# Patient Record
Sex: FEMALE | Race: WHITE | NOT HISPANIC OR LATINO | URBAN - NONMETROPOLITAN AREA
[De-identification: names, ages, dates, MRNs, and addresses within clinical notes are randomized per-mention and may not be internally consistent; named-entity substitution may affect disease eponyms.]

---

## 2023-02-14 ENCOUNTER — OFFICE VISIT (OUTPATIENT)
Dept: INTERNAL MEDICINE | Facility: CLINIC | Age: 43
End: 2023-02-14
Payer: COMMERCIAL

## 2023-02-14 VITALS
TEMPERATURE: 98 F | OXYGEN SATURATION: 97 % | DIASTOLIC BLOOD PRESSURE: 80 MMHG | HEART RATE: 80 BPM | SYSTOLIC BLOOD PRESSURE: 120 MMHG | HEIGHT: 67 IN | BODY MASS INDEX: 23.54 KG/M2 | WEIGHT: 150 LBS

## 2023-02-14 DIAGNOSIS — Z13.29 SCREENING FOR ENDOCRINE DISORDER: ICD-10-CM

## 2023-02-14 DIAGNOSIS — Z12.31 SCREENING MAMMOGRAM FOR BREAST CANCER: ICD-10-CM

## 2023-02-14 DIAGNOSIS — Z13.0 SCREENING FOR BLOOD DISEASE: ICD-10-CM

## 2023-02-14 DIAGNOSIS — Z13.220 SCREENING, LIPID: ICD-10-CM

## 2023-02-14 DIAGNOSIS — B35.4 RINGWORM OF BODY: ICD-10-CM

## 2023-02-14 DIAGNOSIS — M95.8 ACQUIRED DEFORMITY OF CLAVICLE: ICD-10-CM

## 2023-02-14 DIAGNOSIS — Z00.00 WELL ADULT EXAM: Primary | ICD-10-CM

## 2023-02-14 DIAGNOSIS — M25.541 ARTHRALGIA OF RIGHT HAND: ICD-10-CM

## 2023-02-14 PROCEDURE — 99386 PREV VISIT NEW AGE 40-64: CPT | Performed by: FAMILY MEDICINE

## 2023-02-14 NOTE — ASSESSMENT & PLAN NOTE
Unremarkable PE findings. Discussed with the patient routine health maintenance including:  Vaccines, dental/eye health, healthy diet and exercise. Mental health has been addressed today as well. Routine labs have been ordered. Mammogram has been ordered. She will schedule an appointment with gynecology on her own. Declines flu and COVID vaccines today.

## 2023-02-14 NOTE — PROGRESS NOTES
"Kimberley Win is a 43 y.o. female.    Chief Complaint   Patient presents with   • Annual Exam       HPI     Kimberley Win is a 43-year-old female who presents today for an annual physical exam and to establish care.    Patient reports that she has had a spot on her right ankle for 1 or 2 years that constantly itches. She states that the spot returns in different locations on her body, such as her arm, and it remains itchy for a few years before it eventually goes away. Patient notes that she has had a pet cat for years.    Patient notes that her clavicle experiences pain intermittently while she is driving. She describes that the spot on her clavicle feels \"pointy\", and her back experiences tenderness when it is pressed on.    She states that she feels \"pinching\" towards the back of her leg causing intermittent pain which eventually goes away. She notes that the pain occurred more often prior to her regular exercise.    Patient experiences painful knots and potential arthritis in her right hand. Her aunt was diagnosed with rheumatoid arthritis last year.    She last had a Pap smear and mammogram sometime before the COVID-19 lockdown. Patient states that she notices abnormal lumps or bumps on her breasts during her menses. She is up to date on her tetanus vaccine until 2027. She is due for the COVID-19 vaccine, however she declines to obtain it at this time. Patient notes that she is currently up to date on her dental exams and eye exams.She confirms that she is actively exercising and follow a healthy diet.  Denies any concern for anxiety or depression.     The following portions of the patient's history were reviewed and updated as appropriate: allergies, current medications, past family history, past medical history, past social history, past surgical history and problem list.     Past Medical History:   Diagnosis Date   • Arthritis        History reviewed. No pertinent surgical history.    Family History "   Problem Relation Age of Onset   • COPD Mother    • Diabetes Mother    • Early death Mother    • Heart disease Mother    • Stroke Mother    • Breast cancer Mother    • COPD Maternal Grandfather        Social History     Socioeconomic History   • Marital status:    Tobacco Use   • Smoking status: Former     Packs/day: 0.25     Years: 10.00     Pack years: 2.50     Types: Cigarettes     Start date: 1995     Quit date: 2005     Years since quittin.0     Passive exposure: Past   • Smokeless tobacco: Never   Vaping Use   • Vaping Use: Never used   Substance and Sexual Activity   • Alcohol use: Not Currently     Comment: rare   • Drug use: Never   • Sexual activity: Yes     Partners: Male     Birth control/protection: Condom       No Known Allergies    No current outpatient medications on file.    ROS    Review of Systems   Constitutional: Negative for chills, fatigue and fever.   HENT: Negative for congestion, postnasal drip and sore throat.    Eyes: Negative for blurred vision and visual disturbance.   Respiratory: Negative for cough, shortness of breath and wheezing.    Cardiovascular: Negative for chest pain and leg swelling.   Gastrointestinal: Negative for abdominal pain, constipation, diarrhea, nausea and vomiting.   Endocrine: Positive for cold intolerance. Negative for heat intolerance.   Genitourinary: Negative for difficulty urinating.   Musculoskeletal: Positive for arthralgias. Negative for back pain.   Skin: Positive for rash.   Allergic/Immunologic: Negative for environmental allergies.   Neurological: Negative for weakness, numbness and headache.   Hematological: Does not bruise/bleed easily.   Psychiatric/Behavioral: Negative for depressed mood. The patient is not nervous/anxious.        Vitals:    23 1048   BP: 120/80   BP Location: Left arm   Patient Position: Sitting   Cuff Size: Adult   Pulse: 80   Temp: 98 °F (36.7 °C)   SpO2: 97%   Weight: 68 kg (150 lb)   Height: 170.2  "cm (67\")   PainSc: 0-No pain     Body mass index is 23.49 kg/m².    Physical Exam     Physical Exam  Constitutional:       General: She is not in acute distress.     Appearance: Normal appearance. She is well-developed.   HENT:      Head: Normocephalic and atraumatic.      Right Ear: Tympanic membrane and external ear normal.      Left Ear: Tympanic membrane and external ear normal.   Eyes:      Extraocular Movements: Extraocular movements intact.      Conjunctiva/sclera: Conjunctivae normal.      Pupils: Pupils are equal, round, and reactive to light.   Cardiovascular:      Rate and Rhythm: Normal rate and regular rhythm.      Heart sounds: No murmur heard.  Pulmonary:      Effort: Pulmonary effort is normal. No respiratory distress.      Breath sounds: Normal breath sounds. No wheezing.   Abdominal:      General: Bowel sounds are normal. There is no distension.      Palpations: Abdomen is soft.      Tenderness: There is no abdominal tenderness.   Musculoskeletal:         General: Normal range of motion.      Cervical back: Normal range of motion and neck supple.      Comments: She has Keaton's nodes to her 3rd and 4th digit on her right hand. She exhibits prominence of the right clavicular head.   Lymphadenopathy:      Cervical: No cervical adenopathy.   Skin:     General: Skin is warm and dry.      Comments: Slightly erythematous, well demarcated, dry patch of skin to her left anterior lower leg.   Neurological:      Mental Status: She is alert and oriented to person, place, and time.      Cranial Nerves: No cranial nerve deficit.      Deep Tendon Reflexes: Reflexes normal.   Psychiatric:         Mood and Affect: Mood normal.         Behavior: Behavior normal.         Assessment/Plan    Diagnoses and all orders for this visit:    1. Well adult exam (Primary)  Assessment & Plan:  Unremarkable PE findings. Discussed with the patient routine health maintenance including:  Vaccines, dental/eye health, healthy diet " and exercise. Mental health has been addressed today as well. Routine labs have been ordered. Mammogram has been ordered. She will schedule an appointment with gynecology on her own. Declines flu and COVID vaccines today.      2. Acquired deformity of clavicle  Assessment & Plan:  Likely secondary to patient's new exercise regimen, encouraged stretches and exercises at home.       3. Ringworm of body  Assessment & Plan:  Localized to left anterior lower leg, encouraged to apply Lotrimin cream twice daily.      4. Arthralgia of right hand  Assessment & Plan:  Likely osteoarthritis, however, we will rule out rheumatoid arthritis as well.    Orders:  -     CARLOS With / DsDNA, RNP, Sjogrens A / B, Smith  -     Rheumatoid Factor  -     Cyclic Citrul Peptide Antibody, IgG / IgA    5. Screening mammogram for breast cancer  -     Mammo Screening Digital Tomosynthesis Bilateral With CAD    6. Screening, lipid  -     Lipid Panel    7. Screening for blood disease  -     CBC & Differential    8. Screening for endocrine disorder  -     Comprehensive Metabolic Panel      No orders of the defined types were placed in this encounter.      No orders of the defined types were placed in this encounter.      Return in about 1 year (around 2/14/2024) for Annual.      Dorinda Covington DO       Transcribed from ambient dictation for Dorinda Covington DO by Tifafni Lagos.  02/14/23   15:07 EST    Patient or patient representative verbalized consent to the visit recording.  I have personally performed the services described in this document as transcribed by the above individual, and it is both accurate and complete.  Dorinda Covington DO  2/15/2023  06:22 EST

## 2023-02-15 LAB
ALBUMIN SERPL-MCNC: 4.8 G/DL (ref 3.5–5.2)
ALBUMIN/GLOB SERPL: 1.8 G/DL
ALP SERPL-CCNC: 47 U/L (ref 39–117)
ALT SERPL-CCNC: 12 U/L (ref 1–33)
ANA SER QL: NEGATIVE
AST SERPL-CCNC: 15 U/L (ref 1–32)
BASOPHILS # BLD AUTO: 0.03 10*3/MM3 (ref 0–0.2)
BASOPHILS NFR BLD AUTO: 0.5 % (ref 0–1.5)
BILIRUB SERPL-MCNC: 0.5 MG/DL (ref 0–1.2)
BUN SERPL-MCNC: 11 MG/DL (ref 6–20)
BUN/CREAT SERPL: 15.7 (ref 7–25)
CALCIUM SERPL-MCNC: 9.4 MG/DL (ref 8.6–10.5)
CCP IGA+IGG SERPL IA-ACNC: 2 UNITS (ref 0–19)
CHLORIDE SERPL-SCNC: 105 MMOL/L (ref 98–107)
CHOLEST SERPL-MCNC: 214 MG/DL (ref 0–200)
CO2 SERPL-SCNC: 25.5 MMOL/L (ref 22–29)
CREAT SERPL-MCNC: 0.7 MG/DL (ref 0.57–1)
EGFRCR SERPLBLD CKD-EPI 2021: 109.5 ML/MIN/1.73
EOSINOPHIL # BLD AUTO: 0.07 10*3/MM3 (ref 0–0.4)
EOSINOPHIL NFR BLD AUTO: 1.2 % (ref 0.3–6.2)
ERYTHROCYTE [DISTWIDTH] IN BLOOD BY AUTOMATED COUNT: 12.3 % (ref 12.3–15.4)
GLOBULIN SER CALC-MCNC: 2.7 GM/DL
GLUCOSE SERPL-MCNC: 95 MG/DL (ref 65–99)
HCT VFR BLD AUTO: 37.1 % (ref 34–46.6)
HDLC SERPL-MCNC: 89 MG/DL (ref 40–60)
HGB BLD-MCNC: 12.5 G/DL (ref 12–15.9)
IMM GRANULOCYTES # BLD AUTO: 0.01 10*3/MM3 (ref 0–0.05)
IMM GRANULOCYTES NFR BLD AUTO: 0.2 % (ref 0–0.5)
LDLC SERPL CALC-MCNC: 110 MG/DL (ref 0–100)
LYMPHOCYTES # BLD AUTO: 1.77 10*3/MM3 (ref 0.7–3.1)
LYMPHOCYTES NFR BLD AUTO: 29.7 % (ref 19.6–45.3)
MCH RBC QN AUTO: 30.9 PG (ref 26.6–33)
MCHC RBC AUTO-ENTMCNC: 33.7 G/DL (ref 31.5–35.7)
MCV RBC AUTO: 91.8 FL (ref 79–97)
MONOCYTES # BLD AUTO: 0.39 10*3/MM3 (ref 0.1–0.9)
MONOCYTES NFR BLD AUTO: 6.6 % (ref 5–12)
NEUTROPHILS # BLD AUTO: 3.68 10*3/MM3 (ref 1.7–7)
NEUTROPHILS NFR BLD AUTO: 61.8 % (ref 42.7–76)
NRBC BLD AUTO-RTO: 0 /100 WBC (ref 0–0.2)
PLATELET # BLD AUTO: 274 10*3/MM3 (ref 140–450)
POTASSIUM SERPL-SCNC: 4.1 MMOL/L (ref 3.5–5.2)
PROT SERPL-MCNC: 7.5 G/DL (ref 6–8.5)
RBC # BLD AUTO: 4.04 10*6/MM3 (ref 3.77–5.28)
RHEUMATOID FACT SERPL-ACNC: 10.2 IU/ML
SODIUM SERPL-SCNC: 140 MMOL/L (ref 136–145)
TRIGL SERPL-MCNC: 86 MG/DL (ref 0–150)
VLDLC SERPL CALC-MCNC: 15 MG/DL (ref 5–40)
WBC # BLD AUTO: 5.95 10*3/MM3 (ref 3.4–10.8)

## 2023-03-28 ENCOUNTER — TELEPHONE (OUTPATIENT)
Dept: INTERNAL MEDICINE | Facility: CLINIC | Age: 43
End: 2023-03-28

## 2023-03-28 NOTE — TELEPHONE ENCOUNTER
Caller: Kimberley Win    Relationship to patient: Self    Best call back number: [phone]    PATIENT HAS AN APPOINTMENT WITH THE OBGYN THAT DR GARCIA REFERRED HER TO, BUT SHE WOULD LIKE TO GET IN SOONER THAN 4/19/23 OR IF SHE COULD RECOMMEND ANOTHER DOCTOR.  PLEASE ADVISE.

## 2023-04-19 ENCOUNTER — OFFICE VISIT (OUTPATIENT)
Dept: OBSTETRICS AND GYNECOLOGY | Facility: CLINIC | Age: 43
End: 2023-04-19
Payer: COMMERCIAL

## 2023-04-19 VITALS
DIASTOLIC BLOOD PRESSURE: 76 MMHG | BODY MASS INDEX: 23.23 KG/M2 | HEIGHT: 67 IN | SYSTOLIC BLOOD PRESSURE: 122 MMHG | WEIGHT: 148 LBS

## 2023-04-19 DIAGNOSIS — N63.24 MASS OF LOWER INNER QUADRANT OF LEFT BREAST: ICD-10-CM

## 2023-04-19 DIAGNOSIS — Z01.419 WELL WOMAN EXAM: Primary | ICD-10-CM

## 2023-04-19 NOTE — PROGRESS NOTES
Annual Well Woman Visit    Subjective   Chief Complaint   Patient presents with   • Annual Exam     Left breast lump.    Last PAP 3+ years ago- WNL  Last MMG 3+ years ago- WNL     Kimberley Win is a 43 y.o.  presenting to be seen for an annual well woman visit. She reports a left breast lump that has been present for at least 6 months - she had a cyst in the same location and had fluid aspirated about 7 years ago. She has had several deaths in the family and therefore was not able to keep close track of the lump over the past several months, but she recently realized it was persistent and hardened. Denies any tenderness. She does have occasional sharp pains in her left breast.    Menarche: age 10  Periods occur monthly and are regular, last 4 days with heavy flow initially and significant cramping.   Previously took Depo in her 20s.     She denies pelvic pain, sexual dysfunction or dyspareunia. She denies urinary complaints including incontinence.    OB Hx: G0  Contraception: condoms  Pap smear: Prior to COVID, updated today. Denies h/o abnormal  Mammogram: Prior to COVID, has mammogram scheduled 23. Reports one prior mammogram that was normal.  Colonoscopy: N/A  DEXA Scan: N/A    Past Medical History:   Diagnosis Date   • Arthritis    • Osteoarthritis    • Varicella When I was a child     History reviewed. No pertinent surgical history.     Family History   Problem Relation Age of Onset   • Breast cancer Mother 55        Stage 4   • COPD Mother    • Diabetes Mother    • Early death Mother    • Heart disease Mother    • Stroke Mother    • COPD Maternal Grandfather    • Rheum arthritis Maternal Aunt    • Breast cancer Other      Social History     Tobacco Use   • Smoking status: Former     Packs/day: 0.25     Years: 10.00     Pack years: 2.50     Types: Cigarettes     Start date: 1995     Quit date: 2005     Years since quittin.1     Passive exposure: Past   • Smokeless tobacco: Never  "  Vaping Use   • Vaping Use: Never used   Substance Use Topics   • Alcohol use: Yes     Comment: rare   • Drug use: Never     (Not in a hospital admission)    Patient has no known allergies.  No current outpatient medications on file prior to visit.     No current facility-administered medications on file prior to visit.     Social History    Tobacco Use      Smoking status: Former        Packs/day: 0.25        Years: 10.00        Pack years: 2.5        Types: Cigarettes        Start date: 1995        Quit date: 2005        Years since quittin.1        Passive exposure: Past      Smokeless tobacco: Never    Review of Systems  Pertinent items are noted in HPI, all other systems were reviewed and negative       Objective   /76   Ht 170.2 cm (67\")   Wt 67.1 kg (148 lb)   BMI 23.18 kg/m²     Physical Exam:  General Appearance: alert, interactive, and cooperative  Breasts:  Examined in supine position  L breast with solid mass measuring approximately 1-1.5 cm in lower inner quadrant (7:00), otherwise bilateral breasts symmetric without masses or skin dimpling  Nipples normal without inversion, lesions or discharge  There are no palpable axillary nodes  Abdomen: no masses, soft, non-tender, no guarding and no rebound tenderness  Pelvis:  Pelvic: Clinical staff was present for exam  External genitalia:  normal appearance of the external genitalia including Bartholin's and [place]'s glands.  :  urethral meatus normal;  Vagina:  normal pink mucosa without prolapse or lesions.  Cervix:  normal appearance.  Uterus:  normal size, shape and consistency.  Adnexa:  normal bimanual exam of the adnexa.  Rectal:  digital rectal exam not performed; anus visually normal appearing     Assessment & Plan    Annual well woman exam with age appropriate screening      Diagnosis Plan   1. Well woman exam  LIQUID-BASED PAP SMEAR WITH HPV GENOTYPING REGARDLESS OF INTERPRETATION (SHRUTHI,COR,MAD)      2. Mass of lower inner " quadrant of left breast  Mammo Diagnostic Left With CAD        Medications ordered: None, discussed IUD/ other hormonal contraceptive methods for menstrual control - Kimberley will reach out to schedule if she would like to pursue any of these options    Procedures performed: Pap    - Mammogram: Ordered diagnostic study of left breast  - Pap screening guidelines reviewed; ordered  - Yearly clinical breast and pelvic exams recommended regardless of pap recommendations  - Dexa scan: not indicated   - Colonoscopy: not indicated   - Healthy diet and exercise encouraged  - Calcium and Vitamin D requirements reviewed  - Contraception: condoms  - Screening: declines GC/CZ    Follow up for annual visit or sooner with any concerns.    Fabby Miranda MD  Obstetrics and Gynecology  [facility]

## 2023-04-21 ENCOUNTER — PATIENT ROUNDING (BHMG ONLY) (OUTPATIENT)
Dept: OBSTETRICS AND GYNECOLOGY | Facility: CLINIC | Age: 43
End: 2023-04-21
Payer: COMMERCIAL

## 2023-04-21 LAB — REF LAB TEST METHOD: NORMAL

## 2023-04-21 NOTE — PROGRESS NOTES
April 21, 2023    Hello, may I speak with Kimberley Win?    My name is Joanne Hercules    I am  with [facility]  [address], [address]  [place] [address] [phone].    Before we get started may I verify your date of birth? 1980    I am calling to officially welcome you to our practice and ask about your recent visit. Is this a good time to talk? Yes    Tell me about your visit with us. What things went well?  Dr. Miranda was great!  It was a good experience.     We're always looking for ways to make our patients' experiences even better. Do you have recommendations on ways we may improve? No    Overall were you satisfied with your first visit to our practice? Yes       I appreciate you taking the time to speak with me today. Is there anything else I can do for you? No    Thank you, and have a great day.

## 2023-05-11 ENCOUNTER — HOSPITAL ENCOUNTER (OUTPATIENT)
Dept: MAMMOGRAPHY | Facility: HOSPITAL | Age: 43
Discharge: HOME | End: 2023-05-11
Payer: COMMERCIAL

## 2023-05-11 ENCOUNTER — HOSPITAL ENCOUNTER (OUTPATIENT)
Dept: ULTRASOUND IMAGING | Facility: HOSPITAL | Age: 43
Discharge: HOME | End: 2023-05-11
Payer: COMMERCIAL

## 2023-05-11 DIAGNOSIS — N63.24 MASS OF LOWER INNER QUADRANT OF LEFT BREAST: ICD-10-CM

## 2023-05-11 PROCEDURE — 77066 DX MAMMO INCL CAD BI: CPT

## 2023-05-11 PROCEDURE — 76642 ULTRASOUND BREAST LIMITED: CPT

## 2023-05-11 PROCEDURE — G0279 TOMOSYNTHESIS, MAMMO: HCPCS

## 2023-06-01 ENCOUNTER — HOSPITAL ENCOUNTER (OUTPATIENT)
Dept: MAMMOGRAPHY | Facility: HOSPITAL | Age: 43
Discharge: HOME | End: 2023-06-01

## 2023-06-01 ENCOUNTER — HOSPITAL ENCOUNTER (OUTPATIENT)
Dept: ULTRASOUND IMAGING | Facility: HOSPITAL | Age: 43
Discharge: HOME | End: 2023-06-01

## 2023-06-01 DIAGNOSIS — N63.24 MASS OF LOWER INNER QUADRANT OF LEFT BREAST: ICD-10-CM

## 2023-06-01 PROCEDURE — 76942 ECHO GUIDE FOR BIOPSY: CPT

## 2023-06-05 ENCOUNTER — TELEPHONE (OUTPATIENT)
Dept: INTERNAL MEDICINE | Facility: CLINIC | Age: 43
End: 2023-06-05
Payer: COMMERCIAL

## 2023-06-05 DIAGNOSIS — C50.919 METASTATIC INFILTRATING DUCTAL CARCINOMA TO LYMPH NODE: ICD-10-CM

## 2023-06-05 DIAGNOSIS — C77.9 METASTATIC INFILTRATING DUCTAL CARCINOMA TO LYMPH NODE: ICD-10-CM

## 2023-06-05 DIAGNOSIS — C50.919 INFILTRATING DUCTAL CARCINOMA OF BREAST, UNSPECIFIED LATERALITY: Primary | ICD-10-CM

## 2023-06-05 LAB
REF LAB TEST METHOD: NORMAL
REF LAB TEST METHOD: NORMAL

## 2023-06-05 NOTE — TELEPHONE ENCOUNTER
Has radiology contacted the patient with the results?  The radiologist had ordered follow up imaging and the biopsy.  Dr. Miranda reordered the initial mammogram as diagnostic.  The screening mammogram that I had ordered was not completed.

## 2023-06-06 NOTE — TELEPHONE ENCOUNTER
Called patient and discussed the report with the patient.  Advised that an urgent referral will be placed to Dr. Daxa Anderson in [place] to discuss excision.  Patient understanding of this.

## 2023-06-06 NOTE — TELEPHONE ENCOUNTER
Patient called and asked if someone could call her back about her mammogram results.  Please advise.  Phone number verified.

## 2023-06-16 ENCOUNTER — TRANSCRIBE ORDERS (OUTPATIENT)
Dept: PHYSICAL THERAPY | Facility: HOSPITAL | Age: 43
End: 2023-06-16
Payer: COMMERCIAL

## 2023-06-16 DIAGNOSIS — C50.919 MALIGNANT NEOPLASM OF FEMALE BREAST, UNSPECIFIED ESTROGEN RECEPTOR STATUS, UNSPECIFIED LATERALITY, UNSPECIFIED SITE OF BREAST: Primary | ICD-10-CM

## 2023-06-20 PROBLEM — Z17.0 MALIGNANT NEOPLASM OF CENTRAL PORTION OF LEFT BREAST IN FEMALE, ESTROGEN RECEPTOR POSITIVE: Status: ACTIVE | Noted: 2023-06-20

## 2023-06-20 PROBLEM — C50.112 MALIGNANT NEOPLASM OF CENTRAL PORTION OF LEFT BREAST IN FEMALE, ESTROGEN RECEPTOR POSITIVE: Status: ACTIVE | Noted: 2023-06-20

## 2023-06-21 PROBLEM — C50.412 MALIGNANT NEOPLASM OF UPPER-OUTER QUADRANT OF LEFT BREAST IN FEMALE, ESTROGEN RECEPTOR POSITIVE: Status: ACTIVE | Noted: 2023-06-21

## 2023-06-21 PROBLEM — Z17.0 MALIGNANT NEOPLASM OF UPPER-OUTER QUADRANT OF LEFT BREAST IN FEMALE, ESTROGEN RECEPTOR POSITIVE: Status: ACTIVE | Noted: 2023-06-21

## 2023-07-18 ENCOUNTER — PATIENT OUTREACH (OUTPATIENT)
Dept: ONCOLOGY | Facility: HOSPITAL | Age: 43
End: 2023-07-18

## 2023-07-24 ENCOUNTER — OFFICE VISIT (OUTPATIENT)
Dept: ONCOLOGY | Facility: CLINIC | Age: 43
End: 2023-07-24
Payer: COMMERCIAL

## 2023-07-24 VITALS
HEART RATE: 81 BPM | BODY MASS INDEX: 24.48 KG/M2 | SYSTOLIC BLOOD PRESSURE: 116 MMHG | OXYGEN SATURATION: 99 % | TEMPERATURE: 97.7 F | HEIGHT: 67 IN | DIASTOLIC BLOOD PRESSURE: 57 MMHG | WEIGHT: 156 LBS | RESPIRATION RATE: 12 BRPM

## 2023-07-24 DIAGNOSIS — Z17.0 MALIGNANT NEOPLASM OF CENTRAL PORTION OF LEFT BREAST IN FEMALE, ESTROGEN RECEPTOR POSITIVE: Primary | ICD-10-CM

## 2023-07-24 DIAGNOSIS — C50.112 MALIGNANT NEOPLASM OF CENTRAL PORTION OF LEFT BREAST IN FEMALE, ESTROGEN RECEPTOR POSITIVE: Primary | ICD-10-CM

## 2023-07-24 DIAGNOSIS — T45.1X5A CHEMOTHERAPY-INDUCED NAUSEA: ICD-10-CM

## 2023-07-24 DIAGNOSIS — R11.0 CHEMOTHERAPY-INDUCED NAUSEA: ICD-10-CM

## 2023-07-24 PROCEDURE — 99214 OFFICE O/P EST MOD 30 MIN: CPT | Performed by: INTERNAL MEDICINE

## 2023-07-24 NOTE — PROGRESS NOTES
Hematology and Oncology [place]  Office number [phone]    Fax number [phone]     Follow-up     Date:      Patient Name: Kimberley Win  MRN: [id number]  : 1980    Referring Physician: Dr. Daxa Anderson MD    Chief Complaint: left breast cancer    Cancer Staging:  Cancer Staging   Stage IIA (cT2, cN1, cM0, G2, ER+, DC+, HER2-)    History of Present Illness: Kimberley Win is a pleasant 43 y.o. female who presented for evaluation of left breast cancer.     She notes a history of cystic breast disease for many years. She notes a new breast mass that became progressively harder and associated with pain prompting diagnostic workup.     Diagnostic mammogram 23 showed a dense, spiculated mass in the left breast which on ultrasound corresponded to a 3:00 3 cm hypoechoic mass with internal vascularity. In the 7:00 left breast there was a 7 mm mass corresponding on US to a cluster of cysts spanning 1.4 cm. In the left breast there was a 1.7 cm LN with multiple smaller LN.     Left breast biopsy showed grade 2 invasive ductal carcinoma (ER 90%, DC 10%, HER 2 negative 0+ by IHC). Lymph node FNA was postive for malignancy, metastatic ductal carcinoma.    Breast cancer risk profile:  Age of menarche:10  G0; infertility  Age of menopause: premenopausal   Family history of breast, ovarian, prostate or pancreatic cancer: mom stage IV breast cancer in her 50s. M Great Aunt, breast cancer.     Treatment history:  Dose dense AC followed by weekly taxol: C1, 2023    Interval history:  She is here for toxicity check after cycle 2. Doing much better in terms of nausea and wants to continue Sancuso with cycle 3-4. However, the copay was unaffordable.     Nausea was well controlled on Sancuso patch. Was unable to get insurance coverage for it. Had no breakthrough while patch was in place. Took patch off Saturday and switched to zofran TID which is currently controlling it. Started her  menstrual cycle yesterday, cramps. Flow is regular.  Had some tenderness and pain in left breast at onset of her period.   Bone pain was controlled on tylenol and ibuprofen.   GERD was severe, kept her from sleeping. This was better controlled after I called in scheduled omeprazole with evening meal   Mild diarrhea with menses. Not using Miralax.   Mild blood tinged epistaxis  Mild pain at port improving    Past Medical History:   Past Medical History:   Diagnosis Date    Arthritis     Breast cancer 2023    Left Breast    Malignant neoplasm of central portion of left breast in female, estrogen receptor positive 2023    Osteoarthritis     Varicella When I was a child   No personal history of myocardial infarction, cerebrovascular event, or venous thromboembolism.  Osteoarthritis in her hands. No personal history of autoimmune disease. Fhx of RA in her aunt    Past Surgical History:   Past Surgical History:   Procedure Laterality Date    US GUIDED LYMPH NODE BIOPSY  2023       Family History:   Family History   Problem Relation Age of Onset    Breast cancer Mother 55        Stage 4    COPD Mother     Diabetes Mother     Early death Mother     Heart disease Mother     Stroke Mother     Rheum arthritis Maternal Aunt     Breast cancer Paternal Aunt     COPD Maternal Grandfather     Breast cancer Other        Social History:   Social History     Socioeconomic History    Marital status:    Tobacco Use    Smoking status: Former     Packs/day: 0.25     Years: 10.00     Pack years: 2.50     Types: Cigarettes     Start date: 1995     Quit date: 2005     Years since quittin.4     Passive exposure: Past    Smokeless tobacco: Never   Vaping Use    Vaping Use: Never used   Substance and Sexual Activity    Alcohol use: Yes     Comment: rare    Drug use: Never    Sexual activity: Yes     Partners: Male     Birth control/protection: Condom   , lives in [place].  "    Medications:     Current Outpatient Medications:     diphenhydrAMINE HCl, Sleep, (ZzzQuil) 25 MG capsule, , Disp: , Rfl:     granisetron (SANCUSO) 3.1 MG/24HR, Place 1 patch on the skin as directed by provider 1 (One) Time., Disp: , Rfl:     ibuprofen (ADVIL,MOTRIN) 200 MG tablet, Take 1 tablet by mouth Every 6 (Six) Hours As Needed for Mild Pain., Disp: , Rfl:     lidocaine-prilocaine (EMLA) 2.5-2.5 % cream, Apply 1 application topically to the appropriate area as directed As Needed (45-60 minutes prior to port access.  Cover with saran/plastic wrap.)., Disp: 30 g, Rfl: 3    OLANZapine (zyPREXA) 5 MG tablet, Take 1 tablet by mouth Every Night. Take on days 2, 3 and 4 after chemotherapy., Disp: 3 tablet, Rfl: 3    omeprazole (priLOSEC) 40 MG capsule, Take 1 capsule by mouth Daily. 30 min prior to evening., Disp: 30 capsule, Rfl: 5    ondansetron (ZOFRAN) 8 MG tablet, Take 1 tablet by mouth 3 (Three) Times a Day As Needed for Nausea or Vomiting (DO NOT USE WHILE SANCUSO IN PLACE)., Disp: 30 tablet, Rfl: 5    prochlorperazine (COMPAZINE) 5 MG tablet, Take 1-2 tablets by mouth Every 6 (Six) Hours As Needed for Nausea (USE 2nd; ok to use on evening of chemo)., Disp: 30 tablet, Rfl: 1    Allergies:   Allergies   Allergen Reactions    Sunblock [Solbar Pf Spf15] Rash       Objective     Vital Signs:   Vitals:    07/24/23 0910   BP: 116/57   Pulse: 81   Resp: 12   Temp: 97.7 °F (36.5 °C)   TempSrc: Temporal   SpO2: 99%   Weight: 70.8 kg (156 lb)   Height: 170.2 cm (67\")   PainSc: 0-No pain    Body mass index is 24.43 kg/m².   Pain Score    07/24/23 0910   PainSc: 0-No pain       ECOG Performance Status: 0    Physical Exam:   General: No acute distress. Well appearing   HEENT: Normocephalic, atraumatic. Sclera anicteric.   Neck: supple, no adenopathy.   Cardiovascular: regular rate and rhythm. No murmurs.   Respiratory: Normal rate. Clear to auscultation bilaterally  Abdomen: Soft, nontender, non distended with " normoactive bowel sounds  Lymph: no cervical, supraclavicular or axillary adenopathy  Neuro: Alert and oriented x 3. No focal deficits.   Ext: Symmetric, no swelling.   Psych: Euthymic  Breast: 4 x 3.5 cm 3:00 left breast mass (now 3.5x 3 cm firm). Small deep mobile LN not palpable today  Skin: No rash    Laboratory/Imaging Reviewed:   Infusion on 07/18/2023   Component Date Value Ref Range Status    Glucose 07/18/2023 102 (H)  65 - 99 mg/dL Final    BUN 07/18/2023 13  6 - 20 mg/dL Final    Creatinine 07/18/2023 0.58  0.57 - 1.00 mg/dL Final    Sodium 07/18/2023 137  136 - 145 mmol/L Final    Potassium 07/18/2023 4.1  3.5 - 5.2 mmol/L Final    Chloride 07/18/2023 100  98 - 107 mmol/L Final    CO2 07/18/2023 23.6  22.0 - 29.0 mmol/L Final    Calcium 07/18/2023 9.1  8.6 - 10.5 mg/dL Final    Total Protein 07/18/2023 7.3  6.0 - 8.5 g/dL Final    Albumin 07/18/2023 4.4  3.5 - 5.2 g/dL Final    ALT (SGPT) 07/18/2023 23  1 - 33 U/L Final    AST (SGOT) 07/18/2023 18  1 - 32 U/L Final    Alkaline Phosphatase 07/18/2023 86  39 - 117 U/L Final    Total Bilirubin 07/18/2023 <0.2  0.0 - 1.2 mg/dL Final    Globulin 07/18/2023 2.9  gm/dL Final    A/G Ratio 07/18/2023 1.5  g/dL Final    BUN/Creatinine Ratio 07/18/2023 22.4  7.0 - 25.0 Final    Anion Gap 07/18/2023 13.4  5.0 - 15.0 mmol/L Final    eGFR 07/18/2023 114.6  >60.0 mL/min/1.73 Final    WBC 07/18/2023 10.77  3.40 - 10.80 10*3/mm3 Final    RBC 07/18/2023 3.97  3.77 - 5.28 10*6/mm3 Final    Hemoglobin 07/18/2023 11.8 (L)  12.0 - 15.9 g/dL Final    Hematocrit 07/18/2023 34.7  34.0 - 46.6 % Final    MCV 07/18/2023 87.4  79.0 - 97.0 fL Final    MCH 07/18/2023 29.7  26.6 - 33.0 pg Final    MCHC 07/18/2023 34.0  31.5 - 35.7 g/dL Final    RDW 07/18/2023 12.0 (L)  12.3 - 15.4 % Final    RDW-SD 07/18/2023 38.8  37.0 - 54.0 fl Final    MPV 07/18/2023 8.8  6.0 - 12.0 fL Final    Platelets 07/18/2023 249  140 - 450 10*3/mm3 Final    Neutrophil % 07/18/2023 63.0  42.7 - 76.0 % Final     Lymphocyte % 07/18/2023 21.2  19.6 - 45.3 % Final    Monocyte % 07/18/2023 9.8  5.0 - 12.0 % Final    Eosinophil % 07/18/2023 0.6  0.3 - 6.2 % Final    Basophil % 07/18/2023 0.6  0.0 - 1.5 % Final    Immature Grans % 07/18/2023 4.8 (H)  0.0 - 0.5 % Final    Neutrophils, Absolute 07/18/2023 6.78  1.70 - 7.00 10*3/mm3 Final    Lymphocytes, Absolute 07/18/2023 2.28  0.70 - 3.10 10*3/mm3 Final    Monocytes, Absolute 07/18/2023 1.06 (H)  0.10 - 0.90 10*3/mm3 Final    Eosinophils, Absolute 07/18/2023 0.06  0.00 - 0.40 10*3/mm3 Final    Basophils, Absolute 07/18/2023 0.07  0.00 - 0.20 10*3/mm3 Final    Immature Grans, Absolute 07/18/2023 0.52 (H)  0.00 - 0.05 10*3/mm3 Final    nRBC 07/18/2023 0.0  0.0 - 0.2 /100 WBC Final       Adult Transthoracic Echo Complete W/ Cont if Necessary Per Protocol    Result Date: 6/29/2023  Narrative: 1.  Normal left ventricular size and systolic function, LVEF 55-60%. 2.  Normal LV diastolic filling pattern. 3.  Normal right ventricular size and systolic function. 4.  Normal left and right atrial size. 5.  Trace MR, TR, and PI. 6.  Normal LV global longitudinal strain pattern.     NM Bone Scan Whole Body    Result Date: 6/26/2023  Narrative: NUCLEAR MEDICINE BONE SCAN WHOLE BODY  HISTORY: Breast cancer staging; C50.112-Malignant neoplasm of central portion of left female breast; Z17.0-Estrogen receptor positive status (ER+).  COMPARISON: Existing relevant imaging studies: No prior bone scan.  PROCEDURE: The patient was injected with 26.7 mCi of technetium 99m MDP.  Three hour delayed images were obtained.  FINDINGS:  Minimal increased activity is seen of the sternoclavicular joints, typically degenerative at this location. Otherwise no abnormal tracer activity is identified to suggest occult fracture or metastatic disease.      Impression: No findings to indicate metastatic bone disease .   This report was signed and finalized on 6/26/2023 5:18 PM by Trae De MD.    XR Chest 1  View    Result Date: 6/28/2023  Narrative: XR CHEST 1 VW Date of Exam: 6/28/2023 9:13 AM EDT Indication: C50.812 Comparison: CT chest June 23, 2023 Findings: A port catheter is noted with its tip in the superior vena cava. The heart is enlarged. The lungs seem clear. There are no pleural effusions.     Impression: Impression: 1.No definite pneumothorax. 2.Some suggested cardiomegaly. Electronically Signed: Kev Ortiz  6/28/2023 9:43 AM EDT  Workstation ID: [id number]    MRI Breast Bilateral Diagnostic With & Without Contrast    Result Date: 6/28/2023  Narrative: BILATERAL BREAST MRI   HISTORY: 43-year-old female with a new diagnosis of invasive ductal carcinoma, ER positive TX positive HER2/violeta negative, status post ultrasound-guided core biopsy of a palpable left breast mass at at . Marking clip was placed by the biopsying radiologist. The patient also underwent ultrasound-guided fine-needle aspiration of a prominent left axillary lymph node cytology consistent with metastatic disease. No notation made as to whether marking clip was placed. Breast MRI to evaluate extent of disease in the left breast and to screen the contralateral right breast.  TECHNIQUE:  MRI was performed on a 1.5 Philomena magnet utilizing an 8 channel Sentinelle breast coil.  Pre-contrast spin-echo T1 weighted and T2 weighted sequences were obtained in the axial plane.  Routine dynamic images were performed following the administration of 14 ml of Multihance contrast.  Four postcontrast runs were obtained. No contrast complications occurred.  Delayed high resolution post contrast T1 weighted sagittal images were also obtained.  A CAD system (BY[employer]!) was utilized for data analysis.   COMPARISON: Outside diagnostic mammogram and ultrasound images dated 5/11/2020 [id number]. No prior MRIs are available for comparison.   FINDINGS: There is normal vascular enhancement and mild background enhancement. The breast tissue is  heterogeneously dense.  On T2 weighted sequences multiple cysts are identified in both breasts.  LEFT BREAST: Corresponding to the index malignancy is a heterogeneously enhancing spiculated mass measuring 2.8 x 2 x 3 cm located at 3:00 in the left breast demonstrating washout kinetics on kinetic analysis. Signal void from marking clip placed at the time of the biopsy is identified along the anterior aspect of this mass. There is no evidence of chest wall or skin invasion. There are no other areas of abnormal morphology or enhancement in the left breast indicate multifocal or multicentric disease.  RIGHT BREAST: There are no areas of abnormal morphology or enhancement in the right breast to indicate breast malignancy.  Evaluation of axillary lymph nodes demonstrate several enlarged left axillary lymph nodes suspicious for metastatic disease. The largest lymph node which has lost its fatty hilum measures 1.6 x 1 x 1.8 cm.      Impression: BI-RADS 6 known biopsy-proven malignancy left breast with FNA evidence of metastatic disease to the left axilla. There is no evidence of multifocal multicentric or contralateral disease  RECOMMENDATIONS: Surgical and oncology follow-up.  BI-RADS CATEGORY: BI-RADS 6 known biopsy-proven malignancy left breast       This report was finalized on 6/28/2023 10:03 AM by Dr. Elif Pacheco MD.       Procedures    Assessment / Plan      Assessment/Plan:     1.  Malignant neoplasm of central portion of left breast in female, estrogen receptor positive   2.  Chemotherapy monitoring  -We reviewed the potential role for neoadjuvant treatment.  The advantages include potential for downstaging the tumor to allow for breast conservation, and the added prognostic value of assessing pathologic response to chemotherapy.  There is no clear survival advantage to neoadjuvant over adjuvant administration, but outcomes appear equivalent when followed by standard surgical treatment.  She desires lumpectomy  and is currently not a candidate for breast conservation at current time. Therefore she was referred for consideration of neoadjuvant chemotherapy.   We discussed standard treatment options of 1) dose dense Adriamycin and Cytoxan followed by weekly Taxol or 2) Taxotere and Cytoxan. We discussed differences in schedule and toxicities. We specifically discussed an increased risk for cardiotoxicity and late bone marrow disorders with the addition of adriamycin. Based on young age and LN positive malignancy recommended dose dense AC followed by weekly taxol.  -We reviewed her baseline echocardiogram which shows an ejection fraction of 55 to 60% with normal strain and is adequate to proceed with treatment.  -We reviewed her metastatic work-up which shows multiple axillary lymph nodes, but no evidence of metastatic disease in the chest abdomen or pelvis.  Bone scan negative.  -MRI reviewed and shows no evidence of multicentric or contralateral disease  We discussed the goals of chemotherapy being cure.  We reviewed the chemotherapy schedule and its side effects including but not limited to alopecia, myelosuppression, infection, fevers, nausea, vomiting, diarrhea, mucositis, dehydration, fatigue, heart disease, neuropathy, and late cancers.    -Metastatic workup negative  -She is here for tox check after cycle 2. Side effects better controlled. Tolerating therapy well. Vitals stable. Anticipate cycle 2 in 1 week pending repeat labs.      3.  Chemotherapy-induced nausea  -Better with sancuso. Place sancuso 24 hours prior to chemo and wear for 7 days. 2 samples dispensed today.   -Added compazine afternoon of chemo.   -Schedule zofran TID after patch removed until symptoms resolve.   -Continue aggressive supportive care.     4. GERD  -Now on scheduled PPI. Severe breakthrough symptoms improved with addition of higher strength PPI.    5. Neulasta bone pain  -OK to use tylenol as needed. Call if poorly controlled. Continues  claritin premedication.  -Continue  6.  Mild epistaxis/Rhinorrhea  -Trial of saline mist spray and cool mist humidifier at night. Continue  -Discussed urgent evaluation for any prolonged or severe episodes of epistaxis that do not resolve with pressure      7. Access   Port     Follow Up:   2 weeks     Martha Olguin MD  Hematology and Oncology

## 2023-07-31 ENCOUNTER — INFUSION (OUTPATIENT)
Dept: ONCOLOGY | Facility: HOSPITAL | Age: 43
End: 2023-07-31
Payer: COMMERCIAL

## 2023-07-31 ENCOUNTER — OFFICE VISIT (OUTPATIENT)
Dept: ONCOLOGY | Facility: CLINIC | Age: 43
End: 2023-07-31
Payer: COMMERCIAL

## 2023-07-31 VITALS
WEIGHT: 156 LBS | DIASTOLIC BLOOD PRESSURE: 70 MMHG | OXYGEN SATURATION: 99 % | SYSTOLIC BLOOD PRESSURE: 127 MMHG | HEIGHT: 67 IN | RESPIRATION RATE: 12 BRPM | HEART RATE: 80 BPM | BODY MASS INDEX: 24.48 KG/M2 | TEMPERATURE: 96.9 F

## 2023-07-31 DIAGNOSIS — T45.1X5A CHEMOTHERAPY-INDUCED NAUSEA: ICD-10-CM

## 2023-07-31 DIAGNOSIS — Z17.0 MALIGNANT NEOPLASM OF CENTRAL PORTION OF LEFT BREAST IN FEMALE, ESTROGEN RECEPTOR POSITIVE: Primary | ICD-10-CM

## 2023-07-31 DIAGNOSIS — R11.0 CHEMOTHERAPY-INDUCED NAUSEA: ICD-10-CM

## 2023-07-31 DIAGNOSIS — C50.112 MALIGNANT NEOPLASM OF CENTRAL PORTION OF LEFT BREAST IN FEMALE, ESTROGEN RECEPTOR POSITIVE: Primary | ICD-10-CM

## 2023-07-31 LAB
ALBUMIN SERPL-MCNC: 4.4 G/DL (ref 3.5–5.2)
ALBUMIN/GLOB SERPL: 1.4 G/DL
ALP SERPL-CCNC: 100 U/L (ref 39–117)
ALT SERPL W P-5'-P-CCNC: 23 U/L (ref 1–33)
ANION GAP SERPL CALCULATED.3IONS-SCNC: 12.5 MMOL/L (ref 5–15)
AST SERPL-CCNC: 20 U/L (ref 1–32)
BILIRUB SERPL-MCNC: <0.2 MG/DL (ref 0–1.2)
BUN SERPL-MCNC: 19 MG/DL (ref 6–20)
BUN/CREAT SERPL: 30.6 (ref 7–25)
CALCIUM SPEC-SCNC: 9.3 MG/DL (ref 8.6–10.5)
CHLORIDE SERPL-SCNC: 102 MMOL/L (ref 98–107)
CO2 SERPL-SCNC: 25.5 MMOL/L (ref 22–29)
CREAT SERPL-MCNC: 0.62 MG/DL (ref 0.57–1)
DEPRECATED RDW RBC AUTO: 39.1 FL (ref 37–54)
EGFRCR SERPLBLD CKD-EPI 2021: 112.8 ML/MIN/1.73
EOSINOPHIL # BLD MANUAL: 0.14 10*3/MM3 (ref 0–0.4)
EOSINOPHIL NFR BLD MANUAL: 1 % (ref 0.3–6.2)
ERYTHROCYTE [DISTWIDTH] IN BLOOD BY AUTOMATED COUNT: 12.5 % (ref 12.3–15.4)
GLOBULIN UR ELPH-MCNC: 3.1 GM/DL
GLUCOSE SERPL-MCNC: 91 MG/DL (ref 65–99)
HCT VFR BLD AUTO: 33.3 % (ref 34–46.6)
HGB BLD-MCNC: 11.2 G/DL (ref 12–15.9)
LYMPHOCYTES # BLD MANUAL: 1.66 10*3/MM3 (ref 0.7–3.1)
LYMPHOCYTES NFR BLD MANUAL: 2 % (ref 5–12)
MCH RBC QN AUTO: 29.3 PG (ref 26.6–33)
MCHC RBC AUTO-ENTMCNC: 33.6 G/DL (ref 31.5–35.7)
MCV RBC AUTO: 87.2 FL (ref 79–97)
METAMYELOCYTES NFR BLD MANUAL: 2 % (ref 0–0)
MONOCYTES # BLD: 0.28 10*3/MM3 (ref 0.1–0.9)
MYELOCYTES NFR BLD MANUAL: 4 % (ref 0–0)
NEUTROPHILS # BLD AUTO: 10.91 10*3/MM3 (ref 1.7–7)
NEUTROPHILS NFR BLD MANUAL: 72 % (ref 42.7–76)
NEUTS BAND NFR BLD MANUAL: 7 % (ref 0–5)
NRBC SPEC MANUAL: 1 /100 WBC (ref 0–0.2)
PLAT MORPH BLD: NORMAL
PLATELET # BLD AUTO: 223 10*3/MM3 (ref 140–450)
PMV BLD AUTO: 9 FL (ref 6–12)
POTASSIUM SERPL-SCNC: 4.4 MMOL/L (ref 3.5–5.2)
PROT SERPL-MCNC: 7.5 G/DL (ref 6–8.5)
RBC # BLD AUTO: 3.82 10*6/MM3 (ref 3.77–5.28)
RBC MORPH BLD: NORMAL
SCAN SLIDE: NORMAL
SODIUM SERPL-SCNC: 140 MMOL/L (ref 136–145)
VARIANT LYMPHS NFR BLD MANUAL: 12 % (ref 19.6–45.3)
WBC MORPH BLD: NORMAL
WBC NRBC COR # BLD: 13.81 10*3/MM3 (ref 3.4–10.8)

## 2023-07-31 PROCEDURE — 96366 THER/PROPH/DIAG IV INF ADDON: CPT

## 2023-07-31 PROCEDURE — 96411 CHEMO IV PUSH ADDL DRUG: CPT

## 2023-07-31 PROCEDURE — 99215 OFFICE O/P EST HI 40 MIN: CPT | Performed by: INTERNAL MEDICINE

## 2023-07-31 PROCEDURE — 25010000002 DEXAMETHASONE SODIUM PHOSPHATE 20 MG/5ML SOLUTION: Performed by: INTERNAL MEDICINE

## 2023-07-31 PROCEDURE — 25010000002 FOSAPREPITANT PER 1 MG: Performed by: INTERNAL MEDICINE

## 2023-07-31 PROCEDURE — 96409 CHEMO IV PUSH SNGL DRUG: CPT

## 2023-07-31 PROCEDURE — 96375 TX/PRO/DX INJ NEW DRUG ADDON: CPT

## 2023-07-31 PROCEDURE — 25010000002 CYCLOPHOSPHAMIDE PER 100 MG: Performed by: INTERNAL MEDICINE

## 2023-07-31 PROCEDURE — 85007 BL SMEAR W/DIFF WBC COUNT: CPT

## 2023-07-31 PROCEDURE — 96413 CHEMO IV INFUSION 1 HR: CPT

## 2023-07-31 PROCEDURE — 25010000002 DOXORUBICIN PER 10 MG: Performed by: INTERNAL MEDICINE

## 2023-07-31 PROCEDURE — 80053 COMPREHEN METABOLIC PANEL: CPT

## 2023-07-31 PROCEDURE — 25010000002 HEPARIN LOCK FLUSH PER 10 UNITS: Performed by: INTERNAL MEDICINE

## 2023-07-31 PROCEDURE — 85025 COMPLETE CBC W/AUTO DIFF WBC: CPT

## 2023-07-31 PROCEDURE — 96367 TX/PROPH/DG ADDL SEQ IV INF: CPT

## 2023-07-31 RX ORDER — SODIUM CHLORIDE 0.9 % (FLUSH) 0.9 %
20 SYRINGE (ML) INJECTION AS NEEDED
Status: DISCONTINUED | OUTPATIENT
Start: 2023-07-31 | End: 2023-07-31 | Stop reason: HOSPADM

## 2023-07-31 RX ORDER — DOXORUBICIN HYDROCHLORIDE 2 MG/ML
60 INJECTION, SOLUTION INTRAVENOUS ONCE
Status: COMPLETED | OUTPATIENT
Start: 2023-07-31 | End: 2023-07-31

## 2023-07-31 RX ORDER — SODIUM CHLORIDE 0.9 % (FLUSH) 0.9 %
20 SYRINGE (ML) INJECTION AS NEEDED
OUTPATIENT
Start: 2023-07-31

## 2023-07-31 RX ORDER — OLANZAPINE 5 MG/1
5 TABLET ORAL ONCE
Status: CANCELLED | OUTPATIENT
Start: 2023-08-02 | End: 2023-08-02

## 2023-07-31 RX ORDER — DOXORUBICIN HYDROCHLORIDE 2 MG/ML
60 INJECTION, SOLUTION INTRAVENOUS ONCE
Status: CANCELLED | OUTPATIENT
Start: 2023-08-02

## 2023-07-31 RX ORDER — SODIUM CHLORIDE 9 MG/ML
250 INJECTION, SOLUTION INTRAVENOUS ONCE
Status: CANCELLED | OUTPATIENT
Start: 2023-08-02

## 2023-07-31 RX ORDER — HEPARIN SODIUM (PORCINE) LOCK FLUSH IV SOLN 100 UNIT/ML 100 UNIT/ML
300 SOLUTION INTRAVENOUS ONCE
Status: COMPLETED | OUTPATIENT
Start: 2023-07-31 | End: 2023-07-31

## 2023-07-31 RX ORDER — HEPARIN SODIUM (PORCINE) LOCK FLUSH IV SOLN 100 UNIT/ML 100 UNIT/ML
300 SOLUTION INTRAVENOUS ONCE
OUTPATIENT
Start: 2023-07-31

## 2023-07-31 RX ORDER — OLANZAPINE 5 MG/1
5 TABLET ORAL ONCE
Status: COMPLETED | OUTPATIENT
Start: 2023-07-31 | End: 2023-07-31

## 2023-07-31 RX ORDER — SODIUM CHLORIDE 9 MG/ML
250 INJECTION, SOLUTION INTRAVENOUS ONCE
Status: DISCONTINUED | OUTPATIENT
Start: 2023-07-31 | End: 2023-07-31 | Stop reason: HOSPADM

## 2023-07-31 RX ADMIN — DEXAMETHASONE SODIUM PHOSPHATE 12 MG: 4 INJECTION, SOLUTION INTRA-ARTICULAR; INTRALESIONAL; INTRAMUSCULAR; INTRAVENOUS; SOFT TISSUE at 12:30

## 2023-07-31 RX ADMIN — OLANZAPINE 5 MG: 5 TABLET, FILM COATED ORAL at 12:27

## 2023-07-31 RX ADMIN — Medication 10 ML: at 14:02

## 2023-07-31 RX ADMIN — FOSAPREPITANT 150 MG: 150 INJECTION, POWDER, LYOPHILIZED, FOR SOLUTION INTRAVENOUS at 12:28

## 2023-07-31 RX ADMIN — HEPARIN 500 UNITS: 100 SYRINGE at 14:01

## 2023-07-31 RX ADMIN — CYCLOPHOSPHAMIDE 1090 MG: 500 INJECTION, POWDER, FOR SOLUTION INTRAVENOUS; ORAL at 13:27

## 2023-07-31 RX ADMIN — DOXORUBICIN HYDROCHLORIDE 108 MG: 2 INJECTION, SOLUTION INTRAVENOUS at 13:10

## 2023-08-01 ENCOUNTER — INFUSION (OUTPATIENT)
Dept: ONCOLOGY | Facility: HOSPITAL | Age: 43
End: 2023-08-01
Payer: COMMERCIAL

## 2023-08-01 ENCOUNTER — TELEPHONE (OUTPATIENT)
Dept: ONCOLOGY | Facility: CLINIC | Age: 43
End: 2023-08-01
Payer: COMMERCIAL

## 2023-08-01 VITALS
SYSTOLIC BLOOD PRESSURE: 117 MMHG | OXYGEN SATURATION: 98 % | DIASTOLIC BLOOD PRESSURE: 62 MMHG | TEMPERATURE: 98.2 F | RESPIRATION RATE: 18 BRPM | HEART RATE: 82 BPM

## 2023-08-01 DIAGNOSIS — C50.112 MALIGNANT NEOPLASM OF CENTRAL PORTION OF LEFT BREAST IN FEMALE, ESTROGEN RECEPTOR POSITIVE: Primary | ICD-10-CM

## 2023-08-01 DIAGNOSIS — Z17.0 MALIGNANT NEOPLASM OF CENTRAL PORTION OF LEFT BREAST IN FEMALE, ESTROGEN RECEPTOR POSITIVE: Primary | ICD-10-CM

## 2023-08-01 PROCEDURE — 25010000002 PEGFILGRASTIM-CBQV 6 MG/0.6ML SOLUTION PREFILLED SYRINGE: Performed by: INTERNAL MEDICINE

## 2023-08-01 PROCEDURE — 96372 THER/PROPH/DIAG INJ SC/IM: CPT

## 2023-08-01 RX ADMIN — PEGFILGRASTIM-CBQV 6 MG: 6 INJECTION, SOLUTION SUBCUTANEOUS at 14:20

## 2023-08-14 ENCOUNTER — OFFICE VISIT (OUTPATIENT)
Dept: ONCOLOGY | Facility: CLINIC | Age: 43
End: 2023-08-14
Payer: COMMERCIAL

## 2023-08-14 ENCOUNTER — INFUSION (OUTPATIENT)
Dept: ONCOLOGY | Facility: HOSPITAL | Age: 43
End: 2023-08-14
Payer: COMMERCIAL

## 2023-08-14 VITALS
TEMPERATURE: 97.1 F | HEIGHT: 67 IN | DIASTOLIC BLOOD PRESSURE: 62 MMHG | SYSTOLIC BLOOD PRESSURE: 134 MMHG | WEIGHT: 157 LBS | BODY MASS INDEX: 24.64 KG/M2 | OXYGEN SATURATION: 98 % | RESPIRATION RATE: 12 BRPM | HEART RATE: 77 BPM

## 2023-08-14 DIAGNOSIS — C50.112 MALIGNANT NEOPLASM OF CENTRAL PORTION OF LEFT BREAST IN FEMALE, ESTROGEN RECEPTOR POSITIVE: Primary | ICD-10-CM

## 2023-08-14 DIAGNOSIS — Z17.0 MALIGNANT NEOPLASM OF CENTRAL PORTION OF LEFT BREAST IN FEMALE, ESTROGEN RECEPTOR POSITIVE: Primary | ICD-10-CM

## 2023-08-14 LAB
ALBUMIN SERPL-MCNC: 4.3 G/DL (ref 3.5–5.2)
ALBUMIN/GLOB SERPL: 1.4 G/DL
ALP SERPL-CCNC: 94 U/L (ref 39–117)
ALT SERPL W P-5'-P-CCNC: 15 U/L (ref 1–33)
ANION GAP SERPL CALCULATED.3IONS-SCNC: 9.3 MMOL/L (ref 5–15)
AST SERPL-CCNC: 18 U/L (ref 1–32)
BILIRUB SERPL-MCNC: 0.2 MG/DL (ref 0–1.2)
BUN SERPL-MCNC: 12 MG/DL (ref 6–20)
BUN/CREAT SERPL: 18.8 (ref 7–25)
CALCIUM SPEC-SCNC: 9.3 MG/DL (ref 8.6–10.5)
CHLORIDE SERPL-SCNC: 102 MMOL/L (ref 98–107)
CO2 SERPL-SCNC: 27.7 MMOL/L (ref 22–29)
CREAT SERPL-MCNC: 0.64 MG/DL (ref 0.57–1)
DEPRECATED RDW RBC AUTO: 41.1 FL (ref 37–54)
EGFRCR SERPLBLD CKD-EPI 2021: 111.9 ML/MIN/1.73
ERYTHROCYTE [DISTWIDTH] IN BLOOD BY AUTOMATED COUNT: 13.7 % (ref 12.3–15.4)
GLOBULIN UR ELPH-MCNC: 3 GM/DL
GLUCOSE SERPL-MCNC: 107 MG/DL (ref 65–99)
HCT VFR BLD AUTO: 30.3 % (ref 34–46.6)
HGB BLD-MCNC: 10.2 G/DL (ref 12–15.9)
LYMPHOCYTES # BLD MANUAL: 1.45 10*3/MM3 (ref 0.7–3.1)
LYMPHOCYTES NFR BLD MANUAL: 9 % (ref 5–12)
MCH RBC QN AUTO: 29.8 PG (ref 26.6–33)
MCHC RBC AUTO-ENTMCNC: 33.7 G/DL (ref 31.5–35.7)
MCV RBC AUTO: 88.6 FL (ref 79–97)
METAMYELOCYTES NFR BLD MANUAL: 2 % (ref 0–0)
MONOCYTES # BLD: 1.45 10*3/MM3 (ref 0.1–0.9)
NEUTROPHILS # BLD AUTO: 12.88 10*3/MM3 (ref 1.7–7)
NEUTROPHILS NFR BLD MANUAL: 72 % (ref 42.7–76)
NEUTS BAND NFR BLD MANUAL: 8 % (ref 0–5)
NRBC SPEC MANUAL: 2 /100 WBC (ref 0–0.2)
PLATELET # BLD AUTO: 229 10*3/MM3 (ref 140–450)
PMV BLD AUTO: 8.8 FL (ref 6–12)
POTASSIUM SERPL-SCNC: 4 MMOL/L (ref 3.5–5.2)
PROT SERPL-MCNC: 7.3 G/DL (ref 6–8.5)
RBC # BLD AUTO: 3.42 10*6/MM3 (ref 3.77–5.28)
RBC MORPH BLD: NORMAL
SCAN SLIDE: NORMAL
SMALL PLATELETS BLD QL SMEAR: ADEQUATE
SODIUM SERPL-SCNC: 139 MMOL/L (ref 136–145)
VARIANT LYMPHS NFR BLD MANUAL: 9 % (ref 19.6–45.3)
WBC MORPH BLD: NORMAL
WBC NRBC COR # BLD: 16.1 10*3/MM3 (ref 3.4–10.8)

## 2023-08-14 PROCEDURE — 96409 CHEMO IV PUSH SNGL DRUG: CPT

## 2023-08-14 PROCEDURE — 25010000002 DOXORUBICIN PER 10 MG: Performed by: INTERNAL MEDICINE

## 2023-08-14 PROCEDURE — 25010000002 DEXAMETHASONE SODIUM PHOSPHATE 20 MG/5ML SOLUTION: Performed by: INTERNAL MEDICINE

## 2023-08-14 PROCEDURE — 96367 TX/PROPH/DG ADDL SEQ IV INF: CPT

## 2023-08-14 PROCEDURE — 25010000002 CYCLOPHOSPHAMIDE 1 GM/5ML SOLUTION 5 ML VIAL: Performed by: INTERNAL MEDICINE

## 2023-08-14 PROCEDURE — 96375 TX/PRO/DX INJ NEW DRUG ADDON: CPT

## 2023-08-14 PROCEDURE — 85025 COMPLETE CBC W/AUTO DIFF WBC: CPT

## 2023-08-14 PROCEDURE — 25010000002 HEPARIN LOCK FLUSH PER 10 UNITS: Performed by: INTERNAL MEDICINE

## 2023-08-14 PROCEDURE — 85007 BL SMEAR W/DIFF WBC COUNT: CPT

## 2023-08-14 PROCEDURE — 25010000002 FOSAPREPITANT PER 1 MG: Performed by: INTERNAL MEDICINE

## 2023-08-14 PROCEDURE — 96411 CHEMO IV PUSH ADDL DRUG: CPT

## 2023-08-14 PROCEDURE — 80053 COMPREHEN METABOLIC PANEL: CPT

## 2023-08-14 PROCEDURE — 25010000002 CYCLOPHOSPHAMIDE 500 MG/2.5ML SOLUTION 2.5 ML VIAL: Performed by: INTERNAL MEDICINE

## 2023-08-14 PROCEDURE — 96365 THER/PROPH/DIAG IV INF INIT: CPT

## 2023-08-14 PROCEDURE — 96413 CHEMO IV INFUSION 1 HR: CPT

## 2023-08-14 RX ORDER — DOXORUBICIN HYDROCHLORIDE 2 MG/ML
60 INJECTION, SOLUTION INTRAVENOUS ONCE
Status: CANCELLED | OUTPATIENT
Start: 2023-08-16

## 2023-08-14 RX ORDER — SODIUM CHLORIDE 9 MG/ML
250 INJECTION, SOLUTION INTRAVENOUS ONCE
Status: COMPLETED | OUTPATIENT
Start: 2023-08-14 | End: 2023-08-14

## 2023-08-14 RX ORDER — HEPARIN SODIUM (PORCINE) LOCK FLUSH IV SOLN 100 UNIT/ML 100 UNIT/ML
300 SOLUTION INTRAVENOUS ONCE
OUTPATIENT
Start: 2023-08-14

## 2023-08-14 RX ORDER — SODIUM CHLORIDE 9 MG/ML
250 INJECTION, SOLUTION INTRAVENOUS ONCE
Status: CANCELLED | OUTPATIENT
Start: 2023-08-16

## 2023-08-14 RX ORDER — SODIUM CHLORIDE 0.9 % (FLUSH) 0.9 %
20 SYRINGE (ML) INJECTION AS NEEDED
Status: DISCONTINUED | OUTPATIENT
Start: 2023-08-14 | End: 2023-08-14 | Stop reason: HOSPADM

## 2023-08-14 RX ORDER — SODIUM CHLORIDE 0.9 % (FLUSH) 0.9 %
20 SYRINGE (ML) INJECTION AS NEEDED
OUTPATIENT
Start: 2023-08-14

## 2023-08-14 RX ORDER — DOXORUBICIN HYDROCHLORIDE 2 MG/ML
60 INJECTION, SOLUTION INTRAVENOUS ONCE
Status: COMPLETED | OUTPATIENT
Start: 2023-08-14 | End: 2023-08-14

## 2023-08-14 RX ORDER — HEPARIN SODIUM (PORCINE) LOCK FLUSH IV SOLN 100 UNIT/ML 100 UNIT/ML
300 SOLUTION INTRAVENOUS ONCE
Status: COMPLETED | OUTPATIENT
Start: 2023-08-14 | End: 2023-08-14

## 2023-08-14 RX ADMIN — HEPARIN 300 UNITS: 100 SYRINGE at 13:48

## 2023-08-14 RX ADMIN — CYCLOPHOSPHAMIDE 1090 MG: 200 INJECTION, SOLUTION INTRAVENOUS at 13:16

## 2023-08-14 RX ADMIN — DEXAMETHASONE SODIUM PHOSPHATE 12 MG: 4 INJECTION, SOLUTION INTRA-ARTICULAR; INTRALESIONAL; INTRAMUSCULAR; INTRAVENOUS; SOFT TISSUE at 12:44

## 2023-08-14 RX ADMIN — DOXORUBICIN HYDROCHLORIDE 108 MG: 2 INJECTION, SOLUTION INTRAVENOUS at 13:05

## 2023-08-14 RX ADMIN — SODIUM CHLORIDE 150 MG: 9 INJECTION, SOLUTION INTRAVENOUS at 12:18

## 2023-08-14 RX ADMIN — Medication 20 ML: at 13:48

## 2023-08-14 RX ADMIN — SODIUM CHLORIDE 250 ML: 9 INJECTION, SOLUTION INTRAVENOUS at 13:05

## 2023-08-14 NOTE — PROGRESS NOTES
[facility]ology [facility] [place]  Office number [phone]    Fax number [phone]     Follow-up     Date: 23      Patient Name: Kimberley Win  MRN: [id number]  : 1980    Referring Physician: Dr. Daxa Anderson MD    Chief Complaint: left breast cancer    Cancer Staging:  Cancer Staging   Stage IIA (cT2, cN1, cM0, G2, ER+, WY+, HER2-)    History of Present Illness: Kimberley Win is a pleasant 43 y.o. female who presented for evaluation of left breast cancer.     She notes a history of cystic breast disease for many years. She notes a new breast mass that became progressively harder and associated with pain prompting diagnostic workup.     Diagnostic mammogram 23 showed a dense, spiculated mass in the left breast which on ultrasound corresponded to a 3:00 3 cm hypoechoic mass with internal vascularity. In the 7:00 left breast there was a 7 mm mass corresponding on US to a cluster of cysts spanning 1.4 cm. In the left breast there was a 1.7 cm LN with multiple smaller LN.     Left breast biopsy showed grade 2 invasive ductal carcinoma (ER 90%, WY 10%, HER 2 negative 0+ by IHC). Lymph node FNA was postive for malignancy, metastatic ductal carcinoma.    Breast cancer risk profile:  Age of menarche:10  G0; infertility  Age of menopause: premenopausal   Family history of breast, ovarian, prostate or pancreatic cancer: mom stage IV breast cancer in her 50s. M Great Aunt, breast cancer.   Genetic testing: negative 36 gene CancerNext panel     Treatment history:  Dose dense AC followed by weekly taxol: C1, 2023    Interval history:  She is here for follow up and consideration of cycle #4. She endorses a tough first week with more fatigue. Was sleeping a lot for 6 days, was drinking a lot of fluids.    Felt weak and tired, leg heaviness. No focal weakness.  Altered taste, using biotene. Has not tried baking soda rinse but plans to. No mouth sores.   Persistent dyspepsia x 1  week, constant, taking PPI once daily. Not using breakthrough pepcid. Forgot to take the Zyprexa except day 1. Had palpitations x 24 hours after Zyprexa, intermittent, that then resolved. Kept Sancuso in place and used rare compazine and thinks nausea was well controlled overall. Did have some gagging with pills during her nausea week, no choking, none current, no anticipatory nausea today.  Constipation better with miralax + colace.   Seeing Dr. Anderson 23. Desires BL mastectomy with reconstruction.  Contralateral right breast pain/tenderness x 4-5 days    Past Medical History:   Past Medical History:   Diagnosis Date    Arthritis     Breast cancer 2023    Left Breast    Malignant neoplasm of central portion of left breast in female, estrogen receptor positive 2023    Osteoarthritis     Varicella When I was a child   No personal history of myocardial infarction, cerebrovascular event, or venous thromboembolism.  Osteoarthritis in her hands. No personal history of autoimmune disease. Fhx of RA in her aunt    Past Surgical History:   Past Surgical History:   Procedure Laterality Date    US GUIDED LYMPH NODE BIOPSY  2023       Family History:   Family History   Problem Relation Age of Onset    Breast cancer Mother 55        Stage 4    COPD Mother     Diabetes Mother     Early death Mother     Heart disease Mother     Stroke Mother     Rheum arthritis Maternal Aunt     Breast cancer Paternal Aunt     COPD Maternal Grandfather     Breast cancer Other        Social History:   Social History     Socioeconomic History    Marital status:    Tobacco Use    Smoking status: Former     Packs/day: 0.25     Years: 10.00     Pack years: 2.50     Types: Cigarettes     Start date: 1995     Quit date: 2005     Years since quittin.5     Passive exposure: Past    Smokeless tobacco: Never   Vaping Use    Vaping Use: Never used   Substance and Sexual Activity    Alcohol use: Yes     Comment:  "rare    Drug use: Never    Sexual activity: Yes     Partners: Male     Birth control/protection: Condom   , lives in [place].     Medications:     Current Outpatient Medications:     diphenhydrAMINE HCl, Sleep, (ZzzQuil) 25 MG capsule, , Disp: , Rfl:     granisetron (SANCUSO) 3.1 MG/24HR, Place 1 patch on the skin as directed by provider 1 (One) Time., Disp: , Rfl:     ibuprofen (ADVIL,MOTRIN) 200 MG tablet, Take 1 tablet by mouth Every 6 (Six) Hours As Needed for Mild Pain., Disp: , Rfl:     lidocaine-prilocaine (EMLA) 2.5-2.5 % cream, Apply 1 application topically to the appropriate area as directed As Needed (45-60 minutes prior to port access.  Cover with saran/plastic wrap.)., Disp: 30 g, Rfl: 3    OLANZapine (zyPREXA) 5 MG tablet, Take 1 tablet by mouth Every Night. Take on days 2, 3 and 4 after chemotherapy., Disp: 3 tablet, Rfl: 3    omeprazole (priLOSEC) 40 MG capsule, Take 1 capsule by mouth Daily. 30 min prior to evening., Disp: 30 capsule, Rfl: 5    ondansetron (ZOFRAN) 8 MG tablet, Take 1 tablet by mouth 3 (Three) Times a Day As Needed for Nausea or Vomiting (DO NOT USE WHILE SANCUSO IN PLACE)., Disp: 30 tablet, Rfl: 5    prochlorperazine (COMPAZINE) 5 MG tablet, Take 1-2 tablets by mouth Every 6 (Six) Hours As Needed for Nausea (USE 2nd; ok to use on evening of chemo)., Disp: 30 tablet, Rfl: 1    Allergies:   Allergies   Allergen Reactions    Sunblock [Solbar Pf Spf15] Rash       Objective     Vital Signs:   Vitals:    08/14/23 0921   BP: 134/62   Pulse: 77   Resp: 12   Temp: 97.1 øF (36.2 øC)   TempSrc: Temporal   SpO2: 98%   Weight: 71.2 kg (157 lb)   Height: 170.2 cm (67\")   PainSc: 0-No pain    Body mass index is 24.59 kg/mý.   Pain Score    08/14/23 0921   PainSc: 0-No pain       ECOG Performance Status: 0    Physical Exam:   General: No acute distress. Well appearing.  HEENT: Normocephalic, atraumatic. Sclera anicteric.   Neck: supple, no adenopathy.   Cardiovascular: regular " rate and rhythm. No murmurs.   Respiratory: Normal rate. Clear to auscultation bilaterally  Abdomen: Soft, nontender, non distended with normoactive bowel sounds  Lymph: no cervical, supraclavicular or axillary adenopathy  Neuro: Alert and oriented x 3. No focal deficits.   Ext: Symmetric, no swelling.   Psych: Euthymic  Breast: 4 x 3.5 cm 3:00 left breast mass (now 3 x 2.5 cm firm stable). Small deep mobile LN not palpable. No palpable right breast mass.  Skin: No rash    Laboratory/Imaging Reviewed:   Infusion on 08/14/2023   Component Date Value Ref Range Status    Glucose 08/14/2023 107 (H)  65 - 99 mg/dL Final    BUN 08/14/2023 12  6 - 20 mg/dL Final    Creatinine 08/14/2023 0.64  0.57 - 1.00 mg/dL Final    Sodium 08/14/2023 139  136 - 145 mmol/L Final    Potassium 08/14/2023 4.0  3.5 - 5.2 mmol/L Final    Chloride 08/14/2023 102  98 - 107 mmol/L Final    CO2 08/14/2023 27.7  22.0 - 29.0 mmol/L Final    Calcium 08/14/2023 9.3  8.6 - 10.5 mg/dL Final    Total Protein 08/14/2023 7.3  6.0 - 8.5 g/dL Final    Albumin 08/14/2023 4.3  3.5 - 5.2 g/dL Final    ALT (SGPT) 08/14/2023 15  1 - 33 U/L Final    AST (SGOT) 08/14/2023 18  1 - 32 U/L Final    Alkaline Phosphatase 08/14/2023 94  39 - 117 U/L Final    Total Bilirubin 08/14/2023 0.2  0.0 - 1.2 mg/dL Final    Globulin 08/14/2023 3.0  gm/dL Final    A/G Ratio 08/14/2023 1.4  g/dL Final    BUN/Creatinine Ratio 08/14/2023 18.8  7.0 - 25.0 Final    Anion Gap 08/14/2023 9.3  5.0 - 15.0 mmol/L Final    eGFR 08/14/2023 111.9  >60.0 mL/min/1.73 Final    WBC 08/14/2023 16.10 (H)  3.40 - 10.80 10*3/mm3 Final    RBC 08/14/2023 3.42 (L)  3.77 - 5.28 10*6/mm3 Final    Hemoglobin 08/14/2023 10.2 (L)  12.0 - 15.9 g/dL Final    Hematocrit 08/14/2023 30.3 (L)  34.0 - 46.6 % Final    MCV 08/14/2023 88.6  79.0 - 97.0 fL Final    MCH 08/14/2023 29.8  26.6 - 33.0 pg Final    MCHC 08/14/2023 33.7  31.5 - 35.7 g/dL Final    RDW 08/14/2023 13.7  12.3 - 15.4 % Final    RDW-SD 08/14/2023  41.1  37.0 - 54.0 fl Final    MPV 08/14/2023 8.8  6.0 - 12.0 fL Final    Platelets 08/14/2023 229  140 - 450 10*3/mm3 Final    Scan Slide 08/14/2023    Final    See Manual Differential Results    Neutrophil % 08/14/2023 72.0  42.7 - 76.0 % Final    Lymphocyte % 08/14/2023 9.0 (L)  19.6 - 45.3 % Final    Monocyte % 08/14/2023 9.0  5.0 - 12.0 % Final    Bands %  08/14/2023 8.0 (H)  0.0 - 5.0 % Final    Metamyelocyte % 08/14/2023 2.0 (H)  0.0 - 0.0 % Final    Neutrophils Absolute 08/14/2023 12.88 (H)  1.70 - 7.00 10*3/mm3 Final    Lymphocytes Absolute 08/14/2023 1.45  0.70 - 3.10 10*3/mm3 Final    Monocytes Absolute 08/14/2023 1.45 (H)  0.10 - 0.90 10*3/mm3 Final    nRBC 08/14/2023 2.0 (H)  0.0 - 0.2 /100 WBC Final    RBC Morphology 08/14/2023 Normal  Normal Final    WBC Morphology 08/14/2023 Normal  Normal Final    Platelet Estimate 08/14/2023 Adequate  Normal Final       No results found.    Procedures    Assessment / Plan      Assessment/Plan:     1.  Malignant neoplasm of central portion of left breast in female, estrogen receptor positive   2.  Chemotherapy monitoring  -We reviewed the potential role for neoadjuvant treatment.  The advantages include potential for downstaging the tumor to allow for breast conservation, and the added prognostic value of assessing pathologic response to chemotherapy.  There is no clear survival advantage to neoadjuvant over adjuvant administration, but outcomes appear equivalent when followed by standard surgical treatment.  She desires lumpectomy and is currently not a candidate for breast conservation at current time. Therefore she was referred for consideration of neoadjuvant chemotherapy.   Based on young age and LN positive malignancy recommended dose dense AC followed by weekly taxol.  -Baseline echocardiogram shows an ejection fraction of 55 to 60%  -CBC/CMP reviewed and adequate for treatment today.  She has a leukocytosis that is likely secondary to recent Neulasta  administration.  No current infectious symptoms.     3.  Chemotherapy-induced nausea  -Continue Sancuso and as needed Compazine  -Stop olanzapine for sedation and palpitations.  -Continue aggressive supportive care.     4. GERD/dyspepsia  -I asked her to increase the PPI to twice daily and add Pepcid twice daily as needed    5. Neulasta bone pain  -Well-controlled on Claritin    6. Constipation  -Better on Colace plus MiraLAX.    7.  Contralateral breast pain  -No palpable mass on exam.  Discussed with her breast surgeon.  She will follow-up with her in the next 1 to 2 weeks for surgical planning and will determine need for any further contralateral imaging at that time    8. Access   Port     9.  Altered taste/dry mouth  -Reviewed strategies to combat this.    10.  Palpitations  -Suspect secondary to Sancuso.   -I ordered an EKG for her to have done if her symptoms recur    Follow Up:   2 weeks     Martha Olguin MD  Hematology and Oncology     I have spent a total of 42 min on reviewing test results/preparing to see patient, counseling patient, performing medically appropriate exam, placing orders, coordinating care, discussion with surgeon and documenting clinical information in the electronic or other health record

## 2023-08-14 NOTE — PATIENT INSTRUCTIONS
Keep Prilosec with your evening meal and schedule Pepcid AM before breakfast and at bedtime.    Metaquil oral rinse

## 2023-08-15 ENCOUNTER — HOSPITAL ENCOUNTER (OUTPATIENT)
Dept: CARDIOLOGY | Facility: HOSPITAL | Age: 43
Discharge: HOME | End: 2023-08-15
Payer: COMMERCIAL

## 2023-08-15 ENCOUNTER — INFUSION (OUTPATIENT)
Dept: ONCOLOGY | Facility: HOSPITAL | Age: 43
End: 2023-08-15
Payer: COMMERCIAL

## 2023-08-15 VITALS
WEIGHT: 156.6 LBS | HEART RATE: 76 BPM | RESPIRATION RATE: 18 BRPM | OXYGEN SATURATION: 100 % | DIASTOLIC BLOOD PRESSURE: 71 MMHG | TEMPERATURE: 99.1 F | BODY MASS INDEX: 24.53 KG/M2 | SYSTOLIC BLOOD PRESSURE: 121 MMHG

## 2023-08-15 DIAGNOSIS — Z17.0 MALIGNANT NEOPLASM OF CENTRAL PORTION OF LEFT BREAST IN FEMALE, ESTROGEN RECEPTOR POSITIVE: ICD-10-CM

## 2023-08-15 DIAGNOSIS — C50.112 MALIGNANT NEOPLASM OF CENTRAL PORTION OF LEFT BREAST IN FEMALE, ESTROGEN RECEPTOR POSITIVE: ICD-10-CM

## 2023-08-15 DIAGNOSIS — C50.112 MALIGNANT NEOPLASM OF CENTRAL PORTION OF LEFT BREAST IN FEMALE, ESTROGEN RECEPTOR POSITIVE: Primary | ICD-10-CM

## 2023-08-15 DIAGNOSIS — Z17.0 MALIGNANT NEOPLASM OF CENTRAL PORTION OF LEFT BREAST IN FEMALE, ESTROGEN RECEPTOR POSITIVE: Primary | ICD-10-CM

## 2023-08-15 PROCEDURE — 96372 THER/PROPH/DIAG INJ SC/IM: CPT

## 2023-08-15 PROCEDURE — 93005 ELECTROCARDIOGRAM TRACING: CPT | Performed by: INTERNAL MEDICINE

## 2023-08-15 PROCEDURE — 96401 CHEMO ANTI-NEOPL SQ/IM: CPT

## 2023-08-15 PROCEDURE — 25010000002 PEGFILGRASTIM-CBQV 6 MG/0.6ML SOLUTION PREFILLED SYRINGE: Performed by: INTERNAL MEDICINE

## 2023-08-15 RX ADMIN — PEGFILGRASTIM-CBQV 6 MG: 6 INJECTION, SOLUTION SUBCUTANEOUS at 13:54

## 2023-08-16 LAB
QT INTERVAL: 420 MS
QTC INTERVAL: 469 MS

## 2023-08-17 ENCOUNTER — PATIENT MESSAGE (OUTPATIENT)
Dept: ONCOLOGY | Facility: CLINIC | Age: 43
End: 2023-08-17
Payer: COMMERCIAL

## 2023-08-18 DIAGNOSIS — R00.2 PALPITATIONS: Primary | ICD-10-CM

## 2023-08-18 NOTE — TELEPHONE ENCOUNTER
"Contacted patient via telephone. Patient stated she is having palpitations again.  Patient stated they are not worse.  Patient stated she didn't have any yesterday.  Today is the last day for Sancuso.  Patient stated the palpitations are all over her chest and it varies on location with each one.  Patient stated it is intermittent.  Patient stated that at times it has felt like \"it is beating in her throat and it will cause her to cough.\"  She denies SOA, chest pain/pressure, dizziness, lightheadedness.  Patient stated this morning when she got up her BP was 106/65 heart rate 76 bpm and it was not happening at that time.  Later when it was happening, her BP was 126/71 heart rate 89 bpm.  Patient wasn't sure if she should be worried about it or do something.  Patient already completed the EKG on 8/16/23 and it wasn't happening at that time. Notified Dr. Olguin of the above.  Order received for a 72 hour holter monitor.  Referral coordinator in [place] notified due to [place] needing a cardiology referral to complete.  Patient stated she is okay with coming to [place] to get set up with.  Advised her per MD if she wishes not to complete monitor now, she can see MD in [place] on 8/21/23.  Patient stated to go on and set up monitor instead.  Advised her that if she later wishes to see MD on 8/21/23.  Also, patient advised on signs and symptoms that she needs to be evaluated by the nearest ER such as dizziness, lightheadedness, heart rate > 100 bpm at rest or chest pain or pressure.  Patient verbalized understanding.  Holter monitor set up and patient coming to [facility] today for placement. Patient notified and where to go to get it and time she needs to be there by. Patient verbalized understanding.  "

## 2023-08-23 ENCOUNTER — TELEPHONE (OUTPATIENT)
Dept: ONCOLOGY | Facility: CLINIC | Age: 43
End: 2023-08-23
Payer: COMMERCIAL

## 2023-08-23 ENCOUNTER — APPOINTMENT (OUTPATIENT)
Dept: GENERAL RADIOLOGY | Facility: HOSPITAL | Age: 43
End: 2023-08-23
Payer: COMMERCIAL

## 2023-08-23 ENCOUNTER — HOSPITAL ENCOUNTER (EMERGENCY)
Facility: HOSPITAL | Age: 43
Discharge: HOME | End: 2023-08-23
Attending: STUDENT IN AN ORGANIZED HEALTH CARE EDUCATION/TRAINING PROGRAM
Payer: COMMERCIAL

## 2023-08-23 VITALS
RESPIRATION RATE: 20 BRPM | SYSTOLIC BLOOD PRESSURE: 106 MMHG | DIASTOLIC BLOOD PRESSURE: 66 MMHG | OXYGEN SATURATION: 99 % | BODY MASS INDEX: 24.36 KG/M2 | HEART RATE: 94 BPM | TEMPERATURE: 98.3 F | WEIGHT: 155.2 LBS | HEIGHT: 67 IN

## 2023-08-23 DIAGNOSIS — I49.3 PREMATURE VENTRICULAR CONTRACTIONS: Primary | ICD-10-CM

## 2023-08-23 LAB
ALBUMIN SERPL-MCNC: 4.2 G/DL (ref 3.5–5.2)
ALBUMIN/GLOB SERPL: 1.4 G/DL
ALP SERPL-CCNC: 95 U/L (ref 39–117)
ALT SERPL W P-5'-P-CCNC: 15 U/L (ref 1–33)
ANION GAP SERPL CALCULATED.3IONS-SCNC: 7.9 MMOL/L (ref 5–15)
AST SERPL-CCNC: 14 U/L (ref 1–32)
BILIRUB SERPL-MCNC: 0.2 MG/DL (ref 0–1.2)
BUN SERPL-MCNC: 13 MG/DL (ref 6–20)
BUN/CREAT SERPL: 20.6 (ref 7–25)
CALCIUM SPEC-SCNC: 9.5 MG/DL (ref 8.6–10.5)
CHLORIDE SERPL-SCNC: 100 MMOL/L (ref 98–107)
CO2 SERPL-SCNC: 29.1 MMOL/L (ref 22–29)
CREAT SERPL-MCNC: 0.63 MG/DL (ref 0.57–1)
DEPRECATED RDW RBC AUTO: 41.1 FL (ref 37–54)
EGFRCR SERPLBLD CKD-EPI 2021: 112.3 ML/MIN/1.73
ERYTHROCYTE [DISTWIDTH] IN BLOOD BY AUTOMATED COUNT: 13.2 % (ref 12.3–15.4)
GLOBULIN UR ELPH-MCNC: 3 GM/DL
GLUCOSE SERPL-MCNC: 119 MG/DL (ref 65–99)
HCT VFR BLD AUTO: 28.7 % (ref 34–46.6)
HGB BLD-MCNC: 10 G/DL (ref 12–15.9)
HOLD SPECIMEN: NORMAL
HOLD SPECIMEN: NORMAL
LYMPHOCYTES # BLD MANUAL: 0.69 10*3/MM3 (ref 0.7–3.1)
LYMPHOCYTES NFR BLD MANUAL: 22 % (ref 5–12)
MAGNESIUM SERPL-MCNC: 1.8 MG/DL (ref 1.6–2.6)
MCH RBC QN AUTO: 30.2 PG (ref 26.6–33)
MCHC RBC AUTO-ENTMCNC: 34.8 G/DL (ref 31.5–35.7)
MCV RBC AUTO: 86.7 FL (ref 79–97)
MONOCYTES # BLD: 0.45 10*3/MM3 (ref 0.1–0.9)
NEUTROPHILS # BLD AUTO: 0.9 10*3/MM3 (ref 1.7–7)
NEUTROPHILS NFR BLD MANUAL: 44 % (ref 42.7–76)
NT-PROBNP SERPL-MCNC: 349.7 PG/ML (ref 0–450)
PLAT MORPH BLD: NORMAL
PLATELET # BLD AUTO: 245 10*3/MM3 (ref 140–450)
PMV BLD AUTO: 9.1 FL (ref 6–12)
POTASSIUM SERPL-SCNC: 3.5 MMOL/L (ref 3.5–5.2)
PROT SERPL-MCNC: 7.2 G/DL (ref 6–8.5)
RBC # BLD AUTO: 3.31 10*6/MM3 (ref 3.77–5.28)
RBC MORPH BLD: NORMAL
SODIUM SERPL-SCNC: 137 MMOL/L (ref 136–145)
TROPONIN T SERPL HS-MCNC: 9 NG/L
TSH SERPL DL<=0.05 MIU/L-ACNC: 2.11 UIU/ML (ref 0.27–4.2)
URATE SERPL-MCNC: 3.4 MG/DL (ref 2.4–5.7)
VARIANT LYMPHS NFR BLD MANUAL: 34 % (ref 19.6–45.3)
WBC MORPH BLD: NORMAL
WBC NRBC COR # BLD: 2.04 10*3/MM3 (ref 3.4–10.8)
WHOLE BLOOD HOLD COAG: NORMAL
WHOLE BLOOD HOLD SPECIMEN: NORMAL

## 2023-08-23 PROCEDURE — 99284 EMERGENCY DEPT VISIT MOD MDM: CPT

## 2023-08-23 PROCEDURE — 93005 ELECTROCARDIOGRAM TRACING: CPT | Performed by: STUDENT IN AN ORGANIZED HEALTH CARE EDUCATION/TRAINING PROGRAM

## 2023-08-23 PROCEDURE — 85007 BL SMEAR W/DIFF WBC COUNT: CPT | Performed by: STUDENT IN AN ORGANIZED HEALTH CARE EDUCATION/TRAINING PROGRAM

## 2023-08-23 PROCEDURE — 83735 ASSAY OF MAGNESIUM: CPT | Performed by: STUDENT IN AN ORGANIZED HEALTH CARE EDUCATION/TRAINING PROGRAM

## 2023-08-23 PROCEDURE — 83880 ASSAY OF NATRIURETIC PEPTIDE: CPT | Performed by: PHYSICIAN ASSISTANT

## 2023-08-23 PROCEDURE — 71045 X-RAY EXAM CHEST 1 VIEW: CPT

## 2023-08-23 PROCEDURE — 84484 ASSAY OF TROPONIN QUANT: CPT | Performed by: STUDENT IN AN ORGANIZED HEALTH CARE EDUCATION/TRAINING PROGRAM

## 2023-08-23 PROCEDURE — 84550 ASSAY OF BLOOD/URIC ACID: CPT | Performed by: PHYSICIAN ASSISTANT

## 2023-08-23 PROCEDURE — 80050 GENERAL HEALTH PANEL: CPT | Performed by: STUDENT IN AN ORGANIZED HEALTH CARE EDUCATION/TRAINING PROGRAM

## 2023-08-23 RX ORDER — SODIUM CHLORIDE 0.9 % (FLUSH) 0.9 %
10 SYRINGE (ML) INJECTION AS NEEDED
Status: DISCONTINUED | OUTPATIENT
Start: 2023-08-23 | End: 2023-08-23 | Stop reason: HOSPADM

## 2023-08-23 NOTE — TELEPHONE ENCOUNTER
"Patient stated she removed her holter monitor and mailed it back on Monday, 8/28/23. She stated since then, her heart palpitations have become constant, even with rest.  Patient stated palpitations are worse with movement, stated she is not doing much.  Patient denied chest pressure/pain but that her heart is racing.  Patient stated \"when it hits in my throat, I cough and it takes my breath away, even with sitting.\"  Patient stated it \"beats all over my chest.\"  Dr. Olguin notified and per MD, patient advised that since it is worsening and she has c/o SOA, she should go to the nearest emergency room for evaluation or call 911.  Patient verbalized understanding and stated she will.  " 72

## 2023-08-23 NOTE — TELEPHONE ENCOUNTER
Notified patient that the holter isn't expected to be received back until 8/24/23 and will then take 2-3 days to be read by a cardiologist.  Dr. Olguin notified and patient advised that since it will take so long to get results she should to to the nearest emergency room.  Patient stated she is up and moving to go to the ER now and her heart rate is 136 bpm.  Dr. Olguin notified and patient advised that this is more reason to be evaluated now by an ER provider.  Patient verbalized understanding.

## 2023-08-28 ENCOUNTER — OFFICE VISIT (OUTPATIENT)
Dept: ONCOLOGY | Facility: CLINIC | Age: 43
End: 2023-08-28
Payer: COMMERCIAL

## 2023-08-28 ENCOUNTER — INFUSION (OUTPATIENT)
Dept: ONCOLOGY | Facility: HOSPITAL | Age: 43
End: 2023-08-28
Payer: COMMERCIAL

## 2023-08-28 VITALS
HEIGHT: 67 IN | DIASTOLIC BLOOD PRESSURE: 83 MMHG | TEMPERATURE: 97.8 F | RESPIRATION RATE: 12 BRPM | HEART RATE: 82 BPM | OXYGEN SATURATION: 99 % | BODY MASS INDEX: 24.33 KG/M2 | SYSTOLIC BLOOD PRESSURE: 134 MMHG | WEIGHT: 155 LBS

## 2023-08-28 VITALS — HEART RATE: 83 BPM | SYSTOLIC BLOOD PRESSURE: 108 MMHG | DIASTOLIC BLOOD PRESSURE: 58 MMHG

## 2023-08-28 DIAGNOSIS — C50.112 MALIGNANT NEOPLASM OF CENTRAL PORTION OF LEFT BREAST IN FEMALE, ESTROGEN RECEPTOR POSITIVE: Primary | ICD-10-CM

## 2023-08-28 DIAGNOSIS — Z17.0 MALIGNANT NEOPLASM OF CENTRAL PORTION OF LEFT BREAST IN FEMALE, ESTROGEN RECEPTOR POSITIVE: Primary | ICD-10-CM

## 2023-08-28 LAB
ALBUMIN SERPL-MCNC: 4.2 G/DL (ref 3.5–5.2)
ALBUMIN/GLOB SERPL: 1.4 G/DL
ALP SERPL-CCNC: 77 U/L (ref 39–117)
ALT SERPL W P-5'-P-CCNC: 17 U/L (ref 1–33)
ANION GAP SERPL CALCULATED.3IONS-SCNC: 10.2 MMOL/L (ref 5–15)
AST SERPL-CCNC: 17 U/L (ref 1–32)
B-HCG UR QL: NEGATIVE
BASOPHILS # BLD AUTO: 0.08 10*3/MM3 (ref 0–0.2)
BASOPHILS NFR BLD AUTO: 0.8 % (ref 0–1.5)
BILIRUB SERPL-MCNC: <0.2 MG/DL (ref 0–1.2)
BUN SERPL-MCNC: 10 MG/DL (ref 6–20)
BUN/CREAT SERPL: 17.5 (ref 7–25)
CALCIUM SPEC-SCNC: 9.2 MG/DL (ref 8.6–10.5)
CHLORIDE SERPL-SCNC: 104 MMOL/L (ref 98–107)
CO2 SERPL-SCNC: 25.8 MMOL/L (ref 22–29)
CREAT SERPL-MCNC: 0.57 MG/DL (ref 0.57–1)
DEPRECATED RDW RBC AUTO: 45 FL (ref 37–54)
EGFRCR SERPLBLD CKD-EPI 2021: 115.1 ML/MIN/1.73
EOSINOPHIL # BLD AUTO: 0.05 10*3/MM3 (ref 0–0.4)
EOSINOPHIL NFR BLD AUTO: 0.5 % (ref 0.3–6.2)
ERYTHROCYTE [DISTWIDTH] IN BLOOD BY AUTOMATED COUNT: 14.9 % (ref 12.3–15.4)
GLOBULIN UR ELPH-MCNC: 3 GM/DL
GLUCOSE SERPL-MCNC: 93 MG/DL (ref 65–99)
HCT VFR BLD AUTO: 28 % (ref 34–46.6)
HGB BLD-MCNC: 9.8 G/DL (ref 12–15.9)
IMM GRANULOCYTES # BLD AUTO: 0.18 10*3/MM3 (ref 0–0.05)
IMM GRANULOCYTES NFR BLD AUTO: 1.8 % (ref 0–0.5)
LYMPHOCYTES # BLD AUTO: 1.26 10*3/MM3 (ref 0.7–3.1)
LYMPHOCYTES NFR BLD AUTO: 12.7 % (ref 19.6–45.3)
MCH RBC QN AUTO: 31 PG (ref 26.6–33)
MCHC RBC AUTO-ENTMCNC: 35 G/DL (ref 31.5–35.7)
MCV RBC AUTO: 88.6 FL (ref 79–97)
MONOCYTES # BLD AUTO: 1.38 10*3/MM3 (ref 0.1–0.9)
MONOCYTES NFR BLD AUTO: 13.9 % (ref 5–12)
NEUTROPHILS NFR BLD AUTO: 6.99 10*3/MM3 (ref 1.7–7)
NEUTROPHILS NFR BLD AUTO: 70.3 % (ref 42.7–76)
NRBC BLD AUTO-RTO: 0.2 /100 WBC (ref 0–0.2)
PLATELET # BLD AUTO: 190 10*3/MM3 (ref 140–450)
PMV BLD AUTO: 8.9 FL (ref 6–12)
POTASSIUM SERPL-SCNC: 4.2 MMOL/L (ref 3.5–5.2)
PROT SERPL-MCNC: 7.2 G/DL (ref 6–8.5)
RBC # BLD AUTO: 3.16 10*6/MM3 (ref 3.77–5.28)
SODIUM SERPL-SCNC: 140 MMOL/L (ref 136–145)
WBC NRBC COR # BLD: 9.94 10*3/MM3 (ref 3.4–10.8)

## 2023-08-28 PROCEDURE — 25010000002 PACLITAXEL PER 1 MG: Performed by: INTERNAL MEDICINE

## 2023-08-28 PROCEDURE — 81025 URINE PREGNANCY TEST: CPT

## 2023-08-28 PROCEDURE — 96417 CHEMO IV INFUS EACH ADDL SEQ: CPT

## 2023-08-28 PROCEDURE — 25010000002 DEXAMETHASONE SODIUM PHOSPHATE 20 MG/5ML SOLUTION: Performed by: INTERNAL MEDICINE

## 2023-08-28 PROCEDURE — 25010000002 HEPARIN LOCK FLUSH PER 10 UNITS: Performed by: INTERNAL MEDICINE

## 2023-08-28 PROCEDURE — 96375 TX/PRO/DX INJ NEW DRUG ADDON: CPT

## 2023-08-28 PROCEDURE — 25010000002 DIPHENHYDRAMINE PER 50 MG: Performed by: INTERNAL MEDICINE

## 2023-08-28 PROCEDURE — 85025 COMPLETE CBC W/AUTO DIFF WBC: CPT

## 2023-08-28 PROCEDURE — 96413 CHEMO IV INFUSION 1 HR: CPT

## 2023-08-28 PROCEDURE — 80053 COMPREHEN METABOLIC PANEL: CPT

## 2023-08-28 RX ORDER — SODIUM CHLORIDE 0.9 % (FLUSH) 0.9 %
20 SYRINGE (ML) INJECTION AS NEEDED
OUTPATIENT
Start: 2023-08-28

## 2023-08-28 RX ORDER — DIPHENHYDRAMINE HYDROCHLORIDE 50 MG/ML
50 INJECTION INTRAMUSCULAR; INTRAVENOUS AS NEEDED
Status: CANCELLED | OUTPATIENT
Start: 2023-08-31

## 2023-08-28 RX ORDER — SODIUM CHLORIDE 9 MG/ML
250 INJECTION, SOLUTION INTRAVENOUS ONCE
Status: CANCELLED | OUTPATIENT
Start: 2023-08-31

## 2023-08-28 RX ORDER — HEPARIN SODIUM (PORCINE) LOCK FLUSH IV SOLN 100 UNIT/ML 100 UNIT/ML
300 SOLUTION INTRAVENOUS ONCE
OUTPATIENT
Start: 2023-08-28

## 2023-08-28 RX ORDER — HEPARIN SODIUM (PORCINE) LOCK FLUSH IV SOLN 100 UNIT/ML 100 UNIT/ML
300 SOLUTION INTRAVENOUS ONCE
Status: COMPLETED | OUTPATIENT
Start: 2023-08-28 | End: 2023-08-28

## 2023-08-28 RX ORDER — FAMOTIDINE 10 MG/ML
20 INJECTION, SOLUTION INTRAVENOUS ONCE
Status: CANCELLED | OUTPATIENT
Start: 2023-08-31

## 2023-08-28 RX ORDER — FAMOTIDINE 10 MG/ML
20 INJECTION, SOLUTION INTRAVENOUS AS NEEDED
Status: DISCONTINUED | OUTPATIENT
Start: 2023-08-28 | End: 2023-08-28 | Stop reason: HOSPADM

## 2023-08-28 RX ORDER — DIPHENHYDRAMINE HYDROCHLORIDE 50 MG/ML
50 INJECTION INTRAMUSCULAR; INTRAVENOUS AS NEEDED
Status: DISCONTINUED | OUTPATIENT
Start: 2023-08-28 | End: 2023-08-28 | Stop reason: HOSPADM

## 2023-08-28 RX ORDER — SODIUM CHLORIDE 0.9 % (FLUSH) 0.9 %
20 SYRINGE (ML) INJECTION AS NEEDED
Status: DISCONTINUED | OUTPATIENT
Start: 2023-08-28 | End: 2023-08-28 | Stop reason: HOSPADM

## 2023-08-28 RX ORDER — FAMOTIDINE 10 MG/ML
20 INJECTION, SOLUTION INTRAVENOUS AS NEEDED
Status: CANCELLED | OUTPATIENT
Start: 2023-08-31

## 2023-08-28 RX ORDER — FAMOTIDINE 10 MG/ML
20 INJECTION, SOLUTION INTRAVENOUS ONCE
Status: COMPLETED | OUTPATIENT
Start: 2023-08-28 | End: 2023-08-28

## 2023-08-28 RX ADMIN — HEPARIN 300 UNITS: 100 SYRINGE at 15:18

## 2023-08-28 RX ADMIN — DIPHENHYDRAMINE HYDROCHLORIDE 50 MG: 50 INJECTION INTRAMUSCULAR; INTRAVENOUS at 13:24

## 2023-08-28 RX ADMIN — FAMOTIDINE 20 MG: 10 INJECTION INTRAVENOUS at 13:54

## 2023-08-28 RX ADMIN — DEXAMETHASONE SODIUM PHOSPHATE 12 MG: 4 INJECTION, SOLUTION INTRA-ARTICULAR; INTRALESIONAL; INTRAMUSCULAR; INTRAVENOUS; SOFT TISSUE at 13:54

## 2023-08-28 RX ADMIN — SODIUM CHLORIDE 145 MG: 9 INJECTION, SOLUTION INTRAVENOUS at 14:17

## 2023-08-28 RX ADMIN — Medication 20 ML: at 15:18

## 2023-08-28 NOTE — PROGRESS NOTES
Hematology and Oncology [place]  Office number [phone]    Fax number [phone]     Follow-up     Date: 23      Patient Name: Kimberley Win  MRN: [id number]  : 1980    Referring Physician: Dr. Daxa Anderson MD    Chief Complaint: left breast cancer    Cancer Staging:  Cancer Staging   Stage IIA (cT2, cN1, cM0, G2, ER+, MO+, HER2-)    History of Present Illness: Kimberley Win is a pleasant 43 y.o. female who presented for evaluation of left breast cancer.     She notes a history of cystic breast disease for many years. She notes a new breast mass that became progressively harder and associated with pain prompting diagnostic workup.     Diagnostic mammogram 23 showed a dense, spiculated mass in the left breast which on ultrasound corresponded to a 3:00 3 cm hypoechoic mass with internal vascularity. In the 7:00 left breast there was a 7 mm mass corresponding on US to a cluster of cysts spanning 1.4 cm. In the left breast there was a 1.7 cm LN with multiple smaller LN.     Left breast biopsy showed grade 2 invasive ductal carcinoma (ER 90%, MO 10%, HER 2 negative 0+ by IHC). Lymph node FNA was postive for malignancy, metastatic ductal carcinoma.    Breast cancer risk profile:  Age of menarche:10  G0; infertility  Age of menopause: premenopausal   Family history of breast, ovarian, prostate or pancreatic cancer: mom stage IV breast cancer in her 50s. M Great Aunt, breast cancer.   Genetic testing: negative 36 gene CancerNext panel     Treatment history:  Dose dense AC followed by weekly taxol: C1, 2023    Interval history:  She is here for follow up and consideration of cycle #5 of treatment, cycle 1 of taxol.  Had palpitations associated with SOA. Underwent holter which is pending, but given worsening symptoms and new onset SOA was seen in ED where EKG showed new onset frequent PVCs, not seen on the EKG she had performed at symptom onset. She did remove Sancuso  without noticeable improvement in palpitations and also had stopped olanzapine. Feeling better now. Nausea and constipation were longer lasting as was fatigue. Altered taste. No infectious symptoms.      has h/o PVCs on metoprolol    Past Medical History:   Past Medical History:   Diagnosis Date    Arthritis     Breast cancer 2023    Left Breast    Malignant neoplasm of central portion of left breast in female, estrogen receptor positive 2023    Osteoarthritis     Varicella When I was a child   No personal history of myocardial infarction, cerebrovascular event, or venous thromboembolism.  Osteoarthritis in her hands. No personal history of autoimmune disease. Fhx of RA in her aunt    Past Surgical History:   Past Surgical History:   Procedure Laterality Date    US GUIDED LYMPH NODE BIOPSY  2023       Family History:   Family History   Problem Relation Age of Onset    Breast cancer Mother 55        Stage 4    COPD Mother     Diabetes Mother     Early death Mother     Heart disease Mother     Stroke Mother     Rheum arthritis Maternal Aunt     Breast cancer Paternal Aunt     COPD Maternal Grandfather     Breast cancer Other        Social History:   Social History     Socioeconomic History    Marital status:    Tobacco Use    Smoking status: Former     Packs/day: 0.25     Years: 10.00     Pack years: 2.50     Types: Cigarettes     Start date: 1995     Quit date: 2005     Years since quittin.5     Passive exposure: Past    Smokeless tobacco: Never   Vaping Use    Vaping Use: Never used   Substance and Sexual Activity    Alcohol use: Yes     Comment: rare    Drug use: Never    Sexual activity: Yes     Partners: Male     Birth control/protection: Condom   , lives in [place].     Medications:     Current Outpatient Medications:     diphenhydrAMINE HCl, Sleep, (ZzzQuil) 25 MG capsule, , Disp: , Rfl:     granisetron (SANCUSO) 3.1 MG/24HR, Place 1 patch on the  "skin as directed by provider 1 (One) Time. (Patient not taking: Reported on 8/28/2023), Disp: , Rfl:     ibuprofen (ADVIL,MOTRIN) 200 MG tablet, Take 1 tablet by mouth Every 6 (Six) Hours As Needed for Mild Pain., Disp: , Rfl:     lidocaine-prilocaine (EMLA) 2.5-2.5 % cream, Apply 1 application topically to the appropriate area as directed As Needed (45-60 minutes prior to port access.  Cover with saran/plastic wrap.)., Disp: 30 g, Rfl: 3    omeprazole (priLOSEC) 40 MG capsule, Take 1 capsule by mouth Daily. 30 min prior to evening., Disp: 30 capsule, Rfl: 5    ondansetron (ZOFRAN) 8 MG tablet, Take 1 tablet by mouth 3 (Three) Times a Day As Needed for Nausea or Vomiting (DO NOT USE WHILE SANCUSO IN PLACE)., Disp: 30 tablet, Rfl: 5    prochlorperazine (COMPAZINE) 5 MG tablet, Take 1-2 tablets by mouth Every 6 (Six) Hours As Needed for Nausea (USE 2nd; ok to use on evening of chemo)., Disp: 30 tablet, Rfl: 1    Allergies:   Allergies   Allergen Reactions    Sunblock [Solbar Pf Spf15] Rash       Objective     Vital Signs:   Vitals:    08/28/23 1054   BP: 134/83   Pulse: 82   Resp: 12   Temp: 97.8 °F (36.6 °C)   TempSrc: Temporal   SpO2: 99%   Weight: 70.3 kg (155 lb)   Height: 170.2 cm (67\")   PainSc: 0-No pain    Body mass index is 24.28 kg/m².   Pain Score    08/28/23 1054   PainSc: 0-No pain       ECOG Performance Status: 0    Physical Exam:   General: No acute distress. Well appearing.  HEENT: Normocephalic, atraumatic. Sclera anicteric.   Neck: supple, no adenopathy.   Cardiovascular: regular rate and rhythm. No murmurs.   Respiratory: Normal rate. Clear to auscultation bilaterally  Abdomen: Soft, nontender, non distended with normoactive bowel sounds  Lymph: no cervical, supraclavicular or axillary adenopathy  Neuro: Alert and oriented x 3. No focal deficits.   Ext: Symmetric, no swelling.   Psych: Euthymic  Breast: 4 x 3.5 cm 3:00 left breast mass (now 3 x 2 cm firm stable). Small deep mobile LN not palpable. " No palpable right breast mass.  Skin: No rash    Laboratory/Imaging Reviewed:   Infusion on 08/28/2023   Component Date Value Ref Range Status    Glucose 08/28/2023 93  65 - 99 mg/dL Final    BUN 08/28/2023 10  6 - 20 mg/dL Final    Creatinine 08/28/2023 0.57  0.57 - 1.00 mg/dL Final    Sodium 08/28/2023 140  136 - 145 mmol/L Final    Potassium 08/28/2023 4.2  3.5 - 5.2 mmol/L Final    Chloride 08/28/2023 104  98 - 107 mmol/L Final    CO2 08/28/2023 25.8  22.0 - 29.0 mmol/L Final    Calcium 08/28/2023 9.2  8.6 - 10.5 mg/dL Final    Total Protein 08/28/2023 7.2  6.0 - 8.5 g/dL Final    Albumin 08/28/2023 4.2  3.5 - 5.2 g/dL Final    ALT (SGPT) 08/28/2023 17  1 - 33 U/L Final    AST (SGOT) 08/28/2023 17  1 - 32 U/L Final    Alkaline Phosphatase 08/28/2023 77  39 - 117 U/L Final    Total Bilirubin 08/28/2023 <0.2  0.0 - 1.2 mg/dL Final    Globulin 08/28/2023 3.0  gm/dL Final    A/G Ratio 08/28/2023 1.4  g/dL Final    BUN/Creatinine Ratio 08/28/2023 17.5  7.0 - 25.0 Final    Anion Gap 08/28/2023 10.2  5.0 - 15.0 mmol/L Final    eGFR 08/28/2023 115.1  >60.0 mL/min/1.73 Final    HCG, Urine QL 08/28/2023 Negative  Negative Final    WBC 08/28/2023 9.94  3.40 - 10.80 10*3/mm3 Final    RBC 08/28/2023 3.16 (L)  3.77 - 5.28 10*6/mm3 Final    Hemoglobin 08/28/2023 9.8 (L)  12.0 - 15.9 g/dL Final    Hematocrit 08/28/2023 28.0 (L)  34.0 - 46.6 % Final    MCV 08/28/2023 88.6  79.0 - 97.0 fL Final    MCH 08/28/2023 31.0  26.6 - 33.0 pg Final    MCHC 08/28/2023 35.0  31.5 - 35.7 g/dL Final    RDW 08/28/2023 14.9  12.3 - 15.4 % Final    RDW-SD 08/28/2023 45.0  37.0 - 54.0 fl Final    MPV 08/28/2023 8.9  6.0 - 12.0 fL Final    Platelets 08/28/2023 190  140 - 450 10*3/mm3 Final    Neutrophil % 08/28/2023 70.3  42.7 - 76.0 % Final    Lymphocyte % 08/28/2023 12.7 (L)  19.6 - 45.3 % Final    Monocyte % 08/28/2023 13.9 (H)  5.0 - 12.0 % Final    Eosinophil % 08/28/2023 0.5  0.3 - 6.2 % Final    Basophil % 08/28/2023 0.8  0.0 - 1.5 % Final     Immature Grans % 08/28/2023 1.8 (H)  0.0 - 0.5 % Final    Neutrophils, Absolute 08/28/2023 6.99  1.70 - 7.00 10*3/mm3 Final    Lymphocytes, Absolute 08/28/2023 1.26  0.70 - 3.10 10*3/mm3 Final    Monocytes, Absolute 08/28/2023 1.38 (H)  0.10 - 0.90 10*3/mm3 Final    Eosinophils, Absolute 08/28/2023 0.05  0.00 - 0.40 10*3/mm3 Final    Basophils, Absolute 08/28/2023 0.08  0.00 - 0.20 10*3/mm3 Final    Immature Grans, Absolute 08/28/2023 0.18 (H)  0.00 - 0.05 10*3/mm3 Final    nRBC 08/28/2023 0.2  0.0 - 0.2 /100 WBC Final   Admission on 08/23/2023, Discharged on 08/23/2023   Component Date Value Ref Range Status    Glucose 08/23/2023 119 (H)  65 - 99 mg/dL Final    BUN 08/23/2023 13  6 - 20 mg/dL Final    Creatinine 08/23/2023 0.63  0.57 - 1.00 mg/dL Final    Sodium 08/23/2023 137  136 - 145 mmol/L Final    Potassium 08/23/2023 3.5  3.5 - 5.2 mmol/L Final    Chloride 08/23/2023 100  98 - 107 mmol/L Final    CO2 08/23/2023 29.1 (H)  22.0 - 29.0 mmol/L Final    Calcium 08/23/2023 9.5  8.6 - 10.5 mg/dL Final    Total Protein 08/23/2023 7.2  6.0 - 8.5 g/dL Final    Albumin 08/23/2023 4.2  3.5 - 5.2 g/dL Final    ALT (SGPT) 08/23/2023 15  1 - 33 U/L Final    AST (SGOT) 08/23/2023 14  1 - 32 U/L Final    Alkaline Phosphatase 08/23/2023 95  39 - 117 U/L Final    Total Bilirubin 08/23/2023 0.2  0.0 - 1.2 mg/dL Final    Globulin 08/23/2023 3.0  gm/dL Final    A/G Ratio 08/23/2023 1.4  g/dL Final    BUN/Creatinine Ratio 08/23/2023 20.6  7.0 - 25.0 Final    Anion Gap 08/23/2023 7.9  5.0 - 15.0 mmol/L Final    eGFR 08/23/2023 112.3  >60.0 mL/min/1.73 Final    Magnesium 08/23/2023 1.8  1.6 - 2.6 mg/dL Final    HS Troponin T 08/23/2023 9  <10 ng/L Final    TSH 08/23/2023 2.110  0.270 - 4.200 uIU/mL Final    Extra Tube 08/23/2023 Hold for add-ons.   Final    Auto resulted.    Extra Tube 08/23/2023 hold for add-on   Final    Auto resulted    Extra Tube 08/23/2023 Hold for add-ons.   Final    Auto resulted.    Extra Tube 08/23/2023  Hold for add-ons.   Final    Auto resulted    WBC 08/23/2023 2.04 (L)  3.40 - 10.80 10*3/mm3 Final    RBC 08/23/2023 3.31 (L)  3.77 - 5.28 10*6/mm3 Final    Hemoglobin 08/23/2023 10.0 (L)  12.0 - 15.9 g/dL Final    Hematocrit 08/23/2023 28.7 (L)  34.0 - 46.6 % Final    MCV 08/23/2023 86.7  79.0 - 97.0 fL Final    MCH 08/23/2023 30.2  26.6 - 33.0 pg Final    MCHC 08/23/2023 34.8  31.5 - 35.7 g/dL Final    RDW 08/23/2023 13.2  12.3 - 15.4 % Final    RDW-SD 08/23/2023 41.1  37.0 - 54.0 fl Final    MPV 08/23/2023 9.1  6.0 - 12.0 fL Final    Platelets 08/23/2023 245  140 - 450 10*3/mm3 Final    Neutrophil % 08/23/2023 44.0  42.7 - 76.0 % Final    Lymphocyte % 08/23/2023 34.0  19.6 - 45.3 % Final    Monocyte % 08/23/2023 22.0 (H)  5.0 - 12.0 % Final    Neutrophils Absolute 08/23/2023 0.90 (L)  1.70 - 7.00 10*3/mm3 Final    Lymphocytes Absolute 08/23/2023 0.69 (L)  0.70 - 3.10 10*3/mm3 Final    Monocytes Absolute 08/23/2023 0.45  0.10 - 0.90 10*3/mm3 Final    RBC Morphology 08/23/2023 Normal  Normal Final    WBC Morphology 08/23/2023 Normal  Normal Final    Platelet Morphology 08/23/2023 Normal  Normal Final    Uric Acid 08/23/2023 3.4  2.4 - 5.7 mg/dL Final    proBNP 08/23/2023 349.7  0.0 - 450.0 pg/mL Final   Hospital Outpatient Visit on 08/15/2023   Component Date Value Ref Range Status    QT Interval 08/15/2023 420  ms Final    QTC Interval 08/15/2023 469  ms Final       XR Chest 1 View    Result Date: 8/23/2023  Narrative: PROCEDURE: XR CHEST 1 VW-  HISTORY: Dysrhythmia Triage Protocol  COMPARISON: 6/20/2023  FINDINGS:  Portable view of the chest demonstrates the lungs to be grossly clear. There is no evidence of effusion, pneumothorax or other significant pleural disease. The mediastinum is unremarkable.  The heart size is normal. Right subclavian Port-A-Cath is noted with tip in the innominate vein.      Impression: Port-A-Cath in position as above without pneumothorax    This report was signed and finalized on  8/23/2023 1:18 PM by Trae De MD.       Procedures    Assessment / Plan      Assessment/Plan:     1.  Malignant neoplasm of central portion of left breast in female, estrogen receptor positive   2.  Chemotherapy monitoring  -We reviewed the potential role for neoadjuvant treatment.  The advantages include potential for downstaging the tumor to allow for breast conservation, and the added prognostic value of assessing pathologic response to chemotherapy.  There is no clear survival advantage to neoadjuvant over adjuvant administration, but outcomes appear equivalent when followed by standard surgical treatment.  She desires lumpectomy and is currently not a candidate for breast conservation at current time. Therefore she was referred for consideration of neoadjuvant chemotherapy.   Based on young age and LN positive malignancy recommended dose dense AC followed by weekly taxol.  -Baseline echocardiogram shows an ejection fraction of 55 to 60%  -CBC/CMP reviewed and adequate for treatment today.  -Follow up in one week with labs for consideration of week #2 of taxol     3.  Chemotherapy-induced nausea  -Stopped both olanzapine and Sancuso for palpitations but neither seemed to be the trigger.   -Anticipate this will improve with therapy change from AC to taxol. Has PRN zofran/compazine available.     4. GERD/dyspepsia  - PPI BID and PRN pepcid    5. Palpitations  -EKG x 2 and ED records reviewed  -Holter pending results but EKG with frequent PVCs.   -Electrolytes reviewed.   -Discussed cardiology/metoprolol if persistent. Since improving and chemotherapy related, she wants to continue to monitor for now unless they recur on taxol.     6. Constipation  -Better on Colace plus MiraLAX.    7. Access   Port     Follow Up:   1 weeks     Martha Olguin MD  Hematology and Oncology     I have spent a total of 40 min on the day of service including time before, during, and after the office visit on reviewing test  results/records/preparing to see patient, counseling patient, performing medically appropriate exam, placing orders, coordinating care and documenting clinical information in the electronic or other health record

## 2023-09-05 ENCOUNTER — OFFICE VISIT (OUTPATIENT)
Dept: ONCOLOGY | Facility: CLINIC | Age: 43
End: 2023-09-05
Payer: COMMERCIAL

## 2023-09-05 ENCOUNTER — INFUSION (OUTPATIENT)
Dept: ONCOLOGY | Facility: HOSPITAL | Age: 43
End: 2023-09-05
Payer: COMMERCIAL

## 2023-09-05 VITALS
DIASTOLIC BLOOD PRESSURE: 66 MMHG | BODY MASS INDEX: 23.86 KG/M2 | SYSTOLIC BLOOD PRESSURE: 117 MMHG | HEIGHT: 67 IN | OXYGEN SATURATION: 99 % | RESPIRATION RATE: 16 BRPM | HEART RATE: 109 BPM | WEIGHT: 152 LBS | TEMPERATURE: 97.3 F

## 2023-09-05 VITALS — SYSTOLIC BLOOD PRESSURE: 108 MMHG | DIASTOLIC BLOOD PRESSURE: 67 MMHG | TEMPERATURE: 97.7 F | HEART RATE: 100 BPM

## 2023-09-05 DIAGNOSIS — C50.112 MALIGNANT NEOPLASM OF CENTRAL PORTION OF LEFT BREAST IN FEMALE, ESTROGEN RECEPTOR POSITIVE: Primary | ICD-10-CM

## 2023-09-05 DIAGNOSIS — Z17.0 MALIGNANT NEOPLASM OF CENTRAL PORTION OF LEFT BREAST IN FEMALE, ESTROGEN RECEPTOR POSITIVE: Primary | ICD-10-CM

## 2023-09-05 LAB
ALBUMIN SERPL-MCNC: 4.3 G/DL (ref 3.5–5.2)
ALBUMIN/GLOB SERPL: 1.4 G/DL
ALP SERPL-CCNC: 65 U/L (ref 39–117)
ALT SERPL W P-5'-P-CCNC: 48 U/L (ref 1–33)
ANION GAP SERPL CALCULATED.3IONS-SCNC: 10.1 MMOL/L (ref 5–15)
AST SERPL-CCNC: 34 U/L (ref 1–32)
BASOPHILS # BLD AUTO: 0.07 10*3/MM3 (ref 0–0.2)
BASOPHILS NFR BLD AUTO: 1 % (ref 0–1.5)
BILIRUB SERPL-MCNC: 0.2 MG/DL (ref 0–1.2)
BUN SERPL-MCNC: 16 MG/DL (ref 6–20)
BUN/CREAT SERPL: 26.2 (ref 7–25)
CALCIUM SPEC-SCNC: 9.7 MG/DL (ref 8.6–10.5)
CHLORIDE SERPL-SCNC: 103 MMOL/L (ref 98–107)
CO2 SERPL-SCNC: 25.9 MMOL/L (ref 22–29)
CREAT SERPL-MCNC: 0.61 MG/DL (ref 0.57–1)
DEPRECATED RDW RBC AUTO: 48.7 FL (ref 37–54)
EGFRCR SERPLBLD CKD-EPI 2021: 113.2 ML/MIN/1.73
EOSINOPHIL # BLD AUTO: 0.01 10*3/MM3 (ref 0–0.4)
EOSINOPHIL NFR BLD AUTO: 0.1 % (ref 0.3–6.2)
ERYTHROCYTE [DISTWIDTH] IN BLOOD BY AUTOMATED COUNT: 15.3 % (ref 12.3–15.4)
GLOBULIN UR ELPH-MCNC: 3.1 GM/DL
GLUCOSE SERPL-MCNC: 104 MG/DL (ref 65–99)
HCT VFR BLD AUTO: 29 % (ref 34–46.6)
HGB BLD-MCNC: 9.9 G/DL (ref 12–15.9)
IMM GRANULOCYTES # BLD AUTO: 0.03 10*3/MM3 (ref 0–0.05)
IMM GRANULOCYTES NFR BLD AUTO: 0.4 % (ref 0–0.5)
LYMPHOCYTES # BLD AUTO: 0.71 10*3/MM3 (ref 0.7–3.1)
LYMPHOCYTES NFR BLD AUTO: 10.5 % (ref 19.6–45.3)
MCH RBC QN AUTO: 30.5 PG (ref 26.6–33)
MCHC RBC AUTO-ENTMCNC: 34.1 G/DL (ref 31.5–35.7)
MCV RBC AUTO: 89.2 FL (ref 79–97)
MONOCYTES # BLD AUTO: 0.81 10*3/MM3 (ref 0.1–0.9)
MONOCYTES NFR BLD AUTO: 12 % (ref 5–12)
NEUTROPHILS NFR BLD AUTO: 5.12 10*3/MM3 (ref 1.7–7)
NEUTROPHILS NFR BLD AUTO: 76 % (ref 42.7–76)
NRBC BLD AUTO-RTO: 0 /100 WBC (ref 0–0.2)
PLATELET # BLD AUTO: 352 10*3/MM3 (ref 140–450)
PMV BLD AUTO: 8.6 FL (ref 6–12)
POTASSIUM SERPL-SCNC: 3.9 MMOL/L (ref 3.5–5.2)
PROT SERPL-MCNC: 7.4 G/DL (ref 6–8.5)
RBC # BLD AUTO: 3.25 10*6/MM3 (ref 3.77–5.28)
SODIUM SERPL-SCNC: 139 MMOL/L (ref 136–145)
WBC NRBC COR # BLD: 6.75 10*3/MM3 (ref 3.4–10.8)

## 2023-09-05 PROCEDURE — 25010000002 HEPARIN LOCK FLUSH PER 10 UNITS: Performed by: INTERNAL MEDICINE

## 2023-09-05 PROCEDURE — 85025 COMPLETE CBC W/AUTO DIFF WBC: CPT

## 2023-09-05 PROCEDURE — 96375 TX/PRO/DX INJ NEW DRUG ADDON: CPT

## 2023-09-05 PROCEDURE — 25010000002 DIPHENHYDRAMINE PER 50 MG: Performed by: NURSE PRACTITIONER

## 2023-09-05 PROCEDURE — 25010000002 DEXAMETHASONE SODIUM PHOSPHATE 20 MG/5ML SOLUTION: Performed by: NURSE PRACTITIONER

## 2023-09-05 PROCEDURE — 80053 COMPREHEN METABOLIC PANEL: CPT

## 2023-09-05 PROCEDURE — 25010000002 PACLITAXEL PER 1 MG: Performed by: NURSE PRACTITIONER

## 2023-09-05 PROCEDURE — 96413 CHEMO IV INFUSION 1 HR: CPT

## 2023-09-05 RX ORDER — FAMOTIDINE 10 MG/ML
20 INJECTION, SOLUTION INTRAVENOUS AS NEEDED
Status: CANCELLED | OUTPATIENT
Start: 2023-09-07

## 2023-09-05 RX ORDER — LORATADINE 10 MG/1
10 TABLET ORAL DAILY
COMMUNITY

## 2023-09-05 RX ORDER — FAMOTIDINE 10 MG/ML
20 INJECTION, SOLUTION INTRAVENOUS ONCE
Status: CANCELLED | OUTPATIENT
Start: 2023-09-07

## 2023-09-05 RX ORDER — DIPHENHYDRAMINE HYDROCHLORIDE 50 MG/ML
50 INJECTION INTRAMUSCULAR; INTRAVENOUS AS NEEDED
Status: DISCONTINUED | OUTPATIENT
Start: 2023-09-05 | End: 2023-09-05 | Stop reason: HOSPADM

## 2023-09-05 RX ORDER — FAMOTIDINE 10 MG/ML
20 INJECTION, SOLUTION INTRAVENOUS AS NEEDED
Status: DISCONTINUED | OUTPATIENT
Start: 2023-09-05 | End: 2023-09-05 | Stop reason: HOSPADM

## 2023-09-05 RX ORDER — DIPHENHYDRAMINE HYDROCHLORIDE 50 MG/ML
50 INJECTION INTRAMUSCULAR; INTRAVENOUS AS NEEDED
Status: CANCELLED | OUTPATIENT
Start: 2023-09-07

## 2023-09-05 RX ORDER — HEPARIN SODIUM (PORCINE) LOCK FLUSH IV SOLN 100 UNIT/ML 100 UNIT/ML
300 SOLUTION INTRAVENOUS ONCE
Status: CANCELLED | OUTPATIENT
Start: 2023-09-05

## 2023-09-05 RX ORDER — SODIUM CHLORIDE 0.9 % (FLUSH) 0.9 %
20 SYRINGE (ML) INJECTION AS NEEDED
Status: CANCELLED | OUTPATIENT
Start: 2023-09-05

## 2023-09-05 RX ORDER — FAMOTIDINE 10 MG/ML
20 INJECTION, SOLUTION INTRAVENOUS ONCE
Status: COMPLETED | OUTPATIENT
Start: 2023-09-05 | End: 2023-09-05

## 2023-09-05 RX ORDER — HEPARIN SODIUM (PORCINE) LOCK FLUSH IV SOLN 100 UNIT/ML 100 UNIT/ML
300 SOLUTION INTRAVENOUS ONCE
Status: COMPLETED | OUTPATIENT
Start: 2023-09-05 | End: 2023-09-05

## 2023-09-05 RX ORDER — SODIUM CHLORIDE 9 MG/ML
250 INJECTION, SOLUTION INTRAVENOUS ONCE
Status: DISCONTINUED | OUTPATIENT
Start: 2023-09-05 | End: 2023-09-05 | Stop reason: HOSPADM

## 2023-09-05 RX ORDER — SODIUM CHLORIDE 9 MG/ML
250 INJECTION, SOLUTION INTRAVENOUS ONCE
Status: CANCELLED | OUTPATIENT
Start: 2023-09-07

## 2023-09-05 RX ADMIN — SODIUM CHLORIDE 145 MG: 9 INJECTION, SOLUTION INTRAVENOUS at 11:14

## 2023-09-05 RX ADMIN — DIPHENHYDRAMINE HYDROCHLORIDE 25 MG: 50 INJECTION INTRAMUSCULAR; INTRAVENOUS at 10:52

## 2023-09-05 RX ADMIN — FAMOTIDINE 20 MG: 10 INJECTION, SOLUTION INTRAVENOUS at 10:33

## 2023-09-05 RX ADMIN — DEXAMETHASONE SODIUM PHOSPHATE 12 MG: 4 INJECTION, SOLUTION INTRA-ARTICULAR; INTRALESIONAL; INTRAMUSCULAR; INTRAVENOUS; SOFT TISSUE at 10:33

## 2023-09-05 RX ADMIN — SODIUM CHLORIDE, PRESERVATIVE FREE 300 UNITS: 5 INJECTION INTRAVENOUS at 12:30

## 2023-09-05 NOTE — PROGRESS NOTES
Hematology and Oncology [place]  Office number [phone]    Fax number [phone]     Follow-up     Date: 23      Patient Name: Kimberley Win  MRN: [id number]  : 1980    Referring Physician: Dr. Daxa Anderson MD    Chief Complaint: left breast cancer    Cancer Staging:  Cancer Staging   Stage IIA (cT2, cN1, cM0, G2, ER+, NE+, HER2-)    History of Present Illness: Kimberley Win is a pleasant 43 y.o. female who presented for evaluation of left breast cancer.     She notes a history of cystic breast disease for many years. She notes a new breast mass that became progressively harder and associated with pain prompting diagnostic workup.     Diagnostic mammogram 23 showed a dense, spiculated mass in the left breast which on ultrasound corresponded to a 3:00 3 cm hypoechoic mass with internal vascularity. In the 7:00 left breast there was a 7 mm mass corresponding on US to a cluster of cysts spanning 1.4 cm. In the left breast there was a 1.7 cm LN with multiple smaller LN.     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 Lymph node FNA was postive for malignancy, metastatic ductal carcinoma.    Breast cancer risk profile:  Age of menarche:10  G0; infertility  Age of menopause: premenopausal   Family history of breast, ovarian, prostate or pancreatic cancer: mom stage IV breast cancer in her 50s. M Great Aunt, breast cancer.   Genetic testing: negative 36 gene CancerNext panel     Treatment history:  Dose dense AC followed by weekly taxol: C1, 2023    Interval history:  She is here for follow up and consideration of cycle #6 of treatment, cycle 2 of taxol.  She continues to have palpitations associated with shortness of air.  EKG showed new onset of frequent PVCs not seen on the EKG she performed at onset.  She reports that she has no nausea and has no need to restart the Sancuso patch or the olanzapine.  She  did have some nausea this morning but feels its more anticipatory because she has to be here today.  She continues to have palpitations as well as tachycardia.  Continues to have altered taste. No infectious symptoms.      has h/o PVCs on metoprolol    Past Medical History:   Past Medical History:   Diagnosis Date    Arthritis     Breast cancer 2023    Left Breast    Malignant neoplasm of central portion of left breast in female, estrogen receptor positive 2023    Osteoarthritis     Varicella When I was a child   No personal history of myocardial infarction, cerebrovascular event, or venous thromboembolism.  Osteoarthritis in her hands. No personal history of autoimmune disease. Fhx of RA in her aunt    Past Surgical History:   Past Surgical History:   Procedure Laterality Date    US GUIDED LYMPH NODE BIOPSY  2023       Family History:   Family History   Problem Relation Age of Onset    Breast cancer Mother 55        Stage 4    COPD Mother     Diabetes Mother     Early death Mother     Heart disease Mother     Stroke Mother     Rheum arthritis Maternal Aunt     Breast cancer Paternal Aunt     COPD Maternal Grandfather     Breast cancer Other        Social History:   Social History     Socioeconomic History    Marital status:    Tobacco Use    Smoking status: Former     Packs/day: 0.25     Years: 10.00     Pack years: 2.50     Types: Cigarettes     Start date: 1995     Quit date: 2005     Years since quittin.5     Passive exposure: Past    Smokeless tobacco: Never   Vaping Use    Vaping Use: Never used   Substance and Sexual Activity    Alcohol use: Yes     Comment: rare    Drug use: Never    Sexual activity: Yes     Partners: Male     Birth control/protection: Condom   , lives in [place].     Medications:     Current Outpatient Medications:     ibuprofen (ADVIL,MOTRIN) 200 MG tablet, Take 1 tablet by mouth Every 6 (Six) Hours As Needed for Mild Pain.,  "Disp: , Rfl:     lidocaine-prilocaine (EMLA) 2.5-2.5 % cream, Apply 1 application topically to the appropriate area as directed As Needed (45-60 minutes prior to port access.  Cover with saran/plastic wrap.)., Disp: 30 g, Rfl: 3    loratadine (Claritin) 10 MG tablet, Take 1 tablet by mouth Daily., Disp: , Rfl:     omeprazole (priLOSEC) 40 MG capsule, Take 1 capsule by mouth Daily. 30 min prior to evening., Disp: 30 capsule, Rfl: 5    ondansetron (ZOFRAN) 8 MG tablet, Take 1 tablet by mouth 3 (Three) Times a Day As Needed for Nausea or Vomiting (DO NOT USE WHILE SANCUSO IN PLACE)., Disp: 30 tablet, Rfl: 5    prochlorperazine (COMPAZINE) 5 MG tablet, Take 1-2 tablets by mouth Every 6 (Six) Hours As Needed for Nausea (USE 2nd; ok to use on evening of chemo)., Disp: 30 tablet, Rfl: 1    Allergies:   Allergies   Allergen Reactions    Sunblock [Solbar Pf Spf15] Rash       Objective     Vital Signs:   Vitals:    09/05/23 0853   BP: 117/66   Pulse: 109   Resp: 16   Temp: 97.3 °F (36.3 °C)   TempSrc: Temporal   SpO2: 99%   Weight: 68.9 kg (152 lb)   Height: 170.2 cm (67\")   PainSc: 0-No pain    Body mass index is 23.81 kg/m².   Pain Score    09/05/23 0853   PainSc: 0-No pain       ECOG Performance Status: 0    Physical Exam:   General: No acute distress. Well appearing.  HEENT: Normocephalic, atraumatic. Sclera anicteric.   Neck: supple, no adenopathy.   Cardiovascular: regular rate and rhythm. No murmurs.   Respiratory: Normal rate. Clear to auscultation bilaterally  Abdomen: Soft, nontender, non distended with normoactive bowel sounds  Lymph: no cervical, supraclavicular or axillary adenopathy  Neuro: Alert and oriented x 3. No focal deficits.   Ext: Symmetric, no swelling.   Psych: Euthymic  Breast: 4 x 3.5 cm 3:00 left breast mass (now 3 x 2 cm firm stable). Small deep mobile LN not palpable. No palpable right breast mass.  Skin: No rash    Laboratory/Imaging Reviewed:   Infusion on 08/28/2023   Component Date Value Ref " Range Status    Glucose 08/28/2023 93  65 - 99 mg/dL Final    BUN 08/28/2023 10  6 - 20 mg/dL Final    Creatinine 08/28/2023 0.57  0.57 - 1.00 mg/dL Final    Sodium 08/28/2023 140  136 - 145 mmol/L Final    Potassium 08/28/2023 4.2  3.5 - 5.2 mmol/L Final    Chloride 08/28/2023 104  98 - 107 mmol/L Final    CO2 08/28/2023 25.8  22.0 - 29.0 mmol/L Final    Calcium 08/28/2023 9.2  8.6 - 10.5 mg/dL Final    Total Protein 08/28/2023 7.2  6.0 - 8.5 g/dL Final    Albumin 08/28/2023 4.2  3.5 - 5.2 g/dL Final    ALT (SGPT) 08/28/2023 17  1 - 33 U/L Final    AST (SGOT) 08/28/2023 17  1 - 32 U/L Final    Alkaline Phosphatase 08/28/2023 77  39 - 117 U/L Final    Total Bilirubin 08/28/2023 <0.2  0.0 - 1.2 mg/dL Final    Globulin 08/28/2023 3.0  gm/dL Final    A/G Ratio 08/28/2023 1.4  g/dL Final    BUN/Creatinine Ratio 08/28/2023 17.5  7.0 - 25.0 Final    Anion Gap 08/28/2023 10.2  5.0 - 15.0 mmol/L Final    eGFR 08/28/2023 115.1  >60.0 mL/min/1.73 Final    HCG, Urine QL 08/28/2023 Negative  Negative Final    WBC 08/28/2023 9.94  3.40 - 10.80 10*3/mm3 Final    RBC 08/28/2023 3.16 (L)  3.77 - 5.28 10*6/mm3 Final    Hemoglobin 08/28/2023 9.8 (L)  12.0 - 15.9 g/dL Final    Hematocrit 08/28/2023 28.0 (L)  34.0 - 46.6 % Final    MCV 08/28/2023 88.6  79.0 - 97.0 fL Final    MCH 08/28/2023 31.0  26.6 - 33.0 pg Final    MCHC 08/28/2023 35.0  31.5 - 35.7 g/dL Final    RDW 08/28/2023 14.9  12.3 - 15.4 % Final    RDW-SD 08/28/2023 45.0  37.0 - 54.0 fl Final    MPV 08/28/2023 8.9  6.0 - 12.0 fL Final    Platelets 08/28/2023 190  140 - 450 10*3/mm3 Final    Neutrophil % 08/28/2023 70.3  42.7 - 76.0 % Final    Lymphocyte % 08/28/2023 12.7 (L)  19.6 - 45.3 % Final    Monocyte % 08/28/2023 13.9 (H)  5.0 - 12.0 % Final    Eosinophil % 08/28/2023 0.5  0.3 - 6.2 % Final    Basophil % 08/28/2023 0.8  0.0 - 1.5 % Final    Immature Grans % 08/28/2023 1.8 (H)  0.0 - 0.5 % Final    Neutrophils, Absolute 08/28/2023 6.99  1.70 - 7.00 10*3/mm3 Final     Lymphocytes, Absolute 08/28/2023 1.26  0.70 - 3.10 10*3/mm3 Final    Monocytes, Absolute 08/28/2023 1.38 (H)  0.10 - 0.90 10*3/mm3 Final    Eosinophils, Absolute 08/28/2023 0.05  0.00 - 0.40 10*3/mm3 Final    Basophils, Absolute 08/28/2023 0.08  0.00 - 0.20 10*3/mm3 Final    Immature Grans, Absolute 08/28/2023 0.18 (H)  0.00 - 0.05 10*3/mm3 Final    nRBC 08/28/2023 0.2  0.0 - 0.2 /100 WBC Final   Admission on 08/23/2023, Discharged on 08/23/2023   Component Date Value Ref Range Status    Glucose 08/23/2023 119 (H)  65 - 99 mg/dL Final    BUN 08/23/2023 13  6 - 20 mg/dL Final    Creatinine 08/23/2023 0.63  0.57 - 1.00 mg/dL Final    Sodium 08/23/2023 137  136 - 145 mmol/L Final    Potassium 08/23/2023 3.5  3.5 - 5.2 mmol/L Final    Chloride 08/23/2023 100  98 - 107 mmol/L Final    CO2 08/23/2023 29.1 (H)  22.0 - 29.0 mmol/L Final    Calcium 08/23/2023 9.5  8.6 - 10.5 mg/dL Final    Total Protein 08/23/2023 7.2  6.0 - 8.5 g/dL Final    Albumin 08/23/2023 4.2  3.5 - 5.2 g/dL Final    ALT (SGPT) 08/23/2023 15  1 - 33 U/L Final    AST (SGOT) 08/23/2023 14  1 - 32 U/L Final    Alkaline Phosphatase 08/23/2023 95  39 - 117 U/L Final    Total Bilirubin 08/23/2023 0.2  0.0 - 1.2 mg/dL Final    Globulin 08/23/2023 3.0  gm/dL Final    A/G Ratio 08/23/2023 1.4  g/dL Final    BUN/Creatinine Ratio 08/23/2023 20.6  7.0 - 25.0 Final    Anion Gap 08/23/2023 7.9  5.0 - 15.0 mmol/L Final    eGFR 08/23/2023 112.3  >60.0 mL/min/1.73 Final    Magnesium 08/23/2023 1.8  1.6 - 2.6 mg/dL Final    HS Troponin T 08/23/2023 9  <10 ng/L Final    TSH 08/23/2023 2.110  0.270 - 4.200 uIU/mL Final    Extra Tube 08/23/2023 Hold for add-ons.   Final    Auto resulted.    Extra Tube 08/23/2023 hold for add-on   Final    Auto resulted    Extra Tube 08/23/2023 Hold for add-ons.   Final    Auto resulted.    Extra Tube 08/23/2023 Hold for add-ons.   Final    Auto resulted    WBC 08/23/2023 2.04 (L)  3.40 - 10.80 10*3/mm3 Final    RBC 08/23/2023 3.31 (L)   3.77 - 5.28 10*6/mm3 Final    Hemoglobin 08/23/2023 10.0 (L)  12.0 - 15.9 g/dL Final    Hematocrit 08/23/2023 28.7 (L)  34.0 - 46.6 % Final    MCV 08/23/2023 86.7  79.0 - 97.0 fL Final    MCH 08/23/2023 30.2  26.6 - 33.0 pg Final    MCHC 08/23/2023 34.8  31.5 - 35.7 g/dL Final    RDW 08/23/2023 13.2  12.3 - 15.4 % Final    RDW-SD 08/23/2023 41.1  37.0 - 54.0 fl Final    MPV 08/23/2023 9.1  6.0 - 12.0 fL Final    Platelets 08/23/2023 245  140 - 450 10*3/mm3 Final    Neutrophil % 08/23/2023 44.0  42.7 - 76.0 % Final    Lymphocyte % 08/23/2023 34.0  19.6 - 45.3 % Final    Monocyte % 08/23/2023 22.0 (H)  5.0 - 12.0 % Final    Neutrophils Absolute 08/23/2023 0.90 (L)  1.70 - 7.00 10*3/mm3 Final    Lymphocytes Absolute 08/23/2023 0.69 (L)  0.70 - 3.10 10*3/mm3 Final    Monocytes Absolute 08/23/2023 0.45  0.10 - 0.90 10*3/mm3 Final    RBC Morphology 08/23/2023 Normal  Normal Final    WBC Morphology 08/23/2023 Normal  Normal Final    Platelet Morphology 08/23/2023 Normal  Normal Final    Uric Acid 08/23/2023 3.4  2.4 - 5.7 mg/dL Final    proBNP 08/23/2023 349.7  0.0 - 450.0 pg/mL Final       XR Chest 1 View    Result Date: 8/23/2023  Narrative: PROCEDURE: XR CHEST 1 VW-  HISTORY: Dysrhythmia Triage Protocol  COMPARISON: 6/20/2023  FINDINGS:  Portable view of the chest demonstrates the lungs to be grossly clear. There is no evidence of effusion, pneumothorax or other significant pleural disease. The mediastinum is unremarkable.  The heart size is normal. Right subclavian Port-A-Cath is noted with tip in the innominate vein.      Impression: Port-A-Cath in position as above without pneumothorax    This report was signed and finalized on 8/23/2023 1:18 PM by Trae eD MD.       Procedures    Assessment / Plan      Assessment/Plan:     1.  Malignant neoplasm of central portion of left breast in female, estrogen receptor positive   2.  Chemotherapy monitoring  -We reviewed the potential role for neoadjuvant treatment.  The  advantages include potential for downstaging the tumor to allow for breast conservation, and the added prognostic value of assessing pathologic response to chemotherapy.  There is no clear survival advantage to neoadjuvant over adjuvant administration, but outcomes appear equivalent when followed by standard surgical treatment.  She desires lumpectomy and is currently not a candidate for breast conservation at current time. Therefore she was referred for consideration of neoadjuvant chemotherapy.   Based on young age and LN positive malignancy recommended dose dense AC followed by weekly taxol.  -Baseline echocardiogram shows an ejection fraction of 55 to 60%  -CBC/CMP pending for treatment today.  -Follow up in one week with labs for consideration of week #3 of taxol     3.  Chemotherapy-induced nausea  -Stopped both olanzapine and Sancuso for palpitations but neither seemed to be the trigger.   -Anticipate this will improve with therapy change from AC to taxol.   -Has PRN zofran/compazine available.     4. GERD/dyspepsia  - PPI BID and PRN pepcid    5. Palpitations  -EKG x 2 and ED records reviewed  -Holter pending results but EKG with frequent PVCs.   -Electrolytes reviewed.   -This is not improved.  I recommended referral to cardiology and starting metoprolol since this is persistent.  Patient would like to hold off on cardiology referral as well as metoprolol for now.  We will plan to discuss at next visit.  We discussed heart rate today was 109 at rest.  I encouraged patient to continue to monitor at home.  - For now we will continue to monitor.  I reviewed with the patient that if this persist or if she is having chest pain to go to ED and notify our office.    6. Constipation  -Better on Colace plus MiraLAX.    7. Access   Port     Follow Up:   1 week     NELLA Horan    Hematology and Oncology     Total time of patient care including time prior to, face to face with patient, and following visit spent  in reviewing records, lab results, imaging studies, discussion with patient, and documentation/charting was > 45 minutes

## 2023-09-06 ENCOUNTER — TELEPHONE (OUTPATIENT)
Dept: ONCOLOGY | Facility: CLINIC | Age: 43
End: 2023-09-06
Payer: COMMERCIAL

## 2023-09-06 DIAGNOSIS — R00.2 PALPITATIONS: Primary | ICD-10-CM

## 2023-09-06 DIAGNOSIS — I49.3 FREQUENT PVCS: ICD-10-CM

## 2023-09-06 NOTE — TELEPHONE ENCOUNTER
Notified patient per Dr. Olguin that her holter monitor results came back and it showed frequent PVCs.  Educated patient on PVCs.  Advised patient per Dr. Olguin that she recommends metoprolol and a referral to cardiologist.  Patient verbalized understanding and agreed with plan.  Asked patient to call this office with any questions or concerns or if she does not hear from cardiology with an appointment in the next few days.  Patient verbalized understanding.

## 2023-09-08 ENCOUNTER — APPOINTMENT (OUTPATIENT)
Dept: GENERAL RADIOLOGY | Facility: HOSPITAL | Age: 43
End: 2023-09-08
Payer: COMMERCIAL

## 2023-09-08 ENCOUNTER — HOSPITAL ENCOUNTER (EMERGENCY)
Facility: HOSPITAL | Age: 43
Discharge: HOME | End: 2023-09-08
Attending: EMERGENCY MEDICINE
Payer: COMMERCIAL

## 2023-09-08 VITALS
DIASTOLIC BLOOD PRESSURE: 63 MMHG | SYSTOLIC BLOOD PRESSURE: 108 MMHG | BODY MASS INDEX: 24.44 KG/M2 | TEMPERATURE: 98.5 F | RESPIRATION RATE: 18 BRPM | HEIGHT: 67 IN | OXYGEN SATURATION: 97 % | HEART RATE: 81 BPM | WEIGHT: 155.7 LBS

## 2023-09-08 DIAGNOSIS — R50.9 FEVER, UNSPECIFIED: Primary | ICD-10-CM

## 2023-09-08 LAB
ALBUMIN SERPL-MCNC: 4.3 G/DL (ref 3.5–5.2)
ALBUMIN/GLOB SERPL: 1.5 G/DL
ALP SERPL-CCNC: 62 U/L (ref 39–117)
ALT SERPL W P-5'-P-CCNC: 72 U/L (ref 1–33)
ANION GAP SERPL CALCULATED.3IONS-SCNC: 11.2 MMOL/L (ref 5–15)
AST SERPL-CCNC: 47 U/L (ref 1–32)
B PARAPERT DNA SPEC QL NAA+PROBE: NOT DETECTED
B PERT DNA SPEC QL NAA+PROBE: NOT DETECTED
B-HCG UR QL: NEGATIVE
BASOPHILS # BLD AUTO: 0.02 10*3/MM3 (ref 0–0.2)
BASOPHILS NFR BLD AUTO: 0.5 % (ref 0–1.5)
BILIRUB SERPL-MCNC: 0.4 MG/DL (ref 0–1.2)
BILIRUB UR QL STRIP: NEGATIVE
BUN SERPL-MCNC: 13 MG/DL (ref 6–20)
BUN/CREAT SERPL: 21 (ref 7–25)
C PNEUM DNA NPH QL NAA+NON-PROBE: NOT DETECTED
CALCIUM SPEC-SCNC: 9.2 MG/DL (ref 8.6–10.5)
CHLORIDE SERPL-SCNC: 99 MMOL/L (ref 98–107)
CLARITY UR: CLEAR
CO2 SERPL-SCNC: 25.8 MMOL/L (ref 22–29)
COLOR UR: YELLOW
CREAT SERPL-MCNC: 0.62 MG/DL (ref 0.57–1)
D-LACTATE SERPL-SCNC: 0.8 MMOL/L (ref 0.5–2)
DEPRECATED RDW RBC AUTO: 47.8 FL (ref 37–54)
EGFRCR SERPLBLD CKD-EPI 2021: 112.8 ML/MIN/1.73
EOSINOPHIL # BLD AUTO: 0.04 10*3/MM3 (ref 0–0.4)
EOSINOPHIL NFR BLD AUTO: 0.9 % (ref 0.3–6.2)
ERYTHROCYTE [DISTWIDTH] IN BLOOD BY AUTOMATED COUNT: 14.7 % (ref 12.3–15.4)
FLUAV SUBTYP SPEC NAA+PROBE: NOT DETECTED
FLUBV RNA ISLT QL NAA+PROBE: NOT DETECTED
GLOBULIN UR ELPH-MCNC: 2.9 GM/DL
GLUCOSE SERPL-MCNC: 102 MG/DL (ref 65–99)
GLUCOSE UR STRIP-MCNC: NEGATIVE MG/DL
HADV DNA SPEC NAA+PROBE: NOT DETECTED
HCOV 229E RNA SPEC QL NAA+PROBE: NOT DETECTED
HCOV HKU1 RNA SPEC QL NAA+PROBE: NOT DETECTED
HCOV NL63 RNA SPEC QL NAA+PROBE: NOT DETECTED
HCOV OC43 RNA SPEC QL NAA+PROBE: NOT DETECTED
HCT VFR BLD AUTO: 28.1 % (ref 34–46.6)
HGB BLD-MCNC: 9.6 G/DL (ref 12–15.9)
HGB UR QL STRIP.AUTO: NEGATIVE
HMPV RNA NPH QL NAA+NON-PROBE: NOT DETECTED
HPIV1 RNA ISLT QL NAA+PROBE: NOT DETECTED
HPIV2 RNA SPEC QL NAA+PROBE: NOT DETECTED
HPIV3 RNA NPH QL NAA+PROBE: NOT DETECTED
HPIV4 P GENE NPH QL NAA+PROBE: NOT DETECTED
IMM GRANULOCYTES # BLD AUTO: 0.01 10*3/MM3 (ref 0–0.05)
IMM GRANULOCYTES NFR BLD AUTO: 0.2 % (ref 0–0.5)
KETONES UR QL STRIP: NEGATIVE
LEUKOCYTE ESTERASE UR QL STRIP.AUTO: NEGATIVE
LYMPHOCYTES # BLD AUTO: 0.41 10*3/MM3 (ref 0.7–3.1)
LYMPHOCYTES NFR BLD AUTO: 9.7 % (ref 19.6–45.3)
M PNEUMO IGG SER IA-ACNC: NOT DETECTED
MCH RBC QN AUTO: 30.4 PG (ref 26.6–33)
MCHC RBC AUTO-ENTMCNC: 34.2 G/DL (ref 31.5–35.7)
MCV RBC AUTO: 88.9 FL (ref 79–97)
MONOCYTES # BLD AUTO: 0.2 10*3/MM3 (ref 0.1–0.9)
MONOCYTES NFR BLD AUTO: 4.7 % (ref 5–12)
NEUTROPHILS NFR BLD AUTO: 3.55 10*3/MM3 (ref 1.7–7)
NEUTROPHILS NFR BLD AUTO: 84 % (ref 42.7–76)
NITRITE UR QL STRIP: NEGATIVE
NRBC BLD AUTO-RTO: 0 /100 WBC (ref 0–0.2)
PH UR STRIP.AUTO: 7.5 [PH] (ref 5–8)
PLATELET # BLD AUTO: 301 10*3/MM3 (ref 140–450)
PMV BLD AUTO: 8.8 FL (ref 6–12)
POTASSIUM SERPL-SCNC: 4.1 MMOL/L (ref 3.5–5.2)
PROCALCITONIN SERPL-MCNC: 0.09 NG/ML (ref 0–0.25)
PROT SERPL-MCNC: 7.2 G/DL (ref 6–8.5)
PROT UR QL STRIP: NEGATIVE
RBC # BLD AUTO: 3.16 10*6/MM3 (ref 3.77–5.28)
RHINOVIRUS RNA SPEC NAA+PROBE: NOT DETECTED
RSV RNA NPH QL NAA+NON-PROBE: NOT DETECTED
SARS-COV-2 RNA NPH QL NAA+NON-PROBE: NOT DETECTED
SODIUM SERPL-SCNC: 136 MMOL/L (ref 136–145)
SP GR UR STRIP: 1.01 (ref 1–1.03)
UROBILINOGEN UR QL STRIP: NORMAL
WBC NRBC COR # BLD: 4.23 10*3/MM3 (ref 3.4–10.8)

## 2023-09-08 PROCEDURE — 85025 COMPLETE CBC W/AUTO DIFF WBC: CPT

## 2023-09-08 PROCEDURE — 96360 HYDRATION IV INFUSION INIT: CPT

## 2023-09-08 PROCEDURE — 83605 ASSAY OF LACTIC ACID: CPT

## 2023-09-08 PROCEDURE — 87040 BLOOD CULTURE FOR BACTERIA: CPT

## 2023-09-08 PROCEDURE — 93005 ELECTROCARDIOGRAM TRACING: CPT

## 2023-09-08 PROCEDURE — 84145 PROCALCITONIN (PCT): CPT

## 2023-09-08 PROCEDURE — 99284 EMERGENCY DEPT VISIT MOD MDM: CPT

## 2023-09-08 PROCEDURE — 80053 COMPREHEN METABOLIC PANEL: CPT

## 2023-09-08 PROCEDURE — 81003 URINALYSIS AUTO W/O SCOPE: CPT

## 2023-09-08 PROCEDURE — 0202U NFCT DS 22 TRGT SARS-COV-2: CPT

## 2023-09-08 PROCEDURE — 81025 URINE PREGNANCY TEST: CPT

## 2023-09-08 PROCEDURE — 36415 COLL VENOUS BLD VENIPUNCTURE: CPT

## 2023-09-08 PROCEDURE — 71045 X-RAY EXAM CHEST 1 VIEW: CPT

## 2023-09-08 RX ORDER — ACETAMINOPHEN 500 MG
500 TABLET ORAL ONCE
Status: COMPLETED | OUTPATIENT
Start: 2023-09-08 | End: 2023-09-08

## 2023-09-08 RX ORDER — LEVOFLOXACIN 500 MG/1
500 TABLET, FILM COATED ORAL DAILY
Qty: 7 TABLET | Refills: 0 | Status: SHIPPED | OUTPATIENT
Start: 2023-09-08 | End: 2023-09-15

## 2023-09-08 RX ORDER — LEVOFLOXACIN 750 MG/1
750 TABLET ORAL ONCE
Status: COMPLETED | OUTPATIENT
Start: 2023-09-08 | End: 2023-09-08

## 2023-09-08 RX ORDER — ACETAMINOPHEN 500 MG
1000 TABLET ORAL ONCE
Status: COMPLETED | OUTPATIENT
Start: 2023-09-08 | End: 2023-09-08

## 2023-09-08 RX ADMIN — SODIUM CHLORIDE 1000 ML: 9 INJECTION, SOLUTION INTRAVENOUS at 18:11

## 2023-09-08 RX ADMIN — ACETAMINOPHEN 500 MG: 500 TABLET, FILM COATED ORAL at 17:52

## 2023-09-08 RX ADMIN — ACETAMINOPHEN 500 MG: 500 TABLET, FILM COATED ORAL at 17:45

## 2023-09-08 RX ADMIN — LEVOFLOXACIN 750 MG: 750 TABLET, FILM COATED ORAL at 21:04

## 2023-09-08 NOTE — ED PROVIDER NOTES
Subjective  History of Present Illness:    This is a 43-year-old female, recently started treatment for breast cancer back in July of the left breast, follows with Dr. Olguin of oncology, status postchemotherapy last treatment/infusion Tuesday of this week, presents 3 days later for sudden onset fevers chills myalgias around 2 PM today.  She denies any chest pain or shortness of air.  No nausea vomiting or diarrhea.  No sore throat.  Had previously been taking doxorubicin and another chemotherapy agent that caused heart issues, began Taxol, had her second infusion of Taxol this past Tuesday.  Tmax of 101.  No diarrhea.  No known sick contacts.  No congestion or runny nose.  Denies any dysuria hematuria frequency or urgency.  Cough is nonproductive.      Nurses Notes reviewed and agree, including vitals, allergies, social history and prior medical history.     REVIEW OF SYSTEMS: All systems reviewed and not pertinent unless noted.  Review of Systems   Constitutional:  Positive for chills and fever.   HENT:  Negative for congestion, rhinorrhea and sore throat.    Respiratory:  Positive for cough. Negative for shortness of breath.    Cardiovascular:  Negative for chest pain.   Gastrointestinal:  Negative for abdominal pain, diarrhea, nausea and vomiting.   Genitourinary:  Negative for dysuria, frequency, hematuria and urgency.   Musculoskeletal:  Positive for myalgias.   All other systems reviewed and are negative.    Past Medical History:   Diagnosis Date    Arthritis     Breast cancer 06/01/2023    Left Breast    Malignant neoplasm of central portion of left breast in female, estrogen receptor positive 6/20/2023    Osteoarthritis 2021    Varicella When I was a child       Allergies:    Sunblock [solbar pf [id number]]      Past Surgical History:   Procedure Laterality Date    US GUIDED LYMPH NODE BIOPSY  6/1/2023         Social History     Socioeconomic History    Marital status:    Tobacco Use    Smoking status:  "Former     Packs/day: 0.25     Years: 10.00     Pack years: 2.50     Types: Cigarettes     Start date: 1995     Quit date: 2005     Years since quittin.5     Passive exposure: Past    Smokeless tobacco: Never   Vaping Use    Vaping Use: Never used   Substance and Sexual Activity    Alcohol use: Yes     Comment: rare    Drug use: Never    Sexual activity: Yes     Partners: Male     Birth control/protection: Condom         Family History   Problem Relation Age of Onset    Breast cancer Mother 55        Stage 4    COPD Mother     Diabetes Mother     Early death Mother     Heart disease Mother     Stroke Mother     Rheum arthritis Maternal Aunt     Breast cancer Paternal Aunt     COPD Maternal Grandfather     Breast cancer Other        Objective  Physical Exam:  /56   Pulse 74   Temp (!) 100.7 °F (38.2 °C)   Resp 20   Ht 170.2 cm (67\")   Wt 70.6 kg (155 lb 11.2 oz)   SpO2 96%   BMI 24.39 kg/m²      Physical Exam  Vitals and nursing note reviewed.   Constitutional:       Appearance: She is normal weight. She is ill-appearing. She is not toxic-appearing or diaphoretic.   HENT:      Head: Normocephalic and atraumatic.      Nose: Nose normal. No congestion or rhinorrhea.      Mouth/Throat:      Mouth: Mucous membranes are moist.      Pharynx: Oropharynx is clear. No oropharyngeal exudate or posterior oropharyngeal erythema.   Eyes:      Extraocular Movements: Extraocular movements intact.      Pupils: Pupils are equal, round, and reactive to light.   Cardiovascular:      Rate and Rhythm: Normal rate and regular rhythm.      Pulses: Normal pulses.      Heart sounds: Normal heart sounds.   Pulmonary:      Effort: Pulmonary effort is normal. No respiratory distress.      Breath sounds: Normal breath sounds. No stridor. No wheezing, rhonchi or rales.   Abdominal:      General: There is no distension.      Palpations: Abdomen is soft.      Tenderness: There is no abdominal tenderness. There is no " guarding.   Musculoskeletal:         General: Normal range of motion.      Cervical back: Normal range of motion and neck supple. No rigidity or tenderness.   Skin:     General: Skin is warm and dry.      Capillary Refill: Capillary refill takes less than 2 seconds.   Neurological:      General: No focal deficit present.      Mental Status: She is alert and oriented to person, place, and time.   Psychiatric:         Mood and Affect: Mood normal.         Behavior: Behavior normal.         Thought Content: Thought content normal.         Judgment: Judgment normal.           Procedures    ED Course:    ED Course as of 09/08/23 2058   Fri Sep 08, 2023   1846 EKG interpreted by me.  Sinus rhythm.  Rate of 93.  Low voltage in chest leads.  No ST segment or T wave changes.  Normal EKG [CG]   1859 Had nursing staff contact on-call oncologist for advice. [JR]      ED Course User Index  [CG] Justin Newton,   [JR] Ralph Moser PA-C       Lab Results (last 24 hours)       Procedure Component Value Units Date/Time    Respiratory Panel PCR w/COVID-19(SARS-CoV-2) HIRAM/DONN/RAFI/PAD/COR/MAD/SHRUTHI In-House, NP Swab in UTM/VTM, 3-4 HR TAT - Swab, Nasopharynx [[id number]]  (Normal) Collected: 09/08/23 1748    Specimen: Swab from Nasopharynx Updated: 09/08/23 1843     ADENOVIRUS, PCR Not Detected     Coronavirus 229E Not Detected     Coronavirus HKU1 Not Detected     Coronavirus NL63 Not Detected     Coronavirus OC43 Not Detected     COVID19 Not Detected     Human Metapneumovirus Not Detected     Human Rhinovirus/Enterovirus Not Detected     Influenza A PCR Not Detected     Influenza B PCR Not Detected     Parainfluenza Virus 1 Not Detected     Parainfluenza Virus 2 Not Detected     Parainfluenza Virus 3 Not Detected     Parainfluenza Virus 4 Not Detected     RSV, PCR Not Detected     Bordetella pertussis pcr Not Detected     Bordetella parapertussis PCR Not Detected     Chlamydophila pneumoniae PCR Not Detected     Mycoplasma  pneumo by PCR Not Detected    Narrative:      In the setting of a positive respiratory panel with a viral infection PLUS a negative procalcitonin without other underlying concern for bacterial infection, consider observing off antibiotics or discontinuation of antibiotics and continue supportive care. If the respiratory panel is positive for atypical bacterial infection (Bordetella pertussis, Chlamydophila pneumoniae, or Mycoplasma pneumoniae), consider antibiotic de-escalation to target atypical bacterial infection.    CBC Auto Differential [[id number]]  (Abnormal) Collected: 09/08/23 1813    Specimen: Blood Updated: 09/08/23 1820     WBC 4.23 10*3/mm3      RBC 3.16 10*6/mm3      Hemoglobin 9.6 g/dL      Hematocrit 28.1 %      MCV 88.9 fL      MCH 30.4 pg      MCHC 34.2 g/dL      RDW 14.7 %      RDW-SD 47.8 fl      MPV 8.8 fL      Platelets 301 10*3/mm3      Neutrophil % 84.0 %      Lymphocyte % 9.7 %      Monocyte % 4.7 %      Eosinophil % 0.9 %      Basophil % 0.5 %      Immature Grans % 0.2 %      Neutrophils, Absolute 3.55 10*3/mm3      Lymphocytes, Absolute 0.41 10*3/mm3      Monocytes, Absolute 0.20 10*3/mm3      Eosinophils, Absolute 0.04 10*3/mm3      Basophils, Absolute 0.02 10*3/mm3      Immature Grans, Absolute 0.01 10*3/mm3      nRBC 0.0 /100 WBC     Comprehensive Metabolic Panel [[id number]]  (Abnormal) Collected: 09/08/23 1813    Specimen: Blood Updated: 09/08/23 1840     Glucose 102 mg/dL      BUN 13 mg/dL      Creatinine 0.62 mg/dL      Sodium 136 mmol/L      Potassium 4.1 mmol/L      Chloride 99 mmol/L      CO2 25.8 mmol/L      Calcium 9.2 mg/dL      Total Protein 7.2 g/dL      Albumin 4.3 g/dL      ALT (SGPT) 72 U/L      AST (SGOT) 47 U/L      Alkaline Phosphatase 62 U/L      Total Bilirubin 0.4 mg/dL      Globulin 2.9 gm/dL      A/G Ratio 1.5 g/dL      BUN/Creatinine Ratio 21.0     Anion Gap 11.2 mmol/L      eGFR 112.8 mL/min/1.73     Narrative:      GFR Normal >60  Chronic Kidney Disease  "<60  Kidney Failure <15      Procalcitonin [[id number]]  (Normal) Collected: 09/08/23 1813    Specimen: Blood Updated: 09/08/23 1848     Procalcitonin 0.09 ng/mL     Narrative:      As a Marker for Sepsis (Non-Neonates):    1. <0.5 ng/mL represents a low risk of severe sepsis and/or septic shock.  2. >2 ng/mL represents a high risk of severe sepsis and/or septic shock.    As a Marker for Lower Respiratory Tract Infections that require antibiotic therapy:    PCT on Admission    Antibiotic Therapy       6-12 Hrs later    >0.5                Strongly Recommended  >0.25 - <0.5        Recommended   0.1 - 0.25          Discouraged              Remeasure/reassess PCT  <0.1                Strongly Discouraged     Remeasure/reassess PCT    As 28 day mortality risk marker: \"Change in Procalcitonin Result\" (>80% or <=80%) if Day 0 (or Day 1) and Day 4 values are available. Refer to http://www.[facility]pct-calculator.com    Change in PCT <=80%  A decrease of PCT levels below or equal to 80% defines a positive change in PCT test result representing a higher risk for 28-day all-cause mortality of patients diagnosed with severe sepsis for septic shock.    Change in PCT >80%  A decrease of PCT levels of more than 80% defines a negative change in PCT result representing a lower risk for 28-day all-cause mortality of patients diagnosed with severe sepsis or septic shock.       Lactic Acid, Plasma [[id number]]  (Normal) Collected: 09/08/23 1813    Specimen: Blood Updated: 09/08/23 1835     Lactate 0.8 mmol/L     Urinalysis With Culture If Indicated - Urine, Clean Catch [[id number]]  (Normal) Collected: 09/08/23 1842    Specimen: Urine, Clean Catch Updated: 09/08/23 1850     Color, UA Yellow     Appearance, UA Clear     pH, UA 7.5     Specific Gravity, UA 1.008     Glucose, UA Negative     Ketones, UA Negative     Bilirubin, UA Negative     Blood, UA Negative     Protein, UA Negative     Leuk Esterase, UA Negative     Nitrite, UA Negative "     Urobilinogen, UA 0.2 E.U./dL    Narrative:      In absence of clinical symptoms, the presence of pyuria, bacteria, and/or nitrites on the urinalysis result does not correlate with infection.  Urine microscopic not indicated.    Pregnancy, Urine - Urine, Clean Catch [[id number]]  (Normal) Collected: 09/08/23 1842    Specimen: Urine, Clean Catch Updated: 09/08/23 1858     HCG, Urine QL Negative    Blood Culture - Blood, Hand, Right [[id number]] Collected: 09/08/23 2023    Specimen: Blood from Hand, Right Updated: 09/08/23 2029    Blood Culture - Blood, Arm, Left [[id number]] Collected: 09/08/23 2025    Specimen: Blood from Arm, Left Updated: 09/08/23 2029             No radiology results from the last 24 hrs       MDM     Amount and/or Complexity of Data Reviewed  Decide to obtain previous medical records or to obtain history from someone other than the patient: yes  Independent visualization of images, tracings, or specimens: yes        Initial impression of presenting illness: This is a 43-year-old female currently undergoing chemotherapy infusions for left-sided breast cancer presents emergency room today for evaluation of fever and flu symptoms.    DDX: includes but is not limited to: Viral upper respiratory tract infection, pneumonia, sepsis, neutropenic fever, urinary tract infection, electrolyte abnormality, other    Patient arrives hemodynamically stable, febrile to 100.7 degrees, tachycardic at a rate of 101, nonhypoxic and nontachypneic with vitals interpreted by myself.     Pertinent features from physical exam: This is a 43-year-old female, appears that she feels unwell, abdomen soft nontender.  Cardiac auscultation regular rate and rhythm, lungs were clear bilaterally.  Mucous membranes moist, oropharynx clear without erythema or exudate on the tonsils.  2+ radial pulses bilaterally.  Moves all extremities without difficulty.  Capillary refill and skin turgor are appropriate..  Port in the right upper  Sukumar chest appears without erythema or obvious external infection.    Initial diagnostic plan: CBC, CMP, procalcitonin, lactic acid, respiratory panel, urinalysis, urine pregnancy, chest x-ray, EKG ,blood cultures    Results from initial plan were reviewed and interpreted by me revealing urine hCG negative.  Procalcitonin normal.  Lactic acid normal.  Urinalysis without infection.  CMP with mild liver enzyme elevation otherwise negative.  CBC with a hemoglobin of 9.6 and hematocrit of 28.1 which appear baseline for patient.  EKG revealed sinus rhythm rate of 93.  Total respiratory panel was negative.  Chest x-ray per my interpretation with no acute cardiopulmonary process.  Blood cultures pending.  Overall lab work-ups appear essentially reassuring.    Diagnostic information from other sources: Chart reviewed.    Interventions / Re-evaluation: 1000 mg of Tylenol, 1 L of normal saline.  Given first dose of Levaquin.    Results/clinical rationale were discussed with patient at bedside.  She is agreeable with this plan.    Consultations/Discussion of results with other physicians: Spoke with Dr. BRUNO [facility] oncology, recommended treating as a febrile illness with discharge home on broad-spectrum antibiotics such as Levaquin and follow-up in office and called to cancel her chemotherapy next week.    Disposition plan: Discharge.  Close follow-up with her oncologist.  Strict return precautions were given to the patient.  Recommended continuing to monitor blood pressure and return to the ER for symptoms of low blood pressure.  Recommended continuing to mask and remain away from large public places.  -----    Final diagnoses:   Fever, unspecified          Ralph Moser PA-C  09/08/23 2100

## 2023-09-08 NOTE — ED NOTES
JANELLE Rosas requested to speak with oncology at [facility]. Awaiting call back at this time.

## 2023-09-09 NOTE — DISCHARGE INSTRUCTIONS
Please return to the emergency room for any worsening symptoms.  Recommend very close follow-up with your oncologist.  If your blood pressure becomes extremely low, you should return to the ER for reevaluation.  Dr. Pan of [facility] and oncology recommended that you do not receive your chemo on Monday, however recommend keeping this appointment to follow-up with your oncologist.  I have sent an antibiotic to your pharmacy, make sure to pick this up and take the entire course.

## 2023-09-11 ENCOUNTER — INFUSION (OUTPATIENT)
Dept: ONCOLOGY | Facility: HOSPITAL | Age: 43
End: 2023-09-11
Payer: COMMERCIAL

## 2023-09-11 ENCOUNTER — OFFICE VISIT (OUTPATIENT)
Dept: ONCOLOGY | Facility: CLINIC | Age: 43
End: 2023-09-11
Payer: COMMERCIAL

## 2023-09-11 VITALS
TEMPERATURE: 97.4 F | DIASTOLIC BLOOD PRESSURE: 57 MMHG | OXYGEN SATURATION: 100 % | WEIGHT: 152 LBS | HEART RATE: 76 BPM | BODY MASS INDEX: 23.86 KG/M2 | SYSTOLIC BLOOD PRESSURE: 101 MMHG | HEIGHT: 67 IN

## 2023-09-11 DIAGNOSIS — Z17.0 MALIGNANT NEOPLASM OF CENTRAL PORTION OF LEFT BREAST IN FEMALE, ESTROGEN RECEPTOR POSITIVE: Primary | ICD-10-CM

## 2023-09-11 DIAGNOSIS — C50.112 MALIGNANT NEOPLASM OF CENTRAL PORTION OF LEFT BREAST IN FEMALE, ESTROGEN RECEPTOR POSITIVE: Primary | ICD-10-CM

## 2023-09-11 LAB
ALBUMIN SERPL-MCNC: 4 G/DL (ref 3.5–5.2)
ALBUMIN/GLOB SERPL: 1.4 G/DL
ALP SERPL-CCNC: 62 U/L (ref 39–117)
ALT SERPL W P-5'-P-CCNC: 71 U/L (ref 1–33)
ANION GAP SERPL CALCULATED.3IONS-SCNC: 8.2 MMOL/L (ref 5–15)
AST SERPL-CCNC: 42 U/L (ref 1–32)
BASOPHILS # BLD AUTO: 0.05 10*3/MM3 (ref 0–0.2)
BASOPHILS NFR BLD AUTO: 1.3 % (ref 0–1.5)
BILIRUB SERPL-MCNC: 0.2 MG/DL (ref 0–1.2)
BUN SERPL-MCNC: 12 MG/DL (ref 6–20)
BUN/CREAT SERPL: 22.2 (ref 7–25)
CALCIUM SPEC-SCNC: 9.2 MG/DL (ref 8.6–10.5)
CHLORIDE SERPL-SCNC: 103 MMOL/L (ref 98–107)
CO2 SERPL-SCNC: 25.8 MMOL/L (ref 22–29)
CREAT SERPL-MCNC: 0.54 MG/DL (ref 0.57–1)
DEPRECATED RDW RBC AUTO: 49 FL (ref 37–54)
EGFRCR SERPLBLD CKD-EPI 2021: 116.6 ML/MIN/1.73
EOSINOPHIL # BLD AUTO: 0.2 10*3/MM3 (ref 0–0.4)
EOSINOPHIL NFR BLD AUTO: 5.4 % (ref 0.3–6.2)
ERYTHROCYTE [DISTWIDTH] IN BLOOD BY AUTOMATED COUNT: 15 % (ref 12.3–15.4)
GLOBULIN UR ELPH-MCNC: 2.9 GM/DL
GLUCOSE SERPL-MCNC: 99 MG/DL (ref 65–99)
HCT VFR BLD AUTO: 28.2 % (ref 34–46.6)
HGB BLD-MCNC: 9.6 G/DL (ref 12–15.9)
IMM GRANULOCYTES # BLD AUTO: 0.01 10*3/MM3 (ref 0–0.05)
IMM GRANULOCYTES NFR BLD AUTO: 0.3 % (ref 0–0.5)
LYMPHOCYTES # BLD AUTO: 0.51 10*3/MM3 (ref 0.7–3.1)
LYMPHOCYTES NFR BLD AUTO: 13.7 % (ref 19.6–45.3)
MAGNESIUM SERPL-MCNC: 1.9 MG/DL (ref 1.6–2.6)
MCH RBC QN AUTO: 30.7 PG (ref 26.6–33)
MCHC RBC AUTO-ENTMCNC: 34 G/DL (ref 31.5–35.7)
MCV RBC AUTO: 90.1 FL (ref 79–97)
MONOCYTES # BLD AUTO: 0.42 10*3/MM3 (ref 0.1–0.9)
MONOCYTES NFR BLD AUTO: 11.3 % (ref 5–12)
NEUTROPHILS NFR BLD AUTO: 2.53 10*3/MM3 (ref 1.7–7)
NEUTROPHILS NFR BLD AUTO: 68 % (ref 42.7–76)
NRBC BLD AUTO-RTO: 0 /100 WBC (ref 0–0.2)
PHOSPHATE SERPL-MCNC: 3.6 MG/DL (ref 2.5–4.5)
PLATELET # BLD AUTO: 267 10*3/MM3 (ref 140–450)
PMV BLD AUTO: 8.9 FL (ref 6–12)
POTASSIUM SERPL-SCNC: 4.1 MMOL/L (ref 3.5–5.2)
PROT SERPL-MCNC: 6.9 G/DL (ref 6–8.5)
RBC # BLD AUTO: 3.13 10*6/MM3 (ref 3.77–5.28)
SODIUM SERPL-SCNC: 137 MMOL/L (ref 136–145)
WBC NRBC COR # BLD: 3.72 10*3/MM3 (ref 3.4–10.8)

## 2023-09-11 PROCEDURE — 80053 COMPREHEN METABOLIC PANEL: CPT

## 2023-09-11 PROCEDURE — 36591 DRAW BLOOD OFF VENOUS DEVICE: CPT

## 2023-09-11 PROCEDURE — 83735 ASSAY OF MAGNESIUM: CPT

## 2023-09-11 PROCEDURE — 25010000002 HEPARIN LOCK FLUSH PER 10 UNITS: Performed by: INTERNAL MEDICINE

## 2023-09-11 PROCEDURE — 84100 ASSAY OF PHOSPHORUS: CPT

## 2023-09-11 PROCEDURE — 96523 IRRIG DRUG DELIVERY DEVICE: CPT

## 2023-09-11 PROCEDURE — 85025 COMPLETE CBC W/AUTO DIFF WBC: CPT

## 2023-09-11 RX ORDER — SODIUM CHLORIDE 0.9 % (FLUSH) 0.9 %
20 SYRINGE (ML) INJECTION AS NEEDED
Status: DISCONTINUED | OUTPATIENT
Start: 2023-09-11 | End: 2023-09-11 | Stop reason: HOSPADM

## 2023-09-11 RX ORDER — HEPARIN SODIUM (PORCINE) LOCK FLUSH IV SOLN 100 UNIT/ML 100 UNIT/ML
SOLUTION INTRAVENOUS
Status: DISCONTINUED
Start: 2023-09-11 | End: 2023-09-11 | Stop reason: HOSPADM

## 2023-09-11 RX ORDER — HEPARIN SODIUM (PORCINE) LOCK FLUSH IV SOLN 100 UNIT/ML 100 UNIT/ML
300 SOLUTION INTRAVENOUS ONCE
Status: CANCELLED | OUTPATIENT
Start: 2023-09-11

## 2023-09-11 RX ORDER — HEPARIN SODIUM (PORCINE) LOCK FLUSH IV SOLN 100 UNIT/ML 100 UNIT/ML
300 SOLUTION INTRAVENOUS ONCE
Status: COMPLETED | OUTPATIENT
Start: 2023-09-11 | End: 2023-09-11

## 2023-09-11 RX ORDER — SODIUM CHLORIDE 0.9 % (FLUSH) 0.9 %
20 SYRINGE (ML) INJECTION AS NEEDED
Status: CANCELLED | OUTPATIENT
Start: 2023-09-11

## 2023-09-11 RX ADMIN — HEPARIN 300 UNITS: 100 SYRINGE at 13:43

## 2023-09-11 RX ADMIN — Medication 20 ML: at 13:44

## 2023-09-11 NOTE — PROGRESS NOTES
[facility]ology [facility] [place]  Office number [phone]    Fax number [phone]     Follow-up     Date: 23      Patient Name: Kimberley Win  MRN: [id number]  : 1980    Referring Physician: Dr. Daxa Anderson MD    Chief Complaint: left breast cancer    Cancer Staging:  Cancer Staging   Stage IIA (cT2, cN1, cM0, G2, ER+, AR+, HER2-)    History of Present Illness: Kimberley Win is a pleasant 43 y.o. female who presented for evaluation of left breast cancer.     She notes a history of cystic breast disease for many years. She notes a new breast mass that became progressively harder and associated with pain prompting diagnostic workup.     Diagnostic mammogram 23 showed a dense, spiculated mass in the left breast which on ultrasound corresponded to a 3:00 3 cm hypoechoic mass with internal vascularity. In the 7:00 left breast there was a 7 mm mass corresponding on US to a cluster of cysts spanning 1.4 cm. In the left breast there was a 1.7 cm LN with multiple smaller LN.     Left breast biopsy showed grade 2 invasive ductal carcinoma (ER 90%, AR 10%, HER 2 negative 0+ by IHC). Lymph node FNA was postive for malignancy, metastatic ductal carcinoma.    Breast cancer risk profile:  Age of menarche:10  G0; infertility  Age of menopause: premenopausal   Family history of breast, ovarian, prostate or pancreatic cancer: mom stage IV breast cancer in her 50s. M Great Aunt, breast cancer.   Genetic testing: negative 36 gene CancerNext panel     Treatment history:  Dose dense AC followed by weekly taxol: C1, 2023    Interval history:  Was seen in ED 23 for temp to 100.7 associated with chills and body aches. Started on Levaquin. WBC wnl. CXR and infectious workup negative. Taking tylenol and ibuprofen twice daily with persistent fever. Alternating advil and tylenol which takes temp down but it recurs. Has been taking the metoprolol with some improvement in palpitations,  but increased on Saturday. Still was happening this AM. Feels fatigued, myalgias. +tickle in back of throat  Hot flashes last 30 min.     Past Medical History:   Past Medical History:   Diagnosis Date    Arthritis     Breast cancer 2023    Left Breast    Malignant neoplasm of central portion of left breast in female, estrogen receptor positive 2023    Osteoarthritis     Varicella When I was a child   No personal history of myocardial infarction, cerebrovascular event, or venous thromboembolism.  Osteoarthritis in her hands. No personal history of autoimmune disease. Fhx of RA in her aunt    Past Surgical History:   Past Surgical History:   Procedure Laterality Date    US GUIDED LYMPH NODE BIOPSY  2023       Family History:   Family History   Problem Relation Age of Onset    Breast cancer Mother 55        Stage 4    COPD Mother     Diabetes Mother     Early death Mother     Heart disease Mother     Stroke Mother     Rheum arthritis Maternal Aunt     Breast cancer Paternal Aunt     COPD Maternal Grandfather     Breast cancer Other        Social History:   Social History     Socioeconomic History    Marital status:    Tobacco Use    Smoking status: Former     Packs/day: 0.25     Years: 10.00     Pack years: 2.50     Types: Cigarettes     Start date: 1995     Quit date: 2005     Years since quittin.5     Passive exposure: Past    Smokeless tobacco: Never   Vaping Use    Vaping Use: Never used   Substance and Sexual Activity    Alcohol use: Yes     Comment: rare    Drug use: Never    Sexual activity: Yes     Partners: Male     Birth control/protection: Condom   , lives in [place].     Medications:     Current Outpatient Medications:     ibuprofen (ADVIL,MOTRIN) 200 MG tablet, Take 1 tablet by mouth Every 6 (Six) Hours As Needed for Mild Pain., Disp: , Rfl:     levoFLOXacin (LEVAQUIN) 500 MG tablet, Take 1 tablet by mouth Daily for 7 days., Disp: 7 tablet, Rfl:  "0    lidocaine-prilocaine (EMLA) 2.5-2.5 % cream, Apply 1 application topically to the appropriate area as directed As Needed (45-60 minutes prior to port access.  Cover with saran/plastic wrap.)., Disp: 30 g, Rfl: 3    loratadine (CLARITIN) 10 MG tablet, Take 1 tablet by mouth Daily., Disp: , Rfl:     metoprolol tartrate (LOPRESSOR) 25 MG tablet, TAKE ONE-HALF TABLET BY MOUTH EVERY MORNING AND ONE-HALF TABLET EVERY EVENING, Disp: 90 tablet, Rfl: 0    omeprazole (priLOSEC) 40 MG capsule, Take 1 capsule by mouth Daily. 30 min prior to evening., Disp: 30 capsule, Rfl: 5    ondansetron (ZOFRAN) 8 MG tablet, Take 1 tablet by mouth 3 (Three) Times a Day As Needed for Nausea or Vomiting (DO NOT USE WHILE SANCUSO IN PLACE)., Disp: 30 tablet, Rfl: 5    prochlorperazine (COMPAZINE) 5 MG tablet, Take 1-2 tablets by mouth Every 6 (Six) Hours As Needed for Nausea (USE 2nd; ok to use on evening of chemo)., Disp: 30 tablet, Rfl: 1    Allergies:   Allergies   Allergen Reactions    Sunblock [Solbar Pf Spf15] Rash       Objective     Vital Signs:   Vitals:    09/11/23 1256   BP: 101/57   Pulse: 76   Temp: 97.4 °F (36.3 °C)   SpO2: 100%   Weight: 68.9 kg (152 lb)   Height: 170.2 cm (67\")    Body mass index is 23.81 kg/m².   There were no vitals filed for this visit.      ECOG Performance Status: 0    Physical Exam:   General: No acute distress. Well appearing.  HEENT: Normocephalic, atraumatic. Sclera anicteric.   Neck: supple, no adenopathy.   Cardiovascular: regular rate and rhythm. No murmurs.   Respiratory: Normal rate. Clear to auscultation bilaterally  Abdomen: Soft, nontender, non distended with normoactive bowel sounds.  Lymph: no cervical, supraclavicular or axillary adenopathy  Neuro: Alert and oriented x 3. No focal deficits.   Ext: Symmetric, no swelling.   Psych: Euthymic  Breast: 4 x 3.5 cm 3:00 left breast mass (now 3 x 2 cm firm stable). Small deep mobile LN not palpable. No palpable right breast mass.  Skin: No " rash    Laboratory/Imaging Reviewed:   Admission on 09/08/2023, Discharged on 09/08/2023   Component Date Value Ref Range Status    ADENOVIRUS, PCR 09/08/2023 Not Detected  Not Detected Final    Coronavirus 229E 09/08/2023 Not Detected  Not Detected Final    Coronavirus HKU1 09/08/2023 Not Detected  Not Detected Final    Coronavirus NL63 09/08/2023 Not Detected  Not Detected Final    Coronavirus OC43 09/08/2023 Not Detected  Not Detected Final    COVID19 09/08/2023 Not Detected  Not Detected - Ref. Range Final    Human Metapneumovirus 09/08/2023 Not Detected  Not Detected Final    Human Rhinovirus/Enterovirus 09/08/2023 Not Detected  Not Detected Final    Influenza A PCR 09/08/2023 Not Detected  Not Detected Final    Influenza B PCR 09/08/2023 Not Detected  Not Detected Final    Parainfluenza Virus 1 09/08/2023 Not Detected  Not Detected Final    Parainfluenza Virus 2 09/08/2023 Not Detected  Not Detected Final    Parainfluenza Virus 3 09/08/2023 Not Detected  Not Detected Final    Parainfluenza Virus 4 09/08/2023 Not Detected  Not Detected Final    RSV, PCR 09/08/2023 Not Detected  Not Detected Final    Bordetella pertussis pcr 09/08/2023 Not Detected  Not Detected Final    Bordetella parapertussis PCR 09/08/2023 Not Detected  Not Detected Final    Chlamydophila pneumoniae PCR 09/08/2023 Not Detected  Not Detected Final    Mycoplasma pneumo by PCR 09/08/2023 Not Detected  Not Detected Final    WBC 09/08/2023 4.23  3.40 - 10.80 10*3/mm3 Final    RBC 09/08/2023 3.16 (L)  3.77 - 5.28 10*6/mm3 Final    Hemoglobin 09/08/2023 9.6 (L)  12.0 - 15.9 g/dL Final    Hematocrit 09/08/2023 28.1 (L)  34.0 - 46.6 % Final    MCV 09/08/2023 88.9  79.0 - 97.0 fL Final    MCH 09/08/2023 30.4  26.6 - 33.0 pg Final    MCHC 09/08/2023 34.2  31.5 - 35.7 g/dL Final    RDW 09/08/2023 14.7  12.3 - 15.4 % Final    RDW-SD 09/08/2023 47.8  37.0 - 54.0 fl Final    MPV 09/08/2023 8.8  6.0 - 12.0 fL Final    Platelets 09/08/2023 301  140 - 450  10*3/mm3 Final    Neutrophil % 09/08/2023 84.0 (H)  42.7 - 76.0 % Final    Lymphocyte % 09/08/2023 9.7 (L)  19.6 - 45.3 % Final    Monocyte % 09/08/2023 4.7 (L)  5.0 - 12.0 % Final    Eosinophil % 09/08/2023 0.9  0.3 - 6.2 % Final    Basophil % 09/08/2023 0.5  0.0 - 1.5 % Final    Immature Grans % 09/08/2023 0.2  0.0 - 0.5 % Final    Neutrophils, Absolute 09/08/2023 3.55  1.70 - 7.00 10*3/mm3 Final    Lymphocytes, Absolute 09/08/2023 0.41 (L)  0.70 - 3.10 10*3/mm3 Final    Monocytes, Absolute 09/08/2023 0.20  0.10 - 0.90 10*3/mm3 Final    Eosinophils, Absolute 09/08/2023 0.04  0.00 - 0.40 10*3/mm3 Final    Basophils, Absolute 09/08/2023 0.02  0.00 - 0.20 10*3/mm3 Final    Immature Grans, Absolute 09/08/2023 0.01  0.00 - 0.05 10*3/mm3 Final    nRBC 09/08/2023 0.0  0.0 - 0.2 /100 WBC Final    Glucose 09/08/2023 102 (H)  65 - 99 mg/dL Final    BUN 09/08/2023 13  6 - 20 mg/dL Final    Creatinine 09/08/2023 0.62  0.57 - 1.00 mg/dL Final    Sodium 09/08/2023 136  136 - 145 mmol/L Final    Potassium 09/08/2023 4.1  3.5 - 5.2 mmol/L Final    Chloride 09/08/2023 99  98 - 107 mmol/L Final    CO2 09/08/2023 25.8  22.0 - 29.0 mmol/L Final    Calcium 09/08/2023 9.2  8.6 - 10.5 mg/dL Final    Total Protein 09/08/2023 7.2  6.0 - 8.5 g/dL Final    Albumin 09/08/2023 4.3  3.5 - 5.2 g/dL Final    ALT (SGPT) 09/08/2023 72 (H)  1 - 33 U/L Final    AST (SGOT) 09/08/2023 47 (H)  1 - 32 U/L Final    Alkaline Phosphatase 09/08/2023 62  39 - 117 U/L Final    Total Bilirubin 09/08/2023 0.4  0.0 - 1.2 mg/dL Final    Globulin 09/08/2023 2.9  gm/dL Final    A/G Ratio 09/08/2023 1.5  g/dL Final    BUN/Creatinine Ratio 09/08/2023 21.0  7.0 - 25.0 Final    Anion Gap 09/08/2023 11.2  5.0 - 15.0 mmol/L Final    eGFR 09/08/2023 112.8  >60.0 mL/min/1.73 Final    Procalcitonin 09/08/2023 0.09  0.00 - 0.25 ng/mL Final    Lactate 09/08/2023 0.8  0.5 - 2.0 mmol/L Final    Color, UA 09/08/2023 Yellow  Yellow, Straw Final    Appearance, UA 09/08/2023 Clear   Clear Final    pH, UA 09/08/2023 7.5  5.0 - 8.0 Final    Specific Gravity, UA 09/08/2023 1.008  1.005 - 1.030 Final    Glucose, UA 09/08/2023 Negative  Negative Final    Ketones, UA 09/08/2023 Negative  Negative Final    Bilirubin, UA 09/08/2023 Negative  Negative Final    Blood, UA 09/08/2023 Negative  Negative Final    Protein, UA 09/08/2023 Negative  Negative Final    Leuk Esterase, UA 09/08/2023 Negative  Negative Final    Nitrite, UA 09/08/2023 Negative  Negative Final    Urobilinogen, UA 09/08/2023 0.2 E.U./dL  0.2 - 1.0 E.U./dL Final    HCG, Urine QL 09/08/2023 Negative  Negative Final    Blood Culture 09/08/2023 No growth at 2 days   Preliminary    Blood Culture 09/08/2023 No growth at 2 days   Preliminary   Infusion on 09/05/2023   Component Date Value Ref Range Status    Glucose 09/05/2023 104 (H)  65 - 99 mg/dL Final    BUN 09/05/2023 16  6 - 20 mg/dL Final    Creatinine 09/05/2023 0.61  0.57 - 1.00 mg/dL Final    Sodium 09/05/2023 139  136 - 145 mmol/L Final    Potassium 09/05/2023 3.9  3.5 - 5.2 mmol/L Final    Chloride 09/05/2023 103  98 - 107 mmol/L Final    CO2 09/05/2023 25.9  22.0 - 29.0 mmol/L Final    Calcium 09/05/2023 9.7  8.6 - 10.5 mg/dL Final    Total Protein 09/05/2023 7.4  6.0 - 8.5 g/dL Final    Albumin 09/05/2023 4.3  3.5 - 5.2 g/dL Final    ALT (SGPT) 09/05/2023 48 (H)  1 - 33 U/L Final    AST (SGOT) 09/05/2023 34 (H)  1 - 32 U/L Final    Alkaline Phosphatase 09/05/2023 65  39 - 117 U/L Final    Total Bilirubin 09/05/2023 0.2  0.0 - 1.2 mg/dL Final    Globulin 09/05/2023 3.1  gm/dL Final    A/G Ratio 09/05/2023 1.4  g/dL Final    BUN/Creatinine Ratio 09/05/2023 26.2 (H)  7.0 - 25.0 Final    Anion Gap 09/05/2023 10.1  5.0 - 15.0 mmol/L Final    eGFR 09/05/2023 113.2  >60.0 mL/min/1.73 Final    WBC 09/05/2023 6.75  3.40 - 10.80 10*3/mm3 Final    RBC 09/05/2023 3.25 (L)  3.77 - 5.28 10*6/mm3 Final    Hemoglobin 09/05/2023 9.9 (L)  12.0 - 15.9 g/dL Final    Hematocrit 09/05/2023 29.0 (L)   34.0 - 46.6 % Final    MCV 09/05/2023 89.2  79.0 - 97.0 fL Final    MCH 09/05/2023 30.5  26.6 - 33.0 pg Final    MCHC 09/05/2023 34.1  31.5 - 35.7 g/dL Final    RDW 09/05/2023 15.3  12.3 - 15.4 % Final    RDW-SD 09/05/2023 48.7  37.0 - 54.0 fl Final    MPV 09/05/2023 8.6  6.0 - 12.0 fL Final    Platelets 09/05/2023 352  140 - 450 10*3/mm3 Final    Neutrophil % 09/05/2023 76.0  42.7 - 76.0 % Final    Lymphocyte % 09/05/2023 10.5 (L)  19.6 - 45.3 % Final    Monocyte % 09/05/2023 12.0  5.0 - 12.0 % Final    Eosinophil % 09/05/2023 0.1 (L)  0.3 - 6.2 % Final    Basophil % 09/05/2023 1.0  0.0 - 1.5 % Final    Immature Grans % 09/05/2023 0.4  0.0 - 0.5 % Final    Neutrophils, Absolute 09/05/2023 5.12  1.70 - 7.00 10*3/mm3 Final    Lymphocytes, Absolute 09/05/2023 0.71  0.70 - 3.10 10*3/mm3 Final    Monocytes, Absolute 09/05/2023 0.81  0.10 - 0.90 10*3/mm3 Final    Eosinophils, Absolute 09/05/2023 0.01  0.00 - 0.40 10*3/mm3 Final    Basophils, Absolute 09/05/2023 0.07  0.00 - 0.20 10*3/mm3 Final    Immature Grans, Absolute 09/05/2023 0.03  0.00 - 0.05 10*3/mm3 Final    nRBC 09/05/2023 0.0  0.0 - 0.2 /100 WBC Final       XR Chest 1 View    Result Date: 9/9/2023  Narrative: PROCEDURE: XR CHEST 1 VW-  INDICATION:  cough, fever, on chemotherapy  FINDINGS:  A portable view of the chest was obtained.  Comparison is made to a prior exam dated 8/23/2023.   There is no change in a right Port-A-Cath. The cardiac and mediastinal silhouettes are within normal limits.  The lungs are clear.  There is no pleural effusion or pneumothorax.      Impression: No acute process on this portable exam.   This report was signed and finalized on 9/9/2023 8:25 AM by Tanya Hernandez MD.      XR Chest 1 View    Result Date: 8/23/2023  Narrative: PROCEDURE: XR CHEST 1 VW-  HISTORY: Dysrhythmia Triage Protocol  COMPARISON: 6/20/2023  FINDINGS:  Portable view of the chest demonstrates the lungs to be grossly clear. There is no evidence of effusion,  pneumothorax or other significant pleural disease. The mediastinum is unremarkable.  The heart size is normal. Right subclavian Port-A-Cath is noted with tip in the innominate vein.      Impression: Port-A-Cath in position as above without pneumothorax    This report was signed and finalized on 8/23/2023 1:18 PM by Trae De MD.      Holter Monitor - 72 Hour Up To 15 Days    Result Date: 9/5/2023  Narrative:   1.8% PVCs.  Associated with symptoms.          Component  Ref Range & Units 3 d ago   Color, UA  Yellow, Straw Yellow   Appearance, UA  Clear Clear   pH, UA  5.0 - 8.0 7.5   Specific Gravity, UA  1.005 - 1.030 1.008   Glucose, UA  Negative Negative   Ketones, UA  Negative Negative   Bilirubin, UA  Negative Negative   Blood, UA  Negative Negative   Protein, UA  Negative Negative   Leuk Esterase, UA  Negative Negative   Nitrite, UA  Negative Negative   Urobilinogen, UA  0.2 - 1.0 E.U./dL 0.2 E.U./dL   Resulting Agency [facility]          Procedures    Assessment / Plan      Assessment/Plan:     1.  Malignant neoplasm of central portion of left breast in female, estrogen receptor positive   2.  Chemotherapy monitoring  -We reviewed the potential role for neoadjuvant treatment.  The advantages include potential for downstaging the tumor to allow for breast conservation, and the added prognostic value of assessing pathologic response to chemotherapy.  There is no clear survival advantage to neoadjuvant over adjuvant administration, but outcomes appear equivalent when followed by standard surgical treatment.  She desires lumpectomy and is currently not a candidate for breast conservation at current time. Therefore she was referred for consideration of neoadjuvant chemotherapy.   Based on young age and LN positive malignancy recommended dose dense AC followed by weekly taxol.  -Baseline echocardiogram shows an ejection fraction of 55 to 60%  -Hold treatment today for acute side effects of therapy. RTC 1 week for  treatment resumption.  Repeat labs today to reevaluate her white blood cell count     3.  Fever  -I reviewed her ER work-up which is notable for normal chest x-ray, cultures which are no growth to date, normal white blood cell count with no neutropenia.  She continues on Levaquin.  Although her viral respiratory panel was negative, I suspect viral etiology.  We will follow-up in 1 week to ensure resolution of treatment side effects prior to treatment resumption    4. GERD/dyspepsia  - PPI BID and PRN pepcid    5. Palpitations  -Attributed to frequent PVCs.  Now on metoprolol.  Repeat labs including electrolytes    6. Constipation  -Better on Colace plus MiraLAX.    7. Access   Port     Follow Up:   1 weeks    Orders Placed This Encounter   Procedures    Comprehensive Metabolic Panel    Magnesium    Phosphorus    CBC & Differential       Martha Olguin MD  Hematology and Oncology     I have spent a total of 40 min on reviewing test results/preparing to see patient, counseling patient, performing medically appropriate exam placing orders, discussion with nursing, coordinating care, and documenting clinical information in the electronic or other health record

## 2023-09-12 NOTE — PROGRESS NOTES
"     [facility] Cardiology Office Consult Note    Kimberley Win  [id number]  2023    Referred By: Martha Olguin MD    Chief Complaint: Palpitations, PVCs    History of Present Illness:   Mrs. Kimberley Win is a 43 y.o. female who presents to the Cardiology Clinic for evaluation of palpitations and PVC's.  The patient is currently under the care of oncology for cancer of left breast for which she is receiving chemotherapy. She has a previous history of tobacco use with complete smoking cessation in .  Her mother  of breast cancer at age 55.  She has no known cardiac history.  Echocardiography in  demonstrated normal EF with no significant valvular disease.  A recent 72-hour cardiac monitor study showed a 1.8% burden of symptomatic PVC's.  She presents today for follow-up.  The patient reports experiencing palpitations and the sensation of her heart beating \"all over [her] chest\".  She states this occurred after her first chemotherapy treatment in July.  It eventually dissipated but returned after her second chemotherapy treatment.  She notes that things \"do not taste right\", so it is difficult for her to stay hydrated but she is trying to do so.  She notes that she has been ill/febrile for approximately 2 weeks with a temp max of 101 °F.  A review of records reflects that she was given 1 L of IVF last week at her oncology office.  She is unsure if she will be able to proceed with additional chemotherapy treatments at this time.  She denies chest pain, but reports shortness of breath associated with recent URI/pneumonitis for which she will be seeing Dr. Cornejo this Friday.  She denies chest pain, dizziness, and lower extremity edema.  She offers no other complaints or concerns at this time.      Past Cardiac Testin. Last Coronary Angio: None  2. Prior Stress Testing: None  3. Last Echo: 2023   1.  Normal left ventricular size and systolic function, LVEF " 55-60%.  2.  Normal LV diastolic filling pattern.  3.  Normal right ventricular size and systolic function.  4.  Normal left and right atrial size.  5.  Trace MR, TR, and PI.  6.  Normal LV global longitudinal strain pattern.  4. Prior Holter Monitor: 2023    1.8% PVCs. Associated with symptoms.     Review of Systems:   Review of Systems   As Noted in HPI.   I have reviewed and confirmed the accuracy of the ROS as documented by the MA/ABDIFATAHN/RN NELLA Palacios      Past Medical History:   Past Medical History:   Diagnosis Date    Arthritis     Breast cancer 2023    Left Breast    Malignant neoplasm of central portion of left breast in female, estrogen receptor positive 2023    Osteoarthritis     Varicella When I was a child       Past Surgical History:   Past Surgical History:   Procedure Laterality Date    US GUIDED LYMPH NODE BIOPSY  2023       Family History:   Family History   Problem Relation Age of Onset    Breast cancer Mother 55        Stage 4    COPD Mother     Diabetes Mother     Early death Mother     Heart disease Mother     Stroke Mother     Rheum arthritis Maternal Aunt     Breast cancer Paternal Aunt     COPD Maternal Grandfather     Breast cancer Other        Social History:   Social History     Socioeconomic History    Marital status:    Tobacco Use    Smoking status: Former     Packs/day: 0.25     Years: 10.00     Pack years: 2.50     Types: Cigarettes     Start date: 1995     Quit date: 2005     Years since quittin.6     Passive exposure: Past    Smokeless tobacco: Never   Vaping Use    Vaping Use: Never used   Substance and Sexual Activity    Alcohol use: Yes     Comment: rare    Drug use: Never    Sexual activity: Yes     Partners: Male     Birth control/protection: Condom       Medications:     Current Outpatient Medications:     ibuprofen (ADVIL,MOTRIN) 200 MG tablet, Take 1 tablet by mouth Every 6 (Six) Hours As Needed for Mild Pain., Disp:  ", Rfl:     lidocaine-prilocaine (EMLA) 2.5-2.5 % cream, Apply 1 application topically to the appropriate area as directed As Needed (45-60 minutes prior to port access.  Cover with saran/plastic wrap.)., Disp: 30 g, Rfl: 3    loratadine (CLARITIN) 10 MG tablet, Take 1 tablet by mouth Daily., Disp: , Rfl:     metoprolol tartrate (LOPRESSOR) 25 MG tablet, TAKE ONE-HALF TABLET BY MOUTH EVERY MORNING AND ONE-HALF TABLET EVERY EVENING, Disp: 90 tablet, Rfl: 0    omeprazole (priLOSEC) 40 MG capsule, Take 1 capsule by mouth Daily. 30 min prior to evening., Disp: 30 capsule, Rfl: 5    ondansetron (ZOFRAN) 8 MG tablet, Take 1 tablet by mouth 3 (Three) Times a Day As Needed for Nausea or Vomiting (DO NOT USE WHILE SANCUSO IN PLACE)., Disp: 30 tablet, Rfl: 5    predniSONE (DELTASONE) 20 MG tablet, Take 3 tablets by mouth Daily for 5 days, THEN 2 tablets Daily for 5 days, THEN 1 tablet Daily for 5 days. Continue until seen, Disp: 30 tablet, Rfl: 0    prochlorperazine (COMPAZINE) 5 MG tablet, Take 1-2 tablets by mouth Every 6 (Six) Hours As Needed for Nausea (USE 2nd; ok to use on evening of chemo)., Disp: 30 tablet, Rfl: 1    sulfamethoxazole-trimethoprim (BACTRIM DS,SEPTRA DS) 800-160 MG per tablet, Take 1 tablet by mouth 2 (Two) Times a Day., Disp: 28 tablet, Rfl: 0    Allergies:   Allergies   Allergen Reactions    Sunblock [Solbar Pf Spf15] Rash       Physical Exam:  Vital Signs:   Vitals:    09/20/23 1324   BP: 110/66   BP Location: Right arm   Patient Position: Sitting   Pulse: 74   Resp: 20   SpO2: 99%   Weight: 68.9 kg (152 lb)   Height: 170.2 cm (67\")     Body mass index is 23.81 kg/m².    Physical Exam  Vitals and nursing note reviewed.   Constitutional:       General: She is not in acute distress.     Comments: Ill-appearing.  Intermittent weak cough preserved   HENT:      Head: Normocephalic and atraumatic.   Neck:      Trachea: Trachea normal.   Cardiovascular:      Rate and Rhythm: Normal rate and regular rhythm. "      Pulses: Normal pulses.      Heart sounds: Normal heart sounds. No murmur heard.    No friction rub. No gallop.   Pulmonary:      Effort: Pulmonary effort is normal.      Breath sounds: Normal breath sounds.   Musculoskeletal:      Cervical back: Neck supple.      Right lower leg: No edema.      Left lower leg: No edema.   Skin:     General: Skin is warm and dry.   Neurological:      Mental Status: She is alert and oriented to person, place, and time.   Psychiatric:         Mood and Affect: Mood normal.         Behavior: Behavior normal. Behavior is cooperative.         Thought Content: Thought content does not include suicidal ideation.     .  Results Review:   I reviewed the patient's new clinical results.    Procedures    Assessment / Plan:   Diagnoses and all orders for this visit:    1. Palpitations (Primary)  Febrile illness, dehydration likely contributing  Cardiac monitor study showed < 2% burden  She can continue metoprolol, but is discouraged from taking extra doses as this may lead to hypotension  This may be related to chemotherapy treatment  Follow-up with primary cardiologist in 3 months to review symptoms    2. Shortness of breath  6/23, Baseline echo showed isauro LVEF    3. Malignant neoplasm of upper-outer quadrant of left breast in female, estrogen receptor positive  Ongoing chemotherapy  Followed closely by oncology      Preventative Cardiology:   Tobacco Cessation: N/A   Advance Care Planning: ACP discussion was declined by the patient. Patient has an advance directive in EMR which is still valid.        Follow Up:   Return in about 3 months (around 12/20/2023) for Follow-up with Dr. Costello, Follow-up with Dr. Shin. TO RE-EVALUATE SYMPTOMS      Thank you for allowing me to participate in the care of your patient. Please do not hesitate to contact me with additional questions or concerns.     NELLA Haley

## 2023-09-13 LAB
BACTERIA SPEC AEROBE CULT: NORMAL
BACTERIA SPEC AEROBE CULT: NORMAL

## 2023-09-14 ENCOUNTER — TELEPHONE (OUTPATIENT)
Dept: ONCOLOGY | Facility: CLINIC | Age: 43
End: 2023-09-14

## 2023-09-14 ENCOUNTER — HOSPITAL ENCOUNTER (OUTPATIENT)
Dept: ONCOLOGY | Facility: HOSPITAL | Age: 43
Discharge: HOME | End: 2023-09-14
Admitting: INTERNAL MEDICINE
Payer: COMMERCIAL

## 2023-09-14 ENCOUNTER — OFFICE VISIT (OUTPATIENT)
Dept: ONCOLOGY | Facility: CLINIC | Age: 43
End: 2023-09-14
Payer: COMMERCIAL

## 2023-09-14 ENCOUNTER — PATIENT MESSAGE (OUTPATIENT)
Dept: ONCOLOGY | Facility: CLINIC | Age: 43
End: 2023-09-14

## 2023-09-14 VITALS
HEART RATE: 95 BPM | TEMPERATURE: 96.9 F | BODY MASS INDEX: 24.01 KG/M2 | RESPIRATION RATE: 16 BRPM | WEIGHT: 153 LBS | DIASTOLIC BLOOD PRESSURE: 71 MMHG | SYSTOLIC BLOOD PRESSURE: 124 MMHG | HEIGHT: 67 IN

## 2023-09-14 DIAGNOSIS — R50.9 FEVER, UNSPECIFIED FEVER CAUSE: Primary | ICD-10-CM

## 2023-09-14 DIAGNOSIS — Z17.0 MALIGNANT NEOPLASM OF UPPER-OUTER QUADRANT OF LEFT BREAST IN FEMALE, ESTROGEN RECEPTOR POSITIVE: Primary | ICD-10-CM

## 2023-09-14 DIAGNOSIS — C50.112 MALIGNANT NEOPLASM OF CENTRAL PORTION OF LEFT BREAST IN FEMALE, ESTROGEN RECEPTOR POSITIVE: Primary | ICD-10-CM

## 2023-09-14 DIAGNOSIS — Z17.0 MALIGNANT NEOPLASM OF CENTRAL PORTION OF LEFT BREAST IN FEMALE, ESTROGEN RECEPTOR POSITIVE: ICD-10-CM

## 2023-09-14 DIAGNOSIS — C50.412 MALIGNANT NEOPLASM OF UPPER-OUTER QUADRANT OF LEFT BREAST IN FEMALE, ESTROGEN RECEPTOR POSITIVE: Primary | ICD-10-CM

## 2023-09-14 DIAGNOSIS — C50.412 MALIGNANT NEOPLASM OF UPPER-OUTER QUADRANT OF LEFT BREAST IN FEMALE, ESTROGEN RECEPTOR POSITIVE: ICD-10-CM

## 2023-09-14 DIAGNOSIS — C50.112 MALIGNANT NEOPLASM OF CENTRAL PORTION OF LEFT BREAST IN FEMALE, ESTROGEN RECEPTOR POSITIVE: ICD-10-CM

## 2023-09-14 DIAGNOSIS — R05.9 COUGH, UNSPECIFIED TYPE: ICD-10-CM

## 2023-09-14 DIAGNOSIS — Z17.0 MALIGNANT NEOPLASM OF UPPER-OUTER QUADRANT OF LEFT BREAST IN FEMALE, ESTROGEN RECEPTOR POSITIVE: ICD-10-CM

## 2023-09-14 DIAGNOSIS — Z17.0 MALIGNANT NEOPLASM OF CENTRAL PORTION OF LEFT BREAST IN FEMALE, ESTROGEN RECEPTOR POSITIVE: Primary | ICD-10-CM

## 2023-09-14 LAB
ALBUMIN SERPL-MCNC: 4 G/DL (ref 3.5–5.2)
ALBUMIN/GLOB SERPL: 1.1 G/DL
ALP SERPL-CCNC: 72 U/L (ref 39–117)
ALT SERPL W P-5'-P-CCNC: 64 U/L (ref 1–33)
ANION GAP SERPL CALCULATED.3IONS-SCNC: 11 MMOL/L (ref 5–15)
AST SERPL-CCNC: 45 U/L (ref 1–32)
BACTERIA UR QL AUTO: ABNORMAL /HPF
BASOPHILS # BLD AUTO: 0.04 10*3/MM3 (ref 0–0.2)
BASOPHILS NFR BLD AUTO: 0.7 % (ref 0–1.5)
BILIRUB SERPL-MCNC: 0.3 MG/DL (ref 0–1.2)
BILIRUB UR QL STRIP: NEGATIVE
BUN SERPL-MCNC: 15 MG/DL (ref 6–20)
BUN/CREAT SERPL: 26.8 (ref 7–25)
CALCIUM SPEC-SCNC: 9.2 MG/DL (ref 8.6–10.5)
CHLORIDE SERPL-SCNC: 103 MMOL/L (ref 98–107)
CLARITY UR: CLEAR
CO2 SERPL-SCNC: 25 MMOL/L (ref 22–29)
COLOR UR: YELLOW
CREAT SERPL-MCNC: 0.56 MG/DL (ref 0.57–1)
DEPRECATED RDW RBC AUTO: 51 FL (ref 37–54)
EGFRCR SERPLBLD CKD-EPI 2021: 115.6 ML/MIN/1.73
EOSINOPHIL # BLD AUTO: 0.31 10*3/MM3 (ref 0–0.4)
EOSINOPHIL NFR BLD AUTO: 5.2 % (ref 0.3–6.2)
ERYTHROCYTE [DISTWIDTH] IN BLOOD BY AUTOMATED COUNT: 14.8 % (ref 12.3–15.4)
GLOBULIN UR ELPH-MCNC: 3.6 GM/DL
GLUCOSE SERPL-MCNC: 109 MG/DL (ref 65–99)
GLUCOSE UR STRIP-MCNC: NEGATIVE MG/DL
HCT VFR BLD AUTO: 29.1 % (ref 34–46.6)
HGB BLD-MCNC: 9.5 G/DL (ref 12–15.9)
HGB UR QL STRIP.AUTO: NEGATIVE
HYALINE CASTS UR QL AUTO: ABNORMAL /LPF
IMM GRANULOCYTES # BLD AUTO: 0.01 10*3/MM3 (ref 0–0.05)
IMM GRANULOCYTES NFR BLD AUTO: 0.2 % (ref 0–0.5)
KETONES UR QL STRIP: NEGATIVE
LEUKOCYTE ESTERASE UR QL STRIP.AUTO: ABNORMAL
LYMPHOCYTES # BLD AUTO: 0.98 10*3/MM3 (ref 0.7–3.1)
LYMPHOCYTES NFR BLD AUTO: 16.5 % (ref 19.6–45.3)
MCH RBC QN AUTO: 30.4 PG (ref 26.6–33)
MCHC RBC AUTO-ENTMCNC: 32.6 G/DL (ref 31.5–35.7)
MCV RBC AUTO: 93.3 FL (ref 79–97)
MONOCYTES # BLD AUTO: 0.87 10*3/MM3 (ref 0.1–0.9)
MONOCYTES NFR BLD AUTO: 14.6 % (ref 5–12)
NEUTROPHILS NFR BLD AUTO: 3.74 10*3/MM3 (ref 1.7–7)
NEUTROPHILS NFR BLD AUTO: 62.8 % (ref 42.7–76)
NITRITE UR QL STRIP: NEGATIVE
PH UR STRIP.AUTO: 6 [PH] (ref 5–8)
PLATELET # BLD AUTO: 250 10*3/MM3 (ref 140–450)
PMV BLD AUTO: 8.5 FL (ref 6–12)
POTASSIUM SERPL-SCNC: 4.1 MMOL/L (ref 3.5–5.2)
PROT SERPL-MCNC: 7.6 G/DL (ref 6–8.5)
PROT UR QL STRIP: NEGATIVE
RBC # BLD AUTO: 3.12 10*6/MM3 (ref 3.77–5.28)
RBC # UR STRIP: ABNORMAL /HPF
REF LAB TEST METHOD: ABNORMAL
SODIUM SERPL-SCNC: 139 MMOL/L (ref 136–145)
SP GR UR STRIP: 1.02 (ref 1–1.03)
SQUAMOUS #/AREA URNS HPF: ABNORMAL /HPF
UROBILINOGEN UR QL STRIP: ABNORMAL
WBC # UR STRIP: ABNORMAL /HPF
WBC NRBC COR # BLD: 5.95 10*3/MM3 (ref 3.4–10.8)

## 2023-09-14 PROCEDURE — 80053 COMPREHEN METABOLIC PANEL: CPT | Performed by: INTERNAL MEDICINE

## 2023-09-14 PROCEDURE — 85025 COMPLETE CBC W/AUTO DIFF WBC: CPT | Performed by: INTERNAL MEDICINE

## 2023-09-14 PROCEDURE — 87086 URINE CULTURE/COLONY COUNT: CPT | Performed by: INTERNAL MEDICINE

## 2023-09-14 PROCEDURE — 25010000002 HEPARIN LOCK FLUSH PER 10 UNITS: Performed by: INTERNAL MEDICINE

## 2023-09-14 PROCEDURE — 87040 BLOOD CULTURE FOR BACTERIA: CPT | Performed by: INTERNAL MEDICINE

## 2023-09-14 PROCEDURE — 81001 URINALYSIS AUTO W/SCOPE: CPT | Performed by: INTERNAL MEDICINE

## 2023-09-14 PROCEDURE — 36415 COLL VENOUS BLD VENIPUNCTURE: CPT

## 2023-09-14 PROCEDURE — 96360 HYDRATION IV INFUSION INIT: CPT

## 2023-09-14 RX ORDER — HEPARIN SODIUM (PORCINE) LOCK FLUSH IV SOLN 100 UNIT/ML 100 UNIT/ML
500 SOLUTION INTRAVENOUS AS NEEDED
Status: DISCONTINUED | OUTPATIENT
Start: 2023-09-14 | End: 2023-09-15 | Stop reason: HOSPADM

## 2023-09-14 RX ORDER — HEPARIN SODIUM (PORCINE) LOCK FLUSH IV SOLN 100 UNIT/ML 100 UNIT/ML
500 SOLUTION INTRAVENOUS AS NEEDED
OUTPATIENT
Start: 2023-09-14

## 2023-09-14 RX ORDER — HEPARIN SODIUM (PORCINE) LOCK FLUSH IV SOLN 100 UNIT/ML 100 UNIT/ML
300 SOLUTION INTRAVENOUS ONCE
Status: CANCELLED | OUTPATIENT
Start: 2023-09-14

## 2023-09-14 RX ORDER — HEPARIN SODIUM (PORCINE) LOCK FLUSH IV SOLN 100 UNIT/ML 100 UNIT/ML
300 SOLUTION INTRAVENOUS ONCE
Status: DISCONTINUED | OUTPATIENT
Start: 2023-09-14 | End: 2023-09-15 | Stop reason: HOSPADM

## 2023-09-14 RX ORDER — SODIUM CHLORIDE 0.9 % (FLUSH) 0.9 %
20 SYRINGE (ML) INJECTION AS NEEDED
Status: CANCELLED | OUTPATIENT
Start: 2023-09-14

## 2023-09-14 RX ADMIN — SODIUM CHLORIDE 1000 ML: 9 INJECTION, SOLUTION INTRAVENOUS at 15:10

## 2023-09-14 RX ADMIN — HEPARIN 500 UNITS: 100 SYRINGE at 16:15

## 2023-09-14 NOTE — TELEPHONE ENCOUNTER
"Called patient.  Patient stated she has body chills, fever.  Denies N/V.  Stated she is drinking about 2 (16 oz.) bottles of water and 1/2 bottle of gatorade per day.  Patient stated she had an episode on Tuesday this week that her \"stomach acted up and I had diarrhea.\"  Patient denies diarrhea since.  Patient stated she is eating fine.  Patient stated she has 2 tablets of Levaquin left.  Patient stated her temperature is 101.0 now and she took Tylenol at 07:45.  Patient stated her heart rate has been around 100 bpm.  Patient was asked to home COVID test.  Patient stated she would.  Dr. Olguin notified of the above and per MD, patient advised that if her COVID test is negative, she can come in today to be seen.  Lab appointment obtained before MD appointment (only spot available).  Orders received.  Infusion notified of orders.  Patient advised to call this office back with COVID test results.  Patient advised to wear a mask when coming in to clinic.  Patient verbalized understanding.  Orders placed.   "

## 2023-09-14 NOTE — TELEPHONE ENCOUNTER
Notified patient per Dr. Olguin that she will receive a phone call with scan appointments and follow up tomorrow.  Patient verbalized understanding.  Message sent to scheduling to get patient scheduled.

## 2023-09-14 NOTE — TELEPHONE ENCOUNTER
Patient stated she was tested at [facility] at  for COVID today and she is negative.  Patient asked about using port today and she said she will put Emla cream on it. SWAPNA Moise in infusion notified of negative test.

## 2023-09-14 NOTE — TELEPHONE ENCOUNTER
Caller: Kimberley Win    Relationship: Self    Best call back number: [phone]    What is the best time to reach you: ANY    Who are you requesting to speak with (clinical staff, provider,  specific staff member): NURSE WHITE        What was the call regarding: PATIENT CALLED TO TALK TO NURSE WHITE BEFORE HER APPT TODAY ABOUT COVID TEST AND PORT.     Is it okay if the provider responds through MyChart: NO

## 2023-09-14 NOTE — PROGRESS NOTES
Hematology and Oncology [place]  Office number [phone]    Fax number [phone]     Follow-up     Date: 23      Patient Name: Kimberley Win  MRN: [id number]  : 1980    Referring Physician: Dr. Daxa Anderson MD    Chief Complaint: left breast cancer    Cancer Staging:  Cancer Staging   Stage IIA (cT2, cN1, cM0, G2, ER+, NE+, HER2-)    History of Present Illness: Kimberley Win is a pleasant 43 y.o. female who presented for evaluation of left breast cancer.     She notes a history of cystic breast disease for many years. She notes a new breast mass that became progressively harder and associated with pain prompting diagnostic workup.     Diagnostic mammogram 23 showed a dense, spiculated mass in the left breast which on ultrasound corresponded to a 3:00 3 cm hypoechoic mass with internal vascularity. In the 7:00 left breast there was a 7 mm mass corresponding on US to a cluster of cysts spanning 1.4 cm. In the left breast there was a 1.7 cm LN with multiple smaller LN.     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 Lymph node FNA was postive for malignancy, metastatic ductal carcinoma.    Breast cancer risk profile:  Age of menarche:10  G0; infertility  Age of menopause: premenopausal   Family history of breast, ovarian, prostate or pancreatic cancer: mom stage IV breast cancer in her 50s. M Great Aunt, breast cancer.   Genetic testing: negative 36 gene CancerNext panel     Treatment history:  Dose dense AC followed by weekly taxol: C1, 2023    Interval history:  She presents for short interval follow-up.  She continues to experience fevers associated body aches with a Tmax of 101 this morning.  She has a dry cough for the last 3 weeks.  She continues with Levaquin.  She is taking Tylenol as needed.  She denies any other localizing symptoms.  Specifically she denies any shortness of breath, sore throat,  mucositis, abdominal pain, dysuria, flank pain.  Duration of her symptoms is 7 days.      Past Medical History:   Past Medical History:   Diagnosis Date    Arthritis     Breast cancer 2023    Left Breast    Malignant neoplasm of central portion of left breast in female, estrogen receptor positive 2023    Osteoarthritis     Varicella When I was a child   No personal history of myocardial infarction, cerebrovascular event, or venous thromboembolism.  Osteoarthritis in her hands. No personal history of autoimmune disease. Fhx of RA in her aunt    Past Surgical History:   Past Surgical History:   Procedure Laterality Date    US GUIDED LYMPH NODE BIOPSY  2023       Family History:   Family History   Problem Relation Age of Onset    Breast cancer Mother 55        Stage 4    COPD Mother     Diabetes Mother     Early death Mother     Heart disease Mother     Stroke Mother     Rheum arthritis Maternal Aunt     Breast cancer Paternal Aunt     COPD Maternal Grandfather     Breast cancer Other        Social History:   Social History     Socioeconomic History    Marital status:    Tobacco Use    Smoking status: Former     Packs/day: 0.25     Years: 10.00     Pack years: 2.50     Types: Cigarettes     Start date: 1995     Quit date: 2005     Years since quittin.5     Passive exposure: Past    Smokeless tobacco: Never   Vaping Use    Vaping Use: Never used   Substance and Sexual Activity    Alcohol use: Yes     Comment: rare    Drug use: Never    Sexual activity: Yes     Partners: Male     Birth control/protection: Condom   , lives in [place].     Medications:     Current Outpatient Medications:     ibuprofen (ADVIL,MOTRIN) 200 MG tablet, Take 1 tablet by mouth Every 6 (Six) Hours As Needed for Mild Pain., Disp: , Rfl:     levoFLOXacin (LEVAQUIN) 500 MG tablet, Take 1 tablet by mouth Daily for 7 days., Disp: 7 tablet, Rfl: 0    lidocaine-prilocaine (EMLA) 2.5-2.5 % cream,  Apply 1 application topically to the appropriate area as directed As Needed (45-60 minutes prior to port access.  Cover with saran/plastic wrap.)., Disp: 30 g, Rfl: 3    loratadine (CLARITIN) 10 MG tablet, Take 1 tablet by mouth Daily., Disp: , Rfl:     metoprolol tartrate (LOPRESSOR) 25 MG tablet, TAKE ONE-HALF TABLET BY MOUTH EVERY MORNING AND ONE-HALF TABLET EVERY EVENING, Disp: 90 tablet, Rfl: 0    omeprazole (priLOSEC) 40 MG capsule, Take 1 capsule by mouth Daily. 30 min prior to evening., Disp: 30 capsule, Rfl: 5    ondansetron (ZOFRAN) 8 MG tablet, Take 1 tablet by mouth 3 (Three) Times a Day As Needed for Nausea or Vomiting (DO NOT USE WHILE SANCUSO IN PLACE)., Disp: 30 tablet, Rfl: 5    prochlorperazine (COMPAZINE) 5 MG tablet, Take 1-2 tablets by mouth Every 6 (Six) Hours As Needed for Nausea (USE 2nd; ok to use on evening of chemo)., Disp: 30 tablet, Rfl: 1  No current facility-administered medications for this visit.    Facility-Administered Medications Ordered in Other Visits:     heparin injection 300 Units, 300 Units, Intracatheter, Once, Martha Olguin MD    heparin injection 500 Units, 500 Units, Intravenous, PRN, Martha Olguin MD, 500 Units at 09/14/23 1615    Allergies:   Allergies   Allergen Reactions    Sunblock [Solbar Pf Spf15] Rash       Objective     Vital Signs:   There were no vitals filed for this visit.   There is no height or weight on file to calculate BMI.   There were no vitals filed for this visit.      ECOG Performance Status: 0    Physical Exam:   General: No acute distress.  Frequent dry cough  HEENT: Normocephalic, atraumatic. Sclera anicteric.  Mucous membranes moist.  No erythema  Neck: supple, no adenopathy.   Cardiovascular: regular rate and rhythm. No murmurs.   Respiratory: Normal rate. Clear to auscultation bilaterally  Abdomen: Soft, nontender, non distended with normoactive bowel sounds.  Lymph: no cervical, supraclavicular or axillary adenopathy  Neuro: Alert and  oriented x 3. No focal deficits.   Ext: Symmetric, no swelling.   Psych: Euthymic  Skin around her port site with no erythema    Laboratory/Imaging Reviewed:   Hospital Outpatient Visit on 09/14/2023   Component Date Value Ref Range Status    Color, UA 09/14/2023 Yellow  Yellow, Straw Final    Appearance, UA 09/14/2023 Clear  Clear Final    pH, UA 09/14/2023 6.0  5.0 - 8.0 Final    Specific [place], UA 09/14/2023 1.021  1.001 - 1.030 Final    Glucose, UA 09/14/2023 Negative  Negative Final    Ketones, UA 09/14/2023 Negative  Negative Final    Bilirubin, UA 09/14/2023 Negative  Negative Final    Blood, UA 09/14/2023 Negative  Negative Final    Protein, UA 09/14/2023 Negative  Negative Final    Leuk Esterase, UA 09/14/2023 Trace (A)  Negative Final    Nitrite, UA 09/14/2023 Negative  Negative Final    Urobilinogen, UA 09/14/2023 0.2 E.U./dL  0.2 - 1.0 E.U./dL Final    Glucose 09/14/2023 109 (H)  65 - 99 mg/dL Final    BUN 09/14/2023 15  6 - 20 mg/dL Final    Creatinine 09/14/2023 0.56 (L)  0.57 - 1.00 mg/dL Final    Sodium 09/14/2023 139  136 - 145 mmol/L Final    Potassium 09/14/2023 4.1  3.5 - 5.2 mmol/L Final    Slight hemolysis detected by analyzer. Results may be affected.    Chloride 09/14/2023 103  98 - 107 mmol/L Final    CO2 09/14/2023 25.0  22.0 - 29.0 mmol/L Final    Calcium 09/14/2023 9.2  8.6 - 10.5 mg/dL Final    Total Protein 09/14/2023 7.6  6.0 - 8.5 g/dL Final    Albumin 09/14/2023 4.0  3.5 - 5.2 g/dL Final    ALT (SGPT) 09/14/2023 64 (H)  1 - 33 U/L Final    AST (SGOT) 09/14/2023 45 (H)  1 - 32 U/L Final    Alkaline Phosphatase 09/14/2023 72  39 - 117 U/L Final    Total Bilirubin 09/14/2023 0.3  0.0 - 1.2 mg/dL Final    Globulin 09/14/2023 3.6  gm/dL Final    Calculated Result    A/G Ratio 09/14/2023 1.1  g/dL Final    BUN/Creatinine Ratio 09/14/2023 26.8 (H)  7.0 - 25.0 Final    Anion Gap 09/14/2023 11.0  5.0 - 15.0 mmol/L Final    eGFR 09/14/2023 115.6  >60.0 mL/min/1.73 Final    WBC 09/14/2023  5.95  3.40 - 10.80 10*3/mm3 Final    RBC 09/14/2023 3.12 (L)  3.77 - 5.28 10*6/mm3 Final    Hemoglobin 09/14/2023 9.5 (L)  12.0 - 15.9 g/dL Final    Hematocrit 09/14/2023 29.1 (L)  34.0 - 46.6 % Final    MCV 09/14/2023 93.3  79.0 - 97.0 fL Final    MCH 09/14/2023 30.4  26.6 - 33.0 pg Final    MCHC 09/14/2023 32.6  31.5 - 35.7 g/dL Final    RDW 09/14/2023 14.8  12.3 - 15.4 % Final    RDW-SD 09/14/2023 51.0  37.0 - 54.0 fl Final    MPV 09/14/2023 8.5  6.0 - 12.0 fL Final    Platelets 09/14/2023 250  140 - 450 10*3/mm3 Final    Neutrophil % 09/14/2023 62.8  42.7 - 76.0 % Final    Lymphocyte % 09/14/2023 16.5 (L)  19.6 - 45.3 % Final    Monocyte % 09/14/2023 14.6 (H)  5.0 - 12.0 % Final    Eosinophil % 09/14/2023 5.2  0.3 - 6.2 % Final    Basophil % 09/14/2023 0.7  0.0 - 1.5 % Final    Immature Grans % 09/14/2023 0.2  0.0 - 0.5 % Final    Neutrophils, Absolute 09/14/2023 3.74  1.70 - 7.00 10*3/mm3 Final    Lymphocytes, Absolute 09/14/2023 0.98  0.70 - 3.10 10*3/mm3 Final    Monocytes, Absolute 09/14/2023 0.87  0.10 - 0.90 10*3/mm3 Final    Eosinophils, Absolute 09/14/2023 0.31  0.00 - 0.40 10*3/mm3 Final    Basophils, Absolute 09/14/2023 0.04  0.00 - 0.20 10*3/mm3 Final    Immature Grans, Absolute 09/14/2023 0.01  0.00 - 0.05 10*3/mm3 Final    RBC, UA 09/14/2023 0-2  None Seen, 0-2 /HPF Final    WBC, UA 09/14/2023 6-12 (A)  None Seen, 0-2 /HPF Final    Bacteria, UA 09/14/2023 None Seen  None Seen, Trace /HPF Final    Squamous Epithelial Cells, UA 09/14/2023 3-6 (A)  None Seen, 0-2 /HPF Final    Hyaline Casts, UA 09/14/2023 0-6  0 - 6 /LPF Final    Methodology 09/14/2023 Automated Microscopy   Final   Admission on 09/14/2023, Discharged on 09/14/2023   Component Date Value Ref Range Status    SARS Antigen 09/14/2023 Not Detected  Not Detected, Presumptive Negative Final    Internal Control 09/14/2023 Passed  Passed Final    Lot Number 09/14/2023 [id number]   Final    Expiration Date 09/14/2023 04-02-24   Final   Infusion  on 09/11/2023   Component Date Value Ref Range Status    Glucose 09/11/2023 99  65 - 99 mg/dL Final    BUN 09/11/2023 12  6 - 20 mg/dL Final    Creatinine 09/11/2023 0.54 (L)  0.57 - 1.00 mg/dL Final    Sodium 09/11/2023 137  136 - 145 mmol/L Final    Potassium 09/11/2023 4.1  3.5 - 5.2 mmol/L Final    Chloride 09/11/2023 103  98 - 107 mmol/L Final    CO2 09/11/2023 25.8  22.0 - 29.0 mmol/L Final    Calcium 09/11/2023 9.2  8.6 - 10.5 mg/dL Final    Total Protein 09/11/2023 6.9  6.0 - 8.5 g/dL Final    Albumin 09/11/2023 4.0  3.5 - 5.2 g/dL Final    ALT (SGPT) 09/11/2023 71 (H)  1 - 33 U/L Final    AST (SGOT) 09/11/2023 42 (H)  1 - 32 U/L Final    Alkaline Phosphatase 09/11/2023 62  39 - 117 U/L Final    Total Bilirubin 09/11/2023 0.2  0.0 - 1.2 mg/dL Final    Globulin 09/11/2023 2.9  gm/dL Final    A/G Ratio 09/11/2023 1.4  g/dL Final    BUN/Creatinine Ratio 09/11/2023 22.2  7.0 - 25.0 Final    Anion Gap 09/11/2023 8.2  5.0 - 15.0 mmol/L Final    eGFR 09/11/2023 116.6  >60.0 mL/min/1.73 Final    Magnesium 09/11/2023 1.9  1.6 - 2.6 mg/dL Final    Phosphorus 09/11/2023 3.6  2.5 - 4.5 mg/dL Final    WBC 09/11/2023 3.72  3.40 - 10.80 10*3/mm3 Final    RBC 09/11/2023 3.13 (L)  3.77 - 5.28 10*6/mm3 Final    Hemoglobin 09/11/2023 9.6 (L)  12.0 - 15.9 g/dL Final    Hematocrit 09/11/2023 28.2 (L)  34.0 - 46.6 % Final    MCV 09/11/2023 90.1  79.0 - 97.0 fL Final    MCH 09/11/2023 30.7  26.6 - 33.0 pg Final    MCHC 09/11/2023 34.0  31.5 - 35.7 g/dL Final    RDW 09/11/2023 15.0  12.3 - 15.4 % Final    RDW-SD 09/11/2023 49.0  37.0 - 54.0 fl Final    MPV 09/11/2023 8.9  6.0 - 12.0 fL Final    Platelets 09/11/2023 267  140 - 450 10*3/mm3 Final    Neutrophil % 09/11/2023 68.0  42.7 - 76.0 % Final    Lymphocyte % 09/11/2023 13.7 (L)  19.6 - 45.3 % Final    Monocyte % 09/11/2023 11.3  5.0 - 12.0 % Final    Eosinophil % 09/11/2023 5.4  0.3 - 6.2 % Final    Basophil % 09/11/2023 1.3  0.0 - 1.5 % Final    Immature Grans % 09/11/2023 0.3   0.0 - 0.5 % Final    Neutrophils, Absolute 09/11/2023 2.53  1.70 - 7.00 10*3/mm3 Final    Lymphocytes, Absolute 09/11/2023 0.51 (L)  0.70 - 3.10 10*3/mm3 Final    Monocytes, Absolute 09/11/2023 0.42  0.10 - 0.90 10*3/mm3 Final    Eosinophils, Absolute 09/11/2023 0.20  0.00 - 0.40 10*3/mm3 Final    Basophils, Absolute 09/11/2023 0.05  0.00 - 0.20 10*3/mm3 Final    Immature Grans, Absolute 09/11/2023 0.01  0.00 - 0.05 10*3/mm3 Final    nRBC 09/11/2023 0.0  0.0 - 0.2 /100 WBC Final   Admission on 09/08/2023, Discharged on 09/08/2023   Component Date Value Ref Range Status    ADENOVIRUS, PCR 09/08/2023 Not Detected  Not Detected Final    Coronavirus 229E 09/08/2023 Not Detected  Not Detected Final    Coronavirus HKU1 09/08/2023 Not Detected  Not Detected Final    Coronavirus NL63 09/08/2023 Not Detected  Not Detected Final    Coronavirus OC43 09/08/2023 Not Detected  Not Detected Final    COVID19 09/08/2023 Not Detected  Not Detected - Ref. Range Final    Human Metapneumovirus 09/08/2023 Not Detected  Not Detected Final    Human Rhinovirus/Enterovirus 09/08/2023 Not Detected  Not Detected Final    Influenza A PCR 09/08/2023 Not Detected  Not Detected Final    Influenza B PCR 09/08/2023 Not Detected  Not Detected Final    Parainfluenza Virus 1 09/08/2023 Not Detected  Not Detected Final    Parainfluenza Virus 2 09/08/2023 Not Detected  Not Detected Final    Parainfluenza Virus 3 09/08/2023 Not Detected  Not Detected Final    Parainfluenza Virus 4 09/08/2023 Not Detected  Not Detected Final    RSV, PCR 09/08/2023 Not Detected  Not Detected Final    Bordetella pertussis pcr 09/08/2023 Not Detected  Not Detected Final    Bordetella parapertussis PCR 09/08/2023 Not Detected  Not Detected Final    Chlamydophila pneumoniae PCR 09/08/2023 Not Detected  Not Detected Final    Mycoplasma pneumo by PCR 09/08/2023 Not Detected  Not Detected Final    WBC 09/08/2023 4.23  3.40 - 10.80 10*3/mm3 Final    RBC 09/08/2023 3.16 (L)  3.77 -  5.28 10*6/mm3 Final    Hemoglobin 09/08/2023 9.6 (L)  12.0 - 15.9 g/dL Final    Hematocrit 09/08/2023 28.1 (L)  34.0 - 46.6 % Final    MCV 09/08/2023 88.9  79.0 - 97.0 fL Final    MCH 09/08/2023 30.4  26.6 - 33.0 pg Final    MCHC 09/08/2023 34.2  31.5 - 35.7 g/dL Final    RDW 09/08/2023 14.7  12.3 - 15.4 % Final    RDW-SD 09/08/2023 47.8  37.0 - 54.0 fl Final    MPV 09/08/2023 8.8  6.0 - 12.0 fL Final    Platelets 09/08/2023 301  140 - 450 10*3/mm3 Final    Neutrophil % 09/08/2023 84.0 (H)  42.7 - 76.0 % Final    Lymphocyte % 09/08/2023 9.7 (L)  19.6 - 45.3 % Final    Monocyte % 09/08/2023 4.7 (L)  5.0 - 12.0 % Final    Eosinophil % 09/08/2023 0.9  0.3 - 6.2 % Final    Basophil % 09/08/2023 0.5  0.0 - 1.5 % Final    Immature Grans % 09/08/2023 0.2  0.0 - 0.5 % Final    Neutrophils, Absolute 09/08/2023 3.55  1.70 - 7.00 10*3/mm3 Final    Lymphocytes, Absolute 09/08/2023 0.41 (L)  0.70 - 3.10 10*3/mm3 Final    Monocytes, Absolute 09/08/2023 0.20  0.10 - 0.90 10*3/mm3 Final    Eosinophils, Absolute 09/08/2023 0.04  0.00 - 0.40 10*3/mm3 Final    Basophils, Absolute 09/08/2023 0.02  0.00 - 0.20 10*3/mm3 Final    Immature Grans, Absolute 09/08/2023 0.01  0.00 - 0.05 10*3/mm3 Final    nRBC 09/08/2023 0.0  0.0 - 0.2 /100 WBC Final    Glucose 09/08/2023 102 (H)  65 - 99 mg/dL Final    BUN 09/08/2023 13  6 - 20 mg/dL Final    Creatinine 09/08/2023 0.62  0.57 - 1.00 mg/dL Final    Sodium 09/08/2023 136  136 - 145 mmol/L Final    Potassium 09/08/2023 4.1  3.5 - 5.2 mmol/L Final    Chloride 09/08/2023 99  98 - 107 mmol/L Final    CO2 09/08/2023 25.8  22.0 - 29.0 mmol/L Final    Calcium 09/08/2023 9.2  8.6 - 10.5 mg/dL Final    Total Protein 09/08/2023 7.2  6.0 - 8.5 g/dL Final    Albumin 09/08/2023 4.3  3.5 - 5.2 g/dL Final    ALT (SGPT) 09/08/2023 72 (H)  1 - 33 U/L Final    AST (SGOT) 09/08/2023 47 (H)  1 - 32 U/L Final    Alkaline Phosphatase 09/08/2023 62  39 - 117 U/L Final    Total Bilirubin 09/08/2023 0.4  0.0 - 1.2 mg/dL  Final    Globulin 09/08/2023 2.9  gm/dL Final    A/G Ratio 09/08/2023 1.5  g/dL Final    BUN/Creatinine Ratio 09/08/2023 21.0  7.0 - 25.0 Final    Anion Gap 09/08/2023 11.2  5.0 - 15.0 mmol/L Final    eGFR 09/08/2023 112.8  >60.0 mL/min/1.73 Final    Procalcitonin 09/08/2023 0.09  0.00 - 0.25 ng/mL Final    Lactate 09/08/2023 0.8  0.5 - 2.0 mmol/L Final    Color, UA 09/08/2023 Yellow  Yellow, Straw Final    Appearance, UA 09/08/2023 Clear  Clear Final    pH, UA 09/08/2023 7.5  5.0 - 8.0 Final    Specific Gravity, UA 09/08/2023 1.008  1.005 - 1.030 Final    Glucose, UA 09/08/2023 Negative  Negative Final    Ketones, UA 09/08/2023 Negative  Negative Final    Bilirubin, UA 09/08/2023 Negative  Negative Final    Blood, UA 09/08/2023 Negative  Negative Final    Protein, UA 09/08/2023 Negative  Negative Final    Leuk Esterase, UA 09/08/2023 Negative  Negative Final    Nitrite, UA 09/08/2023 Negative  Negative Final    Urobilinogen, UA 09/08/2023 0.2 E.U./dL  0.2 - 1.0 E.U./dL Final    HCG, Urine QL 09/08/2023 Negative  Negative Final    Blood Culture 09/08/2023 No growth at 5 days   Final    Blood Culture 09/08/2023 No growth at 5 days   Final   Infusion on 09/05/2023   Component Date Value Ref Range Status    Glucose 09/05/2023 104 (H)  65 - 99 mg/dL Final    BUN 09/05/2023 16  6 - 20 mg/dL Final    Creatinine 09/05/2023 0.61  0.57 - 1.00 mg/dL Final    Sodium 09/05/2023 139  136 - 145 mmol/L Final    Potassium 09/05/2023 3.9  3.5 - 5.2 mmol/L Final    Chloride 09/05/2023 103  98 - 107 mmol/L Final    CO2 09/05/2023 25.9  22.0 - 29.0 mmol/L Final    Calcium 09/05/2023 9.7  8.6 - 10.5 mg/dL Final    Total Protein 09/05/2023 7.4  6.0 - 8.5 g/dL Final    Albumin 09/05/2023 4.3  3.5 - 5.2 g/dL Final    ALT (SGPT) 09/05/2023 48 (H)  1 - 33 U/L Final    AST (SGOT) 09/05/2023 34 (H)  1 - 32 U/L Final    Alkaline Phosphatase 09/05/2023 65  39 - 117 U/L Final    Total Bilirubin 09/05/2023 0.2  0.0 - 1.2 mg/dL Final    Globulin  09/05/2023 3.1  gm/dL Final    A/G Ratio 09/05/2023 1.4  g/dL Final    BUN/Creatinine Ratio 09/05/2023 26.2 (H)  7.0 - 25.0 Final    Anion Gap 09/05/2023 10.1  5.0 - 15.0 mmol/L Final    eGFR 09/05/2023 113.2  >60.0 mL/min/1.73 Final    WBC 09/05/2023 6.75  3.40 - 10.80 10*3/mm3 Final    RBC 09/05/2023 3.25 (L)  3.77 - 5.28 10*6/mm3 Final    Hemoglobin 09/05/2023 9.9 (L)  12.0 - 15.9 g/dL Final    Hematocrit 09/05/2023 29.0 (L)  34.0 - 46.6 % Final    MCV 09/05/2023 89.2  79.0 - 97.0 fL Final    MCH 09/05/2023 30.5  26.6 - 33.0 pg Final    MCHC 09/05/2023 34.1  31.5 - 35.7 g/dL Final    RDW 09/05/2023 15.3  12.3 - 15.4 % Final    RDW-SD 09/05/2023 48.7  37.0 - 54.0 fl Final    MPV 09/05/2023 8.6  6.0 - 12.0 fL Final    Platelets 09/05/2023 352  140 - 450 10*3/mm3 Final    Neutrophil % 09/05/2023 76.0  42.7 - 76.0 % Final    Lymphocyte % 09/05/2023 10.5 (L)  19.6 - 45.3 % Final    Monocyte % 09/05/2023 12.0  5.0 - 12.0 % Final    Eosinophil % 09/05/2023 0.1 (L)  0.3 - 6.2 % Final    Basophil % 09/05/2023 1.0  0.0 - 1.5 % Final    Immature Grans % 09/05/2023 0.4  0.0 - 0.5 % Final    Neutrophils, Absolute 09/05/2023 5.12  1.70 - 7.00 10*3/mm3 Final    Lymphocytes, Absolute 09/05/2023 0.71  0.70 - 3.10 10*3/mm3 Final    Monocytes, Absolute 09/05/2023 0.81  0.10 - 0.90 10*3/mm3 Final    Eosinophils, Absolute 09/05/2023 0.01  0.00 - 0.40 10*3/mm3 Final    Basophils, Absolute 09/05/2023 0.07  0.00 - 0.20 10*3/mm3 Final    Immature Grans, Absolute 09/05/2023 0.03  0.00 - 0.05 10*3/mm3 Final    nRBC 09/05/2023 0.0  0.0 - 0.2 /100 WBC Final       XR Chest 1 View    Result Date: 9/9/2023  Narrative: PROCEDURE: XR CHEST 1 VW-  INDICATION:  cough, fever, on chemotherapy  FINDINGS:  A portable view of the chest was obtained.  Comparison is made to a prior exam dated 8/23/2023.   There is no change in a right Port-A-Cath. The cardiac and mediastinal silhouettes are within normal limits.  The lungs are clear.  There is no pleural  effusion or pneumothorax.      Impression: No acute process on this portable exam.   This report was signed and finalized on 9/9/2023 8:25 AM by Tanya Hernandez MD.      XR Chest 1 View    Result Date: 8/23/2023  Narrative: PROCEDURE: XR CHEST 1 VW-  HISTORY: Dysrhythmia Triage Protocol  COMPARISON: 6/20/2023  FINDINGS:  Portable view of the chest demonstrates the lungs to be grossly clear. There is no evidence of effusion, pneumothorax or other significant pleural disease. The mediastinum is unremarkable.  The heart size is normal. Right subclavian Port-A-Cath is noted with tip in the innominate vein.      Impression: Port-A-Cath in position as above without pneumothorax    This report was signed and finalized on 8/23/2023 1:18 PM by Trae De MD.      Holter Monitor - 72 Hour Up To 15 Days    Result Date: 9/5/2023  Narrative:   1.8% PVCs.  Associated with symptoms.          Component  Ref Range & Units 3 d ago   Color, UA  Yellow, Straw Yellow   Appearance, UA  Clear Clear   pH, UA  5.0 - 8.0 7.5   Specific Gravity, UA  1.005 - 1.030 1.008   Glucose, UA  Negative Negative   Ketones, UA  Negative Negative   Bilirubin, UA  Negative Negative   Blood, UA  Negative Negative   Protein, UA  Negative Negative   Leuk Esterase, UA  Negative Negative   Nitrite, UA  Negative Negative   Urobilinogen, UA  0.2 - 1.0 E.U./dL 0.2 E.U./dL   Resulting Agency [facility]          Procedures    Assessment / Plan      Assessment/Plan:     1.  Malignant neoplasm of central portion of left breast in female, estrogen receptor positive   2.  Chemotherapy monitoring  -Treatment held this week for febrile illness.  Anticipating treatment resumption 9/18/2023 pending clinical course.    3.  Fever  4.  Persistent dry cough  I reviewed her work-up to date which is notable for negative viral respiratory panel including COVID and flu.  She had a repeat COVID test today which was also negative.  Chest x-ray was negative.  Blood cultures and  urine were negative.  She was not neutropenic.  She is completing a course of Levaquin.  She remains symptomatic with Tmax of 101 today.  She is having a dry cough.  Because of her immunosuppression and persistent symptoms we will obtain a chest CT to rule out pneumonitis which can rarely present with fever and a dry cough or any parenchymal lung changes that could be suggestive of an atypical/fungal pneumonia that may not be well covered by her current antibiotic regimen.  She will continue Levaquin in the interim.  She received IV fluids today.    Follow Up:   On Monday unless clinical worsening.    Martha Olguin MD  Hematology and Oncology

## 2023-09-15 ENCOUNTER — HOSPITAL ENCOUNTER (OUTPATIENT)
Dept: CT IMAGING | Facility: HOSPITAL | Age: 43
Discharge: HOME | End: 2023-09-15
Admitting: INTERNAL MEDICINE
Payer: COMMERCIAL

## 2023-09-15 ENCOUNTER — TELEPHONE (OUTPATIENT)
Dept: ONCOLOGY | Facility: CLINIC | Age: 43
End: 2023-09-15
Payer: COMMERCIAL

## 2023-09-15 DIAGNOSIS — R05.9 COUGH, UNSPECIFIED TYPE: ICD-10-CM

## 2023-09-15 DIAGNOSIS — R50.9 FEVER, UNSPECIFIED FEVER CAUSE: ICD-10-CM

## 2023-09-15 PROCEDURE — 25510000001 IOPAMIDOL 61 % SOLUTION: Performed by: INTERNAL MEDICINE

## 2023-09-15 PROCEDURE — 71260 CT THORAX DX C+: CPT

## 2023-09-15 RX ADMIN — IOPAMIDOL 80 ML: 612 INJECTION, SOLUTION INTRAVENOUS at 15:20

## 2023-09-15 NOTE — TELEPHONE ENCOUNTER
Patient stated she was asleep when this office called.  Patient stated she will come to [place] to complete imaging if needed.  Spoke with scheduling and she is trying to get patient scheduled today in [place].  Advised patient this office will contact her back.  She verbalized understanding.

## 2023-09-15 NOTE — TELEPHONE ENCOUNTER
Called patient about STAT imaging scheduled this morning. Advised patient that  if she is unable to make it she should call us back so we can reschedule it elsewhere. Requested a return call.  Also called patient's spouse with no answer received.

## 2023-09-15 NOTE — TELEPHONE ENCOUNTER
Caller: Kimberley Win    Relationship: Self    Best call back number: [phone]    What is the best time to reach you: ANYTIME    Who are you requesting to speak with (clinical staff, provider, specific staff member): CLINICAL    What was the call regarding: PATIENT RETURNING CALL FOR CINDY - PLEASE CALL WHEN AVAILABLE.

## 2023-09-16 LAB — BACTERIA SPEC AEROBE CULT: NO GROWTH

## 2023-09-18 ENCOUNTER — LAB (OUTPATIENT)
Dept: LAB | Facility: HOSPITAL | Age: 43
End: 2023-09-18
Payer: COMMERCIAL

## 2023-09-18 ENCOUNTER — OFFICE VISIT (OUTPATIENT)
Dept: ONCOLOGY | Facility: CLINIC | Age: 43
End: 2023-09-18
Payer: COMMERCIAL

## 2023-09-18 VITALS
TEMPERATURE: 99.7 F | OXYGEN SATURATION: 93 % | DIASTOLIC BLOOD PRESSURE: 74 MMHG | HEART RATE: 117 BPM | BODY MASS INDEX: 23.86 KG/M2 | SYSTOLIC BLOOD PRESSURE: 120 MMHG | WEIGHT: 152 LBS | HEIGHT: 67 IN | RESPIRATION RATE: 12 BRPM

## 2023-09-18 DIAGNOSIS — C50.112 MALIGNANT NEOPLASM OF CENTRAL PORTION OF LEFT BREAST IN FEMALE, ESTROGEN RECEPTOR POSITIVE: ICD-10-CM

## 2023-09-18 DIAGNOSIS — Z17.0 MALIGNANT NEOPLASM OF CENTRAL PORTION OF LEFT BREAST IN FEMALE, ESTROGEN RECEPTOR POSITIVE: ICD-10-CM

## 2023-09-18 DIAGNOSIS — C50.112 MALIGNANT NEOPLASM OF CENTRAL PORTION OF LEFT BREAST IN FEMALE, ESTROGEN RECEPTOR POSITIVE: Primary | ICD-10-CM

## 2023-09-18 DIAGNOSIS — Z17.0 MALIGNANT NEOPLASM OF CENTRAL PORTION OF LEFT BREAST IN FEMALE, ESTROGEN RECEPTOR POSITIVE: Primary | ICD-10-CM

## 2023-09-18 PROCEDURE — 36415 COLL VENOUS BLD VENIPUNCTURE: CPT

## 2023-09-18 PROCEDURE — 87385 HISTOPLASMA CAPSUL AG IA: CPT

## 2023-09-18 RX ORDER — PREDNISONE 20 MG/1
TABLET ORAL
Qty: 30 TABLET | Refills: 0 | Status: SHIPPED | OUTPATIENT
Start: 2023-09-18 | End: 2023-10-03

## 2023-09-18 RX ORDER — SULFAMETHOXAZOLE AND TRIMETHOPRIM 800; 160 MG/1; MG/1
1 TABLET ORAL 2 TIMES DAILY
Qty: 28 TABLET | Refills: 0 | Status: SHIPPED | OUTPATIENT
Start: 2023-09-18

## 2023-09-18 NOTE — PROGRESS NOTES
[facility]  Office number [phone]    Fax number [phone]     Follow-up     Date: 23      Patient Name: Kimberley Win  MRN: [id number]  : 1980    Referring Physician: Dr. Daxa Anderson MD    Chief Complaint: left breast cancer    Cancer Staging:  Cancer Staging   Stage IIA (cT2, cN1, cM0, G2, ER+, NE+, HER2-)    History of Present Illness: Kimberley Win is a pleasant 43 y.o. female who presented for evaluation of left breast cancer.     She notes a history of cystic breast disease for many years. She notes a new breast mass that became progressively harder and associated with pain prompting diagnostic workup.     Diagnostic mammogram 23 showed a dense, spiculated mass in the left breast which on ultrasound corresponded to a 3:00 3 cm hypoechoic mass with internal vascularity. In the 7:00 left breast there was a 7 mm mass corresponding on US to a cluster of cysts spanning 1.4 cm. In the left breast there was a 1.7 cm LN with multiple smaller LN.     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 Lymph node FNA was postive for malignancy, metastatic ductal carcinoma.    Breast cancer risk profile:  Age of menarche:10  G0; infertility  Age of menopause: premenopausal   Family history of breast, ovarian, prostate or pancreatic cancer: mom stage IV breast cancer in her 50s. M Great Aunt, breast cancer.   Genetic testing: negative 36 gene CancerNext panel     Treatment history:  Dose dense AC followed by weekly taxol: C1, 2023  Taxol C1 ; C2 : on hold    Interval history:  She presents for short interval follow-up.  She continues to experience fevers associated body aches with a Tmax of 101 this morning.  She has a dry cough for the last 3 weeks, in retrospect this started the first week after taxol.  She continues with Levaquin.  She is taking Tylenol as needed.  She denies any  other localizing symptoms.  Specifically she denies any shortness of breath, sore throat, mucositis, abdominal pain, dysuria, flank pain.  Duration of her symptoms is 7 days.      Past Medical History:   Past Medical History:   Diagnosis Date    Arthritis     Breast cancer 2023    Left Breast    Malignant neoplasm of central portion of left breast in female, estrogen receptor positive 2023    Osteoarthritis     Varicella When I was a child   No personal history of myocardial infarction, cerebrovascular event, or venous thromboembolism.  Osteoarthritis in her hands. No personal history of autoimmune disease. Fhx of RA in her aunt    Past Surgical History:   Past Surgical History:   Procedure Laterality Date    US GUIDED LYMPH NODE BIOPSY  2023       Family History:   Family History   Problem Relation Age of Onset    Breast cancer Mother 55        Stage 4    COPD Mother     Diabetes Mother     Early death Mother     Heart disease Mother     Stroke Mother     Rheum arthritis Maternal Aunt     Breast cancer Paternal Aunt     COPD Maternal Grandfather     Breast cancer Other        Social History:   Social History     Socioeconomic History    Marital status:    Tobacco Use    Smoking status: Former     Packs/day: 0.25     Years: 10.00     Pack years: 2.50     Types: Cigarettes     Start date: 1995     Quit date: 2005     Years since quittin.6     Passive exposure: Past    Smokeless tobacco: Never   Vaping Use    Vaping Use: Never used   Substance and Sexual Activity    Alcohol use: Yes     Comment: rare    Drug use: Never    Sexual activity: Yes     Partners: Male     Birth control/protection: Condom   , lives in [place].     Medications:     Current Outpatient Medications:     ibuprofen (ADVIL,MOTRIN) 200 MG tablet, Take 1 tablet by mouth Every 6 (Six) Hours As Needed for Mild Pain., Disp: , Rfl:     lidocaine-prilocaine (EMLA) 2.5-2.5 % cream, Apply 1  "application topically to the appropriate area as directed As Needed (45-60 minutes prior to port access.  Cover with saran/plastic wrap.)., Disp: 30 g, Rfl: 3    loratadine (CLARITIN) 10 MG tablet, Take 1 tablet by mouth Daily., Disp: , Rfl:     metoprolol tartrate (LOPRESSOR) 25 MG tablet, TAKE ONE-HALF TABLET BY MOUTH EVERY MORNING AND ONE-HALF TABLET EVERY EVENING, Disp: 90 tablet, Rfl: 0    omeprazole (priLOSEC) 40 MG capsule, Take 1 capsule by mouth Daily. 30 min prior to evening., Disp: 30 capsule, Rfl: 5    ondansetron (ZOFRAN) 8 MG tablet, Take 1 tablet by mouth 3 (Three) Times a Day As Needed for Nausea or Vomiting (DO NOT USE WHILE SANCUSO IN PLACE)., Disp: 30 tablet, Rfl: 5    prochlorperazine (COMPAZINE) 5 MG tablet, Take 1-2 tablets by mouth Every 6 (Six) Hours As Needed for Nausea (USE 2nd; ok to use on evening of chemo)., Disp: 30 tablet, Rfl: 1    Allergies:   Allergies   Allergen Reactions    Sunblock [Solbar Pf Spf15] Rash       Objective     Vital Signs:   Vitals:    09/18/23 1027   BP: 120/74   Pulse: 117   Resp: 12   Temp: 99.7 °F (37.6 °C)   TempSrc: Temporal   SpO2: 93%   Weight: 68.9 kg (152 lb)   Height: 170.2 cm (67\")   PainSc:   3   PainLoc: Head      Body mass index is 23.81 kg/m².   Pain Score    09/18/23 1027   PainSc:   3   PainLoc: Head         ECOG Performance Status: 0    Physical Exam:   General: No acute distress.  Frequent dry cough  HEENT: Normocephalic, atraumatic. Sclera anicteric.  Mucous membranes moist.  No erythema  Neck: supple, no adenopathy.   Cardiovascular: regular rate and rhythm. No murmurs.   Respiratory: Normal rate. Clear to auscultation bilaterally  Abdomen: Soft, nontender, non distended with normoactive bowel sounds.  Lymph: no cervical, supraclavicular or axillary adenopathy  Neuro: Alert and oriented x 3. No focal deficits.   Ext: Symmetric, no swelling.   Psych: Euthymic  Skin around her port site with no erythema    Laboratory/Imaging Reviewed:   Hospital " Outpatient Visit on 09/14/2023   Component Date Value Ref Range Status    Blood Culture 09/14/2023 No growth at 3 days   Preliminary    Blood Culture 09/14/2023 No growth at 3 days   Preliminary    Color, UA 09/14/2023 Yellow  Yellow, Straw Final    Appearance, UA 09/14/2023 Clear  Clear Final    pH, UA 09/14/2023 6.0  5.0 - 8.0 Final    Specific [place], UA 09/14/2023 1.021  1.001 - 1.030 Final    Glucose, UA 09/14/2023 Negative  Negative Final    Ketones, UA 09/14/2023 Negative  Negative Final    Bilirubin, UA 09/14/2023 Negative  Negative Final    Blood, UA 09/14/2023 Negative  Negative Final    Protein, UA 09/14/2023 Negative  Negative Final    Leuk Esterase, UA 09/14/2023 Trace (A)  Negative Final    Nitrite, UA 09/14/2023 Negative  Negative Final    Urobilinogen, UA 09/14/2023 0.2 E.U./dL  0.2 - 1.0 E.U./dL Final    Glucose 09/14/2023 109 (H)  65 - 99 mg/dL Final    BUN 09/14/2023 15  6 - 20 mg/dL Final    Creatinine 09/14/2023 0.56 (L)  0.57 - 1.00 mg/dL Final    Sodium 09/14/2023 139  136 - 145 mmol/L Final    Potassium 09/14/2023 4.1  3.5 - 5.2 mmol/L Final    Slight hemolysis detected by analyzer. Results may be affected.    Chloride 09/14/2023 103  98 - 107 mmol/L Final    CO2 09/14/2023 25.0  22.0 - 29.0 mmol/L Final    Calcium 09/14/2023 9.2  8.6 - 10.5 mg/dL Final    Total Protein 09/14/2023 7.6  6.0 - 8.5 g/dL Final    Albumin 09/14/2023 4.0  3.5 - 5.2 g/dL Final    ALT (SGPT) 09/14/2023 64 (H)  1 - 33 U/L Final    AST (SGOT) 09/14/2023 45 (H)  1 - 32 U/L Final    Alkaline Phosphatase 09/14/2023 72  39 - 117 U/L Final    Total Bilirubin 09/14/2023 0.3  0.0 - 1.2 mg/dL Final    Globulin 09/14/2023 3.6  gm/dL Final    Calculated Result    A/G Ratio 09/14/2023 1.1  g/dL Final    BUN/Creatinine Ratio 09/14/2023 26.8 (H)  7.0 - 25.0 Final    Anion Gap 09/14/2023 11.0  5.0 - 15.0 mmol/L Final    eGFR 09/14/2023 115.6  >60.0 mL/min/1.73 Final    WBC 09/14/2023 5.95  3.40 - 10.80 10*3/mm3 Final    RBC  09/14/2023 3.12 (L)  3.77 - 5.28 10*6/mm3 Final    Hemoglobin 09/14/2023 9.5 (L)  12.0 - 15.9 g/dL Final    Hematocrit 09/14/2023 29.1 (L)  34.0 - 46.6 % Final    MCV 09/14/2023 93.3  79.0 - 97.0 fL Final    MCH 09/14/2023 30.4  26.6 - 33.0 pg Final    MCHC 09/14/2023 32.6  31.5 - 35.7 g/dL Final    RDW 09/14/2023 14.8  12.3 - 15.4 % Final    RDW-SD 09/14/2023 51.0  37.0 - 54.0 fl Final    MPV 09/14/2023 8.5  6.0 - 12.0 fL Final    Platelets 09/14/2023 250  140 - 450 10*3/mm3 Final    Neutrophil % 09/14/2023 62.8  42.7 - 76.0 % Final    Lymphocyte % 09/14/2023 16.5 (L)  19.6 - 45.3 % Final    Monocyte % 09/14/2023 14.6 (H)  5.0 - 12.0 % Final    Eosinophil % 09/14/2023 5.2  0.3 - 6.2 % Final    Basophil % 09/14/2023 0.7  0.0 - 1.5 % Final    Immature Grans % 09/14/2023 0.2  0.0 - 0.5 % Final    Neutrophils, Absolute 09/14/2023 3.74  1.70 - 7.00 10*3/mm3 Final    Lymphocytes, Absolute 09/14/2023 0.98  0.70 - 3.10 10*3/mm3 Final    Monocytes, Absolute 09/14/2023 0.87  0.10 - 0.90 10*3/mm3 Final    Eosinophils, Absolute 09/14/2023 0.31  0.00 - 0.40 10*3/mm3 Final    Basophils, Absolute 09/14/2023 0.04  0.00 - 0.20 10*3/mm3 Final    Immature Grans, Absolute 09/14/2023 0.01  0.00 - 0.05 10*3/mm3 Final    RBC, UA 09/14/2023 0-2  None Seen, 0-2 /HPF Final    WBC, UA 09/14/2023 6-12 (A)  None Seen, 0-2 /HPF Final    Bacteria, UA 09/14/2023 None Seen  None Seen, Trace /HPF Final    Squamous Epithelial Cells, UA 09/14/2023 3-6 (A)  None Seen, 0-2 /HPF Final    Hyaline Casts, UA 09/14/2023 0-6  0 - 6 /LPF Final    Methodology 09/14/2023 Automated Microscopy   Final    Urine Culture 09/14/2023 No growth   Final   Admission on 09/14/2023, Discharged on 09/14/2023   Component Date Value Ref Range Status    SARS Antigen 09/14/2023 Not Detected  Not Detected, Presumptive Negative Final    Internal Control 09/14/2023 Passed  Passed Final    Lot Number 09/14/2023 [id number]   Final    Expiration Date 09/14/2023 04-02-24   Final    Infusion on 09/11/2023   Component Date Value Ref Range Status    Glucose 09/11/2023 99  65 - 99 mg/dL Final    BUN 09/11/2023 12  6 - 20 mg/dL Final    Creatinine 09/11/2023 0.54 (L)  0.57 - 1.00 mg/dL Final    Sodium 09/11/2023 137  136 - 145 mmol/L Final    Potassium 09/11/2023 4.1  3.5 - 5.2 mmol/L Final    Chloride 09/11/2023 103  98 - 107 mmol/L Final    CO2 09/11/2023 25.8  22.0 - 29.0 mmol/L Final    Calcium 09/11/2023 9.2  8.6 - 10.5 mg/dL Final    Total Protein 09/11/2023 6.9  6.0 - 8.5 g/dL Final    Albumin 09/11/2023 4.0  3.5 - 5.2 g/dL Final    ALT (SGPT) 09/11/2023 71 (H)  1 - 33 U/L Final    AST (SGOT) 09/11/2023 42 (H)  1 - 32 U/L Final    Alkaline Phosphatase 09/11/2023 62  39 - 117 U/L Final    Total Bilirubin 09/11/2023 0.2  0.0 - 1.2 mg/dL Final    Globulin 09/11/2023 2.9  gm/dL Final    A/G Ratio 09/11/2023 1.4  g/dL Final    BUN/Creatinine Ratio 09/11/2023 22.2  7.0 - 25.0 Final    Anion Gap 09/11/2023 8.2  5.0 - 15.0 mmol/L Final    eGFR 09/11/2023 116.6  >60.0 mL/min/1.73 Final    Magnesium 09/11/2023 1.9  1.6 - 2.6 mg/dL Final    Phosphorus 09/11/2023 3.6  2.5 - 4.5 mg/dL Final    WBC 09/11/2023 3.72  3.40 - 10.80 10*3/mm3 Final    RBC 09/11/2023 3.13 (L)  3.77 - 5.28 10*6/mm3 Final    Hemoglobin 09/11/2023 9.6 (L)  12.0 - 15.9 g/dL Final    Hematocrit 09/11/2023 28.2 (L)  34.0 - 46.6 % Final    MCV 09/11/2023 90.1  79.0 - 97.0 fL Final    MCH 09/11/2023 30.7  26.6 - 33.0 pg Final    MCHC 09/11/2023 34.0  31.5 - 35.7 g/dL Final    RDW 09/11/2023 15.0  12.3 - 15.4 % Final    RDW-SD 09/11/2023 49.0  37.0 - 54.0 fl Final    MPV 09/11/2023 8.9  6.0 - 12.0 fL Final    Platelets 09/11/2023 267  140 - 450 10*3/mm3 Final    Neutrophil % 09/11/2023 68.0  42.7 - 76.0 % Final    Lymphocyte % 09/11/2023 13.7 (L)  19.6 - 45.3 % Final    Monocyte % 09/11/2023 11.3  5.0 - 12.0 % Final    Eosinophil % 09/11/2023 5.4  0.3 - 6.2 % Final    Basophil % 09/11/2023 1.3  0.0 - 1.5 % Final    Immature Grans %  09/11/2023 0.3  0.0 - 0.5 % Final    Neutrophils, Absolute 09/11/2023 2.53  1.70 - 7.00 10*3/mm3 Final    Lymphocytes, Absolute 09/11/2023 0.51 (L)  0.70 - 3.10 10*3/mm3 Final    Monocytes, Absolute 09/11/2023 0.42  0.10 - 0.90 10*3/mm3 Final    Eosinophils, Absolute 09/11/2023 0.20  0.00 - 0.40 10*3/mm3 Final    Basophils, Absolute 09/11/2023 0.05  0.00 - 0.20 10*3/mm3 Final    Immature Grans, Absolute 09/11/2023 0.01  0.00 - 0.05 10*3/mm3 Final    nRBC 09/11/2023 0.0  0.0 - 0.2 /100 WBC Final   Admission on 09/08/2023, Discharged on 09/08/2023   Component Date Value Ref Range Status    ADENOVIRUS, PCR 09/08/2023 Not Detected  Not Detected Final    Coronavirus 229E 09/08/2023 Not Detected  Not Detected Final    Coronavirus HKU1 09/08/2023 Not Detected  Not Detected Final    Coronavirus NL63 09/08/2023 Not Detected  Not Detected Final    Coronavirus OC43 09/08/2023 Not Detected  Not Detected Final    COVID19 09/08/2023 Not Detected  Not Detected - Ref. Range Final    Human Metapneumovirus 09/08/2023 Not Detected  Not Detected Final    Human Rhinovirus/Enterovirus 09/08/2023 Not Detected  Not Detected Final    Influenza A PCR 09/08/2023 Not Detected  Not Detected Final    Influenza B PCR 09/08/2023 Not Detected  Not Detected Final    Parainfluenza Virus 1 09/08/2023 Not Detected  Not Detected Final    Parainfluenza Virus 2 09/08/2023 Not Detected  Not Detected Final    Parainfluenza Virus 3 09/08/2023 Not Detected  Not Detected Final    Parainfluenza Virus 4 09/08/2023 Not Detected  Not Detected Final    RSV, PCR 09/08/2023 Not Detected  Not Detected Final    Bordetella pertussis pcr 09/08/2023 Not Detected  Not Detected Final    Bordetella parapertussis PCR 09/08/2023 Not Detected  Not Detected Final    Chlamydophila pneumoniae PCR 09/08/2023 Not Detected  Not Detected Final    Mycoplasma pneumo by PCR 09/08/2023 Not Detected  Not Detected Final    WBC 09/08/2023 4.23  3.40 - 10.80 10*3/mm3 Final    RBC 09/08/2023  3.16 (L)  3.77 - 5.28 10*6/mm3 Final    Hemoglobin 09/08/2023 9.6 (L)  12.0 - 15.9 g/dL Final    Hematocrit 09/08/2023 28.1 (L)  34.0 - 46.6 % Final    MCV 09/08/2023 88.9  79.0 - 97.0 fL Final    MCH 09/08/2023 30.4  26.6 - 33.0 pg Final    MCHC 09/08/2023 34.2  31.5 - 35.7 g/dL Final    RDW 09/08/2023 14.7  12.3 - 15.4 % Final    RDW-SD 09/08/2023 47.8  37.0 - 54.0 fl Final    MPV 09/08/2023 8.8  6.0 - 12.0 fL Final    Platelets 09/08/2023 301  140 - 450 10*3/mm3 Final    Neutrophil % 09/08/2023 84.0 (H)  42.7 - 76.0 % Final    Lymphocyte % 09/08/2023 9.7 (L)  19.6 - 45.3 % Final    Monocyte % 09/08/2023 4.7 (L)  5.0 - 12.0 % Final    Eosinophil % 09/08/2023 0.9  0.3 - 6.2 % Final    Basophil % 09/08/2023 0.5  0.0 - 1.5 % Final    Immature Grans % 09/08/2023 0.2  0.0 - 0.5 % Final    Neutrophils, Absolute 09/08/2023 3.55  1.70 - 7.00 10*3/mm3 Final    Lymphocytes, Absolute 09/08/2023 0.41 (L)  0.70 - 3.10 10*3/mm3 Final    Monocytes, Absolute 09/08/2023 0.20  0.10 - 0.90 10*3/mm3 Final    Eosinophils, Absolute 09/08/2023 0.04  0.00 - 0.40 10*3/mm3 Final    Basophils, Absolute 09/08/2023 0.02  0.00 - 0.20 10*3/mm3 Final    Immature Grans, Absolute 09/08/2023 0.01  0.00 - 0.05 10*3/mm3 Final    nRBC 09/08/2023 0.0  0.0 - 0.2 /100 WBC Final    Glucose 09/08/2023 102 (H)  65 - 99 mg/dL Final    BUN 09/08/2023 13  6 - 20 mg/dL Final    Creatinine 09/08/2023 0.62  0.57 - 1.00 mg/dL Final    Sodium 09/08/2023 136  136 - 145 mmol/L Final    Potassium 09/08/2023 4.1  3.5 - 5.2 mmol/L Final    Chloride 09/08/2023 99  98 - 107 mmol/L Final    CO2 09/08/2023 25.8  22.0 - 29.0 mmol/L Final    Calcium 09/08/2023 9.2  8.6 - 10.5 mg/dL Final    Total Protein 09/08/2023 7.2  6.0 - 8.5 g/dL Final    Albumin 09/08/2023 4.3  3.5 - 5.2 g/dL Final    ALT (SGPT) 09/08/2023 72 (H)  1 - 33 U/L Final    AST (SGOT) 09/08/2023 47 (H)  1 - 32 U/L Final    Alkaline Phosphatase 09/08/2023 62  39 - 117 U/L Final    Total Bilirubin 09/08/2023 0.4   0.0 - 1.2 mg/dL Final    Globulin 09/08/2023 2.9  gm/dL Final    A/G Ratio 09/08/2023 1.5  g/dL Final    BUN/Creatinine Ratio 09/08/2023 21.0  7.0 - 25.0 Final    Anion Gap 09/08/2023 11.2  5.0 - 15.0 mmol/L Final    eGFR 09/08/2023 112.8  >60.0 mL/min/1.73 Final    Procalcitonin 09/08/2023 0.09  0.00 - 0.25 ng/mL Final    Lactate 09/08/2023 0.8  0.5 - 2.0 mmol/L Final    Color, UA 09/08/2023 Yellow  Yellow, Straw Final    Appearance, UA 09/08/2023 Clear  Clear Final    pH, UA 09/08/2023 7.5  5.0 - 8.0 Final    Specific Gravity, UA 09/08/2023 1.008  1.005 - 1.030 Final    Glucose, UA 09/08/2023 Negative  Negative Final    Ketones, UA 09/08/2023 Negative  Negative Final    Bilirubin, UA 09/08/2023 Negative  Negative Final    Blood, UA 09/08/2023 Negative  Negative Final    Protein, UA 09/08/2023 Negative  Negative Final    Leuk Esterase, UA 09/08/2023 Negative  Negative Final    Nitrite, UA 09/08/2023 Negative  Negative Final    Urobilinogen, UA 09/08/2023 0.2 E.U./dL  0.2 - 1.0 E.U./dL Final    HCG, Urine QL 09/08/2023 Negative  Negative Final    Blood Culture 09/08/2023 No growth at 5 days   Final    Blood Culture 09/08/2023 No growth at 5 days   Final   Infusion on 09/05/2023   Component Date Value Ref Range Status    Glucose 09/05/2023 104 (H)  65 - 99 mg/dL Final    BUN 09/05/2023 16  6 - 20 mg/dL Final    Creatinine 09/05/2023 0.61  0.57 - 1.00 mg/dL Final    Sodium 09/05/2023 139  136 - 145 mmol/L Final    Potassium 09/05/2023 3.9  3.5 - 5.2 mmol/L Final    Chloride 09/05/2023 103  98 - 107 mmol/L Final    CO2 09/05/2023 25.9  22.0 - 29.0 mmol/L Final    Calcium 09/05/2023 9.7  8.6 - 10.5 mg/dL Final    Total Protein 09/05/2023 7.4  6.0 - 8.5 g/dL Final    Albumin 09/05/2023 4.3  3.5 - 5.2 g/dL Final    ALT (SGPT) 09/05/2023 48 (H)  1 - 33 U/L Final    AST (SGOT) 09/05/2023 34 (H)  1 - 32 U/L Final    Alkaline Phosphatase 09/05/2023 65  39 - 117 U/L Final    Total Bilirubin 09/05/2023 0.2  0.0 - 1.2 mg/dL  Final    Globulin 09/05/2023 3.1  gm/dL Final    A/G Ratio 09/05/2023 1.4  g/dL Final    BUN/Creatinine Ratio 09/05/2023 26.2 (H)  7.0 - 25.0 Final    Anion Gap 09/05/2023 10.1  5.0 - 15.0 mmol/L Final    eGFR 09/05/2023 113.2  >60.0 mL/min/1.73 Final    WBC 09/05/2023 6.75  3.40 - 10.80 10*3/mm3 Final    RBC 09/05/2023 3.25 (L)  3.77 - 5.28 10*6/mm3 Final    Hemoglobin 09/05/2023 9.9 (L)  12.0 - 15.9 g/dL Final    Hematocrit 09/05/2023 29.0 (L)  34.0 - 46.6 % Final    MCV 09/05/2023 89.2  79.0 - 97.0 fL Final    MCH 09/05/2023 30.5  26.6 - 33.0 pg Final    MCHC 09/05/2023 34.1  31.5 - 35.7 g/dL Final    RDW 09/05/2023 15.3  12.3 - 15.4 % Final    RDW-SD 09/05/2023 48.7  37.0 - 54.0 fl Final    MPV 09/05/2023 8.6  6.0 - 12.0 fL Final    Platelets 09/05/2023 352  140 - 450 10*3/mm3 Final    Neutrophil % 09/05/2023 76.0  42.7 - 76.0 % Final    Lymphocyte % 09/05/2023 10.5 (L)  19.6 - 45.3 % Final    Monocyte % 09/05/2023 12.0  5.0 - 12.0 % Final    Eosinophil % 09/05/2023 0.1 (L)  0.3 - 6.2 % Final    Basophil % 09/05/2023 1.0  0.0 - 1.5 % Final    Immature Grans % 09/05/2023 0.4  0.0 - 0.5 % Final    Neutrophils, Absolute 09/05/2023 5.12  1.70 - 7.00 10*3/mm3 Final    Lymphocytes, Absolute 09/05/2023 0.71  0.70 - 3.10 10*3/mm3 Final    Monocytes, Absolute 09/05/2023 0.81  0.10 - 0.90 10*3/mm3 Final    Eosinophils, Absolute 09/05/2023 0.01  0.00 - 0.40 10*3/mm3 Final    Basophils, Absolute 09/05/2023 0.07  0.00 - 0.20 10*3/mm3 Final    Immature Grans, Absolute 09/05/2023 0.03  0.00 - 0.05 10*3/mm3 Final    nRBC 09/05/2023 0.0  0.0 - 0.2 /100 WBC Final       CT Chest With Contrast Diagnostic    Result Date: 9/15/2023  Narrative: CT CHEST W CONTRAST DIAGNOSTIC Date of Exam: 9/15/2023 1:46 PM CDT Indication: Breast cancer, invasive, stage I/II/III, initial workup COUGH, fever, immunosuppression. Comparison: 6/23/2023 Technique: Axial CT images were obtained of the chest after the uneventful intravenous administration of  80 cc Isovue-300.  Reconstructed coronal and sagittal images were also obtained. Automated exposure control and iterative construction methods were used. Findings: MEDIASTINUM: There is a right subclavian cell venous port with catheter tip in the proximal SVC.. Aortic and heart size are normal. No mass nor pericardial effusion. CORONARY ARTERIES: Nocalcified atherosclerotic disease. LUNGS: There are vague faint areas of groundglass attenuation throughout the lungs potentially infectious or inflammatory. No dense consolidation. No nodule. PLEURAL SPACE: No effusion, mass, nor pneumothorax. LYMPH NODES: There are no pathologically enlarged lymph nodes. UPPER ABDOMEN: Unremarkable OSSEOUS STRUCTURES: Appropriate for age with no acute process identified. Persistent 2.3 x 1.5 cm left breast nodule. Correlate with history.     Impression: Impression: 1.Very faint scattered groundglass opacities in the lungs suggesting infectious/inflammatory process. No lobar pneumonia. 2.Unchanged left breast nodule Electronically Signed: Eladio Quick MD  9/15/2023 2:28 PM CDT  Workstation ID: [id number]    XR Chest 1 View    Result Date: 9/9/2023  Narrative: PROCEDURE: XR CHEST 1 VW-  INDICATION:  cough, fever, on chemotherapy  FINDINGS:  A portable view of the chest was obtained.  Comparison is made to a prior exam dated 8/23/2023.   There is no change in a right Port-A-Cath. The cardiac and mediastinal silhouettes are within normal limits.  The lungs are clear.  There is no pleural effusion or pneumothorax.      Impression: No acute process on this portable exam.   This report was signed and finalized on 9/9/2023 8:25 AM by Tanya Hernandez MD.      XR Chest 1 View    Result Date: 8/23/2023  Narrative: PROCEDURE: XR CHEST 1 VW-  HISTORY: Dysrhythmia Triage Protocol  COMPARISON: 6/20/2023  FINDINGS:  Portable view of the chest demonstrates the lungs to be grossly clear. There is no evidence of effusion, pneumothorax or other significant  pleural disease. The mediastinum is unremarkable.  The heart size is normal. Right subclavian Port-A-Cath is noted with tip in the innominate vein.      Impression: Port-A-Cath in position as above without pneumothorax    This report was signed and finalized on 8/23/2023 1:18 PM by Trae De MD.      Holter Monitor - 72 Hour Up To 15 Days    Result Date: 9/5/2023  Narrative:   1.8% PVCs.  Associated with symptoms.          Component  Ref Range & Units 3 d ago   Color, UA  Yellow, Straw Yellow   Appearance, UA  Clear Clear   pH, UA  5.0 - 8.0 7.5   Specific Gravity, UA  1.005 - 1.030 1.008   Glucose, UA  Negative Negative   Ketones, UA  Negative Negative   Bilirubin, UA  Negative Negative   Blood, UA  Negative Negative   Protein, UA  Negative Negative   Leuk Esterase, UA  Negative Negative   Nitrite, UA  Negative Negative   Urobilinogen, UA  0.2 - 1.0 E.U./dL 0.2 E.U./dL   Resulting Agency [facility]          Procedures    Assessment / Plan      Assessment/Plan:     1.  Malignant neoplasm of central portion of left breast in female, estrogen receptor positive   2.  Chemotherapy monitoring  3.  Fever  4.  Persistent dry cough  -Treatment held last week week for febrile illness. She has persistent fever and a worsening dry cough. CT obtained over the weekend reviewed by me and shows mild pulmonary infiltrates. DDX includes taxane pneumonitis, early, or atypical infection. Repeat blood and urine cultures from Friday were all normal. I am going to hold her treatment again today.   -Will start high dose prednisone for presumed taxane pneumonitis.  -Collect urine histo  -Cover for PCP with bactrim (will also act as prophylaxis against PCP given plan for high dose steroids) Follow up in 38-72 hours to ensure she is improving. If fevers persist, will have her see [email or web address]  Orders Placed This Encounter   Procedures    Histoplasma Ag Ur - Urine, Urine, Clean Catch        Follow Up:   48-72 hours    Martha Olguin,  MD  Hematology and Oncology     I have spent a total of 40 min on reviewing test results/preparing to see patient, counseling patient, performing medically appropriate exam, placing orders, coordinating care and documenting clinical information in the electronic or other health record

## 2023-09-19 LAB
BACTERIA SPEC AEROBE CULT: NORMAL
BACTERIA SPEC AEROBE CULT: NORMAL

## 2023-09-20 ENCOUNTER — OFFICE VISIT (OUTPATIENT)
Dept: CARDIOLOGY | Facility: CLINIC | Age: 43
End: 2023-09-20
Payer: COMMERCIAL

## 2023-09-20 ENCOUNTER — TELEMEDICINE (OUTPATIENT)
Dept: ONCOLOGY | Facility: CLINIC | Age: 43
End: 2023-09-20
Payer: COMMERCIAL

## 2023-09-20 VITALS
RESPIRATION RATE: 20 BRPM | WEIGHT: 152 LBS | DIASTOLIC BLOOD PRESSURE: 66 MMHG | OXYGEN SATURATION: 99 % | HEART RATE: 74 BPM | SYSTOLIC BLOOD PRESSURE: 110 MMHG | HEIGHT: 67 IN | BODY MASS INDEX: 23.86 KG/M2

## 2023-09-20 DIAGNOSIS — Z17.0 MALIGNANT NEOPLASM OF UPPER-OUTER QUADRANT OF LEFT BREAST IN FEMALE, ESTROGEN RECEPTOR POSITIVE: ICD-10-CM

## 2023-09-20 DIAGNOSIS — D84.9 IMMUNOSUPPRESSED STATUS: ICD-10-CM

## 2023-09-20 DIAGNOSIS — C50.112 MALIGNANT NEOPLASM OF CENTRAL PORTION OF LEFT BREAST IN FEMALE, ESTROGEN RECEPTOR POSITIVE: Primary | ICD-10-CM

## 2023-09-20 DIAGNOSIS — Z17.0 MALIGNANT NEOPLASM OF CENTRAL PORTION OF LEFT BREAST IN FEMALE, ESTROGEN RECEPTOR POSITIVE: Primary | ICD-10-CM

## 2023-09-20 DIAGNOSIS — J18.9 PNEUMONITIS: ICD-10-CM

## 2023-09-20 DIAGNOSIS — R50.81 FEVER IN OTHER DISEASES: ICD-10-CM

## 2023-09-20 DIAGNOSIS — R06.02 SHORTNESS OF BREATH: ICD-10-CM

## 2023-09-20 DIAGNOSIS — R00.2 PALPITATIONS: Primary | ICD-10-CM

## 2023-09-20 DIAGNOSIS — C50.412 MALIGNANT NEOPLASM OF UPPER-OUTER QUADRANT OF LEFT BREAST IN FEMALE, ESTROGEN RECEPTOR POSITIVE: ICD-10-CM

## 2023-09-20 NOTE — PROGRESS NOTES
[facility]  Office number [phone]    Fax number [phone]     Follow-up     Date: 23      Patient Name: Kimberley Win  MRN: [id number]  : 1980    Referring Physician: Dr. Daxa Anderson MD    Chief Complaint: short inverval follow up taxol side effect. Pt provides verbal consent to receive care via telehealth    Cancer Staging:  Cancer Staging   Stage IIA (cT2, cN1, cM0, G2, ER+, DE+, HER2-)    History of Present Illness: Kimberley Win is a pleasant 43 y.o. female who presented for evaluation of left breast cancer.     She notes a history of cystic breast disease for many years. She notes a new breast mass that became progressively harder and associated with pain prompting diagnostic workup.     Diagnostic mammogram 23 showed a dense, spiculated mass in the left breast which on ultrasound corresponded to a 3:00 3 cm hypoechoic mass with internal vascularity. In the 7:00 left breast there was a 7 mm mass corresponding on US to a cluster of cysts spanning 1.4 cm. In the left breast there was a 1.7 cm LN with multiple smaller LN.     Left breast biopsy showed grade 2 invasive ductal carcinoma (ER 90%, DE 10%, HER 2 negative 0+ by IHC). Lymph node FNA was postive for malignancy, metastatic ductal carcinoma.    Breast cancer risk profile:  Age of menarche:10  G0; infertility  Age of menopause: premenopausal   Family history of breast, ovarian, prostate or pancreatic cancer: mom stage IV breast cancer in her 50s. M Great Aunt, breast cancer.   Genetic testing: negative 36 gene CancerNext panel     Treatment history:  Dose dense AC followed by weekly taxol: C1, 2023    Interval history:  Kimberley is here for short interval follow up for side effect of therapy.  Started Bactrim Monday night and prednisone yesterday morning.   Last fever yesterday at 9:00 .3 just prior to prednisone dose. No fevers since she started the prednisone.   Cough and  RO are about the same to mildly improved.  Hot flashes are back. Overall she feels she is turning a corner.     Past Medical History:   Past Medical History:   Diagnosis Date    Arthritis     Breast cancer 2023    Left Breast    Malignant neoplasm of central portion of left breast in female, estrogen receptor positive 2023    Osteoarthritis     Varicella When I was a child   No personal history of myocardial infarction, cerebrovascular event, or venous thromboembolism.  Osteoarthritis in her hands. No personal history of autoimmune disease. Fhx of RA in her aunt    Past Surgical History:   Past Surgical History:   Procedure Laterality Date    US GUIDED LYMPH NODE BIOPSY  2023       Family History:   Family History   Problem Relation Age of Onset    Breast cancer Mother 55        Stage 4    COPD Mother     Diabetes Mother     Early death Mother     Heart disease Mother     Stroke Mother     Rheum arthritis Maternal Aunt     Breast cancer Paternal Aunt     COPD Maternal Grandfather     Breast cancer Other        Social History:   Social History     Socioeconomic History    Marital status:    Tobacco Use    Smoking status: Former     Packs/day: 0.25     Years: 10.00     Pack years: 2.50     Types: Cigarettes     Start date: 1995     Quit date: 2005     Years since quittin.6     Passive exposure: Past    Smokeless tobacco: Never   Vaping Use    Vaping Use: Never used   Substance and Sexual Activity    Alcohol use: Yes     Comment: rare    Drug use: Never    Sexual activity: Yes     Partners: Male     Birth control/protection: Condom   , lives in [place].     Medications:     Current Outpatient Medications:     ibuprofen (ADVIL,MOTRIN) 200 MG tablet, Take 1 tablet by mouth Every 6 (Six) Hours As Needed for Mild Pain., Disp: , Rfl:     lidocaine-prilocaine (EMLA) 2.5-2.5 % cream, Apply 1 application topically to the appropriate area as directed As Needed (45-60  minutes prior to port access.  Cover with saran/plastic wrap.)., Disp: 30 g, Rfl: 3    loratadine (CLARITIN) 10 MG tablet, Take 1 tablet by mouth Daily., Disp: , Rfl:     metoprolol tartrate (LOPRESSOR) 25 MG tablet, TAKE ONE-HALF TABLET BY MOUTH EVERY MORNING AND ONE-HALF TABLET EVERY EVENING, Disp: 90 tablet, Rfl: 0    omeprazole (priLOSEC) 40 MG capsule, Take 1 capsule by mouth Daily. 30 min prior to evening., Disp: 30 capsule, Rfl: 5    ondansetron (ZOFRAN) 8 MG tablet, Take 1 tablet by mouth 3 (Three) Times a Day As Needed for Nausea or Vomiting (DO NOT USE WHILE SANCUSO IN PLACE)., Disp: 30 tablet, Rfl: 5    predniSONE (DELTASONE) 20 MG tablet, Take 3 tablets by mouth Daily for 5 days, THEN 2 tablets Daily for 5 days, THEN 1 tablet Daily for 5 days. Continue until seen, Disp: 30 tablet, Rfl: 0    prochlorperazine (COMPAZINE) 5 MG tablet, Take 1-2 tablets by mouth Every 6 (Six) Hours As Needed for Nausea (USE 2nd; ok to use on evening of chemo)., Disp: 30 tablet, Rfl: 1    sulfamethoxazole-trimethoprim (BACTRIM DS,SEPTRA DS) 800-160 MG per tablet, Take 1 tablet by mouth 2 (Two) Times a Day., Disp: 28 tablet, Rfl: 0    Allergies:   Allergies   Allergen Reactions    Sunblock [Solbar Pf Spf15] Rash       Objective     Vital Signs:   There were no vitals filed for this visit.     There is no height or weight on file to calculate BMI.   There were no vitals filed for this visit.        ECOG Performance Status: 0    Physical Exam:   General: No acute distress.  HEENT: Normocephalic, atraumatic. Sclera anicteric.    Neck: no visible adenopathy  Respiratory: Normal rate and effort  Dry cough  Neuro: Alert and oriented x 3. No focal deficits.       Laboratory/Imaging Reviewed:   Assessment / Plan      Assessment/Plan:     1.  Malignant neoplasm of central portion of left breast in female, estrogen receptor positive   2.  Chemotherapy monitoring  3.  Bilateral lung infiltrates concerning for early taxane pneumonitis vs  atypical infection.  4.  Persistent fever  -Treatment remains on hold pending resolution of acute symptoms.  Rapid improvement in her symptoms with prednisone together with bilateral lung infiltrates is worrisome for early taxane pneumonitis.  Also on the differential is atypical infection.  I am covering her for potential PCP pneumonia which is a rare complication of breast cancer chemotherapy.  Urine histo antigen is pending.  I am going to refer her to pulmonary for evaluation and treatment recommendations.  If they agree this is taxane pneumonitis we will plan steroid taper as tolerated and avoid further taxane.  Would be interested in whether they feel she should proceed with a bronchoscopy to rule out atypical infection.I personally reviewed her CT and compared to prior.  Breast mass looks stable to slightly improved.   -Will update her surgeon.  Her last chemotherapy was 9/5/2023, so we will potentially need to look for a surgery date after her high-dose steroids complete. Can consider tamoxifen as a bridge if there will be an unacceptable treatment delay, but she is planning bilateral mastectomy with reconstruction, so we will need to hold this for a few weeks prior to surgery.    Follow Up:   On Monday unless clinical worsening.  Orders Placed This Encounter   Procedures    Ambulatory Referral to Pulmonology     Martha Olguin MD  [facility]

## 2023-09-22 ENCOUNTER — OFFICE VISIT (OUTPATIENT)
Dept: PULMONOLOGY | Facility: CLINIC | Age: 43
End: 2023-09-22
Payer: COMMERCIAL

## 2023-09-22 ENCOUNTER — PATIENT ROUNDING (BHMG ONLY) (OUTPATIENT)
Dept: CARDIOLOGY | Facility: CLINIC | Age: 43
End: 2023-09-22
Payer: COMMERCIAL

## 2023-09-22 ENCOUNTER — LAB (OUTPATIENT)
Dept: LAB | Facility: HOSPITAL | Age: 43
End: 2023-09-22
Payer: COMMERCIAL

## 2023-09-22 VITALS
RESPIRATION RATE: 18 BRPM | DIASTOLIC BLOOD PRESSURE: 68 MMHG | WEIGHT: 152.6 LBS | HEART RATE: 74 BPM | HEIGHT: 67 IN | SYSTOLIC BLOOD PRESSURE: 122 MMHG | OXYGEN SATURATION: 99 % | BODY MASS INDEX: 23.95 KG/M2

## 2023-09-22 DIAGNOSIS — T50.905A DRUG-INDUCED PNEUMONITIS: ICD-10-CM

## 2023-09-22 DIAGNOSIS — R93.89 ABNORMAL CT OF THE CHEST: Primary | ICD-10-CM

## 2023-09-22 DIAGNOSIS — R93.89 ABNORMAL CT OF THE CHEST: ICD-10-CM

## 2023-09-22 DIAGNOSIS — J98.4 DRUG-INDUCED PNEUMONITIS: ICD-10-CM

## 2023-09-22 DIAGNOSIS — J18.9 DRUG-INDUCED PNEUMONITIS: ICD-10-CM

## 2023-09-22 LAB
ALBUMIN SERPL-MCNC: 4.4 G/DL (ref 3.5–5.2)
ALBUMIN/GLOB SERPL: 1.5 G/DL
ALP SERPL-CCNC: 84 U/L (ref 39–117)
ALT SERPL W P-5'-P-CCNC: 133 U/L (ref 1–33)
ANION GAP SERPL CALCULATED.3IONS-SCNC: 11 MMOL/L (ref 5–15)
AST SERPL-CCNC: 62 U/L (ref 1–32)
BASOPHILS # BLD AUTO: 0.08 10*3/MM3 (ref 0–0.2)
BASOPHILS NFR BLD AUTO: 0.8 % (ref 0–1.5)
BILIRUB SERPL-MCNC: 0.2 MG/DL (ref 0–1.2)
BUN SERPL-MCNC: 19 MG/DL (ref 6–20)
BUN/CREAT SERPL: 24.4 (ref 7–25)
CALCIUM SPEC-SCNC: 9.7 MG/DL (ref 8.6–10.5)
CHLORIDE SERPL-SCNC: 103 MMOL/L (ref 98–107)
CO2 SERPL-SCNC: 25 MMOL/L (ref 22–29)
CREAT SERPL-MCNC: 0.78 MG/DL (ref 0.57–1)
CRP SERPL-MCNC: 0.79 MG/DL (ref 0–0.5)
DEPRECATED RDW RBC AUTO: 49.7 FL (ref 37–54)
EGFRCR SERPLBLD CKD-EPI 2021: 96.2 ML/MIN/1.73
EOSINOPHIL # BLD AUTO: 0.03 10*3/MM3 (ref 0–0.4)
EOSINOPHIL NFR BLD AUTO: 0.3 % (ref 0.3–6.2)
ERYTHROCYTE [DISTWIDTH] IN BLOOD BY AUTOMATED COUNT: 15 % (ref 12.3–15.4)
ERYTHROCYTE [SEDIMENTATION RATE] IN BLOOD: 17 MM/HR (ref 0–20)
GLOBULIN UR ELPH-MCNC: 2.9 GM/DL
GLUCOSE SERPL-MCNC: 99 MG/DL (ref 65–99)
HCT VFR BLD AUTO: 31.1 % (ref 34–46.6)
HGB BLD-MCNC: 10.1 G/DL (ref 12–15.9)
IMM GRANULOCYTES # BLD AUTO: 0.04 10*3/MM3 (ref 0–0.05)
IMM GRANULOCYTES NFR BLD AUTO: 0.4 % (ref 0–0.5)
LYMPHOCYTES # BLD AUTO: 1.61 10*3/MM3 (ref 0.7–3.1)
LYMPHOCYTES NFR BLD AUTO: 16.8 % (ref 19.6–45.3)
MCH RBC QN AUTO: 29.6 PG (ref 26.6–33)
MCHC RBC AUTO-ENTMCNC: 32.5 G/DL (ref 31.5–35.7)
MCV RBC AUTO: 91.2 FL (ref 79–97)
MONOCYTES # BLD AUTO: 1.06 10*3/MM3 (ref 0.1–0.9)
MONOCYTES NFR BLD AUTO: 11.1 % (ref 5–12)
NEUTROPHILS NFR BLD AUTO: 6.75 10*3/MM3 (ref 1.7–7)
NEUTROPHILS NFR BLD AUTO: 70.6 % (ref 42.7–76)
NRBC BLD AUTO-RTO: 0 /100 WBC (ref 0–0.2)
PLATELET # BLD AUTO: 359 10*3/MM3 (ref 140–450)
PMV BLD AUTO: 8.8 FL (ref 6–12)
POTASSIUM SERPL-SCNC: 3.9 MMOL/L (ref 3.5–5.2)
PROT SERPL-MCNC: 7.3 G/DL (ref 6–8.5)
RBC # BLD AUTO: 3.41 10*6/MM3 (ref 3.77–5.28)
SODIUM SERPL-SCNC: 139 MMOL/L (ref 136–145)
WBC NRBC COR # BLD: 9.57 10*3/MM3 (ref 3.4–10.8)

## 2023-09-22 PROCEDURE — 85652 RBC SED RATE AUTOMATED: CPT

## 2023-09-22 PROCEDURE — 85025 COMPLETE CBC W/AUTO DIFF WBC: CPT

## 2023-09-22 PROCEDURE — 36415 COLL VENOUS BLD VENIPUNCTURE: CPT

## 2023-09-22 PROCEDURE — 95012 NITRIC OXIDE EXP GAS DETER: CPT | Performed by: INTERNAL MEDICINE

## 2023-09-22 PROCEDURE — 80053 COMPREHEN METABOLIC PANEL: CPT

## 2023-09-22 PROCEDURE — 86140 C-REACTIVE PROTEIN: CPT

## 2023-09-22 NOTE — PROGRESS NOTES
CONSULT NOTE    Requested by:   Dorinda Covington DO      Chief Complaint   Patient presents with    Breathing Problem    Consult       Subjective:  Kimberley Win is a 43 y.o. female.   Patient comes in today for consultation because of abnormal CT of the chest and cough.    The patient was diagnosed with breast cancer and has been undergoing chemotherapy.  Her chemotherapy was recently switched and she was started on Taxol and she received 1 cycle of Taxol on 8/28/2023 and started having some cough but did not pay attention to it.  She also has had some issues with previous chemotherapy with regards to palpitations and felt that she could not differentiate the sensation between the 2.    However, when she had the second round of Taxol on 9/5/2023, she started having increasing cough, body aches and chills.  She went to the ER and later on was seen by her oncologist.    She was started on antibiotics and also prednisone.    She actually notices improvement in her overall symptoms with decreased cough and shortness of breath.  She also feels that the palpitations are much better as well.    Although she did have a history of smoking, she quit smoking almost 20 years ago.      The following portions of the patient's history were reviewed and updated as appropriate: allergies, current medications, past family history, past medical history, past social history, and past surgical history.    Review of Systems   HENT:  Negative for sinus pressure, sneezing and sore throat.    Respiratory:  Positive for cough and shortness of breath (some). Negative for chest tightness and wheezing.    Cardiovascular:  Positive for palpitations. Negative for leg swelling.   Psychiatric/Behavioral:  Positive for sleep disturbance.    All other systems reviewed and are negative.    Past Medical History:   Diagnosis Date    Arthritis     Breast cancer 06/01/2023    Left Breast    Malignant neoplasm of central portion of left  "breast in female, estrogen receptor positive 2023    Osteoarthritis     Varicella When I was a child       Social History     Tobacco Use    Smoking status: Former     Packs/day: 0.25     Years: 10.00     Pack years: 2.50     Types: Cigarettes     Start date: 1995     Quit date: 2005     Years since quittin.6     Passive exposure: Past    Smokeless tobacco: Never   Substance Use Topics    Alcohol use: Yes     Comment: rare         Objective:  Visit Vitals  /68   Pulse 74   Resp 18   Ht 170.2 cm (67\") Comment: pt reported   Wt 69.2 kg (152 lb 9.6 oz)   SpO2 99%   BMI 23.90 kg/m²       Physical Exam  Vitals reviewed.   Constitutional:       Appearance: She is well-developed.   HENT:      Head:      Comments: No acute lesions noted     Mouth/Throat:      Mouth: Mucous membranes are moist.   Neck:      Thyroid: No thyromegaly.      Vascular: No JVD.   Cardiovascular:      Rate and Rhythm: Normal rate and regular rhythm.      Heart sounds: No murmur heard.  Pulmonary:      Effort: Pulmonary effort is normal. No respiratory distress.      Breath sounds: No wheezing or rales.   Musculoskeletal:      Cervical back: Neck supple.      Comments: Gait was normal.   Skin:     General: Skin is warm and dry.   Neurological:      Mental Status: She is alert and oriented to person, place, and time.   Psychiatric:         Behavior: Behavior normal.         Assessment/Plan:  Diagnoses and all orders for this visit:    1. Abnormal CT of the chest (Primary)  -     Nitric Oxide  -     C-reactive Protein; Future  -     Sedimentation Rate; Future    2. Drug-induced pneumonitis  -     Nitric Oxide  -     C-reactive Protein; Future  -     Sedimentation Rate; Future        Return if symptoms worsen or fail to improve.    DISCUSSION(if any):  I reviewed the patient's ER summary, dated 2023, that mentioned fever with a prescription for levofloxacin.    I have personally reviewed images of the last chest x-ray " and available CT.  Last chest x-ray was reviewed personally and the results were shared with the patient.  Images reviewed personally.   Results for orders placed during the hospital encounter of 09/08/23    XR Chest 1 View    Narrative  PROCEDURE: XR CHEST 1 VW-    INDICATION:  cough, fever, on chemotherapy    FINDINGS:  A portable view of the chest was obtained.  Comparison is  made to a prior exam dated 8/23/2023.   There is no change in a right  Port-A-Cath. The cardiac and mediastinal silhouettes are within normal  limits.  The lungs are clear.  There is no pleural effusion or  pneumothorax.    Impression  No acute process on this portable exam.      This report was signed and finalized on 9/9/2023 8:25 AM by Tanya Hernandez MD.        Last CT scan results was reviewed in great detail with the patient.  Results for orders placed during the hospital encounter of 09/15/23    CT Chest With Contrast Diagnostic    Narrative  CT CHEST W CONTRAST DIAGNOSTIC    Date of Exam: 9/15/2023 1:46 PM CDT    Indication: Breast cancer, invasive, stage I/II/III, initial workup  COUGH, fever, immunosuppression.    Comparison: 6/23/2023    Technique: Axial CT images were obtained of the chest after the uneventful intravenous administration of 80 cc Isovue-300.  Reconstructed coronal and sagittal images were also obtained. Automated exposure control and iterative construction methods were  used.      Findings:  MEDIASTINUM: There is a right subclavian cell venous port with catheter tip in the proximal SVC.. Aortic and heart size are normal. No mass nor pericardial effusion.  CORONARY ARTERIES: Nocalcified atherosclerotic disease.  LUNGS: There are vague faint areas of groundglass attenuation throughout the lungs potentially infectious or inflammatory. No dense consolidation. No nodule.  PLEURAL SPACE: No effusion, mass, nor pneumothorax.  LYMPH NODES: There are no pathologically enlarged lymph nodes.    UPPER ABDOMEN:  Unremarkable    OSSEOUS STRUCTURES: Appropriate for age with no acute process identified.    Persistent 2.3 x 1.5 cm left breast nodule. Correlate with history.    Impression  Impression:  1.Very faint scattered groundglass opacities in the lungs suggesting infectious/inflammatory process. No lobar pneumonia.  2.Unchanged left breast nodule      Electronically Signed: Eladio Quick MD  9/15/2023 2:28 PM CDT  Workstation ID: [id number]      I also reviewed her last echocardiogram and shared the results with her.   Results for orders placed during the hospital encounter of 06/29/23    Adult Transthoracic Echo Complete W/ Cont if Necessary Per Protocol    Interpretation Summary  1.  Normal left ventricular size and systolic function, LVEF 55-60%.  2.  Normal LV diastolic filling pattern.  3.  Normal right ventricular size and systolic function.  4.  Normal left and right atrial size.  5.  Trace MR, TR, and PI.  6.  Normal LV global longitudinal strain pattern.      Laboratory workup also showed   Lab Results   Component Value Date    HGB 9.5 (L) 09/14/2023    HGB 9.6 (L) 09/11/2023    HGB 9.6 (L) 09/08/2023   ,   Lab Results   Component Value Date    HCT 29.1 (L) 09/14/2023    HCT 28.2 (L) 09/11/2023    HCT 28.1 (L) 09/08/2023       Lab Results   Component Value Date    EOSABS 0.31 09/14/2023    EOSABS 0.20 09/11/2023    EOSABS 0.04 09/08/2023    & Laboratory workup also showed   Lab Results   Component Value Date    CO2 25.0 09/14/2023   ,   Lab Results   Component Value Date    TSH 2.110 08/23/2023     ===========================  ===========================    FeNO level was 15 today.    Peak flow was 400.    I told the patient and her  that the CT findings are likely from drug-induced pneumonitis.    Although unlikely to be elevated at this time, I will order ESR and CRP.    In her current state, normal levels may not be helpful but if the levels are slightly elevated, we can follow these over the next few  weeks, especially if she is not better symptomatically, to assess response.    Although I agree with steroid taper, I would suggest adding 1 more week of tapered dose of steroid, 10 mg for 5 days, before finally discontinuing it.    As far as bronchoscopy is concerned, since she is clinically better, and the fact that the yield is extremely low, I do not feel that there is localized enough lesion to biopsy.    Unfortunately the yield of bronchoscopy is poor and since she is already on steroids, it is unlikely to add any further clinical information at this time.    If however, her symptoms worsen or rebound after steroids, then we can consider at least a bronchial lavage to rule out opportunistic infections.    Advised the patient to continue close follow-up with Dr. Olguin.    She was asked to call this office if her clinical status worsens or her symptoms recur.      Dictated utilizing Dragon dictation.    This document was electronically signed by Carlos Cornejo MD on 09/22/23 at 11:31 EDT

## 2023-09-22 NOTE — PROGRESS NOTES
[facility] welcome email and rounding message has been sent to patient via [employer].

## 2023-09-22 NOTE — LETTER
September 22, 2023     Martha Olguin MD  [address] [facility]  [address]  [facility] [address]    Patient: Kimberley Win   YOB: 1980   Date of Visit: 9/22/2023     Dear Dr. Sharif MD:    Thank you for referring Kimberley Win to me for evaluation. Below are the relevant portions of my assessment and plan of care.    If you have questions, please do not hesitate to call me. I look forward to following Kimberley along with you.         Sincerely,        Carlos Cornejo MD        CC: No Recipients      Progress Notes:    CONSULT NOTE    Requested by:   Dorinda Covington DO      Chief Complaint   Patient presents with   • Breathing Problem   • Consult       Subjective:  Kimberley Win is a 43 y.o. female.   Patient comes in today for consultation because of abnormal CT of the chest and cough.    The patient was diagnosed with breast cancer and has been undergoing chemotherapy.  Her chemotherapy was recently switched and she was started on Taxol and she received 1 cycle of Taxol on 8/28/2023 and started having some cough but did not pay attention to it.  She also has had some issues with previous chemotherapy with regards to palpitations and felt that she could not differentiate the sensation between the 2.    However, when she had the second round of Taxol on 9/5/2023, she started having increasing cough, body aches and chills.  She went to the ER and later on was seen by her oncologist.    She was started on antibiotics and also prednisone.    She actually notices improvement in her overall symptoms with decreased cough and shortness of breath.  She also feels that the palpitations are much better as well.    Although she did have a history of smoking, she quit smoking almost 20 years ago.      The following portions of the patient's history were reviewed and updated as appropriate: allergies, current medications, past family history, past medical history, past social history, and  "past surgical history.    Review of Systems   HENT:  Negative for sinus pressure, sneezing and sore throat.    Respiratory:  Positive for cough and shortness of breath (some). Negative for chest tightness and wheezing.    Cardiovascular:  Positive for palpitations. Negative for leg swelling.   Psychiatric/Behavioral:  Positive for sleep disturbance.    All other systems reviewed and are negative.    Past Medical History:   Diagnosis Date   • Arthritis    • Breast cancer 2023    Left Breast   • Malignant neoplasm of central portion of left breast in female, estrogen receptor positive 2023   • Osteoarthritis    • Varicella When I was a child       Social History     Tobacco Use   • Smoking status: Former     Packs/day: 0.25     Years: 10.00     Pack years: 2.50     Types: Cigarettes     Start date: 1995     Quit date: 2005     Years since quittin.6     Passive exposure: Past   • Smokeless tobacco: Never   Substance Use Topics   • Alcohol use: Yes     Comment: rare         Objective:  Visit Vitals  /68   Pulse 74   Resp 18   Ht 170.2 cm (67\") Comment: pt reported   Wt 69.2 kg (152 lb 9.6 oz)   SpO2 99%   BMI 23.90 kg/m²       Physical Exam  Vitals reviewed.   Constitutional:       Appearance: She is well-developed.   HENT:      Head:      Comments: No acute lesions noted     Mouth/Throat:      Mouth: Mucous membranes are moist.   Neck:      Thyroid: No thyromegaly.      Vascular: No JVD.   Cardiovascular:      Rate and Rhythm: Normal rate and regular rhythm.      Heart sounds: No murmur heard.  Pulmonary:      Effort: Pulmonary effort is normal. No respiratory distress.      Breath sounds: No wheezing or rales.   Musculoskeletal:      Cervical back: Neck supple.      Comments: Gait was normal.   Skin:     General: Skin is warm and dry.   Neurological:      Mental Status: She is alert and oriented to person, place, and time.   Psychiatric:         Behavior: Behavior normal. "         Assessment/Plan:  Diagnoses and all orders for this visit:    1. Abnormal CT of the chest (Primary)  -     Nitric Oxide  -     C-reactive Protein; Future  -     Sedimentation Rate; Future    2. Drug-induced pneumonitis  -     Nitric Oxide  -     C-reactive Protein; Future  -     Sedimentation Rate; Future        Return if symptoms worsen or fail to improve.    DISCUSSION(if any):  I reviewed the patient's ER summary, dated 9/8/2023, that mentioned fever with a prescription for levofloxacin.    I have personally reviewed images of the last chest x-ray and available CT.  Last chest x-ray was reviewed personally and the results were shared with the patient.  Images reviewed personally.   Results for orders placed during the hospital encounter of 09/08/23    XR Chest 1 View    Narrative  PROCEDURE: XR CHEST 1 VW-    INDICATION:  cough, fever, on chemotherapy    FINDINGS:  A portable view of the chest was obtained.  Comparison is  made to a prior exam dated 8/23/2023.   There is no change in a right  Port-A-Cath. The cardiac and mediastinal silhouettes are within normal  limits.  The lungs are clear.  There is no pleural effusion or  pneumothorax.    Impression  No acute process on this portable exam.      This report was signed and finalized on 9/9/2023 8:25 AM by Tanya Hernandez MD.        Last CT scan results was reviewed in great detail with the patient.  Results for orders placed during the hospital encounter of 09/15/23    CT Chest With Contrast Diagnostic    Narrative  CT CHEST W CONTRAST DIAGNOSTIC    Date of Exam: 9/15/2023 1:46 PM CDT    Indication: Breast cancer, invasive, stage I/II/III, initial workup  COUGH, fever, immunosuppression.    Comparison: 6/23/2023    Technique: Axial CT images were obtained of the chest after the uneventful intravenous administration of 80 cc Isovue-300.  Reconstructed coronal and sagittal images were also obtained. Automated exposure control and iterative construction  methods were  used.      Findings:  MEDIASTINUM: There is a right subclavian cell venous port with catheter tip in the proximal SVC.. Aortic and heart size are normal. No mass nor pericardial effusion.  CORONARY ARTERIES: Nocalcified atherosclerotic disease.  LUNGS: There are vague faint areas of groundglass attenuation throughout the lungs potentially infectious or inflammatory. No dense consolidation. No nodule.  PLEURAL SPACE: No effusion, mass, nor pneumothorax.  LYMPH NODES: There are no pathologically enlarged lymph nodes.    UPPER ABDOMEN: Unremarkable    OSSEOUS STRUCTURES: Appropriate for age with no acute process identified.    Persistent 2.3 x 1.5 cm left breast nodule. Correlate with history.    Impression  Impression:  1.Very faint scattered groundglass opacities in the lungs suggesting infectious/inflammatory process. No lobar pneumonia.  2.Unchanged left breast nodule      Electronically Signed: Eladio Quick MD  9/15/2023 2:28 PM CDT  Workstation ID: [id number]      I also reviewed her last echocardiogram and shared the results with her.   Results for orders placed during the hospital encounter of 06/29/23    Adult Transthoracic Echo Complete W/ Cont if Necessary Per Protocol    Interpretation Summary  1.  Normal left ventricular size and systolic function, LVEF 55-60%.  2.  Normal LV diastolic filling pattern.  3.  Normal right ventricular size and systolic function.  4.  Normal left and right atrial size.  5.  Trace MR, TR, and PI.  6.  Normal LV global longitudinal strain pattern.      Laboratory workup also showed   Lab Results   Component Value Date    HGB 9.5 (L) 09/14/2023    HGB 9.6 (L) 09/11/2023    HGB 9.6 (L) 09/08/2023   ,   Lab Results   Component Value Date    HCT 29.1 (L) 09/14/2023    HCT 28.2 (L) 09/11/2023    HCT 28.1 (L) 09/08/2023       Lab Results   Component Value Date    EOSABS 0.31 09/14/2023    EOSABS 0.20 09/11/2023    EOSABS 0.04 09/08/2023    & Laboratory workup also showed    Lab Results   Component Value Date    CO2 25.0 09/14/2023   ,   Lab Results   Component Value Date    TSH 2.110 08/23/2023     ===========================  ===========================    FeNO level was 15 today.    Peak flow was 400.    I told the patient and her  that the CT findings are likely from drug-induced pneumonitis.    Although unlikely to be elevated at this time, I will order ESR and CRP.    In her current state, normal levels may not be helpful but if the levels are slightly elevated, we can follow these over the next few weeks, especially if she is not better symptomatically, to assess response.    Although I agree with steroid taper, I would suggest adding 1 more week of tapered dose of steroid, 10 mg for 5 days, before finally discontinuing it.    As far as bronchoscopy is concerned, since she is clinically better, and the fact that the yield is extremely low, I do not feel that there is localized enough lesion to biopsy.    Unfortunately the yield of bronchoscopy is poor and since she is already on steroids, it is unlikely to add any further clinical information at this time.    If however, her symptoms worsen or rebound after steroids, then we can consider at least a bronchial lavage to rule out opportunistic infections.    Advised the patient to continue close follow-up with Dr. Olguin.    She was asked to call this office if her clinical status worsens or her symptoms recur.      Dictated utilizing Dragon dictation.    This document was electronically signed by Carlos Cornejo MD on 09/22/23 at 11:31 EDT

## 2023-09-25 ENCOUNTER — INFUSION (OUTPATIENT)
Dept: ONCOLOGY | Facility: HOSPITAL | Age: 43
End: 2023-09-25

## 2023-09-25 ENCOUNTER — TELEMEDICINE (OUTPATIENT)
Dept: ONCOLOGY | Facility: CLINIC | Age: 43
End: 2023-09-25

## 2023-09-25 VITALS
OXYGEN SATURATION: 97 % | SYSTOLIC BLOOD PRESSURE: 124 MMHG | RESPIRATION RATE: 20 BRPM | TEMPERATURE: 99 F | HEART RATE: 98 BPM | DIASTOLIC BLOOD PRESSURE: 67 MMHG

## 2023-09-25 VITALS — WEIGHT: 153 LBS | BODY MASS INDEX: 23.96 KG/M2

## 2023-09-25 DIAGNOSIS — C50.112 MALIGNANT NEOPLASM OF CENTRAL PORTION OF LEFT BREAST IN FEMALE, ESTROGEN RECEPTOR POSITIVE: Primary | ICD-10-CM

## 2023-09-25 DIAGNOSIS — Z17.0 MALIGNANT NEOPLASM OF CENTRAL PORTION OF LEFT BREAST IN FEMALE, ESTROGEN RECEPTOR POSITIVE: Primary | ICD-10-CM

## 2023-09-25 LAB
ALBUMIN SERPL-MCNC: 4.4 G/DL (ref 3.5–5.2)
ALBUMIN/GLOB SERPL: 1.3 G/DL
ALP SERPL-CCNC: 83 U/L (ref 39–117)
ALT SERPL W P-5'-P-CCNC: 77 U/L (ref 1–33)
ANION GAP SERPL CALCULATED.3IONS-SCNC: 13.1 MMOL/L (ref 5–15)
AST SERPL-CCNC: 23 U/L (ref 1–32)
BASOPHILS # BLD AUTO: 0.07 10*3/MM3 (ref 0–0.2)
BASOPHILS NFR BLD AUTO: 0.6 % (ref 0–1.5)
BILIRUB SERPL-MCNC: 0.2 MG/DL (ref 0–1.2)
BUN SERPL-MCNC: 20 MG/DL (ref 6–20)
BUN/CREAT SERPL: 24.7 (ref 7–25)
CALCIUM SPEC-SCNC: 9.7 MG/DL (ref 8.6–10.5)
CHLORIDE SERPL-SCNC: 100 MMOL/L (ref 98–107)
CO2 SERPL-SCNC: 23.9 MMOL/L (ref 22–29)
CREAT SERPL-MCNC: 0.81 MG/DL (ref 0.57–1)
CRP SERPL-MCNC: <0.3 MG/DL (ref 0–0.5)
DEPRECATED RDW RBC AUTO: 50.5 FL (ref 37–54)
EGFRCR SERPLBLD CKD-EPI 2021: 91.9 ML/MIN/1.73
EOSINOPHIL # BLD AUTO: 0.09 10*3/MM3 (ref 0–0.4)
EOSINOPHIL NFR BLD AUTO: 0.8 % (ref 0.3–6.2)
ERYTHROCYTE [DISTWIDTH] IN BLOOD BY AUTOMATED COUNT: 15.5 % (ref 12.3–15.4)
GLOBULIN UR ELPH-MCNC: 3.4 GM/DL
GLUCOSE SERPL-MCNC: 126 MG/DL (ref 65–99)
HCT VFR BLD AUTO: 32.7 % (ref 34–46.6)
HGB BLD-MCNC: 10.9 G/DL (ref 12–15.9)
IMM GRANULOCYTES # BLD AUTO: 0.06 10*3/MM3 (ref 0–0.05)
IMM GRANULOCYTES NFR BLD AUTO: 0.5 % (ref 0–0.5)
LYMPHOCYTES # BLD AUTO: 1.5 10*3/MM3 (ref 0.7–3.1)
LYMPHOCYTES NFR BLD AUTO: 12.8 % (ref 19.6–45.3)
MCH RBC QN AUTO: 30.1 PG (ref 26.6–33)
MCHC RBC AUTO-ENTMCNC: 33.3 G/DL (ref 31.5–35.7)
MCV RBC AUTO: 90.3 FL (ref 79–97)
MONOCYTES # BLD AUTO: 0.85 10*3/MM3 (ref 0.1–0.9)
MONOCYTES NFR BLD AUTO: 7.3 % (ref 5–12)
NEUTROPHILS NFR BLD AUTO: 78 % (ref 42.7–76)
NEUTROPHILS NFR BLD AUTO: 9.13 10*3/MM3 (ref 1.7–7)
NRBC BLD AUTO-RTO: 0 /100 WBC (ref 0–0.2)
PLATELET # BLD AUTO: 418 10*3/MM3 (ref 140–450)
PMV BLD AUTO: 8.5 FL (ref 6–12)
POTASSIUM SERPL-SCNC: 3.7 MMOL/L (ref 3.5–5.2)
PROT SERPL-MCNC: 7.8 G/DL (ref 6–8.5)
RBC # BLD AUTO: 3.62 10*6/MM3 (ref 3.77–5.28)
SODIUM SERPL-SCNC: 137 MMOL/L (ref 136–145)
WBC NRBC COR # BLD: 11.7 10*3/MM3 (ref 3.4–10.8)

## 2023-09-25 PROCEDURE — 86140 C-REACTIVE PROTEIN: CPT

## 2023-09-25 PROCEDURE — 36591 DRAW BLOOD OFF VENOUS DEVICE: CPT

## 2023-09-25 PROCEDURE — 25010000002 HEPARIN LOCK FLUSH PER 10 UNITS: Performed by: INTERNAL MEDICINE

## 2023-09-25 PROCEDURE — 85025 COMPLETE CBC W/AUTO DIFF WBC: CPT

## 2023-09-25 PROCEDURE — 80053 COMPREHEN METABOLIC PANEL: CPT

## 2023-09-25 RX ORDER — HEPARIN SODIUM (PORCINE) LOCK FLUSH IV SOLN 100 UNIT/ML 100 UNIT/ML
500 SOLUTION INTRAVENOUS AS NEEDED
Status: DISCONTINUED | OUTPATIENT
Start: 2023-09-25 | End: 2023-09-25 | Stop reason: HOSPADM

## 2023-09-25 RX ORDER — HEPARIN SODIUM (PORCINE) LOCK FLUSH IV SOLN 100 UNIT/ML 100 UNIT/ML
500 SOLUTION INTRAVENOUS AS NEEDED
OUTPATIENT
Start: 2023-09-25

## 2023-09-25 RX ADMIN — HEPARIN 500 UNITS: 100 SYRINGE at 10:54

## 2023-09-25 NOTE — PATIENT INSTRUCTIONS
After you complete instructions on your previous prednisone script, add 5 days of 1/2 tablet daily. This should have you completing steroid taper 10/8

## 2023-09-25 NOTE — PROGRESS NOTES
Hematology [facility] On[facility] [place]  Office number [phone]    Fax number [phone]     Follow-up     Date: 23      Patient Name: Kimberley Win  MRN: [id number]  : 1980    Referring Physician: Dr. Daxa Anderson MD    Chief Complaint: left breast cancer    Cancer Staging:  Cancer Staging   Stage IIA (cT2, cN1, cM0, G2, ER+, VA+, HER2-)    History of Present Illness: Kimberley Win is a pleasant 43 y.o. female who presented for evaluation of left breast cancer.     She notes a history of cystic breast disease for many years. She notes a new breast mass that became progressively harder and associated with pain prompting diagnostic workup.     Diagnostic mammogram 23 showed a dense, spiculated mass in the left breast which on ultrasound corresponded to a 3:00 3 cm hypoechoic mass with internal vascularity. In the 7:00 left breast there was a 7 mm mass corresponding on US to a cluster of cysts spanning 1.4 cm. In the left breast there was a 1.7 cm LN with multiple smaller LN.     Left breast biopsy showed grade 2 invasive ductal carcinoma (ER 90%, VA 10%, HER 2 negative 0+ by IHC). Lymph node FNA was postive for malignancy, metastatic ductal carcinoma.    Breast cancer risk profile:  Age of menarche:10  G0; infertility  Age of menopause: premenopausal   Family history of breast, ovarian, prostate or pancreatic cancer: mom stage IV breast cancer in her 50s. M Great Aunt, breast cancer.   Genetic testing: negative 36 gene CancerNext panel     Treatment history:  Dose dense AC followed by weekly taxol: C1, 2023  Taxol C1 ; C2 : on hold    Interval history:  She presents for short interval follow-up.  She continues to experience fevers associated body aches with a Tmax of 101 this morning.  She has a dry cough for the last 3 weeks, in retrospect this started the first week after taxol.  She continues with Levaquin.  She is taking Tylenol as needed.  She denies any  other localizing symptoms.  Specifically she denies any shortness of breath, sore throat, mucositis, abdominal pain, dysuria, flank pain.  Duration of her symptoms is 7 days.      Past Medical History:   Past Medical History:   Diagnosis Date    Arthritis     Breast cancer 2023    Left Breast    Malignant neoplasm of central portion of left breast in female, estrogen receptor positive 2023    Osteoarthritis     Varicella When I was a child   No personal history of myocardial infarction, cerebrovascular event, or venous thromboembolism.  Osteoarthritis in her hands. No personal history of autoimmune disease. Fhx of RA in her aunt    Past Surgical History:   Past Surgical History:   Procedure Laterality Date    US GUIDED LYMPH NODE BIOPSY  2023       Family History:   Family History   Problem Relation Age of Onset    Breast cancer Mother 55        Stage 4    COPD Mother     Diabetes Mother     Early death Mother     Heart disease Mother     Stroke Mother     Rheum arthritis Maternal Aunt     Breast cancer Paternal Aunt     COPD Maternal Grandfather     Breast cancer Other        Social History:   Social History     Socioeconomic History    Marital status:    Tobacco Use    Smoking status: Former     Packs/day: 0.25     Years: 10.00     Pack years: 2.50     Types: Cigarettes     Start date: 1995     Quit date: 2005     Years since quittin.6     Passive exposure: Past    Smokeless tobacco: Never   Vaping Use    Vaping Use: Never used   Substance and Sexual Activity    Alcohol use: Yes     Comment: rare    Drug use: Never    Sexual activity: Yes     Partners: Male     Birth control/protection: Condom   , lives in [place].     Medications:     Current Outpatient Medications:     ibuprofen (ADVIL,MOTRIN) 200 MG tablet, Take 1 tablet by mouth Every 6 (Six) Hours As Needed for Mild Pain., Disp: , Rfl:     lidocaine-prilocaine (EMLA) 2.5-2.5 % cream, Apply 1  application topically to the appropriate area as directed As Needed (45-60 minutes prior to port access.  Cover with saran/plastic wrap.)., Disp: 30 g, Rfl: 3    loratadine (CLARITIN) 10 MG tablet, Take 1 tablet by mouth Daily., Disp: , Rfl:     metoprolol tartrate (LOPRESSOR) 25 MG tablet, TAKE ONE-HALF TABLET BY MOUTH EVERY MORNING AND ONE-HALF TABLET EVERY EVENING, Disp: 90 tablet, Rfl: 0    omeprazole (priLOSEC) 40 MG capsule, Take 1 capsule by mouth Daily. 30 min prior to evening., Disp: 30 capsule, Rfl: 5    ondansetron (ZOFRAN) 8 MG tablet, Take 1 tablet by mouth 3 (Three) Times a Day As Needed for Nausea or Vomiting (DO NOT USE WHILE SANCUSO IN PLACE)., Disp: 30 tablet, Rfl: 5    predniSONE (DELTASONE) 20 MG tablet, Take 3 tablets by mouth Daily for 5 days, THEN 2 tablets Daily for 5 days, THEN 1 tablet Daily for 5 days. Continue until seen, Disp: 30 tablet, Rfl: 0    prochlorperazine (COMPAZINE) 5 MG tablet, Take 1-2 tablets by mouth Every 6 (Six) Hours As Needed for Nausea (USE 2nd; ok to use on evening of chemo)., Disp: 30 tablet, Rfl: 1    sulfamethoxazole-trimethoprim (BACTRIM DS,SEPTRA DS) 800-160 MG per tablet, Take 1 tablet by mouth 2 (Two) Times a Day., Disp: 28 tablet, Rfl: 0    Allergies:   Allergies   Allergen Reactions    Sunblock [Solbar Pf Spf15] Rash       Objective     Vital Signs:   There were no vitals filed for this visit.     There is no height or weight on file to calculate BMI.   There were no vitals filed for this visit.        ECOG Performance Status: 0    Physical Exam:   General: No acute distress.  Frequent dry cough  HEENT: Normocephalic, atraumatic. Sclera anicteric.  Mucous membranes moist.  No erythema  Neck: supple, no adenopathy.   Cardiovascular: regular rate and rhythm. No murmurs.   Respiratory: Normal rate. Clear to auscultation bilaterally  Abdomen: Soft, nontender, non distended with normoactive bowel sounds.  Lymph: no cervical, supraclavicular or axillary  adenopathy  Neuro: Alert and oriented x 3. No focal deficits.   Ext: Symmetric, no swelling.   Psych: Euthymic  Skin around her port site with no erythema    Laboratory/Imaging Reviewed:   Lab on 09/22/2023   Component Date Value Ref Range Status    Sed Rate 09/22/2023 17  0 - 20 mm/hr Final    C-Reactive Protein 09/22/2023 0.79 (H)  0.00 - 0.50 mg/dL Final   Lab on 09/18/2023   Component Date Value Ref Range Status    Glucose 09/22/2023 99  65 - 99 mg/dL Final    BUN 09/22/2023 19  6 - 20 mg/dL Final    Creatinine 09/22/2023 0.78  0.57 - 1.00 mg/dL Final    Sodium 09/22/2023 139  136 - 145 mmol/L Final    Potassium 09/22/2023 3.9  3.5 - 5.2 mmol/L Final    Chloride 09/22/2023 103  98 - 107 mmol/L Final    CO2 09/22/2023 25.0  22.0 - 29.0 mmol/L Final    Calcium 09/22/2023 9.7  8.6 - 10.5 mg/dL Final    Total Protein 09/22/2023 7.3  6.0 - 8.5 g/dL Final    Albumin 09/22/2023 4.4  3.5 - 5.2 g/dL Final    ALT (SGPT) 09/22/2023 133 (H)  1 - 33 U/L Final    AST (SGOT) 09/22/2023 62 (H)  1 - 32 U/L Final    Alkaline Phosphatase 09/22/2023 84  39 - 117 U/L Final    Total Bilirubin 09/22/2023 0.2  0.0 - 1.2 mg/dL Final    Globulin 09/22/2023 2.9  gm/dL Final    A/G Ratio 09/22/2023 1.5  g/dL Final    BUN/Creatinine Ratio 09/22/2023 24.4  7.0 - 25.0 Final    Anion Gap 09/22/2023 11.0  5.0 - 15.0 mmol/L Final    eGFR 09/22/2023 96.2  >60.0 mL/min/1.73 Final    WBC 09/22/2023 9.57  3.40 - 10.80 10*3/mm3 Final    RBC 09/22/2023 3.41 (L)  3.77 - 5.28 10*6/mm3 Final    Hemoglobin 09/22/2023 10.1 (L)  12.0 - 15.9 g/dL Final    Hematocrit 09/22/2023 31.1 (L)  34.0 - 46.6 % Final    MCV 09/22/2023 91.2  79.0 - 97.0 fL Final    MCH 09/22/2023 29.6  26.6 - 33.0 pg Final    MCHC 09/22/2023 32.5  31.5 - 35.7 g/dL Final    RDW 09/22/2023 15.0  12.3 - 15.4 % Final    RDW-SD 09/22/2023 49.7  37.0 - 54.0 fl Final    MPV 09/22/2023 8.8  6.0 - 12.0 fL Final    Platelets 09/22/2023 359  140 - 450 10*3/mm3 Final    Neutrophil % 09/22/2023 70.6   42.7 - 76.0 % Final    Lymphocyte % 09/22/2023 16.8 (L)  19.6 - 45.3 % Final    Monocyte % 09/22/2023 11.1  5.0 - 12.0 % Final    Eosinophil % 09/22/2023 0.3  0.3 - 6.2 % Final    Basophil % 09/22/2023 0.8  0.0 - 1.5 % Final    Immature Grans % 09/22/2023 0.4  0.0 - 0.5 % Final    Neutrophils, Absolute 09/22/2023 6.75  1.70 - 7.00 10*3/mm3 Final    Lymphocytes, Absolute 09/22/2023 1.61  0.70 - 3.10 10*3/mm3 Final    Monocytes, Absolute 09/22/2023 1.06 (H)  0.10 - 0.90 10*3/mm3 Final    Eosinophils, Absolute 09/22/2023 0.03  0.00 - 0.40 10*3/mm3 Final    Basophils, Absolute 09/22/2023 0.08  0.00 - 0.20 10*3/mm3 Final    Immature Grans, Absolute 09/22/2023 0.04  0.00 - 0.05 10*3/mm3 Final    nRBC 09/22/2023 0.0  0.0 - 0.2 /100 WBC Final   Hospital Outpatient Visit on 09/14/2023   Component Date Value Ref Range Status    Blood Culture 09/14/2023 No growth at 5 days   Final    Blood Culture 09/14/2023 No growth at 5 days   Final    Color, UA 09/14/2023 Yellow  Yellow, Straw Final    Appearance, UA 09/14/2023 Clear  Clear Final    pH, UA 09/14/2023 6.0  5.0 - 8.0 Final    Specific [place], UA 09/14/2023 1.021  1.001 - 1.030 Final    Glucose, UA 09/14/2023 Negative  Negative Final    Ketones, UA 09/14/2023 Negative  Negative Final    Bilirubin, UA 09/14/2023 Negative  Negative Final    Blood, UA 09/14/2023 Negative  Negative Final    Protein, UA 09/14/2023 Negative  Negative Final    Leuk Esterase, UA 09/14/2023 Trace (A)  Negative Final    Nitrite, UA 09/14/2023 Negative  Negative Final    Urobilinogen, UA 09/14/2023 0.2 E.U./dL  0.2 - 1.0 E.U./dL Final    Glucose 09/14/2023 109 (H)  65 - 99 mg/dL Final    BUN 09/14/2023 15  6 - 20 mg/dL Final    Creatinine 09/14/2023 0.56 (L)  0.57 - 1.00 mg/dL Final    Sodium 09/14/2023 139  136 - 145 mmol/L Final    Potassium 09/14/2023 4.1  3.5 - 5.2 mmol/L Final    Slight hemolysis detected by analyzer. Results may be affected.    Chloride 09/14/2023 103  98 - 107 mmol/L Final     CO2 09/14/2023 25.0  22.0 - 29.0 mmol/L Final    Calcium 09/14/2023 9.2  8.6 - 10.5 mg/dL Final    Total Protein 09/14/2023 7.6  6.0 - 8.5 g/dL Final    Albumin 09/14/2023 4.0  3.5 - 5.2 g/dL Final    ALT (SGPT) 09/14/2023 64 (H)  1 - 33 U/L Final    AST (SGOT) 09/14/2023 45 (H)  1 - 32 U/L Final    Alkaline Phosphatase 09/14/2023 72  39 - 117 U/L Final    Total Bilirubin 09/14/2023 0.3  0.0 - 1.2 mg/dL Final    Globulin 09/14/2023 3.6  gm/dL Final    Calculated Result    A/G Ratio 09/14/2023 1.1  g/dL Final    BUN/Creatinine Ratio 09/14/2023 26.8 (H)  7.0 - 25.0 Final    Anion Gap 09/14/2023 11.0  5.0 - 15.0 mmol/L Final    eGFR 09/14/2023 115.6  >60.0 mL/min/1.73 Final    WBC 09/14/2023 5.95  3.40 - 10.80 10*3/mm3 Final    RBC 09/14/2023 3.12 (L)  3.77 - 5.28 10*6/mm3 Final    Hemoglobin 09/14/2023 9.5 (L)  12.0 - 15.9 g/dL Final    Hematocrit 09/14/2023 29.1 (L)  34.0 - 46.6 % Final    MCV 09/14/2023 93.3  79.0 - 97.0 fL Final    MCH 09/14/2023 30.4  26.6 - 33.0 pg Final    MCHC 09/14/2023 32.6  31.5 - 35.7 g/dL Final    RDW 09/14/2023 14.8  12.3 - 15.4 % Final    RDW-SD 09/14/2023 51.0  37.0 - 54.0 fl Final    MPV 09/14/2023 8.5  6.0 - 12.0 fL Final    Platelets 09/14/2023 250  140 - 450 10*3/mm3 Final    Neutrophil % 09/14/2023 62.8  42.7 - 76.0 % Final    Lymphocyte % 09/14/2023 16.5 (L)  19.6 - 45.3 % Final    Monocyte % 09/14/2023 14.6 (H)  5.0 - 12.0 % Final    Eosinophil % 09/14/2023 5.2  0.3 - 6.2 % Final    Basophil % 09/14/2023 0.7  0.0 - 1.5 % Final    Immature Grans % 09/14/2023 0.2  0.0 - 0.5 % Final    Neutrophils, Absolute 09/14/2023 3.74  1.70 - 7.00 10*3/mm3 Final    Lymphocytes, Absolute 09/14/2023 0.98  0.70 - 3.10 10*3/mm3 Final    Monocytes, Absolute 09/14/2023 0.87  0.10 - 0.90 10*3/mm3 Final    Eosinophils, Absolute 09/14/2023 0.31  0.00 - 0.40 10*3/mm3 Final    Basophils, Absolute 09/14/2023 0.04  0.00 - 0.20 10*3/mm3 Final    Immature Grans, Absolute 09/14/2023 0.01  0.00 - 0.05 10*3/mm3  Final    RBC, UA 09/14/2023 0-2  None Seen, 0-2 /HPF Final    WBC, UA 09/14/2023 6-12 (A)  None Seen, 0-2 /HPF Final    Bacteria, UA 09/14/2023 None Seen  None Seen, Trace /HPF Final    Squamous Epithelial Cells, UA 09/14/2023 3-6 (A)  None Seen, 0-2 /HPF Final    Hyaline Casts, UA 09/14/2023 0-6  0 - 6 /LPF Final    Methodology 09/14/2023 Automated Microscopy   Final    Urine Culture 09/14/2023 No growth   Final   Admission on 09/14/2023, Discharged on 09/14/2023   Component Date Value Ref Range Status    SARS Antigen 09/14/2023 Not Detected  Not Detected, Presumptive Negative Final    Internal Control 09/14/2023 Passed  Passed Final    Lot Number 09/14/2023 [id number]   Final    Expiration Date 09/14/2023 04-02-24   Final       CT Chest With Contrast Diagnostic    Result Date: 9/15/2023  Narrative: CT CHEST W CONTRAST DIAGNOSTIC Date of Exam: 9/15/2023 1:46 PM CDT Indication: Breast cancer, invasive, stage I/II/III, initial workup COUGH, fever, immunosuppression. Comparison: 6/23/2023 Technique: Axial CT images were obtained of the chest after the uneventful intravenous administration of 80 cc Isovue-300.  Reconstructed coronal and sagittal images were also obtained. Automated exposure control and iterative construction methods were used. Findings: MEDIASTINUM: There is a right subclavian cell venous port with catheter tip in the proximal SVC.. Aortic and heart size are normal. No mass nor pericardial effusion. CORONARY ARTERIES: Nocalcified atherosclerotic disease. LUNGS: There are vague faint areas of groundglass attenuation throughout the lungs potentially infectious or inflammatory. No dense consolidation. No nodule. PLEURAL SPACE: No effusion, mass, nor pneumothorax. LYMPH NODES: There are no pathologically enlarged lymph nodes. UPPER ABDOMEN: Unremarkable OSSEOUS STRUCTURES: Appropriate for age with no acute process identified. Persistent 2.3 x 1.5 cm left breast nodule. Correlate with history.      Impression: Impression: 1.Very faint scattered groundglass opacities in the lungs suggesting infectious/inflammatory process. No lobar pneumonia. 2.Unchanged left breast nodule Electronically Signed: Eladio Quick MD  9/15/2023 2:28 PM CDT  Workstation ID: [id number]    XR Chest 1 View    Result Date: 9/9/2023  Narrative: PROCEDURE: XR CHEST 1 VW-  INDICATION:  cough, fever, on chemotherapy  FINDINGS:  A portable view of the chest was obtained.  Comparison is made to a prior exam dated 8/23/2023.   There is no change in a right Port-A-Cath. The cardiac and mediastinal silhouettes are within normal limits.  The lungs are clear.  There is no pleural effusion or pneumothorax.      Impression: No acute process on this portable exam.   This report was signed and finalized on 9/9/2023 8:25 AM by Tanya Hernandez MD.      Holter Monitor - 72 Hour Up To 15 Days    Result Date: 9/5/2023  Narrative:   1.8% PVCs.  Associated with symptoms.          Component  Ref Range & Units 3 d ago   Color, UA  Yellow, Straw Yellow   Appearance, UA  Clear Clear   pH, UA  5.0 - 8.0 7.5   Specific Gravity, UA  1.005 - 1.030 1.008   Glucose, UA  Negative Negative   Ketones, UA  Negative Negative   Bilirubin, UA  Negative Negative   Blood, UA  Negative Negative   Protein, UA  Negative Negative   Leuk Esterase, UA  Negative Negative   Nitrite, UA  Negative Negative   Urobilinogen, UA  0.2 - 1.0 E.U./dL 0.2 E.U./dL   Resulting Agency [facility]          Procedures    Assessment / Plan      Assessment/Plan:     1.  Malignant neoplasm of central portion of left breast in female, estrogen receptor positive   2.  Chemotherapy monitoring  3.  Fever  4.  Persistent dry cough  -Treatment held last week week for febrile illness. She has persistent fever and a worsening dry cough. CT obtained over the weekend reviewed by me and shows mild pulmonary infiltrates. DDX includes taxane pneumonitis, early, or atypical infection. Repeat blood and urine cultures  from Friday were all normal. I am going to hold her treatment again today.   -Will start high dose prednisone for presumed taxane pneumonitis.  -Collect urine histo  -Cover for PCP with bactrim (will also act as prophylaxis against PCP given plan for high dose steroids) Follow up in 38-72 hours to ensure she is improving. If fevers persist, will have her see ID/pulm  No orders of the defined types were placed in this encounter.       Follow Up:   48-72 hours    Martha Olguin MD  Hematology and Oncology     I have spent a total of 40 min on reviewing test results/preparing to see patient, counseling patient, performing medically appropriate exam, placing orders, coordinating care and documenting clinical information in the electronic or other health record    numerical 0-10

## 2023-09-25 NOTE — PROGRESS NOTES
Hematology and Oncology [place]  Office number [phone]    Fax number [phone]     Follow-up     Date: 23      Patient Name: Kimberley Win  MRN: [id number]  : 1980    Referring Physician: Dr. Daxa Anderson MD    Chief Complaint: short inverval follow up taxol side effect. Pt provides verbal consent to receive care via telehealth    Cancer Staging:  Cancer Staging   Stage IIA (cT2, cN1, cM0, G2, ER+, W[place]+, HER2-)    History of Present Illness: Kimberley Win is a pleasant 43 y.o. female who presented for evaluation of left breast cancer.     She notes a history of cystic breast disease for many years. She notes a new breast mass that became progressively harder and associated with pain prompting diagnostic workup.     Diagnostic mammogram 23 showed a dense, spiculated mass in the left breast which on ultrasound corresponded to a 3:00 3 cm hypoechoic mass with internal vascularity. In the 7:00 left breast there was a 7 mm mass corresponding on US to a cluster of cysts spanning 1.4 cm. In the left breast there was a 1.7 cm LN with multiple smaller LN.     Left breast biopsy showed grade 2 invasive ductal carcinoma (ER 90%, WY 10%, HER 2 negative 0+ by IHC). Lymph node FNA was postive for malignancy, metastatic ductal carcinoma.    Breast cancer risk profile:  Age of menarche:10  G0; infertility  Age of menopause: premenopausal   Family history of breast, ovarian, prostate or pancreatic cancer: mom stage IV breast cancer in her 50s. M Great Aunt, breast cancer.   Genetic testing: negative 36 gene CancerNext panel     Treatment history:  Dose dense AC followed by weekly taxol: C1, 2023    Interval history:  Temperature is  max. Breathing and coughing are much improved. Less tired. Less RO and heart palpitations also improving. Today is day 2 of 40 mg and so far no clinical worsening.  Saw Dr. Cornejo who agreed with steroid taper. Will taper to 10 mg through  10/8 and then stop  Kimberley is here for short interval follow up for side effect of therapy.  Started Bactrim Monday night and prednisone yesterday morning.   Last fever yesterday at 9:00 .3 just prior to prednisone dose. No fevers since she started the prednisone.   Cough and RO are about the same to mildly improved.  Hot flashes are back. Overall she feels she is turning a corner.     Past Medical History:   Past Medical History:   Diagnosis Date    Arthritis     Breast cancer 2023    Left Breast    Malignant neoplasm of central portion of left breast in female, estrogen receptor positive 2023    Osteoarthritis     Varicella When I was a child   No personal history of myocardial infarction, cerebrovascular event, or venous thromboembolism.  Osteoarthritis in her hands. No personal history of autoimmune disease. Fhx of RA in her aunt    Past Surgical History:   Past Surgical History:   Procedure Laterality Date    US GUIDED LYMPH NODE BIOPSY  2023       Family History:   Family History   Problem Relation Age of Onset    Breast cancer Mother 55        Stage 4    COPD Mother     Diabetes Mother     Early death Mother     Heart disease Mother     Stroke Mother     Rheum arthritis Maternal Aunt     Breast cancer Paternal Aunt     COPD Maternal Grandfather     Breast cancer Other        Social History:   Social History     Socioeconomic History    Marital status:    Tobacco Use    Smoking status: Former     Packs/day: 0.25     Years: 10.00     Pack years: 2.50     Types: Cigarettes     Start date: 1995     Quit date: 2005     Years since quittin.6     Passive exposure: Past    Smokeless tobacco: Never   Vaping Use    Vaping Use: Never used   Substance and Sexual Activity    Alcohol use: Yes     Comment: rare    Drug use: Never    Sexual activity: Yes     Partners: Male     Birth control/protection: Condom   , lives in [place].     Medications:     Current  Outpatient Medications:     ibuprofen (ADVIL,MOTRIN) 200 MG tablet, Take 1 tablet by mouth Every 6 (Six) Hours As Needed for Mild Pain., Disp: , Rfl:     lidocaine-prilocaine (EMLA) 2.5-2.5 % cream, Apply 1 application topically to the appropriate area as directed As Needed (45-60 minutes prior to port access.  Cover with saran/plastic wrap.)., Disp: 30 g, Rfl: 3    loratadine (CLARITIN) 10 MG tablet, Take 1 tablet by mouth Daily., Disp: , Rfl:     metoprolol tartrate (LOPRESSOR) 25 MG tablet, TAKE ONE-HALF TABLET BY MOUTH EVERY MORNING AND ONE-HALF TABLET EVERY EVENING, Disp: 90 tablet, Rfl: 0    omeprazole (priLOSEC) 40 MG capsule, Take 1 capsule by mouth Daily. 30 min prior to evening., Disp: 30 capsule, Rfl: 5    ondansetron (ZOFRAN) 8 MG tablet, Take 1 tablet by mouth 3 (Three) Times a Day As Needed for Nausea or Vomiting (DO NOT USE WHILE SANCUSO IN PLACE)., Disp: 30 tablet, Rfl: 5    predniSONE (DELTASONE) 20 MG tablet, Take 3 tablets by mouth Daily for 5 days, THEN 2 tablets Daily for 5 days, THEN 1 tablet Daily for 5 days. Continue until seen, Disp: 30 tablet, Rfl: 0    prochlorperazine (COMPAZINE) 5 MG tablet, Take 1-2 tablets by mouth Every 6 (Six) Hours As Needed for Nausea (USE 2nd; ok to use on evening of chemo)., Disp: 30 tablet, Rfl: 1    sulfamethoxazole-trimethoprim (BACTRIM DS,SEPTRA DS) 800-160 MG per tablet, Take 1 tablet by mouth 2 (Two) Times a Day., Disp: 28 tablet, Rfl: 0    Allergies:   Allergies   Allergen Reactions    Sunblock [Solbar Pf Spf15] Rash       Objective     Vital Signs:   Vitals:    09/25/23 0958   Weight: 69.4 kg (153 lb)      Body mass index is 23.96 kg/m².   There were no vitals filed for this visit.        ECOG Performance Status: 0    Physical Exam:   General: No acute distress.  HEENT: Normocephalic, atraumatic. Sclera anicteric.    Neck: no visible adenopathy  Respiratory: Normal rate and effort  Dry cough  Neuro: Alert and oriented x 3. No focal deficits.        Laboratory/Imaging Reviewed:     Component      Latest Ref Rng 9/22/2023   WBC      3.40 - 10.80 10*3/mm3 9.57    RBC      3.77 - 5.28 10*6/mm3 3.41 (L)    Hemoglobin      12.0 - 15.9 g/dL 10.1 (L)    Hematocrit      34.0 - 46.6 % 31.1 (L)    MCV      79.0 - 97.0 fL 91.2    MCH      26.6 - 33.0 pg 29.6    MCHC      31.5 - 35.7 g/dL 32.5    RDW      12.3 - 15.4 % 15.0    RDW-SD      37.0 - 54.0 fl 49.7    MPV      6.0 - 12.0 fL 8.8    Platelets      140 - 450 10*3/mm3 359    Neutrophil Rel %      42.7 - 76.0 % 70.6    Lymphocyte Rel %      19.6 - 45.3 % 16.8 (L)    Monocyte Rel %      5.0 - 12.0 % 11.1    Eosinophil Rel %      0.3 - 6.2 % 0.3    Basophil Rel %      0.0 - 1.5 % 0.8    Immature Granulocyte Rel %      0.0 - 0.5 % 0.4    Neutrophils Absolute      1.70 - 7.00 10*3/mm3 6.75    Lymphocytes Absolute      0.70 - 3.10 10*3/mm3 1.61    Monocytes Absolute      0.10 - 0.90 10*3/mm3 1.06 (H)    Eosinophils Absolute      0.00 - 0.40 10*3/mm3 0.03    Basophils Absolute      0.00 - 0.20 10*3/mm3 0.08    Immature Grans, Absolute      0.00 - 0.05 10*3/mm3 0.04    nRBC      0.0 - 0.2 /100 WBC 0.0    Glucose      65 - 99 mg/dL 99    BUN      6 - 20 mg/dL 19    Creatinine      0.57 - 1.00 mg/dL 0.78    Sodium      136 - 145 mmol/L 139    Potassium      3.5 - 5.2 mmol/L 3.9    Chloride      98 - 107 mmol/L 103    CO2      22.0 - 29.0 mmol/L 25.0    Calcium      8.6 - 10.5 mg/dL 9.7    Total Protein      6.0 - 8.5 g/dL 7.3    Albumin      3.5 - 5.2 g/dL 4.4    ALT (SGPT)      1 - 33 U/L 133 (H)    AST (SGOT)      1 - 32 U/L 62 (H)    Alkaline Phosphatase      39 - 117 U/L 84    Total Bilirubin      0.0 - 1.2 mg/dL 0.2    Globulin      gm/dL 2.9    A/G Ratio      g/dL 1.5    BUN/Creatinine Ratio      7.0 - 25.0  24.4    Anion Gap      5.0 - 15.0 mmol/L 11.0    eGFR      >60.0 mL/min/1.73 96.2    Sed Rate      0 - 20 mm/hr 17    C-Reactive Protein      0.00 - 0.50 mg/dL 0.79 (H)      Assessment / Plan       Assessment/Plan:     1.  Malignant neoplasm of central portion of left breast in female, estrogen receptor positive   2.  Chemotherapy monitoring  3.  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 Worsening transaminitis  -Rapid improvement in her symptoms with prednisone together with bilateral lung infiltrates consistent with early taxane pneumonitis.  Reviewed labs and pulmonary notes. They agreed with taxane pneumonitis and steroid course. No bronch planned unless clinical worsening.    -Continue steroid taper. To complete 10/8. I have been in contact with her plastic surgeon and breast surgeon, tentatively plan OR 9/6 which is 4 weeks after steroids complete. Counseled her to call if any clinical worsening with steroid taper as we would need to re-escalate steoids. Discussed neoadjuvant endocrine therapy if she requires a steroid extension.   I note mild crp elevation and further increase in LFTs. Repeat LFTs today. IF still rising, will need imaging. I updated her surgeons    Follow Up:   2 weeks or sooner if symptoms recur    Martha Olguin MD  Hematology and Oncology     I have spent a total of 30 minutes on reviewing test results, preparing to see patient, counseling patient, performing medically appropriate exam, communications with surgeons, placing orders and documenting clinical information in the electronic health record.

## 2023-09-26 ENCOUNTER — TRANSCRIBE ORDERS (OUTPATIENT)
Dept: ADMINISTRATIVE | Facility: HOSPITAL | Age: 43
End: 2023-09-26
Payer: COMMERCIAL

## 2023-09-26 DIAGNOSIS — C50.812 MALIGNANT NEOPLASM OF OVERLAPPING SITES OF LEFT FEMALE BREAST, UNSPECIFIED ESTROGEN RECEPTOR STATUS: Primary | ICD-10-CM

## 2023-09-26 NOTE — PROGRESS NOTES
Pt here today for port flush and labs drawn via port. Patient discharged ambulatory and stable. AVS given to patient. Patient has no further questions at this time.

## 2023-09-28 ENCOUNTER — PATIENT MESSAGE (OUTPATIENT)
Dept: ONCOLOGY | Facility: CLINIC | Age: 43
End: 2023-09-28
Payer: COMMERCIAL

## 2023-09-28 LAB — H CAPSUL AG UR QL IA: <0.5

## 2023-09-28 RX ORDER — PREDNISONE 20 MG/1
10 TABLET ORAL DAILY
Qty: 3 TABLET | Refills: 0 | Status: SHIPPED | OUTPATIENT
Start: 2023-09-28 | End: 2023-10-03

## 2023-09-28 NOTE — TELEPHONE ENCOUNTER
Spoke with patient regarding need for more prednisone.  She stated Dr. Reich recommended she take 1/2 tablet daily for 5 days after she finishes the taper down to 1 tablet daily for 5 days that was previously sent in by Dr. Olguin.  Notified patient per MD that prescription will be sent in for 1/2 tablet daily for 5 days and she should begin it after previous taper completed.  Patient verbalized understanding.

## 2023-10-02 ENCOUNTER — PATIENT ROUNDING (BHMG ONLY) (OUTPATIENT)
Dept: PULMONOLOGY | Facility: CLINIC | Age: 43
End: 2023-10-02
Payer: COMMERCIAL

## 2023-10-09 ENCOUNTER — HOSPITAL ENCOUNTER (OUTPATIENT)
Dept: INFUSION THERAPY | Facility: HOSPITAL | Age: 43
Discharge: HOME | End: 2023-10-09
Admitting: INTERNAL MEDICINE
Payer: COMMERCIAL

## 2023-10-09 ENCOUNTER — OFFICE VISIT (OUTPATIENT)
Dept: ONCOLOGY | Facility: CLINIC | Age: 43
End: 2023-10-09
Payer: COMMERCIAL

## 2023-10-09 VITALS
OXYGEN SATURATION: 99 % | DIASTOLIC BLOOD PRESSURE: 83 MMHG | RESPIRATION RATE: 12 BRPM | BODY MASS INDEX: 25.11 KG/M2 | HEART RATE: 107 BPM | HEIGHT: 67 IN | SYSTOLIC BLOOD PRESSURE: 142 MMHG | TEMPERATURE: 97.5 F | WEIGHT: 160 LBS

## 2023-10-09 VITALS
BODY MASS INDEX: 24.9 KG/M2 | DIASTOLIC BLOOD PRESSURE: 69 MMHG | SYSTOLIC BLOOD PRESSURE: 108 MMHG | HEART RATE: 68 BPM | RESPIRATION RATE: 18 BRPM | WEIGHT: 159 LBS | OXYGEN SATURATION: 99 % | TEMPERATURE: 97.5 F

## 2023-10-09 DIAGNOSIS — C50.112 MALIGNANT NEOPLASM OF CENTRAL PORTION OF LEFT BREAST IN FEMALE, ESTROGEN RECEPTOR POSITIVE: Primary | ICD-10-CM

## 2023-10-09 DIAGNOSIS — T50.905A DRUG-INDUCED PNEUMONITIS: ICD-10-CM

## 2023-10-09 DIAGNOSIS — Z17.0 MALIGNANT NEOPLASM OF CENTRAL PORTION OF LEFT BREAST IN FEMALE, ESTROGEN RECEPTOR POSITIVE: Primary | ICD-10-CM

## 2023-10-09 DIAGNOSIS — J98.4 DRUG-INDUCED PNEUMONITIS: ICD-10-CM

## 2023-10-09 LAB
ALBUMIN SERPL-MCNC: 4 G/DL (ref 3.5–5.2)
ALBUMIN/GLOB SERPL: 1.4 G/DL
ALP SERPL-CCNC: 64 U/L (ref 39–117)
ALT SERPL W P-5'-P-CCNC: 26 U/L (ref 1–33)
ANION GAP SERPL CALCULATED.3IONS-SCNC: 8 MMOL/L (ref 5–15)
AST SERPL-CCNC: 20 U/L (ref 1–32)
BASOPHILS # BLD AUTO: 0.02 10*3/MM3 (ref 0–0.2)
BASOPHILS NFR BLD AUTO: 0.4 % (ref 0–1.5)
BILIRUB SERPL-MCNC: 0.3 MG/DL (ref 0–1.2)
BUN SERPL-MCNC: 15 MG/DL (ref 6–20)
BUN/CREAT SERPL: 25 (ref 7–25)
CALCIUM SPEC-SCNC: 9.4 MG/DL (ref 8.6–10.5)
CHLORIDE SERPL-SCNC: 103 MMOL/L (ref 98–107)
CO2 SERPL-SCNC: 27 MMOL/L (ref 22–29)
CREAT SERPL-MCNC: 0.6 MG/DL (ref 0.57–1)
CRP SERPL-MCNC: <0.3 MG/DL (ref 0–0.5)
DEPRECATED RDW RBC AUTO: 54.4 FL (ref 37–54)
EGFRCR SERPLBLD CKD-EPI 2021: 113.7 ML/MIN/1.73
EOSINOPHIL # BLD AUTO: 0.19 10*3/MM3 (ref 0–0.4)
EOSINOPHIL NFR BLD AUTO: 3.5 % (ref 0.3–6.2)
ERYTHROCYTE [DISTWIDTH] IN BLOOD BY AUTOMATED COUNT: 15.3 % (ref 12.3–15.4)
ESTRADIOL SERPL HS-MCNC: <5 PG/ML
FSH SERPL-ACNC: 55.7 MIU/ML
GLOBULIN UR ELPH-MCNC: 2.9 GM/DL
GLUCOSE SERPL-MCNC: 99 MG/DL (ref 65–99)
HCT VFR BLD AUTO: 34.3 % (ref 34–46.6)
HGB BLD-MCNC: 11 G/DL (ref 12–15.9)
IMM GRANULOCYTES # BLD AUTO: 0.01 10*3/MM3 (ref 0–0.05)
IMM GRANULOCYTES NFR BLD AUTO: 0.2 % (ref 0–0.5)
LYMPHOCYTES # BLD AUTO: 1.16 10*3/MM3 (ref 0.7–3.1)
LYMPHOCYTES NFR BLD AUTO: 21.6 % (ref 19.6–45.3)
MCH RBC QN AUTO: 30.9 PG (ref 26.6–33)
MCHC RBC AUTO-ENTMCNC: 32.1 G/DL (ref 31.5–35.7)
MCV RBC AUTO: 96.3 FL (ref 79–97)
MONOCYTES # BLD AUTO: 0.56 10*3/MM3 (ref 0.1–0.9)
MONOCYTES NFR BLD AUTO: 10.4 % (ref 5–12)
NEUTROPHILS NFR BLD AUTO: 3.44 10*3/MM3 (ref 1.7–7)
NEUTROPHILS NFR BLD AUTO: 63.9 % (ref 42.7–76)
NRBC BLD AUTO-RTO: 0 /100 WBC (ref 0–0.2)
PLATELET # BLD AUTO: 217 10*3/MM3 (ref 140–450)
PMV BLD AUTO: 8.9 FL (ref 6–12)
POTASSIUM SERPL-SCNC: 3.7 MMOL/L (ref 3.5–5.2)
PROT SERPL-MCNC: 6.9 G/DL (ref 6–8.5)
RBC # BLD AUTO: 3.56 10*6/MM3 (ref 3.77–5.28)
SODIUM SERPL-SCNC: 138 MMOL/L (ref 136–145)
WBC NRBC COR # BLD: 5.38 10*3/MM3 (ref 3.4–10.8)

## 2023-10-09 PROCEDURE — 36591 DRAW BLOOD OFF VENOUS DEVICE: CPT

## 2023-10-09 PROCEDURE — 25010000002 HEPARIN LOCK FLUSH PER 10 UNITS: Performed by: INTERNAL MEDICINE

## 2023-10-09 PROCEDURE — 80053 COMPREHEN METABOLIC PANEL: CPT | Performed by: INTERNAL MEDICINE

## 2023-10-09 PROCEDURE — 86140 C-REACTIVE PROTEIN: CPT | Performed by: INTERNAL MEDICINE

## 2023-10-09 PROCEDURE — 82670 ASSAY OF TOTAL ESTRADIOL: CPT | Performed by: INTERNAL MEDICINE

## 2023-10-09 PROCEDURE — 83001 ASSAY OF GONADOTROPIN (FSH): CPT | Performed by: INTERNAL MEDICINE

## 2023-10-09 PROCEDURE — 85025 COMPLETE CBC W/AUTO DIFF WBC: CPT | Performed by: INTERNAL MEDICINE

## 2023-10-09 RX ORDER — HEPARIN SODIUM (PORCINE) LOCK FLUSH IV SOLN 100 UNIT/ML 100 UNIT/ML
500 SOLUTION INTRAVENOUS AS NEEDED
OUTPATIENT
Start: 2023-10-09

## 2023-10-09 RX ORDER — HEPARIN SODIUM (PORCINE) LOCK FLUSH IV SOLN 100 UNIT/ML 100 UNIT/ML
500 SOLUTION INTRAVENOUS AS NEEDED
Status: DISCONTINUED | OUTPATIENT
Start: 2023-10-09 | End: 2023-10-11 | Stop reason: HOSPADM

## 2023-10-09 RX ADMIN — HEPARIN 500 UNITS: 100 SYRINGE at 11:00

## 2023-10-09 NOTE — PROGRESS NOTES
Hematology and Oncology [place]  Office number [phone]    Fax number [phone]     Follow-up     Date: 10/09/23      Patient Name: Kimberley Win  MRN: [id number]  : 1980    Referring Physician: Dr. Daxa Anderson MD    Chief Complaint: follow up    Cancer Staging:  Cancer Staging   Stage IIA (cT2, cN1, cM0, G2, ER+, [place]+, HER2-)    History of Present Illness: Kimberley Win is a pleasant 43 y.o. female who presented for evaluation of left breast cancer.     She notes a history of cystic breast disease for many years. She notes a new breast mass that became progressively harder and associated with pain prompting diagnostic workup.     Diagnostic mammogram 23 showed a dense, spiculated mass in the left breast which on ultrasound corresponded to a 3:00 3 cm hypoechoic mass with internal vascularity. In the 7:00 left breast there was a 7 mm mass corresponding on US to a cluster of cysts spanning 1.4 cm. In the left breast there was a 1.7 cm LN with multiple smaller LN.     Left breast biopsy showed grade 2 invasive ductal carcinoma (ER 90%, NY 10%, HER 2 negative 0+ by IHC). Lymph node FNA was postive for malignancy, metastatic ductal carcinoma.    Breast cancer risk profile:  Age of menarche:10  G0; infertility  Age of menopause: premenopausal   Family history of breast, ovarian, prostate or pancreatic cancer: mom stage IV breast cancer in her 50s. M Great Aunt, breast cancer.   Genetic testing: negative 36 gene CancerNext panel     Treatment history:  Dose dense AC followed by weekly taxol: C1, 2023    Interval history:  She is here for follow up. She feels back to normal in terms of her breathing for the last week.   Palpations with heavy activity, occurring much less frequently, now every other day on average on metoprolol. No recurring fever. No lingering cough.   Last dose of steroid yesterday.   Hot flashes persist. Not bothersome enough to medicate.   OR is planned  23.   +breast pain, no new lumps or increased size of lump.    Past Medical History:   Past Medical History:   Diagnosis Date    Arthritis     Breast cancer 2023    Left Breast    Malignant neoplasm of central portion of left breast in female, estrogen receptor positive 2023    Osteoarthritis     Varicella When I was a child   No personal history of myocardial infarction, cerebrovascular event, or venous thromboembolism.  Osteoarthritis in her hands. No personal history of autoimmune disease. Fhx of RA in her aunt    Past Surgical History:   Past Surgical History:   Procedure Laterality Date    US GUIDED LYMPH NODE BIOPSY  2023       Family History:   Family History   Problem Relation Age of Onset    Breast cancer Mother 55        Stage 4    COPD Mother     Diabetes Mother     Early death Mother     Heart disease Mother     Stroke Mother     Rheum arthritis Maternal Aunt     Breast cancer Paternal Aunt     COPD Maternal Grandfather     Breast cancer Other      Social History:   Social History     Socioeconomic History    Marital status:    Tobacco Use    Smoking status: Former     Packs/day: 0.25     Years: 10.00     Additional pack years: 0.00     Total pack years: 2.50     Types: Cigarettes     Start date: 1995     Quit date: 2005     Years since quittin.6     Passive exposure: Past    Smokeless tobacco: Never   Vaping Use    Vaping Use: Never used   Substance and Sexual Activity    Alcohol use: Yes     Comment: rare    Drug use: Never    Sexual activity: Yes     Partners: Male     Birth control/protection: Condom   , lives in [place].     Medications:     Current Outpatient Medications:     ibuprofen (ADVIL,MOTRIN) 200 MG tablet, Take 1 tablet by mouth Every 6 (Six) Hours As Needed for Mild Pain., Disp: , Rfl:     lidocaine-prilocaine (EMLA) 2.5-2.5 % cream, Apply 1 application topically to the appropriate area as directed As Needed (45-60 minutes  "prior to port access.  Cover with saran/plastic wrap.)., Disp: 30 g, Rfl: 3    metoprolol tartrate (LOPRESSOR) 25 MG tablet, TAKE ONE-HALF TABLET BY MOUTH EVERY MORNING AND ONE-HALF TABLET EVERY EVENING, Disp: 90 tablet, Rfl: 0    omeprazole (priLOSEC) 40 MG capsule, Take 1 capsule by mouth Daily. 30 min prior to evening., Disp: 30 capsule, Rfl: 5    ondansetron (ZOFRAN) 8 MG tablet, Take 1 tablet by mouth 3 (Three) Times a Day As Needed for Nausea or Vomiting (DO NOT USE WHILE SANCUSO IN PLACE)., Disp: 30 tablet, Rfl: 5    prochlorperazine (COMPAZINE) 5 MG tablet, Take 1-2 tablets by mouth Every 6 (Six) Hours As Needed for Nausea (USE 2nd; ok to use on evening of chemo)., Disp: 30 tablet, Rfl: 1    Allergies:   Allergies   Allergen Reactions    Sunblock [Solbar Pf Spf15] Rash       Objective     Vital Signs:   Vitals:    10/09/23 0943   BP: 142/83   Pulse: 107   Resp: 12   Temp: 97.5 øF (36.4 øC)   TempSrc: Temporal   SpO2: 99%   Weight: 72.6 kg (160 lb)   Height: 170.2 cm (67\")   PainSc: 0-No pain      Body mass index is 25.06 kg/mý.   Pain Score    10/09/23 0943   PainSc: 0-No pain     ECOG Performance Status: 0    Physical Exam:   General: No acute distress. Well appearing.  HEENT: Normocephalic, atraumatic. Sclera anicteric.   Neck: supple, no adenopathy.   Cardiovascular: regular rate and rhythm. No murmurs.   Respiratory: Normal rate. Clear to auscultation bilaterally.  Abdomen: Soft, nontender, non distended with normoactive bowel sounds.  Lymph: no cervical, supraclavicular or axillary adenopathy.  Neuro: Alert and oriented x 3. No focal deficits.   Ext: Symmetric, no swelling.   Breast: left lateral breast mass 2.5 x 2.5 cm stable. No palpable adenopathy    Laboratory/Imaging Reviewed:   Hospital Outpatient Visit on 10/09/2023   Component Date Value Ref Range Status    Glucose 10/09/2023 99  65 - 99 mg/dL Final    BUN 10/09/2023 15  6 - 20 mg/dL Final    Creatinine 10/09/2023 0.60  0.57 - 1.00 mg/dL Final "    Sodium 10/09/2023 138  136 - 145 mmol/L Final    Potassium 10/09/2023 3.7  3.5 - 5.2 mmol/L Final    Chloride 10/09/2023 103  98 - 107 mmol/L Final    CO2 10/09/2023 27.0  22.0 - 29.0 mmol/L Final    Calcium 10/09/2023 9.4  8.6 - 10.5 mg/dL Final    Total Protein 10/09/2023 6.9  6.0 - 8.5 g/dL Final    Albumin 10/09/2023 4.0  3.5 - 5.2 g/dL Final    ALT (SGPT) 10/09/2023 26  1 - 33 U/L Final    AST (SGOT) 10/09/2023 20  1 - 32 U/L Final    Alkaline Phosphatase 10/09/2023 64  39 - 117 U/L Final    Total Bilirubin 10/09/2023 0.3  0.0 - 1.2 mg/dL Final    Globulin 10/09/2023 2.9  gm/dL Final    A/G Ratio 10/09/2023 1.4  g/dL Final    BUN/Creatinine Ratio 10/09/2023 25.0  7.0 - 25.0 Final    Anion Gap 10/09/2023 8.0  5.0 - 15.0 mmol/L Final    eGFR 10/09/2023 113.7  >60.0 mL/min/1.73 Final    C-Reactive Protein 10/09/2023 <0.30  0.00 - 0.50 mg/dL Final    FSH 10/09/2023 55.70  mIU/mL Final    Estradiol 10/09/2023 <5.0  pg/mL Final    WBC 10/09/2023 5.38  3.40 - 10.80 10*3/mm3 Final    RBC 10/09/2023 3.56 (L)  3.77 - 5.28 10*6/mm3 Final    Hemoglobin 10/09/2023 11.0 (L)  12.0 - 15.9 g/dL Final    Hematocrit 10/09/2023 34.3  34.0 - 46.6 % Final    MCV 10/09/2023 96.3  79.0 - 97.0 fL Final    MCH 10/09/2023 30.9  26.6 - 33.0 pg Final    MCHC 10/09/2023 32.1  31.5 - 35.7 g/dL Final    RDW 10/09/2023 15.3  12.3 - 15.4 % Final    RDW-SD 10/09/2023 54.4 (H)  37.0 - 54.0 fl Final    MPV 10/09/2023 8.9  6.0 - 12.0 fL Final    Platelets 10/09/2023 217  140 - 450 10*3/mm3 Final    Neutrophil % 10/09/2023 63.9  42.7 - 76.0 % Final    Lymphocyte % 10/09/2023 21.6  19.6 - 45.3 % Final    Monocyte % 10/09/2023 10.4  5.0 - 12.0 % Final    Eosinophil % 10/09/2023 3.5  0.3 - 6.2 % Final    Basophil % 10/09/2023 0.4  0.0 - 1.5 % Final    Immature Grans % 10/09/2023 0.2  0.0 - 0.5 % Final    Neutrophils, Absolute 10/09/2023 3.44  1.70 - 7.00 10*3/mm3 Final    Lymphocytes, Absolute 10/09/2023 1.16  0.70 - 3.10 10*3/mm3 Final     Monocytes, Absolute 10/09/2023 0.56  0.10 - 0.90 10*3/mm3 Final    Eosinophils, Absolute 10/09/2023 0.19  0.00 - 0.40 10*3/mm3 Final    Basophils, Absolute 10/09/2023 0.02  0.00 - 0.20 10*3/mm3 Final    Immature Grans, Absolute 10/09/2023 0.01  0.00 - 0.05 10*3/mm3 Final    nRBC 10/09/2023 0.0  0.0 - 0.2 /100 WBC Final       CT Chest With Contrast Diagnostic    Result Date: 9/15/2023  Narrative: CT CHEST W CONTRAST DIAGNOSTIC Date of Exam: 9/15/2023 1:46 PM CDT Indication: Breast cancer, invasive, stage I/II/III, initial workup COUGH, fever, immunosuppression. Comparison: 6/23/2023 Technique: Axial CT images were obtained of the chest after the uneventful intravenous administration of 80 cc Isovue-300.  Reconstructed coronal and sagittal images were also obtained. Automated exposure control and iterative construction methods were used. Findings: MEDIASTINUM: There is a right subclavian cell venous port with catheter tip in the proximal SVC.. Aortic and heart size are normal. No mass nor pericardial effusion. CORONARY ARTERIES: Nocalcified atherosclerotic disease. LUNGS: There are vague faint areas of groundglass attenuation throughout the lungs potentially infectious or inflammatory. No dense consolidation. No nodule. PLEURAL SPACE: No effusion, mass, nor pneumothorax. LYMPH NODES: There are no pathologically enlarged lymph nodes. UPPER ABDOMEN: Unremarkable OSSEOUS STRUCTURES: Appropriate for age with no acute process identified. Persistent 2.3 x 1.5 cm left breast nodule. Correlate with history.     Impression: Impression: 1.Very faint scattered groundglass opacities in the lungs suggesting infectious/inflammatory process. No lobar pneumonia. 2.Unchanged left breast nodule Electronically Signed: Eladio Quick MD  9/15/2023 2:28 PM CDT  Workstation ID: [id number]       Assessment / Plan      Assessment/Plan:     1.  Malignant neoplasm of central portion of left breast in female, estrogen receptor positive   2.   Chemotherapy monitoring  3.  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 Worsening transaminitis  -Rapid improvement in her symptoms with prednisone together with bilateral lung infiltrates consistent with early taxane pneumonitis.  Reviewed labs and pulmonary notes. They agreed with taxane pneumonitis and steroid course. No bronch planned unless clinical worsening.    -Continue steroid taper. To complete today. I have been in contact with her plastic surgeon and breast surgeon, tentatively plan OR 9/6 which is 4 weeks after steroids complete. Counseled her to call if any clinical worsening/recurrent symptoms as we would then need to re-escalate steoids. Discussed neoadjuvant endocrine therapy if she requires a steroid extension.   Repeat labs improved as above  Orders Placed This Encounter   Procedures    CT Chest Hi Resolution Diagnostic    Comprehensive Metabolic Panel    C-reactive Protein    Follicle Stimulating Hormone    Estradiol    CBC & Differential     Follow Up:   Postoperatively with pathology and results of high resolution CT to follow upon pneumnoitis    Martha Olguin MD  Hematology and Oncology

## 2023-10-17 ENCOUNTER — HOSPITAL ENCOUNTER (OUTPATIENT)
Dept: MRI IMAGING | Facility: HOSPITAL | Age: 43
Discharge: HOME | End: 2023-10-17
Admitting: SURGERY
Payer: COMMERCIAL

## 2023-10-17 DIAGNOSIS — C50.812 MALIGNANT NEOPLASM OF OVERLAPPING SITES OF LEFT FEMALE BREAST, UNSPECIFIED ESTROGEN RECEPTOR STATUS: ICD-10-CM

## 2023-10-17 PROCEDURE — 0 GADOBENATE DIMEGLUMINE 529 MG/ML SOLUTION: Performed by: SURGERY

## 2023-10-17 PROCEDURE — A9577 INJ MULTIHANCE: HCPCS | Performed by: SURGERY

## 2023-10-17 PROCEDURE — 77049 MRI BREAST C-+ W/CAD BI: CPT

## 2023-10-17 RX ADMIN — GADOBENATE DIMEGLUMINE 14 ML: 529 INJECTION, SOLUTION INTRAVENOUS at 12:54

## 2023-10-18 ENCOUNTER — TELEPHONE (OUTPATIENT)
Dept: MRI IMAGING | Facility: HOSPITAL | Age: 43
End: 2023-10-18
Payer: COMMERCIAL

## 2023-10-18 NOTE — TELEPHONE ENCOUNTER
Called patient with MRI Breast results. Recommended surgical follow up. An email was sent to the Nurse Navigator. Pt expressed understanding and was encouraged to call with any further questions or concerns.

## 2023-10-30 ENCOUNTER — PRE-ADMISSION TESTING (OUTPATIENT)
Dept: PREADMISSION TESTING | Facility: HOSPITAL | Age: 43
End: 2023-10-30
Payer: COMMERCIAL

## 2023-10-30 ENCOUNTER — PATIENT MESSAGE (OUTPATIENT)
Dept: ONCOLOGY | Facility: CLINIC | Age: 43
End: 2023-10-30
Payer: COMMERCIAL

## 2023-10-30 VITALS — BODY MASS INDEX: 25.29 KG/M2 | HEIGHT: 67 IN | WEIGHT: 161.16 LBS

## 2023-10-30 LAB
ALBUMIN SERPL-MCNC: 4.3 G/DL (ref 3.5–5.2)
ALBUMIN/GLOB SERPL: 1.3 G/DL
ALP SERPL-CCNC: 71 U/L (ref 39–117)
ALT SERPL W P-5'-P-CCNC: 22 U/L (ref 1–33)
ANION GAP SERPL CALCULATED.3IONS-SCNC: 6 MMOL/L (ref 5–15)
AST SERPL-CCNC: 25 U/L (ref 1–32)
BILIRUB SERPL-MCNC: 0.2 MG/DL (ref 0–1.2)
BUN SERPL-MCNC: 11 MG/DL (ref 6–20)
BUN/CREAT SERPL: 16.2 (ref 7–25)
CALCIUM SPEC-SCNC: 9.6 MG/DL (ref 8.6–10.5)
CHLORIDE SERPL-SCNC: 104 MMOL/L (ref 98–107)
CO2 SERPL-SCNC: 31 MMOL/L (ref 22–29)
CREAT SERPL-MCNC: 0.68 MG/DL (ref 0.57–1)
DEPRECATED RDW RBC AUTO: 42.5 FL (ref 37–54)
EGFRCR SERPLBLD CKD-EPI 2021: 110.3 ML/MIN/1.73
ERYTHROCYTE [DISTWIDTH] IN BLOOD BY AUTOMATED COUNT: 12.2 % (ref 12.3–15.4)
GLOBULIN UR ELPH-MCNC: 3.4 GM/DL
GLUCOSE SERPL-MCNC: 93 MG/DL (ref 65–99)
HCT VFR BLD AUTO: 38.2 % (ref 34–46.6)
HGB BLD-MCNC: 12.5 G/DL (ref 12–15.9)
MCH RBC QN AUTO: 30.9 PG (ref 26.6–33)
MCHC RBC AUTO-ENTMCNC: 32.7 G/DL (ref 31.5–35.7)
MCV RBC AUTO: 94.3 FL (ref 79–97)
PLATELET # BLD AUTO: 283 10*3/MM3 (ref 140–450)
PMV BLD AUTO: 9.3 FL (ref 6–12)
POTASSIUM SERPL-SCNC: 3.9 MMOL/L (ref 3.5–5.2)
PROT SERPL-MCNC: 7.7 G/DL (ref 6–8.5)
RBC # BLD AUTO: 4.05 10*6/MM3 (ref 3.77–5.28)
SODIUM SERPL-SCNC: 141 MMOL/L (ref 136–145)
WBC NRBC COR # BLD: 4.74 10*3/MM3 (ref 3.4–10.8)

## 2023-10-30 PROCEDURE — 36415 COLL VENOUS BLD VENIPUNCTURE: CPT

## 2023-10-30 PROCEDURE — 85027 COMPLETE CBC AUTOMATED: CPT

## 2023-10-30 PROCEDURE — 80053 COMPREHEN METABOLIC PANEL: CPT

## 2023-10-30 NOTE — TELEPHONE ENCOUNTER
Called patient via telephone.  She stated that she saw Dr. Daxa Anderson last week and she told her she didn't know what it is caused from.  Patient stated stiffness/bone pain is worse after she is sitting for a while and it is mostly affecting her hips, knees and ankles but she has some discomfort when she raises her arms up far in the right shoulder.  Patient denied fever/chills or joint redness.  Dr. Olguin notified of patient's message and telephone call.  Patient offered an appointment this week per MD.  Patient verbalized understanding and stated she wants to be seen in [place].  Message sent to scheduling.

## 2023-10-30 NOTE — PAT
An arrival time for procedure was not provided during PAT visit. If patient had any questions or concerns about their arrival time, they were instructed to contact their surgeon/physician.  Additionally, if the patient referred to an arrival time that was acquired from their my chart account, patient was encouraged to verify that time with their surgeon/physician. Arrival times are NOT provided in Pre Admission Testing Department.    Patient viewed general PAT education video as instructed in their preoperative information received from their surgeon.  Patient stated the general PAT education video was viewed in its entirety and survey completed.  Copies of PAT general education handouts (Incentive Spirometry, Meds to Beds Program, Patient Belongings, Pre-op skin preparation instructions, Blood Glucose testing, Visitor policy, Surgery FAQ, Code H) distributed to patient if not printed. Education related to the PAT pass and skin preparation for surgery (if applicable) completed in PAT as a reinforcement to PAT education video. Patient instructed to return PAT pass provided today as well as completed skin preparation sheet (if applicable) on the day of procedure.     Additionally if patient had not viewed video yet but intended to view it at home or in our waiting area, then referred them to the handout with QR code/link provided during PAT visit.  Instructed patient to complete survey after viewing the video in its entirety.  Encouraged patient/family to read PAT general education handouts thoroughly and notify PAT staff with any questions or concerns. Patient verbalized understanding of all information and priority content.    Patient to apply Chlorhexadine wipes  to surgical area (as instructed) the night before procedure and the AM of procedure. Wipes provided.    Patient instructed to drink 20 ounces of Gatorade or Gatorlyte (if diabetic) and it needs to be completed 1 hour (for Main OR patients) or 2 hours  (scheduled  section & [facility] patients) before given arrival time for procedure (NO RED Gatorade and NO Gatorade Zero).    Patient verbalized understanding.    Patient denies any current skin issues.

## 2023-11-02 ENCOUNTER — HOSPITAL ENCOUNTER (OUTPATIENT)
Dept: GENERAL RADIOLOGY | Facility: HOSPITAL | Age: 43
Discharge: HOME | End: 2023-11-02
Admitting: INTERNAL MEDICINE
Payer: COMMERCIAL

## 2023-11-02 ENCOUNTER — OFFICE VISIT (OUTPATIENT)
Dept: ONCOLOGY | Facility: CLINIC | Age: 43
End: 2023-11-02
Payer: COMMERCIAL

## 2023-11-02 VITALS
DIASTOLIC BLOOD PRESSURE: 66 MMHG | SYSTOLIC BLOOD PRESSURE: 117 MMHG | HEIGHT: 67 IN | RESPIRATION RATE: 16 BRPM | BODY MASS INDEX: 25.43 KG/M2 | HEART RATE: 83 BPM | OXYGEN SATURATION: 99 % | TEMPERATURE: 97.5 F | WEIGHT: 162 LBS

## 2023-11-02 DIAGNOSIS — Z17.0 MALIGNANT NEOPLASM OF CENTRAL PORTION OF LEFT BREAST IN FEMALE, ESTROGEN RECEPTOR POSITIVE: Primary | ICD-10-CM

## 2023-11-02 DIAGNOSIS — C50.112 MALIGNANT NEOPLASM OF CENTRAL PORTION OF LEFT BREAST IN FEMALE, ESTROGEN RECEPTOR POSITIVE: Primary | ICD-10-CM

## 2023-11-02 DIAGNOSIS — Z17.0 MALIGNANT NEOPLASM OF CENTRAL PORTION OF LEFT BREAST IN FEMALE, ESTROGEN RECEPTOR POSITIVE: ICD-10-CM

## 2023-11-02 DIAGNOSIS — C50.112 MALIGNANT NEOPLASM OF CENTRAL PORTION OF LEFT BREAST IN FEMALE, ESTROGEN RECEPTOR POSITIVE: ICD-10-CM

## 2023-11-02 PROCEDURE — 72110 X-RAY EXAM L-2 SPINE 4/>VWS: CPT

## 2023-11-02 NOTE — PROGRESS NOTES
Hematology and Oncology [place]  Office number [phone]    Fax number [phone]     Follow-up     Date: 23    Patient Name: Kimberley Win  MRN: [id number]  : 1980    Referring Physician: Dr. Daxa Anderson MD    Chief Complaint: follow up    Cancer Staging:  Cancer Staging   Stage IIA (cT2, cN1, cM0, G2, ER+, OR+, HER2-)    History of Present Illness: Kimberley Win is a pleasant 43 y.o. female who presented for evaluation of left breast cancer.     She notes a history of cystic breast disease for many years. She notes a new breast mass that became progressively harder and associated with pain prompting diagnostic workup.     Diagnostic mammogram 23 showed a dense, spiculated mass in the left breast which on ultrasound corresponded to a 3:00 3 cm hypoechoic mass with internal vascularity. In the 7:00 left breast there was a 7 mm mass corresponding on US to a cluster of cysts spanning 1.4 cm. In the left breast there was a 1.7 cm LN with multiple smaller LN.     Left breast biopsy showed grade 2 invasive ductal carcinoma (ER 90%, OR 10%, HER 2 negative 0+ by IHC). Lymph node FNA was postive for malignancy, metastatic ductal carcinoma.    Breast cancer risk profile:  Age of menarche:10  G0; infertility  Age of menopause: premenopausal   Family history of breast, ovarian, prostate or pancreatic cancer: mom stage IV breast cancer in her 50s. M Great Aunt, breast cancer.   Genetic testing: negative 36 gene CancerNext panel     Treatment history:  Dose dense AC followed by weekly taxol: C1, 2023    Interval history:  She is here for an acute visit. Notes low back pain, hip pain, radiating to lateral thighs. Pain is constant but worse with standing up. She has to slowly get up and take a few steps to get better. Muscle fatigue. Walking down stairs hurts the worst. Pain with lying flat.  Pain in right shoulder greater than left shoulder. Duration is 2 weeks, not  improving. (Started 2 weeks after steroids completed)  Was having throbbing pain down legs but in different location while on taxol.    OR is planned 23.   No changes noted in her breast.    Past Medical History:   Past Medical History:   Diagnosis Date    Arthritis     Breast cancer 2023    Left Breast    Malignant neoplasm of central portion of left breast in female, estrogen receptor positive 2023    Osteoarthritis     Varicella When I was a child   No personal history of myocardial infarction, cerebrovascular event, or venous thromboembolism.  Osteoarthritis in her hands. No personal history of autoimmune disease. Fhx of RA in her aunt    Past Surgical History:   Past Surgical History:   Procedure Laterality Date    US GUIDED LYMPH NODE BIOPSY  2023    VENOUS ACCESS DEVICE (PORT) INSERTION  2023       Family History:   Family History   Problem Relation Age of Onset    Breast cancer Mother 55        Stage 4    COPD Mother     Diabetes Mother     Early death Mother     Heart disease Mother     Stroke Mother     Rheum arthritis Maternal Aunt     Breast cancer Paternal Aunt     COPD Maternal Grandfather     Breast cancer Other      Social History:   Social History     Socioeconomic History    Marital status:    Tobacco Use    Smoking status: Former     Packs/day: 0.25     Years: 10.00     Additional pack years: 0.00     Total pack years: 2.50     Types: Cigarettes     Start date: 1995     Quit date: 2005     Years since quittin.7     Passive exposure: Past    Smokeless tobacco: Never   Vaping Use    Vaping Use: Never used   Substance and Sexual Activity    Alcohol use: Yes     Comment: rare    Drug use: Never    Sexual activity: Yes     Partners: Male     Birth control/protection: Condom   , lives in [place].     Medications:     Current Outpatient Medications:     ibuprofen (ADVIL,MOTRIN) 200 MG tablet, Take 1 tablet by mouth Every 6 (Six)  "Hours As Needed for Mild Pain., Disp: , Rfl:     lidocaine-prilocaine (EMLA) 2.5-2.5 % cream, Apply 1 application topically to the appropriate area as directed As Needed (45-60 minutes prior to port access.  Cover with saran/plastic wrap.)., Disp: 30 g, Rfl: 3    metoprolol tartrate (LOPRESSOR) 25 MG tablet, TAKE ONE-HALF TABLET BY MOUTH EVERY MORNING AND ONE-HALF TABLET EVERY EVENING, Disp: 90 tablet, Rfl: 0    omeprazole (priLOSEC) 40 MG capsule, Take 1 capsule by mouth Daily. 30 min prior to evening., Disp: 30 capsule, Rfl: 5    ondansetron (ZOFRAN) 8 MG tablet, Take 1 tablet by mouth 3 (Three) Times a Day As Needed for Nausea or Vomiting (DO NOT USE WHILE SANCUSO IN PLACE)., Disp: 30 tablet, Rfl: 5    Vitamin A 3 MG (40750 UT) capsule, Take 25,000 Units by mouth Daily., Disp: , Rfl:     Allergies:   Allergies   Allergen Reactions    Nylon Hives    Sunblock [Solbar Pf Spf15] Rash       Objective     Vital Signs:   Vitals:    11/02/23 1322   BP: 117/66   Pulse: 83   Resp: 16   Temp: 97.5 °F (36.4 °C)   TempSrc: Infrared   SpO2: 99%   Weight: 73.5 kg (162 lb)   Height: 170.2 cm (67.01\")   PainSc:   5      Body mass index is 25.37 kg/m².   Pain Score    11/02/23 1322   PainSc:   5     ECOG Performance Status: 0    Physical Exam:   General: No acute distress. Well appearing.  HEENT: Normocephalic, atraumatic. Sclera anicteric.   Cardiovascular: regular rate and rhythm. No murmurs.   Respiratory: Normal rate. Clear to auscultation bilaterally.  Abdomen: Soft, nontender, non distended with normoactive bowel sounds.  Neuro: Alert and oriented x 3. Full strength throughout. Slow to rise and leans forward on initiation of gait, but normal after initial few steps.   Ext: Symmetric, no swelling.   Breast: left lateral breast mass 2.5 x 2.5 cm not re-examined today    Laboratory/Imaging Reviewed:     Pre-Admission Testing on 10/30/2023   Component Date Value Ref Range Status    WBC 10/30/2023 4.74  3.40 - 10.80 10*3/mm3 " Final    RBC 10/30/2023 4.05  3.77 - 5.28 10*6/mm3 Final    Hemoglobin 10/30/2023 12.5  12.0 - 15.9 g/dL Final    Hematocrit 10/30/2023 38.2  34.0 - 46.6 % Final    MCV 10/30/2023 94.3  79.0 - 97.0 fL Final    MCH 10/30/2023 30.9  26.6 - 33.0 pg Final    MCHC 10/30/2023 32.7  31.5 - 35.7 g/dL Final    RDW 10/30/2023 12.2 (L)  12.3 - 15.4 % Final    RDW-SD 10/30/2023 42.5  37.0 - 54.0 fl Final    MPV 10/30/2023 9.3  6.0 - 12.0 fL Final    Platelets 10/30/2023 283  140 - 450 10*3/mm3 Final    Glucose 10/30/2023 93  65 - 99 mg/dL Final    BUN 10/30/2023 11  6 - 20 mg/dL Final    Creatinine 10/30/2023 0.68  0.57 - 1.00 mg/dL Final    Sodium 10/30/2023 141  136 - 145 mmol/L Final    Potassium 10/30/2023 3.9  3.5 - 5.2 mmol/L Final    Chloride 10/30/2023 104  98 - 107 mmol/L Final    CO2 10/30/2023 31.0 (H)  22.0 - 29.0 mmol/L Final    Calcium 10/30/2023 9.6  8.6 - 10.5 mg/dL Final    Total Protein 10/30/2023 7.7  6.0 - 8.5 g/dL Final    Albumin 10/30/2023 4.3  3.5 - 5.2 g/dL Final    ALT (SGPT) 10/30/2023 22  1 - 33 U/L Final    AST (SGOT) 10/30/2023 25  1 - 32 U/L Final    Alkaline Phosphatase 10/30/2023 71  39 - 117 U/L Final    Total Bilirubin 10/30/2023 0.2  0.0 - 1.2 mg/dL Final    Globulin 10/30/2023 3.4  gm/dL Final    Calculated Result    A/G Ratio 10/30/2023 1.3  g/dL Final    BUN/Creatinine Ratio 10/30/2023 16.2  7.0 - 25.0 Final    Anion Gap 10/30/2023 6.0  5.0 - 15.0 mmol/L Final    eGFR 10/30/2023 110.3  >60.0 mL/min/1.73 Final       XR Spine Lumbar Complete 4+VW    Result Date: 11/3/2023  Narrative: XR SPINE LUMBAR COMPLETE 4+VW Date of Exam: 11/2/2023 2:57 PM EDT Indication: low back pain Comparison: None available. Findings: There are 5 lumbar-type vertebral bodies. No acute fracture. Vertebral body heights are normal. No pars defect. Normal spinal alignment. There is mild degenerative disc disease at L4-L5 and L5-S1. No bulky facet arthropathy. Shortened pedicles at L5. The  paravertebral soft tissues  are unremarkable. No acute osseous findings in the partially imaged pelvis.     Impression: Impression: Mild degenerative disc disease in the lower lumbar spine. Shortened pedicles at L5 which may reflect a component of congenital spinal canal narrowing. Electronically Signed: Rivera Recinos MD  11/3/2023 9:22 AM EDT  Workstation ID: [id number]    MRI Breast Bilateral Diagnostic With & Without Contrast    Result Date: 10/17/2023  Narrative: BILATERAL BREAST MRI   HISTORY: 43-year-old female with a diagnosis of invasive ductal carcinoma ER positive VT positive HER2/violeta negative status post ultrasound-guided core biopsy of a palpable left breast mass at [facility]. The patient also went fine-needle aspiration of a prominent left axillary lymph node, cytology consistent with metastatic disease. No notation was made as to whether a marking clip was placed. The patient has undergone 6 cycles of neoadjuvant chemotherapy. MRI today to evaluate response to neoadjuvant chemotherapy. According to the history I've been provided patient is scheduled to undergo bilateral mastectomy on November 6, 2023.  TECHNIQUE:  MRI was performed on a 1.5 Philomena magnet utilizing an 8 channel Sentinelle breast coil.  Pre-contrast spin-echo T1 weighted and T2 weighted sequences were obtained in the axial plane.  Routine dynamic images were performed following the administration of 14 ml of Multihance contrast.  Four postcontrast runs were obtained. No contrast complications occurred.  Delayed high resolution post contrast T1 weighted sagittal images were also obtained.  A CAD system ([employer]) was utilized for data analysis.   COMPARISON: Prior breast MRI dated 6/27/2023 as well as outside mammograms from [facility] dated 5/11/2023.   FINDINGS: There is normal vascular enhancement and mild background enhancement. The breast tissue is heterogeneously dense.  Mediport now projects in the right superior chest wall. Scattered  simple cysts in both breasts have decreased in number.  LEFT BREAST: There is been  decrease in size of the index malignancy compared to the prior breast MRI. When measured in the same plane the irregular mass now measures 2.5 x 1.7 x 2.2 cm, previously measuring 3.1 x 2.2 x 3.0 cm . Signal void from marking clip placed at the time of biopsy is identified within this mass. There is no evidence of chest or skin wall invasion. There are no new areas of enhancement identified.  RIGHT BREAST: There are no areas of abnormal morphology or enhancement in the right breast to indicate right breast malignancy  Evaluation of left axillary lymph nodes demonstrate decrease in size of several lymph nodes. Specifically when measured in the sagittal plane the largest lymph node now measures 1.4 cm previously measuring 1.8 cm.  No right axillary adenopathy is appreciated.       Impression: Partial imaging response to index malignancy as described above  RECOMMENDATIONS: Clinical and oncology follow-up.  BI-RADS CATEGORY: 6 known biopsy-proven malignancy left breast.    This report was finalized on 10/17/2023 2:53 PM by Dr. Elif Pacheco MD.      Pre-Admission Testing on 10/30/2023   Component Date Value Ref Range Status    WBC 10/30/2023 4.74  3.40 - 10.80 10*3/mm3 Final    RBC 10/30/2023 4.05  3.77 - 5.28 10*6/mm3 Final    Hemoglobin 10/30/2023 12.5  12.0 - 15.9 g/dL Final    Hematocrit 10/30/2023 38.2  34.0 - 46.6 % Final    MCV 10/30/2023 94.3  79.0 - 97.0 fL Final    MCH 10/30/2023 30.9  26.6 - 33.0 pg Final    MCHC 10/30/2023 32.7  31.5 - 35.7 g/dL Final    RDW 10/30/2023 12.2 (L)  12.3 - 15.4 % Final    RDW-SD 10/30/2023 42.5  37.0 - 54.0 fl Final    MPV 10/30/2023 9.3  6.0 - 12.0 fL Final    Platelets 10/30/2023 283  140 - 450 10*3/mm3 Final    Glucose 10/30/2023 93  65 - 99 mg/dL Final    BUN 10/30/2023 11  6 - 20 mg/dL Final    Creatinine 10/30/2023 0.68  0.57 - 1.00 mg/dL Final    Sodium 10/30/2023 141  136 - 145 mmol/L  Final    Potassium 10/30/2023 3.9  3.5 - 5.2 mmol/L Final    Chloride 10/30/2023 104  98 - 107 mmol/L Final    CO2 10/30/2023 31.0 (H)  22.0 - 29.0 mmol/L Final    Calcium 10/30/2023 9.6  8.6 - 10.5 mg/dL Final    Total Protein 10/30/2023 7.7  6.0 - 8.5 g/dL Final    Albumin 10/30/2023 4.3  3.5 - 5.2 g/dL Final    ALT (SGPT) 10/30/2023 22  1 - 33 U/L Final    AST (SGOT) 10/30/2023 25  1 - 32 U/L Final    Alkaline Phosphatase 10/30/2023 71  39 - 117 U/L Final    Total Bilirubin 10/30/2023 0.2  0.0 - 1.2 mg/dL Final    Globulin 10/30/2023 3.4  gm/dL Final    Calculated Result    A/G Ratio 10/30/2023 1.3  g/dL Final    BUN/Creatinine Ratio 10/30/2023 16.2  7.0 - 25.0 Final    Anion Gap 10/30/2023 6.0  5.0 - 15.0 mmol/L Final    eGFR 10/30/2023 110.3  >60.0 mL/min/1.73 Final       MRI Breast Bilateral Diagnostic With & Without Contrast    Result Date: 10/17/2023  Narrative: BILATERAL BREAST MRI   HISTORY: 43-year-old female with a diagnosis of invasive ductal carcinoma ER positive FL positive HER2/violeta negative status post ultrasound-guided core biopsy of a palpable left breast mass at [facility]. The patient also went fine-needle aspiration of a prominent left axillary lymph node, cytology consistent with metastatic disease. No notation was made as to whether a marking clip was placed. The patient has undergone 6 cycles of neoadjuvant chemotherapy. MRI today to evaluate response to neoadjuvant chemotherapy. According to the history I've been provided patient is scheduled to undergo bilateral mastectomy on November 6, 2023.  TECHNIQUE:  MRI was performed on a 1.5 Philomena magnet utilizing an 8 channel Sentinelle breast coil.  Pre-contrast spin-echo T1 weighted and T2 weighted sequences were obtained in the axial plane.  Routine dynamic images were performed following the administration of 14 ml of Multihance contrast.  Four postcontrast runs were obtained. No contrast complications occurred.  Delayed high  resolution post contrast T1 weighted sagittal images were also obtained.  A CAD system ([employer]) was utilized for data analysis.   COMPARISON: Prior breast MRI dated 6/27/2023 as well as outside mammograms from [facility] dated 5/11/2023.   FINDINGS: There is normal vascular enhancement and mild background enhancement. The breast tissue is heterogeneously dense.  Mediport now projects in the right superior chest wall. Scattered simple cysts in both breasts have decreased in number.  LEFT BREAST: There is been  decrease in size of the index malignancy compared to the prior breast MRI. When measured in the same plane the irregular mass now measures 2.5 x 1.7 x 2.2 cm, previously measuring 3.1 x 2.2 x 3.0 cm . Signal void from marking clip placed at the time of biopsy is identified within this mass. There is no evidence of chest or skin wall invasion. There are no new areas of enhancement identified.  RIGHT BREAST: There are no areas of abnormal morphology or enhancement in the right breast to indicate right breast malignancy  Evaluation of left axillary lymph nodes demonstrate decrease in size of several lymph nodes. Specifically when measured in the sagittal plane the largest lymph node now measures 1.4 cm previously measuring 1.8 cm.  No right axillary adenopathy is appreciated.       Impression: Partial imaging response to index malignancy as described above  RECOMMENDATIONS: Clinical and oncology follow-up.  BI-RADS CATEGORY: 6 known biopsy-proven malignancy left breast.    This report was finalized on 10/17/2023 2:53 PM by Dr. Elif Pacheco MD.         Assessment / Plan      Assessment/Plan:     1.  Malignant neoplasm of central portion of left breast in female, estrogen receptor positive   -Neoadjuvant chemotherapy terminated early for taxane pneumonitis  -MRI shows partial reponse  -Follow up after pending OR with final pathology review for adjuvant therapy planning    2. Taxane pneumonitis  3.  Chemotherapy induced transaminitis  -Clinically responded quickly to steroid and now back to baseline following steroid discontinuation  -CBC/CMP reviewed and transamititis has resolved consistent with chemotherapy induced.     4. Myalgias and arthralgias  5. Low back pain  -Given time course suspect taxane induces arthalgias that were masked by steroids (onset is 2 weeks after pred taper completed). No alarm findings on exam. Given malignancy history will obtain imaging and consider MRI if fails to resolve.   Orders Placed This Encounter   Procedures    XR Spine Lumbar 4+ View    NM Bone Scan Whole Body    CK    Sedimentation Rate    C-reactive Protein    TSH     Follow Up:   Postoperatively with pathology and results of high resolution CT to follow upon pneumnoitis    Martha Olguin MD  Hematology and Oncology

## 2023-11-03 ENCOUNTER — TELEPHONE (OUTPATIENT)
Dept: ONCOLOGY | Facility: CLINIC | Age: 43
End: 2023-11-03
Payer: COMMERCIAL

## 2023-11-03 NOTE — TELEPHONE ENCOUNTER
----- Message from Martha Olguin MD sent at 11/3/2023 11:57 AM EDT -----  Regarding: Please call patient:  Please call patient: no fracture on xray.

## 2023-11-06 ENCOUNTER — HOSPITAL ENCOUNTER (OUTPATIENT)
Dept: NUCLEAR MEDICINE | Facility: HOSPITAL | Age: 43
Discharge: HOME | End: 2023-11-06
Payer: COMMERCIAL

## 2023-11-06 ENCOUNTER — ANESTHESIA (OUTPATIENT)
Dept: PERIOP | Facility: HOSPITAL | Age: 43
End: 2023-11-06
Payer: COMMERCIAL

## 2023-11-06 ENCOUNTER — ANESTHESIA EVENT CONVERTED (OUTPATIENT)
Dept: ANESTHESIOLOGY | Facility: HOSPITAL | Age: 43
End: 2023-11-06
Payer: COMMERCIAL

## 2023-11-06 ENCOUNTER — ANESTHESIA EVENT (OUTPATIENT)
Dept: PERIOP | Facility: HOSPITAL | Age: 43
End: 2023-11-06
Payer: COMMERCIAL

## 2023-11-06 ENCOUNTER — HOSPITAL ENCOUNTER (OUTPATIENT)
Facility: HOSPITAL | Age: 43
Discharge: HOME | End: 2023-11-07
Attending: SURGERY | Admitting: SURGERY
Payer: COMMERCIAL

## 2023-11-06 DIAGNOSIS — C50.812 MALIGNANT NEOPLASM OF OVERLAPPING SITES OF LEFT FEMALE BREAST, UNSPECIFIED ESTROGEN RECEPTOR STATUS: ICD-10-CM

## 2023-11-06 DIAGNOSIS — C50.919 BREAST CANCER: ICD-10-CM

## 2023-11-06 LAB
B-HCG UR QL: NEGATIVE
EXPIRATION DATE: NORMAL
INTERNAL NEGATIVE CONTROL: NEGATIVE
INTERNAL POSITIVE CONTROL: POSITIVE
Lab: NORMAL

## 2023-11-06 PROCEDURE — 25810000003 SODIUM CHLORIDE 0.9 % SOLUTION: Performed by: PLASTIC SURGERY

## 2023-11-06 PROCEDURE — 25010000002 FENTANYL CITRATE (PF) 100 MCG/2ML SOLUTION: Performed by: NURSE ANESTHETIST, CERTIFIED REGISTERED

## 2023-11-06 PROCEDURE — G0378 HOSPITAL OBSERVATION PER HR: HCPCS

## 2023-11-06 PROCEDURE — 81025 URINE PREGNANCY TEST: CPT | Performed by: ANESTHESIOLOGY

## 2023-11-06 PROCEDURE — 88305 TISSUE EXAM BY PATHOLOGIST: CPT | Performed by: SURGERY

## 2023-11-06 PROCEDURE — 25010000002 GENTAMICIN PER 80 MG: Performed by: PLASTIC SURGERY

## 2023-11-06 PROCEDURE — 0 TECHNETIUM FILTERED SULFUR COLLOID: Performed by: SURGERY

## 2023-11-06 PROCEDURE — 88342 IMHCHEM/IMCYTCHM 1ST ANTB: CPT | Performed by: SURGERY

## 2023-11-06 PROCEDURE — 88331 PATH CONSLTJ SURG 1 BLK 1SPC: CPT | Performed by: PATHOLOGY

## 2023-11-06 PROCEDURE — 25010000002 BUPIVACAINE (PF) 0.25 % SOLUTION: Performed by: NURSE ANESTHETIST, CERTIFIED REGISTERED

## 2023-11-06 PROCEDURE — 25010000002 DEXAMETHASONE SODIUM PHOSPHATE 10 MG/ML SOLUTION: Performed by: NURSE ANESTHETIST, CERTIFIED REGISTERED

## 2023-11-06 PROCEDURE — 25010000002 SUGAMMADEX 200 MG/2ML SOLUTION: Performed by: NURSE ANESTHETIST, CERTIFIED REGISTERED

## 2023-11-06 PROCEDURE — 38792 RA TRACER ID OF SENTINL NODE: CPT

## 2023-11-06 PROCEDURE — 25010000002 ONDANSETRON PER 1 MG: Performed by: NURSE ANESTHETIST, CERTIFIED REGISTERED

## 2023-11-06 PROCEDURE — C1789 PROSTHESIS, BREAST, IMP: HCPCS | Performed by: SURGERY

## 2023-11-06 PROCEDURE — 25010000002 CEFAZOLIN PER 500 MG: Performed by: SURGERY

## 2023-11-06 PROCEDURE — 25810000003 LACTATED RINGERS PER 1000 ML: Performed by: ANESTHESIOLOGY

## 2023-11-06 PROCEDURE — A9541 TC99M SULFUR COLLOID: HCPCS | Performed by: SURGERY

## 2023-11-06 PROCEDURE — 25010000002 HYDROMORPHONE 1 MG/ML SOLUTION

## 2023-11-06 PROCEDURE — 25010000002 CEFAZOLIN PER 500 MG: Performed by: PLASTIC SURGERY

## 2023-11-06 PROCEDURE — 25010000002 PROPOFOL 10 MG/ML EMULSION: Performed by: NURSE ANESTHETIST, CERTIFIED REGISTERED

## 2023-11-06 PROCEDURE — 88307 TISSUE EXAM BY PATHOLOGIST: CPT | Performed by: SURGERY

## 2023-11-06 PROCEDURE — 25010000002 FENTANYL CITRATE (PF) 50 MCG/ML SOLUTION

## 2023-11-06 DEVICE — GRFT TISS ALLODERM RTM PERF 16X20CM 2.4MM/THK .4MM: Type: IMPLANTABLE DEVICE | Site: BREAST | Status: FUNCTIONAL

## 2023-11-06 DEVICE — EXPNDR TISS BRST F/HT XPROJ STL 133S FX/T 350CC: Type: IMPLANTABLE DEVICE | Site: BREAST | Status: FUNCTIONAL

## 2023-11-06 DEVICE — LIGACLIP MCA MULTIPLE CLIP APPLIERS, 20 MEDIUM CLIPS
Type: IMPLANTABLE DEVICE | Site: BREAST | Status: FUNCTIONAL
Brand: LIGACLIP

## 2023-11-06 RX ORDER — SODIUM CHLORIDE 9 MG/ML
40 INJECTION, SOLUTION INTRAVENOUS AS NEEDED
Status: CANCELLED | OUTPATIENT
Start: 2023-11-06

## 2023-11-06 RX ORDER — ONDANSETRON 2 MG/ML
4 INJECTION INTRAMUSCULAR; INTRAVENOUS ONCE AS NEEDED
Status: DISCONTINUED | OUTPATIENT
Start: 2023-11-06 | End: 2023-11-06 | Stop reason: HOSPADM

## 2023-11-06 RX ORDER — PREGABALIN 75 MG/1
75 CAPSULE ORAL ONCE
Status: COMPLETED | OUTPATIENT
Start: 2023-11-06 | End: 2023-11-06

## 2023-11-06 RX ORDER — FAMOTIDINE 20 MG/1
20 TABLET, FILM COATED ORAL ONCE
Status: COMPLETED | OUTPATIENT
Start: 2023-11-06 | End: 2023-11-06

## 2023-11-06 RX ORDER — SODIUM CHLORIDE 9 MG/ML
50 INJECTION, SOLUTION INTRAVENOUS CONTINUOUS
Status: DISCONTINUED | OUTPATIENT
Start: 2023-11-06 | End: 2023-11-07 | Stop reason: HOSPADM

## 2023-11-06 RX ORDER — MAGNESIUM HYDROXIDE 1200 MG/15ML
LIQUID ORAL AS NEEDED
Status: DISCONTINUED | OUTPATIENT
Start: 2023-11-06 | End: 2023-11-06 | Stop reason: HOSPADM

## 2023-11-06 RX ORDER — PROMETHAZINE HYDROCHLORIDE 12.5 MG/1
6.25 TABLET ORAL EVERY 6 HOURS PRN
Status: DISCONTINUED | OUTPATIENT
Start: 2023-11-06 | End: 2023-11-07 | Stop reason: HOSPADM

## 2023-11-06 RX ORDER — SODIUM CHLORIDE 0.9 % (FLUSH) 0.9 %
3-10 SYRINGE (ML) INJECTION AS NEEDED
Status: DISCONTINUED | OUTPATIENT
Start: 2023-11-06 | End: 2023-11-06 | Stop reason: HOSPADM

## 2023-11-06 RX ORDER — LABETALOL HYDROCHLORIDE 5 MG/ML
5 INJECTION, SOLUTION INTRAVENOUS
Status: DISCONTINUED | OUTPATIENT
Start: 2023-11-06 | End: 2023-11-06 | Stop reason: HOSPADM

## 2023-11-06 RX ORDER — ACETAMINOPHEN 500 MG
1000 TABLET ORAL EVERY 6 HOURS
Status: DISCONTINUED | OUTPATIENT
Start: 2023-11-06 | End: 2023-11-07 | Stop reason: HOSPADM

## 2023-11-06 RX ORDER — PROMETHAZINE HYDROCHLORIDE 12.5 MG/1
6.25 SUPPOSITORY RECTAL EVERY 6 HOURS PRN
Status: DISCONTINUED | OUTPATIENT
Start: 2023-11-06 | End: 2023-11-07 | Stop reason: HOSPADM

## 2023-11-06 RX ORDER — IPRATROPIUM BROMIDE AND ALBUTEROL SULFATE 2.5; .5 MG/3ML; MG/3ML
3 SOLUTION RESPIRATORY (INHALATION) ONCE AS NEEDED
Status: DISCONTINUED | OUTPATIENT
Start: 2023-11-06 | End: 2023-11-06 | Stop reason: HOSPADM

## 2023-11-06 RX ORDER — MEPERIDINE HYDROCHLORIDE 25 MG/ML
12.5 INJECTION INTRAMUSCULAR; INTRAVENOUS; SUBCUTANEOUS
Status: DISCONTINUED | OUTPATIENT
Start: 2023-11-06 | End: 2023-11-06 | Stop reason: HOSPADM

## 2023-11-06 RX ORDER — LIDOCAINE HYDROCHLORIDE 10 MG/ML
0.5 INJECTION, SOLUTION EPIDURAL; INFILTRATION; INTRACAUDAL; PERINEURAL ONCE AS NEEDED
Status: COMPLETED | OUTPATIENT
Start: 2023-11-06 | End: 2023-11-06

## 2023-11-06 RX ORDER — PROMETHAZINE HYDROCHLORIDE 25 MG/1
25 SUPPOSITORY RECTAL ONCE AS NEEDED
Status: DISCONTINUED | OUTPATIENT
Start: 2023-11-06 | End: 2023-11-06 | Stop reason: HOSPADM

## 2023-11-06 RX ORDER — SODIUM CHLORIDE 9 MG/ML
40 INJECTION, SOLUTION INTRAVENOUS AS NEEDED
Status: DISCONTINUED | OUTPATIENT
Start: 2023-11-06 | End: 2023-11-06 | Stop reason: HOSPADM

## 2023-11-06 RX ORDER — HYDRALAZINE HYDROCHLORIDE 20 MG/ML
5 INJECTION INTRAMUSCULAR; INTRAVENOUS
Status: DISCONTINUED | OUTPATIENT
Start: 2023-11-06 | End: 2023-11-06 | Stop reason: HOSPADM

## 2023-11-06 RX ORDER — DIPHENHYDRAMINE HYDROCHLORIDE 50 MG/ML
12.5 INJECTION INTRAMUSCULAR; INTRAVENOUS EVERY 6 HOURS PRN
Status: DISCONTINUED | OUTPATIENT
Start: 2023-11-06 | End: 2023-11-07 | Stop reason: HOSPADM

## 2023-11-06 RX ORDER — FENTANYL CITRATE 50 UG/ML
INJECTION, SOLUTION INTRAMUSCULAR; INTRAVENOUS AS NEEDED
Status: DISCONTINUED | OUTPATIENT
Start: 2023-11-06 | End: 2023-11-06 | Stop reason: SURG

## 2023-11-06 RX ORDER — ONDANSETRON 2 MG/ML
4 INJECTION INTRAMUSCULAR; INTRAVENOUS EVERY 6 HOURS PRN
Status: DISCONTINUED | OUTPATIENT
Start: 2023-11-06 | End: 2023-11-07 | Stop reason: HOSPADM

## 2023-11-06 RX ORDER — DROPERIDOL 2.5 MG/ML
0.62 INJECTION, SOLUTION INTRAMUSCULAR; INTRAVENOUS ONCE AS NEEDED
Status: DISCONTINUED | OUTPATIENT
Start: 2023-11-06 | End: 2023-11-06 | Stop reason: HOSPADM

## 2023-11-06 RX ORDER — HYDROMORPHONE HYDROCHLORIDE 1 MG/ML
0.5 INJECTION, SOLUTION INTRAMUSCULAR; INTRAVENOUS; SUBCUTANEOUS
Status: DISCONTINUED | OUTPATIENT
Start: 2023-11-06 | End: 2023-11-06 | Stop reason: HOSPADM

## 2023-11-06 RX ORDER — ONDANSETRON 2 MG/ML
INJECTION INTRAMUSCULAR; INTRAVENOUS AS NEEDED
Status: DISCONTINUED | OUTPATIENT
Start: 2023-11-06 | End: 2023-11-06 | Stop reason: SURG

## 2023-11-06 RX ORDER — SODIUM CHLORIDE 0.9 % (FLUSH) 0.9 %
10 SYRINGE (ML) INJECTION AS NEEDED
Status: CANCELLED | OUTPATIENT
Start: 2023-11-06

## 2023-11-06 RX ORDER — SODIUM CHLORIDE 0.9 % (FLUSH) 0.9 %
3 SYRINGE (ML) INJECTION EVERY 12 HOURS SCHEDULED
Status: DISCONTINUED | OUTPATIENT
Start: 2023-11-06 | End: 2023-11-06 | Stop reason: HOSPADM

## 2023-11-06 RX ORDER — ROCURONIUM BROMIDE 10 MG/ML
INJECTION, SOLUTION INTRAVENOUS AS NEEDED
Status: DISCONTINUED | OUTPATIENT
Start: 2023-11-06 | End: 2023-11-06 | Stop reason: SURG

## 2023-11-06 RX ORDER — MIDAZOLAM HYDROCHLORIDE 1 MG/ML
1 INJECTION INTRAMUSCULAR; INTRAVENOUS
Status: DISCONTINUED | OUTPATIENT
Start: 2023-11-06 | End: 2023-11-06 | Stop reason: HOSPADM

## 2023-11-06 RX ORDER — NALOXONE HCL 0.4 MG/ML
0.4 VIAL (ML) INJECTION AS NEEDED
Status: DISCONTINUED | OUTPATIENT
Start: 2023-11-06 | End: 2023-11-06 | Stop reason: HOSPADM

## 2023-11-06 RX ORDER — PROMETHAZINE HYDROCHLORIDE 25 MG/1
25 TABLET ORAL ONCE AS NEEDED
Status: DISCONTINUED | OUTPATIENT
Start: 2023-11-06 | End: 2023-11-06 | Stop reason: HOSPADM

## 2023-11-06 RX ORDER — DEXAMETHASONE SODIUM PHOSPHATE 10 MG/ML
INJECTION, SOLUTION INTRAMUSCULAR; INTRAVENOUS
Status: COMPLETED | OUTPATIENT
Start: 2023-11-06 | End: 2023-11-06

## 2023-11-06 RX ORDER — CYCLOBENZAPRINE HCL 10 MG
10 TABLET ORAL EVERY 8 HOURS PRN
Status: DISCONTINUED | OUTPATIENT
Start: 2023-11-06 | End: 2023-11-07 | Stop reason: HOSPADM

## 2023-11-06 RX ORDER — DROPERIDOL 2.5 MG/ML
0.62 INJECTION, SOLUTION INTRAMUSCULAR; INTRAVENOUS
Status: DISCONTINUED | OUTPATIENT
Start: 2023-11-06 | End: 2023-11-06 | Stop reason: HOSPADM

## 2023-11-06 RX ORDER — BUPIVACAINE HYDROCHLORIDE 2.5 MG/ML
INJECTION, SOLUTION EPIDURAL; INFILTRATION; INTRACAUDAL
Status: COMPLETED | OUTPATIENT
Start: 2023-11-06 | End: 2023-11-06

## 2023-11-06 RX ORDER — LIDOCAINE HYDROCHLORIDE 10 MG/ML
INJECTION, SOLUTION EPIDURAL; INFILTRATION; INTRACAUDAL; PERINEURAL AS NEEDED
Status: DISCONTINUED | OUTPATIENT
Start: 2023-11-06 | End: 2023-11-06 | Stop reason: SURG

## 2023-11-06 RX ORDER — SODIUM CHLORIDE, SODIUM LACTATE, POTASSIUM CHLORIDE, CALCIUM CHLORIDE 600; 310; 30; 20 MG/100ML; MG/100ML; MG/100ML; MG/100ML
9 INJECTION, SOLUTION INTRAVENOUS CONTINUOUS
Status: DISCONTINUED | OUTPATIENT
Start: 2023-11-06 | End: 2023-11-07 | Stop reason: HOSPADM

## 2023-11-06 RX ORDER — FENTANYL CITRATE 50 UG/ML
INJECTION, SOLUTION INTRAMUSCULAR; INTRAVENOUS
Status: COMPLETED
Start: 2023-11-06 | End: 2023-11-06

## 2023-11-06 RX ORDER — SODIUM CHLORIDE 0.9 % (FLUSH) 0.9 %
10 SYRINGE (ML) INJECTION EVERY 12 HOURS SCHEDULED
Status: CANCELLED | OUTPATIENT
Start: 2023-11-06

## 2023-11-06 RX ORDER — PROPOFOL 10 MG/ML
VIAL (ML) INTRAVENOUS AS NEEDED
Status: DISCONTINUED | OUTPATIENT
Start: 2023-11-06 | End: 2023-11-06 | Stop reason: SURG

## 2023-11-06 RX ORDER — FAMOTIDINE 10 MG/ML
20 INJECTION, SOLUTION INTRAVENOUS ONCE
Status: CANCELLED | OUTPATIENT
Start: 2023-11-06 | End: 2023-11-06

## 2023-11-06 RX ORDER — FENTANYL CITRATE 50 UG/ML
50 INJECTION, SOLUTION INTRAMUSCULAR; INTRAVENOUS
Status: DISCONTINUED | OUTPATIENT
Start: 2023-11-06 | End: 2023-11-06 | Stop reason: HOSPADM

## 2023-11-06 RX ORDER — OXYCODONE HYDROCHLORIDE 5 MG/1
5 TABLET ORAL EVERY 4 HOURS PRN
Status: DISCONTINUED | OUTPATIENT
Start: 2023-11-06 | End: 2023-11-07 | Stop reason: HOSPADM

## 2023-11-06 RX ORDER — SCOLOPAMINE TRANSDERMAL SYSTEM 1 MG/1
1 PATCH, EXTENDED RELEASE TRANSDERMAL ONCE
Status: DISCONTINUED | OUTPATIENT
Start: 2023-11-06 | End: 2023-11-06

## 2023-11-06 RX ADMIN — OXYCODONE HYDROCHLORIDE 5 MG: 5 TABLET ORAL at 20:40

## 2023-11-06 RX ADMIN — FENTANYL CITRATE 50 MCG: 50 INJECTION, SOLUTION INTRAMUSCULAR; INTRAVENOUS at 11:27

## 2023-11-06 RX ADMIN — SODIUM CHLORIDE 50 ML/HR: 9 INJECTION, SOLUTION INTRAVENOUS at 16:54

## 2023-11-06 RX ADMIN — DEXAMETHASONE SODIUM PHOSPHATE 4 MG: 10 INJECTION, SOLUTION INTRAMUSCULAR; INTRAVENOUS at 11:15

## 2023-11-06 RX ADMIN — TECHNETIUM TC 99M SULFUR COLLOID 1 DOSE: KIT at 11:18

## 2023-11-06 RX ADMIN — ACETAMINOPHEN 1000 MG: 500 TABLET ORAL at 22:49

## 2023-11-06 RX ADMIN — FENTANYL CITRATE 50 MCG: 50 INJECTION, SOLUTION INTRAMUSCULAR; INTRAVENOUS at 15:12

## 2023-11-06 RX ADMIN — ROCURONIUM BROMIDE 30 MG: 10 SOLUTION INTRAVENOUS at 11:12

## 2023-11-06 RX ADMIN — HYDROMORPHONE HYDROCHLORIDE 0.5 MG: 1 INJECTION, SOLUTION INTRAMUSCULAR; INTRAVENOUS; SUBCUTANEOUS at 15:46

## 2023-11-06 RX ADMIN — SUGAMMADEX 200 MG: 100 INJECTION, SOLUTION INTRAVENOUS at 14:39

## 2023-11-06 RX ADMIN — LIDOCAINE HYDROCHLORIDE 50 MG: 10 INJECTION, SOLUTION EPIDURAL; INFILTRATION; INTRACAUDAL; PERINEURAL at 11:12

## 2023-11-06 RX ADMIN — SODIUM CHLORIDE 2000 MG: 900 INJECTION INTRAVENOUS at 11:07

## 2023-11-06 RX ADMIN — PREGABALIN 75 MG: 75 CAPSULE ORAL at 09:00

## 2023-11-06 RX ADMIN — FAMOTIDINE 20 MG: 20 TABLET, FILM COATED ORAL at 09:00

## 2023-11-06 RX ADMIN — ACETAMINOPHEN 1000 MG: 500 TABLET ORAL at 16:58

## 2023-11-06 RX ADMIN — LIDOCAINE HYDROCHLORIDE 0.2 ML: 10 INJECTION, SOLUTION EPIDURAL; INFILTRATION; INTRACAUDAL; PERINEURAL at 08:40

## 2023-11-06 RX ADMIN — SODIUM CHLORIDE 2000 MG: 900 INJECTION INTRAVENOUS at 19:10

## 2023-11-06 RX ADMIN — FENTANYL CITRATE 50 MCG: 50 INJECTION, SOLUTION INTRAMUSCULAR; INTRAVENOUS at 11:12

## 2023-11-06 RX ADMIN — PROPOFOL 180 MG: 10 INJECTION, EMULSION INTRAVENOUS at 11:12

## 2023-11-06 RX ADMIN — DEXAMETHASONE SODIUM PHOSPHATE 6 MG: 10 INJECTION, SOLUTION INTRAMUSCULAR; INTRAVENOUS at 11:18

## 2023-11-06 RX ADMIN — BUPIVACAINE HYDROCHLORIDE 60 ML: 2.5 INJECTION, SOLUTION EPIDURAL; INFILTRATION; INTRACAUDAL; PERINEURAL at 11:15

## 2023-11-06 RX ADMIN — SCOPOLAMINE 1 PATCH: 1.5 PATCH, EXTENDED RELEASE TRANSDERMAL at 09:00

## 2023-11-06 RX ADMIN — HYDROMORPHONE HYDROCHLORIDE 0.5 MG: 1 INJECTION, SOLUTION INTRAMUSCULAR; INTRAVENOUS; SUBCUTANEOUS at 15:30

## 2023-11-06 RX ADMIN — ONDANSETRON 4 MG: 2 INJECTION INTRAMUSCULAR; INTRAVENOUS at 14:39

## 2023-11-06 RX ADMIN — SODIUM CHLORIDE, POTASSIUM CHLORIDE, SODIUM LACTATE AND CALCIUM CHLORIDE 9 ML/HR: 600; 310; 30; 20 INJECTION, SOLUTION INTRAVENOUS at 08:40

## 2023-11-06 NOTE — ANESTHESIA PROCEDURE NOTES
Airway  Urgency: elective    Date/Time: 11/6/2023 11:14 AM  Airway not difficult    General Information and Staff    Patient location during procedure: OR  CRNA/CAA: Nat Vera CRNA    Indications and Patient Condition  Indications for airway management: airway protection    Preoxygenated: yes  MILS not maintained throughout  Mask difficulty assessment: 1 - vent by mask    Final Airway Details  Final airway type: endotracheal airway      Successful airway: ETT  Cuffed: yes   Successful intubation technique: direct laryngoscopy  Facilitating devices/methods: intubating stylet  Endotracheal tube insertion site: oral  Blade: Abdoul  Blade size: 3  ETT size (mm): 7.0  Cormack-Lehane Classification: grade IIa - partial view of glottis  Placement verified by: chest auscultation and capnometry   Measured from: lips  ETT/EBT  to lips (cm): 20  Number of attempts at approach: 1  Assessment: lips, teeth, and gum same as pre-op and atraumatic intubation    Additional Comments  Negative epigastric sounds, Breath sound equal bilaterally with symmetric chest rise and fall

## 2023-11-06 NOTE — ANESTHESIA POSTPROCEDURE EVALUATION
Patient: Kimberley Win    Procedure Summary       Date: 11/06/23 Room / Location:  [facility] OR 06 /  [facility]    Anesthesia Start: 1107 Anesthesia Stop: 1501    Procedures:       BIATERAL BREAST MASTECTOMY WITH NIPPLE SPARING WITH LEFT SENTINEL NODE BIOPSY, AND AXILLARY DISECTION (Bilateral: Breast)      IMMEDIATE BREAST RECONSTRUCTION WITH BREAST TISSUE EXPANDER INSERTION AND ALLODERM - BILATERAL (Bilateral: Breast) Diagnosis:     Surgeons: Daxa Anderson MD; Dilip Anderson MD Provider: Demond Angel MD    Anesthesia Type: general ASA Status: 2            Anesthesia Type: general    Vitals  Vitals Value Taken Time   BP     Temp     Pulse 69 11/06/23 1459   Resp     SpO2 100 % 11/06/23 1459   Vitals shown include unfiled device data.        Post Anesthesia Care and Evaluation    Patient location during evaluation: PACU  Patient participation: complete - patient participated  Level of consciousness: responsive to physical stimuli  Pain management: adequate    Airway patency: patent  Anesthetic complications: No anesthetic complications  PONV Status: none  Cardiovascular status: hemodynamically stable and acceptable  Respiratory status: nonlabored ventilation, acceptable and nasal cannula  Hydration status: acceptable

## 2023-11-06 NOTE — H&P
Plastic Surgery Consult      Admission Date:  2023  LOS:  0  Patient Care Team:  Dorinda Covington DO as PCP - General (Family Medicine)    Patient Name:  Kimberley Win  :  1980  MRN:  [id number]    Date:  2023      Breast Reconstruction Consult    Referral: Dr. Daxa Anderson    History of Present Illness:    Kimberley Win is a 43yoF past smoker with Stage IIA (cT2, cN1, cM0, G2, ER+, WA+, HER2-) left breast cancer.    She notes a history of cystic breast disease for many years. She notes a new breast mass that became progressively harder and associated with pain prompting diagnostic workup.  Diagnostic mammogram 23 showed a dense, spiculated mass in the left breast which on ultrasound corresponded to a 3:00 3 cm hypoechoic mass with internal vascularity. In the 7:00 left breast there was a 7 mm mass corresponding on US to a cluster of cysts spanning 1.4 cm. In the left breast there was a 1.7 cm LN with multiple smaller LN.  Left breast biopsy showed grade 2 invasive ductal carcinoma (ER 90%, WA 10%, HER 2 negative 0+ by IHC). Lymph node FNA was postive for malignancy, metastatic ductal carcinoma.    Patient was placed on chemotherapy to shrink the tumor for possible NSM. Patient saw Dr. Daxa Anderson and elected for bilateral mastectomies. Patient will need radiation due to positive LN. Patient will be done with chemo at the end of . Patient interested in implant based breast reconstruction.    Patient was seen in clinic. Patient had a bad reaction to the chemotherapy and had to stop earlier. Patient was placed on steroids. We had to speed up the time table for her breast reconstruction. Patient will be 1 month off her steroids and has recovered her labs are WNL.    Breast Surgery: None  Breast Goal: Small B cup    Breast pathology: Stage IIA (cT2, cN1, cM0, G2, ER+, WA+, HER2-)  BREAST, BIOPSY, NEEDLE CORES, LEFT MASS:  Invasive ductal carcinoma, Corrigan combined  histologic grade 2, 0.3 cm in  greatest measure dimension  Tubule formation: 3 points  Nuclear pleomorphism: 2 points  Mitotic rate: One-point  ER: Positive  MA: Positive  HER2/violeta: Negative      PMH: arthritis, OA, GERD, HLD  PSH: port placement  SH: Past smoker denies drinking, or illicit drug abuse. Lives in [facility]  FH: Family History of Breast cancer: None  Allergies: NKDA  Medications: No blood thinners or herbal supplements.          History:   Past Medical History:   Diagnosis Date    Arthritis     Breast cancer 2023    Left Breast    Malignant neoplasm of central portion of left breast in female, estrogen receptor positive 2023    Osteoarthritis     Varicella When I was a child     Past Surgical History:   Procedure Laterality Date    US GUIDED LYMPH NODE BIOPSY  2023    VENOUS ACCESS DEVICE (PORT) INSERTION  2023     Family History   Problem Relation Age of Onset    Breast cancer Mother 55        Stage 4    COPD Mother     Diabetes Mother     Early death Mother     Heart disease Mother     Stroke Mother     Rheum arthritis Maternal Aunt     Breast cancer Paternal Aunt     COPD Maternal Grandfather     Breast cancer Other      Social History     Socioeconomic History    Marital status:    Tobacco Use    Smoking status: Former     Packs/day: 0.25     Years: 10.00     Additional pack years: 0.00     Total pack years: 2.50     Types: Cigarettes     Start date: 1995     Quit date: 2005     Years since quittin.7     Passive exposure: Past    Smokeless tobacco: Never   Vaping Use    Vaping Use: Never used   Substance and Sexual Activity    Alcohol use: Yes     Comment: rare    Drug use: Never    Sexual activity: Yes     Partners: Male     Birth control/protection: Condom     Allergies   Allergen Reactions    Nylon Hives    Sunblock [Solbar Pf Spf15] Rash       Medication:  No current facility-administered medications for this  encounter.    Antibiotics:  Anti-Infectives (From admission, onward)      None            Allergies   Allergen Reactions    Nylon Hives    Sunblock [Solbar Pf Spf15] Rash          Review of Systems:  Constitutional-- No Fever, chills or sweats.  Appetite okay, and no malaise. No fatigue.  HEENT-- No new vision, hearing or throat complaints.  No epistaxis or oral sores.  Denies odynophagia or dysphagia. No headache, photophobia or neck stiffness.  CV-- No chest pain, palpitation or syncope  Resp-- No SOB/cough/Hemoptysis  GI- No nausea, vomiting, or diarrhea.  No hematochezia, melena, or hematemesis. Denies jaundice or chronic liver disease.  -- No dysuria, hematuria, or flank pain.  Denies hesitancy, urgency or flank pain.  Lymph- no swollen lymph nodes in neck/axilla or groin.   Heme- No active bruising or bleeding; no Hx of DVT or PE.  MS-- no swelling or pain in the bones or joints of arms/legs.  No new back pain.  He does not verbalize pain per sister at bedside.  Neuro-- No acute focal weakness or numbness in the arms or legs.  No seizures.  Skin--No rashes.       Physical Exam:   Vital Signs:  No data recorded.    No data recorded  No data recorded  No data recorded  No data recorded  No data recorded    General: Well developed, well nourished, alert and cooperative, and appears to be in no acute distress.  Head: normocephalic.  HEENT: Normocephalic, atraumatic.  EOMI. No conjunctival injection. No icterus. No nasal discharge.  Heart: Rhythm is regular. There is no peripheral edema, cyanosis or pallor. Extremities are warm and well perfused. Capillary refill is less than 2 seconds.  Lungs: Clear to auscultation bilaterally, Normal respiratory effort. Nonlabored. No dullness.  Abdomen: Soft, nontender, nondistended. No rebound or guarding. NO mass or HSM.      Focused Breast Exam: bilateral breast A cup no ptosis, Lt breast > Rt breast in volume, small areolas, no chest wall abnormality.    Rt breast:  BW: 12  "cm  SN-N: 19 cm  N-IMF: 7 cm    Lt breast:  BW: 12 cm  SN-N: 19 cm  N-IMF: 7 cm        Laboratory Data:  Results from last 7 days   Lab Units 10/30/23  1028   WBC 10*3/mm3 4.74   HEMOGLOBIN g/dL 12.5   HEMATOCRIT % 38.2   PLATELETS 10*3/mm3 283     Results from last 7 days   Lab Units 10/30/23  1028   SODIUM mmol/L 141   POTASSIUM mmol/L 3.9   CHLORIDE mmol/L 104   CO2 mmol/L 31.0*   BUN mg/dL 11   CREATININE mg/dL 0.68   GLUCOSE mg/dL 93   CALCIUM mg/dL 9.6     Results from last 7 days   Lab Units 10/30/23  1028   ALK PHOS U/L 71   BILIRUBIN mg/dL 0.2   ALT (SGPT) U/L 22   AST (SGOT) U/L 25                         Estimated Creatinine Clearance: 122.5 mL/min (by C-G formula based on SCr of 0.68 mg/dL).                 Microbiology:  No results found for: \"ACANTHNAEG\", \"AFBCX\", \"BPERTUSSISCX\", \"BLOODCX\"  No results found for: \"BCIDPCR\", \"CXREFLEX\", \"CSFCX\", \"CULTURETIS\"  No results found for: \"CULTURES\", \"HSVCX\", \"URCX\"  No results found for: \"EYECULTURE\", \"GCCX\", \"HSVCULTURE\", \"LABHSV\"  No results found for: \"LEGIONELLA\", \"MRSACX\", \"MUMPSCX\", \"MYCOPLASCX\"  No results found for: \"NOCARDIACX\", \"STOOLCX\"  No results found for: \"THROATCX\", \"UNSTIMCULT\", \"URINECX\", \"CULTURE\", \"VZVCULTUR\"  No results found for: \"VIRALCULTU\", \"WOUNDCX\"          Assessment: 43yoF past smoker with Stage IIA (cT2, cN1, cM0, G2, ER+, KS+, HER2-) left breast cancer.    Breast Goal: B cup    Plan:  Again Discussed risks and benefits of implant-based reconstruction vs autologous breast reconstruction vs prosthetics. Discussed SSM vs NSM. Discussed delayed vs immediate reconstruction. Based on PE and hx patient may be able to do NSM vs SSM pending Dr. Mittal decision followed by immediate reconstruction with TE and alloderm.    Patient will need radiation discussed increased risk of complications with radiation and placement of implant will depend on radiology oncology    Patient done with chemo and off of steroids 1 months prior will do " vitamin A    Scheduled bilateral NSM/SSM with Dr. emanuel Anderson folloed by immediate recon with 11 cm TE wrapped in alloderm.          Dilip Anderson MD  11/06/23  08:14 EST

## 2023-11-06 NOTE — ANESTHESIA PROCEDURE NOTES
Peripheral Block      Patient reassessed immediately prior to procedure    Patient location during procedure: OR  Reason for block: at surgeon's request and post-op pain management  Performed by  Anesthesiologist: Srini Pereira MD  Preanesthetic Checklist  Completed: patient identified, IV checked, site marked, risks and benefits discussed, surgical consent, monitors and equipment checked, pre-op evaluation and timeout performed  Prep:  Pt Position: supine  Sterile barriers:cap, gloves, mask and washed/disinfected hands  Prep: ChloraPrep  Patient monitoring: blood pressure monitoring, continuous pulse oximetry and EKG  Procedure  Performed under: general  Guidance:ultrasound guided and landmark technique  Images:still images obtained, printed/placed on chart    Laterality:Bilateral  Block Type:PECS I and PECS II  Injection Technique:single-shot  Needle Type:short-bevel  Needle Gauge:20 G  Resistance on Injection: none    Medications Used: dexamethasone sodium phosphate injection - Injection   4 mg - 11/6/2023 11:15:00 AM  bupivacaine PF (MARCAINE) 0.25 % injection - Injection   60 mL - 11/6/2023 11:15:00 AM      Medications  Preservative Free Saline:10ml  Comment:Block Injection:  Total volume of LA divided between Right and Left sided blocks         Post Assessment  Injection Assessment: negative aspiration for heme, incremental injection and no paresthesia on injection  Patient Tolerance:comfortable throughout block  Complications:no  Additional Notes  Interpectoral-Pectoserratus Plane   A high-frequency linear transducer, with sterile cover, was placed medial to the coracoid process in the paramedian sagittal plane. The transducer was moved caudally to the 4th rib and rotated slightly to allow an in-plane needle trajectory from medial to lateral. Pectoralis Major Muscle (PMM), Pectoralis Minor Muscle (PmM), Thoracoacromial Artery, Ribs, and Pleura were identified under ultrasound. The insertion site was  "prepped in sterile fashion and then localized with 2-5 ml of 1% Lidocaine. Using ultrasound-guidance, a 20-gauge B-Hoffman 4\" Ultraplex 360 non-stimulating echogenic needle was advanced in plane until the tip of the needle was in the fascial plane between the PMM and PmM, lateral to the Thoracoacromial Artery. 1-3ml of preservative free normal saline was used to hydro-dissect the fascial planes. After the fascial plane was verified, 10ml local anesthetic (LA) was injected for Interpectoral fascial plane block. The needle was continued along the same path to the level of the 4th rib below PmM.  Initially preservative free normal saline was used to confirm needle position and then 20 ml of LA was injected for Pectoserratus fascial plane block. Aspiration every 5 ml to prevent intravascular injection. Injection was completed with negative aspiration of blood and negative intravascular injection. Injection pressures were normal with minimal resistance.               "

## 2023-11-06 NOTE — OP NOTE
General Surgery Mastectomy Operative Note    Kimberley Win  [id number]  1980    Date of Surgery:  11/6/2023 13:16 EST    Pre-op Diagnosis: left breast cancer    Post-op Diagnosis: same     Procedure(s):  LEFT NIPPLE SPARING MASTECTOMY RIGHT PROPHYLACTIC NIPPLE SPARING MASTECTOMY, LEFT BREAST SENTINEL NODE INJECTION, LEFT DEEP AXILLARY SENTINEL NODE BIOPSY, LEFT AXILLARY DISECTION    Anesthesia: General, manju pec blocks    [place] Node Biopsy for Breast Cancer - Left  Operation performed with curative intent. Yes   Tracer(s) used to identify sentinel nodes in the upfront surgery (non-neoadjuvant) setting (select all that apply). Radioactive tracer   Tracer(s) used to identify sentinel nodes in the neoadjuvant setting (select all that apply). Radioactive tracer   All nodes (colored or non-colored) present at the end of a dye-filled lymphatic channel were removed. N/A   All significantly radioactive nodes were removed. Yes   All palpably suspicious nodes were removed. Yes   Biopsy-proven positive nodes marked with clips prior to chemotherapy were identified and removed. N/A        Surgeon: Daxa Anderson MD    Assistant: Assistant: Giana Prater PA     Estimated Blood Loss: 25ml    Specimens:             Specimens       ID Source Type Tests Collected By Collected At Frozen?    A Breast, Right Tissue TISSUE PATHOLOGY EXAM   Daxa Anderson MD 11/6/23 1153     Description: Right Breast, See comments    Comment: Short Stitch Sub-nipple Areolar Tissue, Long stitch Axillary Tail    This specimen was not marked as sent.    B [place] Lymph Node Tissue TISSUE PATHOLOGY EXAM   Daxa Anderson MD 11/6/23 1215 Yes    Description: Left Deep Axillary Sentinal Nodes    This specimen was not marked as sent.    C Breast, Left Tissue TISSUE PATHOLOGY EXAM   Daxa Anderson MD 11/6/23 1306     Description: See Comments    Comment: Short Stitch Sub-nipple Areolar Tissue, Long stitch Axillary Tail    This specimen was  not marked as sent.    D Lymph Node Tissue TISSUE PATHOLOGY EXAM   Daxa Anderson MD 11/6/23 4190     Description: Left Deep Axillary Node Dissection    This specimen was not marked as sent.          Findings: tumor close to skin but grossly normal and unable to take anymore without taking skin, left sentinel nodes 1/2 positive for macromets, at least 1 more grossly positive node removed, very close to thoracodorsal nerve but nerve preserved    Complications:  none    History: H/o Stage 2 left breast cancer s/p neoadjuvant chemo with some response. Pt desired bilateral nipple sparing mastectomy with recon.  Left sentinel node biopsy and possible axillary dissection also planned and informed consent obtained.    Description of Procedure: Pt brought to OR and placed in supine position.  GETA and bilateral pectoralis block performed.  Time out performed.  I injected left breast with radiolabeled nucleotide in usual fashion.  Pt prepped and draped.  Bilateral IM folds marked with 11cm markings. I began on right and Incision made and continued down using cautery to the fascia and posterior plane developed first.  Then the anterior flaps created superiorly to just below the clavicle, medially to lateral sternum, inferior to below fold, and laterally to latissimus. Breast was taken off the pec and axillary tail was marked with a stitch.  Site was irrigated and hemostasis confirmed. I then moved to the left breast. Marcial probe was used to identify the area of most uptake in the left axilla and I made a small axillary incision and divided fascia and removed 2 deep sentinel nodes.  These were removed using cautery and clips as needed. Frozen was 1/2 positive for macromets.  I performed a deep axillary node dissection remove all palpable nodes and preserving nerves. I then made left IM incision and continued down using cautery to the fascia and posterior plane developed first.  Then the anterior flaps created superiorly to  just below the clavicle, medially to lateral sternum, inferior to below fold, and laterally to latissimus. The tumor was close to the skin and marked it with a clip because I could not remove any more tissue without compromising or taking skin. Breast was taken off the pec and axillary tail was marked with a stitch.  Site was irrigated and hemostasis confirmed. Breast was taken off the pec and axillary tail was marked with a stitch.  Site was irrigated and hemostasis confirmed.  Dr. Dilip Anderson took over at this time. NAC and skin viable.    Assistant: Giana Prater PA  was responsible for performing the following activities: Retraction, Suction, Irrigation, Suturing, Closing, and Placing Dressing and their skilled assistance was necessary for the success of this case.    Daxa Anderson MD     Date: 11/6/2023  Time: 13:16 EST

## 2023-11-06 NOTE — OP NOTE
Plastic Surgery Operative Report        Patient Name:  Kimberley Win  :  1980  MRN:  [id number]    Date of Surgery:  2023       PREOPERATIVE DIAGNOSIS: Bilateral absent breasts, status post bilateral mastectomies for breast cancer.   POSTOPERATIVE DIAGNOSIS: Bilateral absent breasts, status post bilateral mastectomies for breast cancer.     PROCEDURES PERFORMED:  1. Bilateral breast reconstruction with immediate insertion of 11 cm tissue expanders in a prepectoral position filled 25 cc.    2. Use of AlloDerm in breast reconstruction bilaterally.     SURGEON: Dilip Anderson MD      CPT® Codes: 99823, 00107     Staff:  Surgeon(s):  Daxa Anderson MD Moore, Evan M, MD    Circulator: Coral Jacques RN; Kahlil Raphael RN  Scrub Person: Cheryl Prater; Zainab Rebolledo  Nursing Assistant: Kimberlee Calderon PCT  Assistant: Giana Prater PA     Assistant: Giana Prater PA  was responsible for performing the following activities: Retraction, Suction, Irrigation, Suturing, Closing, and Placing Dressing and their skilled assistance was necessary for the success of this case.      Anesthesia: General      Indications for the Procedure:  Kimberley Win is a 43yoF past smoker with Stage IIA (cT2, cN1, cM0, G2, ER+, HI+, HER2-) left breast cancer.  Patient was seen in clinic and elected for bilateral mastectomies with immediate reconstruction starting with tissue expanders.     Operative Findings:  1. Placement of 11 cm TE filled to 25 cc.       Description of the Procedure:   Patient was seen in preop holding risks and benefits were discussed with the patient who elected to proceed with the procedure. At this point in time H&P was updated, consent was obtained, and the patient was then marked appropriately (including the breast borders and the mastectomy incision patterns). The patient was then taken back to the OR placed in a supine position on the operating room table and general anesthesia was induced  followed by intubation. The patient was prepped and draped in a sterile like fashion. A time out was performed in which the patient and the procedure were gone over. Everyone was in agreement in terms of the patient and the procedure.      Dr. Daxa Anderson then started the procedure by performing the bilateral mastectomies and the SLNBs. On the sterile back table the tissue expanders were then prepped and wrapped in alloderm. Tissue expanders were then selected checking expiration date to be sure they were not . The alloderms were also selected checking expiration date to be sure they were not . The air was then taken out of the tissue expanders. Injectable normal saline was then placed into the tissue expanders a total of 25 cc into each tissue expander. The alloderm was then prepped in a sterile like fashion. The alloderm was then used to wrap the individual tissue expanders suturing together with 3-0 vicryl and tailoring the ends with white scissors. Once the tissue expanders were wrapped in alloderm they were placed in sterile bowels with antibiotic solution and covered until they were needed during the case.      Following the end of the bilateral mastectomies and SLNBs Dr. Daxa Anderson had completed the procedures at this point Plastic surgery took over the remainder of the case. The patient's skin was then re prepped with betadine paint. The patient was then re draped in a sterile like fashion. New light handle covers, new bovie and new suction were then placed. Working in both breast pockets. The pockets were then irrigated with warm normal saline. Hemostasis was then checked with bovie cautery taking care not to burn the skin flaps. Once hemostasis was obtained the pockets were irrigated with antibiotic solution.      The tissue expanders wrapped in alloderm were then placed into the breast pockets in a prepectoral position and the suture tabs were sutured in place with 2-0 silk suture. Top  gloves were changed during each handling of the tissue expanders. One 15 Chinese drains were placed into each breast pocket. Drains were sutured in place with 3-0 nylon suture. Biopatches were placed around the drains. The mastectomies skin edges were then freshened with a 10 blade. The mastectomies incision was then closed by deep 3-0 monocryls,  deep dermal 3-0 monocryls followed by running 3-0 stratifix suture. The drain sites were then dressed. Drapes were taken down and the patient was placed into a surgical bra with fluffs. At this point in time the procedure had finished. Patient was the extubated and taken to PACU for a full recovery.       Implants:    Implant Name Type Inv. Item Serial No.  Lot No. LRB No. Used Action   CLIPAPPLR M/ ENDO [id number] 3/8IN MD - [id number] Implant CLIPAPPLR M/ ENDO [id number] 3/8IN MD  [facility]  DIV OF J [employer] J [id number] N/A 1 Implanted   EXPNDR TISS BRST F/HT XPROJ STL 133S FX/T 350CC - [id number] - [id number] Implant EXPNDR TISS BRST F/HT XPROJ STL 133S FX/T 350CC [id number] [employer] [id number] Left 1 Implanted   EXPNDR TISS BRST F/HT XPROJ STL 133S FX/T 350CC - [id number] - [id number] Implant EXPNDR TISS BRST F/HT XPROJ STL 133S FX/T 350CC [id number] [employer] [id number] Right 1 Implanted       Specimen Removed: Rt and Lt breast tissue      Estimated Blood Loss: 2 cc plastic surgery portion    Drains Placed: 2 drains total placed, One in each breast    Complications: none immediate      Dilip Anderson MD     Date: 11/6/2023  Time: 17:56 EST

## 2023-11-06 NOTE — ANESTHESIA PREPROCEDURE EVALUATION
Anesthesia Evaluation     Patient summary reviewed and Nursing notes reviewed   no history of anesthetic complications:   NPO Solid Status: > 8 hours  NPO Liquid Status: > 8 hours           Airway   Mallampati: II  TM distance: >3 FB  Neck ROM: full  No difficulty expected  Dental      Pulmonary - normal exam   Cardiovascular - normal exam        Neuro/Psych  GI/Hepatic/Renal/Endo      Musculoskeletal     Abdominal    Substance History      OB/GYN          Other   arthritis,                 Anesthesia Plan    ASA 2     general     intravenous induction     Anesthetic plan, risks, benefits, and alternatives have been provided, discussed and informed consent has been obtained with: patient.    Plan discussed with CRNA.    CODE STATUS:

## 2023-11-07 ENCOUNTER — PATIENT OUTREACH (OUTPATIENT)
Dept: ONCOLOGY | Facility: HOSPITAL | Age: 43
End: 2023-11-07
Payer: COMMERCIAL

## 2023-11-07 VITALS
WEIGHT: 162 LBS | TEMPERATURE: 99.1 F | SYSTOLIC BLOOD PRESSURE: 99 MMHG | BODY MASS INDEX: 25.43 KG/M2 | RESPIRATION RATE: 16 BRPM | HEIGHT: 67 IN | HEART RATE: 72 BPM | OXYGEN SATURATION: 98 % | DIASTOLIC BLOOD PRESSURE: 62 MMHG

## 2023-11-07 PROCEDURE — 25010000002 CEFAZOLIN PER 500 MG: Performed by: PLASTIC SURGERY

## 2023-11-07 RX ADMIN — SODIUM CHLORIDE 2000 MG: 900 INJECTION INTRAVENOUS at 02:57

## 2023-11-07 RX ADMIN — ACETAMINOPHEN 1000 MG: 500 TABLET ORAL at 04:19

## 2023-11-07 RX ADMIN — OXYCODONE HYDROCHLORIDE 5 MG: 5 TABLET ORAL at 09:26

## 2023-11-07 NOTE — PLAN OF CARE
Goal Outcome Evaluation:      Pt was d/c at 1030. IV removed. D/c and drain education given to pt and pt's . Pt transported to 's vehicle via wheelchair by primary RN.

## 2023-11-07 NOTE — DISCHARGE SUMMARY
History of Present Illness:     Kimberley Win is a 43yoF past smoker with Stage IIA (cT2, cN1, cM0, G2, ER+, ID+, HER2-) left breast cancer.     She notes a history of cystic breast disease for many years. She notes a new breast mass that became progressively harder and associated with pain prompting diagnostic workup.  Diagnostic mammogram 5/11/23 showed a dense, spiculated mass in the left breast which on ultrasound corresponded to a 3:00 3 cm hypoechoic mass with internal vascularity. In the 7:00 left breast there was a 7 mm mass corresponding on US to a cluster of cysts spanning 1.4 cm. In the left breast there was a 1.7 cm LN with multiple smaller LN.  Left breast biopsy showed grade 2 invasive ductal carcinoma (ER 90%, ID 10%, HER 2 negative 0+ by IHC). Lymph node FNA was postive for malignancy, metastatic ductal carcinoma.     Patient was placed on chemotherapy to shrink the tumor for possible NSM. Patient saw Dr. Daxa Anderson and elected for bilateral mastectomies. Patient will need radiation due to positive LN. Patient will be done with chemo at the end of 11/23. Patient interested in implant based breast reconstruction.     Patient was seen in clinic. Patient had a bad reaction to the chemotherapy and had to stop earlier. Patient was placed on steroids. We had to speed up the time table for her breast reconstruction. Patient will be 1 month off her steroids and has recovered her labs are WNL.    Date of Surgery:  11/6/2023 13:16 EST  Pre-op Diagnosis: left breast cancer  Post-op Diagnosis: same      Procedure(s):  LEFT NIPPLE SPARING MASTECTOMY RIGHT PROPHYLACTIC NIPPLE SPARING MASTECTOMY, LEFT BREAST SENTINEL NODE INJECTION, LEFT DEEP AXILLARY SENTINEL NODE BIOPSY, LEFT AXILLARY DISECTION    Date of Surgery:  11/6/2023  PREOPERATIVE DIAGNOSIS: Bilateral absent breasts, status post bilateral mastectomies for breast cancer.   POSTOPERATIVE DIAGNOSIS: Bilateral absent breasts, status post bilateral  mastectomies for breast cancer.      PROCEDURES PERFORMED:  1. Bilateral breast reconstruction with immediate insertion of 11 cm tissue expanders in a prepectoral position filled 25 cc.    2. Use of AlloDerm in breast reconstruction bilaterally.     Hospital Course  Patient was a planned admit for iv hydration and pain control. Patient did well overnight. Patient was seen in AM. Patient was voiding ambulating tolerating PO. Patient was found appropriate for discharge.

## 2023-11-07 NOTE — PLAN OF CARE
Goal Outcome Evaluation:                 VSS on room air. Voiding spontaneously. LIS x2 to bulb suction. Sites CDI. Family at bedside. Tolerating PO. PRN x1 for pain.

## 2023-11-07 NOTE — PROGRESS NOTES
Pt has done well overnight.Pain minimal.    AF VSS  [place]'s approp serosang  Flaps and NAC manju viable    A/P: stable postop, d/c home

## 2023-11-07 NOTE — CASE MANAGEMENT/SOCIAL WORK
Continued Stay Note  [facility]     Patient Name: Kimberley Win  MRN: [id number]  Today's Date: 11/7/2023    Admit Date: 11/6/2023        Discharge Plan       Row Name 11/07/23 1107       Plan    Final Discharge Disposition Code 01 - home or self-care                   Discharge Codes    No documentation.                 Expected Discharge Date and Time       Expected Discharge Date Expected Discharge Time    Nov 7, 2023               ANNABELLE Johansen

## 2023-11-07 NOTE — DISCHARGE INSTRUCTIONS
May shower no baths  No heavy lifting  No bra or compressive dressing  Scripts at home  Follow up as scheduled with Dr. Dilip Anderson

## 2023-11-10 LAB
CYTO UR: NORMAL
LAB AP CASE REPORT: NORMAL
LAB AP CLINICAL INFORMATION: NORMAL
LAB AP DIAGNOSIS COMMENT: NORMAL
LAB AP SPECIAL STAINS: NORMAL
Lab: NORMAL
PATH REPORT.FINAL DX SPEC: NORMAL
PATH REPORT.GROSS SPEC: NORMAL

## 2023-11-13 ENCOUNTER — HOSPITAL ENCOUNTER (OUTPATIENT)
Dept: CT IMAGING | Facility: HOSPITAL | Age: 43
Discharge: HOME | End: 2023-11-13
Admitting: INTERNAL MEDICINE
Payer: COMMERCIAL

## 2023-11-13 DIAGNOSIS — Z17.0 MALIGNANT NEOPLASM OF CENTRAL PORTION OF LEFT BREAST IN FEMALE, ESTROGEN RECEPTOR POSITIVE: ICD-10-CM

## 2023-11-13 DIAGNOSIS — J98.4 DRUG-INDUCED PNEUMONITIS: ICD-10-CM

## 2023-11-13 DIAGNOSIS — C50.112 MALIGNANT NEOPLASM OF CENTRAL PORTION OF LEFT BREAST IN FEMALE, ESTROGEN RECEPTOR POSITIVE: ICD-10-CM

## 2023-11-13 DIAGNOSIS — T50.905A DRUG-INDUCED PNEUMONITIS: ICD-10-CM

## 2023-11-13 PROCEDURE — 71250 CT THORAX DX C-: CPT

## 2023-11-15 DIAGNOSIS — C50.112 MALIGNANT NEOPLASM OF CENTRAL PORTION OF LEFT BREAST IN FEMALE, ESTROGEN RECEPTOR POSITIVE: Primary | ICD-10-CM

## 2023-11-15 DIAGNOSIS — Z17.0 MALIGNANT NEOPLASM OF CENTRAL PORTION OF LEFT BREAST IN FEMALE, ESTROGEN RECEPTOR POSITIVE: Primary | ICD-10-CM

## 2023-11-17 ENCOUNTER — INFUSION (OUTPATIENT)
Dept: ONCOLOGY | Facility: HOSPITAL | Age: 43
End: 2023-11-17
Payer: COMMERCIAL

## 2023-11-17 ENCOUNTER — HOSPITAL ENCOUNTER (OUTPATIENT)
Dept: NUCLEAR MEDICINE | Facility: HOSPITAL | Age: 43
Discharge: HOME | End: 2023-11-17
Payer: COMMERCIAL

## 2023-11-17 DIAGNOSIS — C50.112 MALIGNANT NEOPLASM OF CENTRAL PORTION OF LEFT BREAST IN FEMALE, ESTROGEN RECEPTOR POSITIVE: ICD-10-CM

## 2023-11-17 DIAGNOSIS — C50.112 MALIGNANT NEOPLASM OF CENTRAL PORTION OF LEFT BREAST IN FEMALE, ESTROGEN RECEPTOR POSITIVE: Primary | ICD-10-CM

## 2023-11-17 DIAGNOSIS — Z17.0 MALIGNANT NEOPLASM OF CENTRAL PORTION OF LEFT BREAST IN FEMALE, ESTROGEN RECEPTOR POSITIVE: ICD-10-CM

## 2023-11-17 DIAGNOSIS — Z17.0 MALIGNANT NEOPLASM OF CENTRAL PORTION OF LEFT BREAST IN FEMALE, ESTROGEN RECEPTOR POSITIVE: Primary | ICD-10-CM

## 2023-11-17 LAB
CK SERPL-CCNC: 56 U/L (ref 20–180)
CRP SERPL-MCNC: 0.41 MG/DL (ref 0–0.5)
ERYTHROCYTE [SEDIMENTATION RATE] IN BLOOD: 33 MM/HR (ref 0–20)
REF LAB TEST METHOD: NORMAL
TSH SERPL DL<=0.05 MIU/L-ACNC: 3.48 UIU/ML (ref 0.27–4.2)

## 2023-11-17 PROCEDURE — 36591 DRAW BLOOD OFF VENOUS DEVICE: CPT

## 2023-11-17 PROCEDURE — 78306 BONE IMAGING WHOLE BODY: CPT

## 2023-11-17 PROCEDURE — 86140 C-REACTIVE PROTEIN: CPT

## 2023-11-17 PROCEDURE — 25010000002 HEPARIN LOCK FLUSH PER 10 UNITS: Performed by: INTERNAL MEDICINE

## 2023-11-17 PROCEDURE — A9503 TC99M MEDRONATE: HCPCS | Performed by: INTERNAL MEDICINE

## 2023-11-17 PROCEDURE — 84443 ASSAY THYROID STIM HORMONE: CPT

## 2023-11-17 PROCEDURE — 82550 ASSAY OF CK (CPK): CPT

## 2023-11-17 PROCEDURE — 85652 RBC SED RATE AUTOMATED: CPT

## 2023-11-17 PROCEDURE — 0 TECHNETIUM MEDRONATE KIT: Performed by: INTERNAL MEDICINE

## 2023-11-17 RX ORDER — HEPARIN SODIUM (PORCINE) LOCK FLUSH IV SOLN 100 UNIT/ML 100 UNIT/ML
500 SOLUTION INTRAVENOUS AS NEEDED
Status: DISCONTINUED | OUTPATIENT
Start: 2023-11-17 | End: 2023-11-17 | Stop reason: HOSPADM

## 2023-11-17 RX ORDER — HEPARIN SODIUM (PORCINE) LOCK FLUSH IV SOLN 100 UNIT/ML 100 UNIT/ML
500 SOLUTION INTRAVENOUS AS NEEDED
OUTPATIENT
Start: 2023-11-17

## 2023-11-17 RX ORDER — TC 99M MEDRONATE 20 MG/10ML
26.5 INJECTION, POWDER, LYOPHILIZED, FOR SOLUTION INTRAVENOUS
Status: COMPLETED | OUTPATIENT
Start: 2023-11-17 | End: 2023-11-17

## 2023-11-17 RX ADMIN — HEPARIN 500 UNITS: 100 SYRINGE at 09:09

## 2023-11-17 RX ADMIN — TC 99M MEDRONATE 26.5 MILLICURIE: 20 INJECTION, POWDER, LYOPHILIZED, FOR SOLUTION INTRAVENOUS at 09:00

## 2023-11-20 ENCOUNTER — TELEPHONE (OUTPATIENT)
Dept: RADIATION ONCOLOGY | Facility: HOSPITAL | Age: 43
End: 2023-11-20
Payer: COMMERCIAL

## 2023-11-20 ENCOUNTER — OFFICE VISIT (OUTPATIENT)
Dept: ONCOLOGY | Facility: CLINIC | Age: 43
End: 2023-11-20
Payer: COMMERCIAL

## 2023-11-20 VITALS
SYSTOLIC BLOOD PRESSURE: 113 MMHG | DIASTOLIC BLOOD PRESSURE: 71 MMHG | OXYGEN SATURATION: 98 % | TEMPERATURE: 97.3 F | BODY MASS INDEX: 25.11 KG/M2 | RESPIRATION RATE: 16 BRPM | HEART RATE: 82 BPM | HEIGHT: 67 IN | WEIGHT: 160 LBS

## 2023-11-20 DIAGNOSIS — R51.9 NONINTRACTABLE EPISODIC HEADACHE, UNSPECIFIED HEADACHE TYPE: ICD-10-CM

## 2023-11-20 DIAGNOSIS — Z17.0 MALIGNANT NEOPLASM OF CENTRAL PORTION OF LEFT BREAST IN FEMALE, ESTROGEN RECEPTOR POSITIVE: Primary | ICD-10-CM

## 2023-11-20 DIAGNOSIS — C50.112 MALIGNANT NEOPLASM OF CENTRAL PORTION OF LEFT BREAST IN FEMALE, ESTROGEN RECEPTOR POSITIVE: Primary | ICD-10-CM

## 2023-11-20 RX ORDER — OXYCODONE HYDROCHLORIDE 5 MG/1
1 TABLET ORAL
COMMUNITY
Start: 2023-10-19

## 2023-11-20 NOTE — PROGRESS NOTES
[facility]  Office number [phone]    Fax number [phone]     Follow-up     Date: 23    Patient Name: Kimberley Win  MRN: [id number]  : 1980    Referring Physician: Dr. Daxa Anderson MD    Chief Complaint: follow up    Cancer Staging:  Cancer Staging   Stage IIA (cT2, cN1, cM0, G2, ER+, AR+, HER2-)    History of Present Illness: Kimberley Win is a pleasant 43 y.o. female who presented for evaluation of left breast cancer.     She notes a history of cystic breast disease for many years. She notes a new breast mass that became progressively harder and associated with pain prompting diagnostic workup.     Diagnostic mammogram 23 showed a dense, spiculated mass in the left breast which on ultrasound corresponded to a 3:00 3 cm hypoechoic mass with internal vascularity. In the 7:00 left breast there was a 7 mm mass corresponding on US to a cluster of cysts spanning 1.4 cm. In the left breast there was a 1.7 cm LN with multiple smaller LN.     Left breast biopsy showed grade 2 invasive ductal carcinoma (ER 90%, AR 10%, HER 2 negative 0+ by IHC). Lymph node FNA was postive for malignancy, metastatic ductal carcinoma.  Ki-67 5%    Breast cancer risk profile:  Age of menarche:10  G0; infertility  Age of menopause: premenopausal   Family history of breast, ovarian, prostate or pancreatic cancer: mom stage IV breast cancer in her 50s. M Great Aunt, breast cancer.   Genetic testing: negative 36 gene CancerNext panel     Treatment history:  Dose dense AC followed by weekly taxol: C1, 2023.  Weekly Taxol terminated after 2 doses for pneumonitis which fully resolved with oral steroids    Interval history:  She presents for follow-up and final pathology review.  She underwent bilateral mastectomy and sentinel lymph node biopsy on 2023.  Left breast mastectomy showed residual 2.5 cm of invasive ductal carcinoma with associated DCIS.  Margins negative.   [place] lymph node biopsy was positive (1/2) with extracapsular extension and 1 of 4 additional lymph nodes were positive for metastatic carcinoma (total lymph node count 2 of 6).      She is recovering well from surgery.  Incisional pain is well-controlled.  Has an appointment pending with Dr. Robison 12/5/2023 for consideration of postmastectomy radiation.  She notes throbbing in back of left occiput that resolves over the last few weeks.  It is becoming more frequent.  No associated vision changes, focal numbness, or focal weakness.  She has low back pain and hip pain since treatment which are stable.  Continued hot flashes.  No menses.    Past Medical History:   Past Medical History:   Diagnosis Date    Arthritis     Breast cancer 06/01/2023    Left Breast    Hypertension     Malignant neoplasm of central portion of left breast in female, estrogen receptor positive 06/20/2023    Osteoarthritis 2021   No personal history of myocardial infarction, cerebrovascular event, or venous thromboembolism.  Osteoarthritis in her hands. No personal history of autoimmune disease. Fhx of RA in her aunt    Past Surgical History:   Past Surgical History:   Procedure Laterality Date    BREAST RECONSTRUCTION, BREAST TISSUE EXPANDER REMOVAL, IMPLANT INSERTION Bilateral 11/6/2023    Procedure: IMMEDIATE BREAST RECONSTRUCTION WITH BREAST TISSUE EXPANDER INSERTION AND ALLODERM - BILATERAL;  Surgeon: Dilip Anderson MD;  Location:  [facility] OR;  Service: Plastics;  Laterality: Bilateral;    MASTECTOMY W/ SENTINEL NODE BIOPSY Bilateral 11/6/2023    Procedure: BREAST MASTECTOMY WITH NIPPLE SPARING BILATERAL WITH LEFT SENTINEL NODE BIOPSY, AND AXILLARY DISECTION;  Surgeon: Daxa Anderson MD;  Location:  [facility] OR;  Service: General;  Laterality: Bilateral;    US GUIDED LYMPH NODE BIOPSY  06/01/2023    VENOUS ACCESS DEVICE (PORT) INSERTION  06/30/2023       Family History:   Family History   Problem Relation Age of Onset    Breast cancer  Mother 55        Stage 4    COPD Mother     Diabetes Mother     Early death Mother     Heart disease Mother     Stroke Mother     Rheum arthritis Maternal Aunt     Breast cancer Paternal Aunt     COPD Maternal Grandfather     Breast cancer Other      Social History:   Social History     Socioeconomic History    Marital status:    Tobacco Use    Smoking status: Former     Packs/day: 0.25     Years: 10.00     Additional pack years: 0.00     Total pack years: 2.50     Types: Cigarettes     Start date: 1995     Quit date: 2005     Years since quittin.7     Passive exposure: Past    Smokeless tobacco: Never   Vaping Use    Vaping Use: Never used   Substance and Sexual Activity    Alcohol use: Yes     Comment: rare    Drug use: Never    Sexual activity: Yes     Partners: Male     Birth control/protection: Condom   , lives in [place].     Medications:     Current Outpatient Medications:     oxyCODONE (ROXICODONE) 5 MG immediate release tablet, Take 1 tablet by mouth Every 4 (Four) to 6 (Six) Hours As Needed for Pain., Disp: , Rfl:     lidocaine-prilocaine (EMLA) 2.5-2.5 % cream, Apply 1 application topically to the appropriate area as directed As Needed (45-60 minutes prior to port access.  Cover with saran/plastic wrap.)., Disp: 30 g, Rfl: 3    metoprolol tartrate (LOPRESSOR) 25 MG tablet, TAKE ONE-HALF TABLET BY MOUTH EVERY MORNING AND ONE-HALF TABLET EVERY EVENING, Disp: 90 tablet, Rfl: 0    omeprazole (priLOSEC) 40 MG capsule, Take 1 capsule by mouth Daily. 30 min prior to evening., Disp: 30 capsule, Rfl: 5    ondansetron (ZOFRAN) 8 MG tablet, Take 1 tablet by mouth 3 (Three) Times a Day As Needed for Nausea or Vomiting (DO NOT USE WHILE SANCUSO IN PLACE)., Disp: 30 tablet, Rfl: 5    Vitamin A 3 MG (05370 UT) capsule, Take 25,000 Units by mouth Daily., Disp: , Rfl:     Allergies:   Allergies   Allergen Reactions    Nylon Hives    Sunblock [Solbar Pf Spf15] Rash       Objective  "    Vital Signs:   Vitals:    11/20/23 1149   BP: 113/71   Pulse: 82   Resp: 16   Temp: 97.3 °F (36.3 °C)   TempSrc: Temporal   SpO2: 98%   Weight: 72.6 kg (160 lb)   Height: 170.2 cm (67\")   PainSc:   2   PainLoc: Hip      Body mass index is 25.06 kg/m².   Pain Score    11/20/23 1149   PainSc:   2   PainLoc: Hip     ECOG Performance Status: 0    Physical Exam:   General: No acute distress. Well appearing.  HEENT: Normocephalic, atraumatic. Sclera anicteric.   Cardiovascular: regular rate and rhythm. No murmurs.   Respiratory: Normal rate. Clear to auscultation bilaterally.  Abdomen: Soft, nontender, non distended with normoactive bowel sounds.  Neuro: Alert and oriented x 3. Full strength throughout.  Cranial nerves intact.  Finger-nose and rapid alternating movements normal  Ext: Sy status post bilateral mastectomy.  Incisions are healing well.    Laboratory/Imaging Reviewed:     Infusion on 11/17/2023   Component Date Value Ref Range Status    Creatine Kinase 11/17/2023 56  20 - 180 U/L Final    Sed Rate 11/17/2023 33 (H)  0 - 20 mm/hr Final    C-Reactive Protein 11/17/2023 0.41  0.00 - 0.50 mg/dL Final    TSH 11/17/2023 3.480  0.270 - 4.200 uIU/mL Final   Admission on 11/06/2023, Discharged on 11/07/2023   Component Date Value Ref Range Status    HCG, Urine, QL 11/06/2023 Negative  Negative Final    Lot Number 11/06/2023 [id number]   Final    Internal Positive Control 11/06/2023 Positive  Positive, Passed Final    Internal Negative Control 11/06/2023 Negative  Negative, Passed Final    Expiration Date 11/06/2023 01/30/2025   Final    Case Report 11/06/2023    Final                    Value:Surgical Pathology Report                         Case: [id number]                                  Authorizing Provider:  Daxa Anderson MD        Collected:           11/06/2023 11:53 AM          Ordering Location:     [facility]   Received:            11/07/2023 06:56 AM                                 OR    "                                                                        Pathologist:           Heaven Nelson MD                                                   Intraop:               Heaven Nelson MD                                                   Specimens:   1) - Breast, Right, Right Breast, See comments                                                      2) - [place] Lymph Node, Left Deep Axillary Sentinal Nodes                                         3) - Breast, Left, See Comments                                                                     4) - Lymph Node, Left Deep Axillary Node Dissection                                        Clinical Information 11/06/2023    Final                    Value:This result contains rich text formatting which cannot be displayed here.    Final Diagnosis 11/06/2023    Final                    Value:This result contains rich text formatting which cannot be displayed here.    Comment 11/06/2023    Final                    Value:This result contains rich text formatting which cannot be displayed here.    Intraoperative Consultation 11/06/2023    Final                    Value:This result contains rich text formatting which cannot be displayed here.    Gross Description 11/06/2023    Final                    Value:This result contains rich text formatting which cannot be displayed here.    Special Stains 11/06/2023    Final                    Value:This result contains rich text formatting which cannot be displayed here.    Microscopic Description 11/06/2023    Final                    Value:This result contains rich text formatting which cannot be displayed here.       NM Bone Scan Whole Body    Result Date: 11/19/2023  Narrative: PROCEDURE: NM BONE SCAN WHOLE BODY-  HISTORY: pain, breast cancer; C50.112-Malignant neoplasm of central portion of left female breast; Z17.0-Estrogen receptor positive status (ER+)  COMPARISON: Existing relevant imaging  studies: 6/26/2023.  PROCEDURE: The patient was injected with 26.5 mCi of technetium 99m MDP.  Three hour delayed images were obtained.  FINDINGS:  No abnormal tracer activity is identified to suggest occult fracture or metastatic disease.      Impression: No findings to indicate metastatic bone disease .     This report was signed and finalized on 11/19/2023 10:47 PM by Trae De MD.      CT Chest Hi Resolution Diagnostic    Result Date: 11/13/2023  Narrative: CT CHEST HI RESOLUTION DIAGNOSTIC Date of Exam: 11/13/2023 11:11 AM EST Indication: pneumonitis. Comparison: September 15, 2023 Technique: CT scan of the chest with high-resolution protocol was performed without contrast administration.  Reconstructed coronal and sagittal images were also obtained. Automated exposure control and iterative construction methods were used. Findings: The central tracheobronchial tree is clear. The lungs are clear. No significant emphysema or interstitial lung disease is identified. There is no pleural effusion. A right subclavian port has its tip at the mid SVC. The heart size appears normal, with no evidence of calcified coronary artery disease. The great vessels are normal in caliber. No abnormally enlarged lymph nodes are identified. Post-surgical changes are noted along the bilateral breasts. Partial evaluation of the upper abdomen is unremarkable. No aggressive osseous lesions are identified.     Impression: Impression: No acute cardiopulmonary process. Electronically Signed: Demond Yuan MD  11/13/2023 6:32 PM EST  Workstation ID: [id number]    NM [place] Node Injection Only    Result Date: 11/6/2023  Narrative: This procedure was auto-finalized with no dictation required.    Peripheral Block    Result Date: 11/6/2023  Narrative: Nat Vera CRNA     11/6/2023 11:21 AM Peripheral Block Patient reassessed immediately prior to procedure Patient location during procedure: OR Reason for block: at surgeon's  "request and post-op pain management Performed by Anesthesiologist: Srini Pereira MD Preanesthetic Checklist Completed: patient identified, IV checked, site marked, risks and benefits discussed, surgical consent, monitors and equipment checked, pre-op evaluation and timeout performed Prep: Pt Position: supine Sterile barriers:cap, gloves, mask and washed/disinfected hands Prep: ChloraPrep Patient monitoring: blood pressure monitoring, continuous pulse oximetry and EKG Procedure Performed under: general Guidance:ultrasound guided and landmark technique Images:still images obtained, printed/placed on chart Laterality:Bilateral Block Type:PECS I and PECS II Injection Technique:single-shot Needle Type:short-bevel Needle Gauge:20 G Resistance on Injection: none Medications Used: dexamethasone sodium phosphate injection - Injection  4 mg - 11/6/2023 11:15:00 AM bupivacaine PF (MARCAINE) 0.25 % injection - Injection  60 mL - 11/6/2023 11:15:00 AM Medications Preservative Free Saline:10ml Comment:Block Injection:  Total volume of LA divided between Right and Left sided blocks   Post Assessment Injection Assessment: negative aspiration for heme, incremental injection and no paresthesia on injection Patient Tolerance:comfortable throughout block Complications:no Additional Notes Interpectoral-Pectoserratus Plane A high-frequency linear transducer, with sterile cover, was placed medial to the coracoid process in the paramedian sagittal plane. The transducer was moved caudally to the 4th rib and rotated slightly to allow an in-plane needle trajectory from medial to lateral. Pectoralis Major Muscle (PMM), Pectoralis Minor Muscle (PmM), Thoracoacromial Artery, Ribs, and Pleura were identified under ultrasound. The insertion site was prepped in sterile fashion and then localized with 2-5 ml of 1% Lidocaine. Using ultrasound-guidance, a 20-gauge B-Hoffman 4\" Ultraplex 360 non-stimulating echogenic needle was advanced in plane " until the tip of the needle was in the fascial plane between the PMM and PmM, lateral to the Thoracoacromial Artery. 1-3ml of preservative free normal saline was used to hydro-dissect the fascial planes. After the fascial plane was verified, 10ml local anesthetic (LA) was injected for Interpectoral fascial plane block. The needle was continued along the same path to the level of the 4th rib below PmM.  Initially preservative free normal saline was used to confirm needle position and then 20 ml of LA was injected for Pectoserratus fascial plane block. Aspiration every 5 ml to prevent intravascular injection. Injection was completed with negative aspiration of blood and negative intravascular injection. Injection pressures were normal with minimal resistance.     XR Spine Lumbar Complete 4+VW    Result Date: 11/3/2023  Narrative: XR SPINE LUMBAR COMPLETE 4+VW Date of Exam: 11/2/2023 2:57 PM EDT Indication: low back pain Comparison: None available. Findings: There are 5 lumbar-type vertebral bodies. No acute fracture. Vertebral body heights are normal. No pars defect. Normal spinal alignment. There is mild degenerative disc disease at L4-L5 and L5-S1. No bulky facet arthropathy. Shortened pedicles at L5. The  paravertebral soft tissues are unremarkable. No acute osseous findings in the partially imaged pelvis.     Impression: Impression: Mild degenerative disc disease in the lower lumbar spine. Shortened pedicles at L5 which may reflect a component of congenital spinal canal narrowing. Electronically Signed: Rivera Recinos MD  11/3/2023 9:22 AM EDT  Workstation ID: [id number]    Infusion on 11/17/2023   Component Date Value Ref Range Status    Creatine Kinase 11/17/2023 56  20 - 180 U/L Final    Sed Rate 11/17/2023 33 (H)  0 - 20 mm/hr Final    C-Reactive Protein 11/17/2023 0.41  0.00 - 0.50 mg/dL Final    TSH 11/17/2023 3.480  0.270 - 4.200 uIU/mL Final   Admission on 11/06/2023, Discharged on 11/07/2023   Component  Date Value Ref Range Status    HCG, Urine, QL 11/06/2023 Negative  Negative Final    Lot Number 11/06/2023 [id number]   Final    Internal Positive Control 11/06/2023 Positive  Positive, Passed Final    Internal Negative Control 11/06/2023 Negative  Negative, Passed Final    Expiration Date 11/06/2023 01/30/2025   Final    Case Report 11/06/2023    Final                    Value:Surgical Pathology Report                         Case: [id number]                                  Authorizing Provider:  Daxa Anderson MD        Collected:           11/06/2023 11:53 AM          Ordering Location:     [facility]   Received:            11/07/2023 06:56 AM                                 OR                                                                           Pathologist:           Heaven Nelson MD                                                   Intraop:               Heaven Nelson MD                                                   Specimens:   1) - Breast, Right, Right Breast, See comments                                                      2) - [place] Lymph Node, Left Deep Axillary Sentinal Nodes                                         3) - Breast, Left, See Comments                                                                     4) - Lymph Node, Left Deep Axillary Node Dissection                                        Clinical Information 11/06/2023    Final                    Value:This result contains rich text formatting which cannot be displayed here.    Final Diagnosis 11/06/2023    Final                    Value:This result contains rich text formatting which cannot be displayed here.    Comment 11/06/2023    Final                    Value:This result contains rich text formatting which cannot be displayed here.    Intraoperative Consultation 11/06/2023    Final                    Value:This result contains rich text formatting which cannot be displayed here.     Gross Description 11/06/2023    Final                    Value:This result contains rich text formatting which cannot be displayed here.    Special Stains 11/06/2023    Final                    Value:This result contains rich text formatting which cannot be displayed here.    Microscopic Description 11/06/2023    Final                    Value:This result contains rich text formatting which cannot be displayed here.       NM Bone Scan Whole Body    Result Date: 11/19/2023  Narrative: PROCEDURE: NM BONE SCAN WHOLE BODY-  HISTORY: pain, breast cancer; C50.112-Malignant neoplasm of central portion of left female breast; Z17.0-Estrogen receptor positive status (ER+)  COMPARISON: Existing relevant imaging studies: 6/26/2023.  PROCEDURE: The patient was injected with 26.5 mCi of technetium 99m MDP.  Three hour delayed images were obtained.  FINDINGS:  No abnormal tracer activity is identified to suggest occult fracture or metastatic disease.      Impression: No findings to indicate metastatic bone disease .     This report was signed and finalized on 11/19/2023 10:47 PM by Trae De MD.      CT Chest Hi Resolution Diagnostic    Result Date: 11/13/2023  Narrative: CT CHEST HI RESOLUTION DIAGNOSTIC Date of Exam: 11/13/2023 11:11 AM EST Indication: pneumonitis. Comparison: September 15, 2023 Technique: CT scan of the chest with high-resolution protocol was performed without contrast administration.  Reconstructed coronal and sagittal images were also obtained. Automated exposure control and iterative construction methods were used. Findings: The central tracheobronchial tree is clear. The lungs are clear. No significant emphysema or interstitial lung disease is identified. There is no pleural effusion. A right subclavian port has its tip at the mid SVC. The heart size appears normal, with no evidence of calcified coronary artery disease. The great vessels are normal in caliber. No abnormally enlarged lymph nodes are  identified. Post-surgical changes are noted along the bilateral breasts. Partial evaluation of the upper abdomen is unremarkable. No aggressive osseous lesions are identified.     Impression: Impression: No acute cardiopulmonary process. Electronically Signed: Demond Yuan MD  11/13/2023 6:32 PM EST  Workstation ID: [id number]    NM [place] Node Injection Only    Result Date: 11/6/2023  Narrative: This procedure was auto-finalized with no dictation required.    Peripheral Block    Result Date: 11/6/2023  Narrative: Nat Vera CRNA     11/6/2023 11:21 AM Peripheral Block Patient reassessed immediately prior to procedure Patient location during procedure: OR Reason for block: at surgeon's request and post-op pain management Performed by Anesthesiologist: Srini Pereira MD Preanesthetic Checklist Completed: patient identified, IV checked, site marked, risks and benefits discussed, surgical consent, monitors and equipment checked, pre-op evaluation and timeout performed Prep: Pt Position: supine Sterile barriers:cap, gloves, mask and washed/disinfected hands Prep: ChloraPrep Patient monitoring: blood pressure monitoring, continuous pulse oximetry and EKG Procedure Performed under: general Guidance:ultrasound guided and landmark technique Images:still images obtained, printed/placed on chart Laterality:Bilateral Block Type:PECS I and PECS II Injection Technique:single-shot Needle Type:short-bevel Needle Gauge:20 G Resistance on Injection: none Medications Used: dexamethasone sodium phosphate injection - Injection  4 mg - 11/6/2023 11:15:00 AM bupivacaine PF (MARCAINE) 0.25 % injection - Injection  60 mL - 11/6/2023 11:15:00 AM Medications Preservative Free Saline:10ml Comment:Block Injection:  Total volume of LA divided between Right and Left sided blocks   Post Assessment Injection Assessment: negative aspiration for heme, incremental injection and no paresthesia on injection Patient  "Tolerance:comfortable throughout block Complications:no Additional Notes Interpectoral-Pectoserratus Plane A high-frequency linear transducer, with sterile cover, was placed medial to the coracoid process in the paramedian sagittal plane. The transducer was moved caudally to the 4th rib and rotated slightly to allow an in-plane needle trajectory from medial to lateral. Pectoralis Major Muscle (PMM), Pectoralis Minor Muscle (PmM), Thoracoacromial Artery, Ribs, and Pleura were identified under ultrasound. The insertion site was prepped in sterile fashion and then localized with 2-5 ml of 1% Lidocaine. Using ultrasound-guidance, a 20-gauge B-Hoffman 4\" Ultraplex 360 non-stimulating echogenic needle was advanced in plane until the tip of the needle was in the fascial plane between the PMM and PmM, lateral to the Thoracoacromial Artery. 1-3ml of preservative free normal saline was used to hydro-dissect the fascial planes. After the fascial plane was verified, 10ml local anesthetic (LA) was injected for Interpectoral fascial plane block. The needle was continued along the same path to the level of the 4th rib below PmM.  Initially preservative free normal saline was used to confirm needle position and then 20 ml of LA was injected for Pectoserratus fascial plane block. Aspiration every 5 ml to prevent intravascular injection. Injection was completed with negative aspiration of blood and negative intravascular injection. Injection pressures were normal with minimal resistance.     XR Spine Lumbar Complete 4+VW    Result Date: 11/3/2023  Narrative: XR SPINE LUMBAR COMPLETE 4+VW Date of Exam: 11/2/2023 2:57 PM EDT Indication: low back pain Comparison: None available. Findings: There are 5 lumbar-type vertebral bodies. No acute fracture. Vertebral body heights are normal. No pars defect. Normal spinal alignment. There is mild degenerative disc disease at L4-L5 and L5-S1. No bulky facet arthropathy. Shortened pedicles at L5. The "  paravertebral soft tissues are unremarkable. No acute osseous findings in the partially imaged pelvis.     Impression: Impression: Mild degenerative disc disease in the lower lumbar spine. Shortened pedicles at L5 which may reflect a component of congenital spinal canal narrowing. Electronically Signed: Rivera Recinos MD  11/3/2023 9:22 AM EDT  Workstation ID: [id number]       Assessment / Plan      Assessment/Plan:     1.  Malignant neoplasm of central portion of left breast in female, estrogen receptor positive, lymph node positive  2.  Amenorrheic status post chemotherapy  -She is status post neoadjuvant chemotherapy.  This was terminated early for taxane pneumonitis.  She has fully recovered and underwent bilateral mastectomy showing 2.5 cm of residual disease and 2 of 6 lymph nodes positive  -Reviewed final pathology results in detail today.  -Proceed with adjuvant PMRT.  -Discussed options for extended duration endocrine therapy in the context of lymph node positive disease.  Aromatase inhibitor preferred to tamoxifen in the setting of lymph node positive disease.  Discussed options for initial tamoxifen followed by transition to aromatase  in 6 months if remains postmenopausal inhibitor or addition of ovarian suppression or BSO.  Given her young age, she is at risk for ovarian rebound.  Discussed the pros and cons of both approaches.  She did update me after her office visit that her preference is to proceed with BSO for definitive ovarian suppression.  Referral to gynecology.  -BRCA negative, no indication for adjuvant PARP inhibitor  -With respect to adjuvant CDK 4 6 inhibitors, she has 2 lymph nodes positive but tumor size less than 5%, grade 2, and Ki-67 5% so she does not qualify for adjuvant abemaciclib.  If adjuvant Ribociclib is FDA approved, we will consider addition of this agent at time of endocrine initiation based on results of the Sana trial.  -MIGUEL on exam    2. Taxane pneumonitis  -Fully  resolved and repeat CT within normal limits    3.  Persisting headaches  -These are new and worsening.  Obtain brain MRI in the context of her underlying malignancy.    4.  Bone pain  Reviewed her bone scan which is unremarkable.  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  Continue to monitor.    Follow Up:   6 weeks    Martha Olguin MD  Hematology and Oncology

## 2023-11-20 NOTE — TELEPHONE ENCOUNTER
Called patient to follow-up after Consult in 6/23, Neoadjuvant Chemo, and Bilateral Mastectomies.  Patient lives in [place], had Chemo treatments in [place], and will be having XRT in [place] as well.  Dr. Daxa Anderson completed a referral to Dr. Robison with the [facility] in [place].  Patient states that she has Consult scheduled with him.  Patient has my contact information and will call should she need [facility] services in the future or if she has any additional questions.

## 2023-12-13 ENCOUNTER — HOSPITAL ENCOUNTER (OUTPATIENT)
Dept: MRI IMAGING | Facility: HOSPITAL | Age: 43
Discharge: HOME | End: 2023-12-13
Payer: COMMERCIAL

## 2023-12-13 DIAGNOSIS — R51.9 NONINTRACTABLE EPISODIC HEADACHE, UNSPECIFIED HEADACHE TYPE: ICD-10-CM

## 2023-12-13 DIAGNOSIS — C50.112 MALIGNANT NEOPLASM OF CENTRAL PORTION OF LEFT BREAST IN FEMALE, ESTROGEN RECEPTOR POSITIVE: ICD-10-CM

## 2023-12-13 DIAGNOSIS — Z17.0 MALIGNANT NEOPLASM OF CENTRAL PORTION OF LEFT BREAST IN FEMALE, ESTROGEN RECEPTOR POSITIVE: ICD-10-CM

## 2023-12-14 ENCOUNTER — TELEPHONE (OUTPATIENT)
Dept: ONCOLOGY | Facility: CLINIC | Age: 43
End: 2023-12-14
Payer: COMMERCIAL

## 2023-12-14 DIAGNOSIS — C50.112 MALIGNANT NEOPLASM OF CENTRAL PORTION OF LEFT BREAST IN FEMALE, ESTROGEN RECEPTOR POSITIVE: Primary | ICD-10-CM

## 2023-12-14 DIAGNOSIS — R51.9 NONINTRACTABLE EPISODIC HEADACHE, UNSPECIFIED HEADACHE TYPE: ICD-10-CM

## 2023-12-14 DIAGNOSIS — Z17.0 MALIGNANT NEOPLASM OF CENTRAL PORTION OF LEFT BREAST IN FEMALE, ESTROGEN RECEPTOR POSITIVE: Primary | ICD-10-CM

## 2023-12-14 NOTE — TELEPHONE ENCOUNTER
Caller: STEFFANIE RUSH/ [facility]        Best call back number: [phone]      What was the call regarding:  MRI COULD NOT PATIENTS MRI LAST NIGHT BECAUSE SHE HAS THE TISSUE EXPANDERS IN HER BREAST, THEY CAN RESCHEDULE AFTER SHE HAS THOSE REMOVED

## 2023-12-15 NOTE — TELEPHONE ENCOUNTER
Notified patient that Dr. Olguin is sorry, she didn't think about tissue expanders.  Patient stated its fine.  Dr. Olguin wanted to know if she is still having headaches and if so she wants to do a CT Head with and without contrast.  Patient verbalized understanding and stated she is and agreed to CT.  Order placed and scheduling notified.

## 2023-12-20 ENCOUNTER — OFFICE VISIT (OUTPATIENT)
Dept: CARDIOLOGY | Facility: CLINIC | Age: 43
End: 2023-12-20
Payer: COMMERCIAL

## 2023-12-20 VITALS
SYSTOLIC BLOOD PRESSURE: 108 MMHG | OXYGEN SATURATION: 98 % | BODY MASS INDEX: 25.27 KG/M2 | RESPIRATION RATE: 18 BRPM | HEIGHT: 67 IN | WEIGHT: 161 LBS | DIASTOLIC BLOOD PRESSURE: 62 MMHG | HEART RATE: 71 BPM

## 2023-12-20 DIAGNOSIS — R00.2 PALPITATIONS: Primary | ICD-10-CM

## 2023-12-20 DIAGNOSIS — I49.3 PVC (PREMATURE VENTRICULAR CONTRACTION): ICD-10-CM

## 2023-12-20 RX ORDER — DOCUSATE SODIUM 100 MG/1
1 CAPSULE, LIQUID FILLED ORAL EVERY 12 HOURS SCHEDULED
COMMUNITY
Start: 2023-10-19

## 2023-12-20 RX ORDER — CYCLOBENZAPRINE HCL 5 MG
1 TABLET ORAL 3 TIMES DAILY
COMMUNITY
Start: 2023-10-19

## 2023-12-20 RX ORDER — METOPROLOL SUCCINATE 25 MG/1
25 TABLET, EXTENDED RELEASE ORAL DAILY
Qty: 90 TABLET | Refills: 3 | Status: SHIPPED | OUTPATIENT
Start: 2023-12-20

## 2023-12-20 NOTE — PROGRESS NOTES
[facility] Cardiology Office Follow Up Note    Kimberley Win  [id number]  2023    Primary Care Provider: Dorinda Covington DO    Chief Complaint: Routine follow-up     History of Present Illness:   Mrs. Kimberley Win is a 43 y.o. female who presents to the Cardiology Clinic for routine follow-up.  The patient has a history of breast cancer, status post bilateral mastectomy and recently finished chemotherapy.  She is planning to undergo radiation therapy.  She has a past cardiac history significant for symptomatic PVCs, with a 1.8% PVC burden on outpatient cardiac monitoring in .  Currently, the patient reports overall improvement in her palpitations with metoprolol.  She continues to have occasional palpitations, however they do not appear to inhibit her daily activities.  The episodes are typically brief in duration.  No presyncopal or syncopal events.  No other specific complaints today.     Past Cardiac Testin. Last Coronary Angio: None  2. Prior Stress Testing: None  3. Last Echo: 2023              1.  Normal left ventricular size and systolic function, LVEF 55-60%.  2.  Normal LV diastolic filling pattern.  3.  Normal right ventricular size and systolic function.  4.  Normal left and right atrial size.  5.  Trace MR, TR, and PI.  6.  Normal LV global longitudinal strain pattern.  4. Prior Holter Monitor: 2023              1.  1.8% PVCs. Associated with symptoms.     Review of Systems:   Review of Systems   Constitutional:  Negative for activity change, appetite change, chills, diaphoresis, fatigue, fever, unexpected weight gain and unexpected weight loss.   Eyes:  Negative for blurred vision and double vision.   Respiratory:  Negative for cough, chest tightness, shortness of breath and wheezing.    Cardiovascular:  Positive for palpitations. Negative for chest pain and leg swelling.   Gastrointestinal:  Negative for abdominal pain, anal  "bleeding, blood in stool and GERD.   Endocrine: Negative for cold intolerance and heat intolerance.   Genitourinary:  Negative for hematuria.   Neurological:  Negative for dizziness, syncope, weakness and light-headedness.   Hematological:  Does not bruise/bleed easily.   Psychiatric/Behavioral:  Negative for depressed mood and stress. The patient is not nervous/anxious.        I have reviewed and/or updated the patient's past medical, past surgical, family, social history, problem list and allergies as appropriate.     Medications:     Current Outpatient Medications:     cyclobenzaprine (FLEXERIL) 5 MG tablet, Take 1 tablet by mouth 3 times a day., Disp: , Rfl:     docusate sodium (COLACE) 100 MG capsule, Take 1 capsule by mouth Every 12 (Twelve) Hours., Disp: , Rfl:     lidocaine-prilocaine (EMLA) 2.5-2.5 % cream, Apply 1 application topically to the appropriate area as directed As Needed (45-60 minutes prior to port access.  Cover with saran/plastic wrap.)., Disp: 30 g, Rfl: 3    omeprazole (priLOSEC) 40 MG capsule, Take 1 capsule by mouth Daily. 30 min prior to evening., Disp: 30 capsule, Rfl: 5    ondansetron (ZOFRAN) 8 MG tablet, Take 1 tablet by mouth 3 (Three) Times a Day As Needed for Nausea or Vomiting (DO NOT USE WHILE SANCUSO IN PLACE)., Disp: 30 tablet, Rfl: 5    metoprolol succinate XL (TOPROL-XL) 25 MG 24 hr tablet, Take 1 tablet by mouth Daily., Disp: 90 tablet, Rfl: 3    oxyCODONE (ROXICODONE) 5 MG immediate release tablet, Take 1 tablet by mouth Every 4 (Four) to 6 (Six) Hours As Needed for Pain., Disp: , Rfl:     Vitamin A 3 MG (33758 UT) capsule, Take 25,000 Units by mouth Daily. (Patient not taking: Reported on 12/20/2023), Disp: , Rfl:     Physical Exam:  Vital Signs:   Vitals:    12/20/23 1349   BP: 108/62   BP Location: Right arm   Patient Position: Sitting   Pulse: 71   Resp: 18   SpO2: 98%   Weight: 73 kg (161 lb)   Height: 170.2 cm (67\")       Physical Exam  Constitutional:       General: " She is not in acute distress.     Appearance: Normal appearance. She is well-developed. She is not diaphoretic.   HENT:      Head: Normocephalic and atraumatic.   Eyes:      General: No scleral icterus.     Pupils: Pupils are equal, round, and reactive to light.   Neck:      Trachea: No tracheal deviation.   Cardiovascular:      Rate and Rhythm: Normal rate and regular rhythm.      Heart sounds: Normal heart sounds. No murmur heard.     No friction rub. No gallop.      Comments: Normal JVD.  Pulmonary:      Effort: Pulmonary effort is normal. No respiratory distress.      Breath sounds: Normal breath sounds. No stridor. No wheezing or rales.   Chest:      Chest wall: No tenderness.   Abdominal:      General: Bowel sounds are normal. There is no distension.      Palpations: Abdomen is soft.      Tenderness: There is no abdominal tenderness. There is no guarding or rebound.   Musculoskeletal:         General: No swelling. Normal range of motion.      Cervical back: Neck supple. No tenderness.   Lymphadenopathy:      Cervical: No cervical adenopathy.   Skin:     General: Skin is warm and dry.      Findings: No erythema.   Neurological:      General: No focal deficit present.      Mental Status: She is alert and oriented to person, place, and time.   Psychiatric:         Mood and Affect: Mood normal.         Behavior: Behavior normal.         Results Review:   I reviewed the patient's new clinical results.        Assessment / Plan:     1.  Symptomatic PVCs  --Palpitations appear to be adequately controlled with metoprolol  --Will continue metoprolol (changed to long-acting dosing)  --If increased palpitations, will consider uptitration of metoprolol as tolerated by BP  --Of palpitations unable to be adequately suppressed with metoprolol, could consider the addition of flecainide  --Follow-up in 6 months, sooner if required     2.  Malignant neoplasm of upper-outer quadrant of left breast in female, estrogen receptor  positive  --Managed by oncology       Follow Up:   Return in about 6 months (around 6/20/2024).      Thank you for allowing me to participate in the care of your patient. Please to not hesitate to contact me with additional questions or concerns.     SAVANA Costello MD  Interventional Cardiology   12/20/2023  13:58 EST

## 2024-01-02 NOTE — PROGRESS NOTES
GYN Office Visit    Subjective   Chief Complaint   Patient presents with    Consult     Patient wants to discuss removal of ovaries due to history of cancer.      Kimberley Win is a 43 y.o.  presenting to discuss undergoing BSO due to recent history of stage IIa ductal carcinoma of the left breast, ER+/WI+. She reports her oncologist has recommended bilateral oophorectomy for ongoing treatment. She is s/p bilateral mastectomy in 2023. She has been experiencing hot flashes and night sweats since undergoing chemotherapy. Her menses stopped after her second round of chemo, after which she developed vasomotor symptoms. She has since stopped chemotherapy due to adverse reactions, with her last round being in 2023. She is currently undergoing radiation therapy and has 19 sessions remaining. She reports the plan is to eventually start tamoxifen vs an aromatase inhibitor.    OB Hx: G0  Pap smear: 23, NILM, HRHPV (-)   Mammogram: 2023 revealing spiculated mass in L breast  Colonoscopy: N/A  DEXA Scan: N/A -- discussed that she will likely require earlier screening given early menopause/ tx for breast cancer    Past Medical History:   Diagnosis Date    Abnormal ECG 23    Anemia     Chemo    Arthritis     Breast cancer 2023    Left Breast    Hypertension     Malignant neoplasm of central portion of left breast in female, estrogen receptor positive 2023    Osteoarthritis      Past Surgical History:   Procedure Laterality Date    BREAST BIOPSY  23    BREAST RECONSTRUCTION, BREAST TISSUE EXPANDER REMOVAL, IMPLANT INSERTION Bilateral 2023    Procedure: IMMEDIATE BREAST RECONSTRUCTION WITH BREAST TISSUE EXPANDER INSERTION AND ALLODERM - BILATERAL;  Surgeon: Dilip Anderson MD;  Location: [facility];  Service: Plastics;  Laterality: Bilateral;    MASTECTOMY  23    Double mastectomy    MASTECTOMY W/ SENTINEL NODE BIOPSY Bilateral 2023    Procedure: BREAST MASTECTOMY  WITH NIPPLE SPARING BILATERAL WITH LEFT SENTINEL NODE BIOPSY, AND AXILLARY DISECTION;  Surgeon: Daxa Anderson MD;  Location: [facility];  Service: General;  Laterality: Bilateral;    US GUIDED LYMPH NODE BIOPSY  2023    VENOUS ACCESS DEVICE (PORT) INSERTION  2023     Family History   Problem Relation Age of Onset    Breast cancer Mother 55        Stage 4    COPD Mother     Diabetes Mother     Early death Mother     Heart disease Mother     Stroke Mother     Rheum arthritis Maternal Aunt     Breast cancer Paternal Aunt     COPD Maternal Grandfather     Stroke Maternal Grandfather     Breast cancer Other       Social History     Tobacco Use    Smoking status: Former     Packs/day: 0.25     Years: 10.00     Additional pack years: 0.00     Total pack years: 2.50     Types: Cigarettes     Start date: 1995     Quit date: 2005     Years since quittin.9     Passive exposure: Past    Smokeless tobacco: Never   Vaping Use    Vaping Use: Never used   Substance Use Topics    Alcohol use: Not Currently     Comment: rare    Drug use: Never     Allergies   Allergen Reactions    Nylon Hives    Sunblock [Solbar Pf Spf15] Rash     Current Outpatient Medications on File Prior to Visit   Medication Sig Dispense Refill    docusate sodium (COLACE) 100 MG capsule Take 1 capsule by mouth Every 12 (Twelve) Hours.      lidocaine-prilocaine (EMLA) 2.5-2.5 % cream Apply 1 application topically to the appropriate area as directed As Needed (45-60 minutes prior to port access.  Cover with saran/plastic wrap.). 30 g 3    metoprolol succinate XL (TOPROL-XL) 25 MG 24 hr tablet Take 1 tablet by mouth Daily. 90 tablet 3    omeprazole (priLOSEC) 40 MG capsule Take 1 capsule by mouth Daily. 30 min prior to evening. 30 capsule 5    ondansetron (ZOFRAN) 8 MG tablet Take 1 tablet by mouth 3 (Three) Times a Day As Needed for Nausea or Vomiting (DO NOT USE WHILE SANCUSO IN PLACE). 30 tablet 5    [DISCONTINUED] cyclobenzaprine  "(FLEXERIL) 5 MG tablet Take 1 tablet by mouth 3 times a day.      [DISCONTINUED] oxyCODONE (ROXICODONE) 5 MG immediate release tablet Take 1 tablet by mouth Every 4 (Four) to 6 (Six) Hours As Needed for Pain.      [DISCONTINUED] Vitamin A 3 MG (05134 UT) capsule Take 25,000 Units by mouth Daily. (Patient not taking: Reported on 2023)       No current facility-administered medications on file prior to visit.     Social History    Tobacco Use      Smoking status: Former        Packs/day: 0.25        Years: 10.00        Additional pack years: 0.00        Total pack years: 2.50        Types: Cigarettes        Start date: 1995        Quit date: 2005        Years since quittin.9        Passive exposure: Past      Smokeless tobacco: Never       Objective   /60   Ht 170.2 cm (67\")   Wt 77.6 kg (171 lb)   BMI 26.78 kg/m²     Physical Exam:  General Appearance: alert, interactive, and NAD     Medical Decision Making:    I reviewed Kimberley's most recent visits with Dr. Olguin 23 (heme/onc) and Dr. Robison 2023 (radiation oncology).     Latest Reference Range & Units Most Recent   Sodium 136 - 145 mmol/L 141  10/30/23 10:28   Potassium 3.5 - 5.2 mmol/L 3.9  10/30/23 10:28   Chloride 98 - 107 mmol/L 104  10/30/23 10:28   CO2 22.0 - 29.0 mmol/L 31.0 (H)  10/30/23 10:28   Anion Gap 5.0 - 15.0 mmol/L 6.0  10/30/23 10:28   BUN 6 - 20 mg/dL 11  10/30/23 10:28   Creatinine 0.57 - 1.00 mg/dL 0.68  10/30/23 10:28   BUN/Creatinine Ratio 7.0 - 25.0  16.2  10/30/23 10:28   eGFR >60.0 mL/min/1.73 110.3  10/30/23 10:28   Glucose 65 - 99 mg/dL 93  10/30/23 10:28   Calcium 8.6 - 10.5 mg/dL 9.6  10/30/23 10:28   Alkaline Phosphatase 39 - 117 U/L 71  10/30/23 10:28   Total Protein 6.0 - 8.5 g/dL 7.7  10/30/23 10:28   Albumin 3.5 - 5.2 g/dL 4.3  10/30/23 10:28   Globulin gm/dL 3.4  10/30/23 10:28   A/G Ratio g/dL 1.3  10/30/23 10:28   AST (SGOT) 1 - 32 U/L 25  10/30/23 10:28   ALT (SGPT) 1 - 33 U/L " 22  10/30/23 10:28   Total Bilirubin 0.0 - 1.2 mg/dL 0.2  10/30/23 10:28   FSH mIU/mL 55.70  10/9/23 11:00   Estradiol pg/mL <5.0  10/9/23 11:00   TSH Baseline 0.270 - 4.200 uIU/mL 3.480  11/17/23 09:06   WBC 3.40 - 10.80 10*3/mm3 4.74  10/30/23 10:28   RBC 3.77 - 5.28 10*6/mm3 4.05  10/30/23 10:28   Hemoglobin 12.0 - 15.9 g/dL 12.5  10/30/23 10:28   Hematocrit 34.0 - 46.6 % 38.2  10/30/23 10:28   Platelets 140 - 450 10*3/mm3 283  10/30/23 10:28   RDW 12.3 - 15.4 % 12.2 (L)  10/30/23 10:28   MCV 79.0 - 97.0 fL 94.3  10/30/23 10:28   MCH 26.6 - 33.0 pg 30.9  10/30/23 10:28   MCHC 31.5 - 35.7 g/dL 32.7  10/30/23 10:28   MPV 6.0 - 12.0 fL 9.3  10/30/23 10:28   RDW-SD 37.0 - 54.0 fl 42.5  10/30/23 10:28   (H): Data is abnormally high  (L): Data is abnormally low      Assessment & Plan      Diagnosis Plan   1. Malignant neoplasm of central portion of left breast in female, estrogen receptor positive        2. Vasomotor symptoms due to menopause          Medication Management: Briefly discussed medical management of vasomotor symptoms, including SSRI/ SNRI and NK3R antagonist. Will further discuss at post-operative visit.    Procedures Performed: None    We reviewed her history and the recommendation for BSO to eliminate ovarian production of estrogen given her history of ER+ breast cancer. We discussed the risks/ benefits/ alternatives to the procedure and all questions were answered. Kimberley would like to proceed with laparoscopic BSO. Prep for case placed.    We also discussed expected changes with menopause, including vasomotor symptoms and decreased bone mineral density. Given iatrogenic early onset of menopause, Kimberley will require earlier DEXA screening than typically recommended. She is not a candidate for HRT given ER+/UT+ breast cancer, however we discussed that there are nonhormonal medications that can help with vasomotor symptoms and she is interested in further discussing at future visits.     RTC for post-op  assessment.    Fabby Miranda MD  Obstetrics and Gynecology  [facility]

## 2024-01-03 ENCOUNTER — PREP FOR SURGERY (OUTPATIENT)
Dept: OTHER | Facility: HOSPITAL | Age: 44
End: 2024-01-03
Payer: COMMERCIAL

## 2024-01-03 ENCOUNTER — OFFICE VISIT (OUTPATIENT)
Dept: OBSTETRICS AND GYNECOLOGY | Facility: CLINIC | Age: 44
End: 2024-01-03
Payer: COMMERCIAL

## 2024-01-03 VITALS
BODY MASS INDEX: 26.84 KG/M2 | SYSTOLIC BLOOD PRESSURE: 110 MMHG | DIASTOLIC BLOOD PRESSURE: 60 MMHG | WEIGHT: 171 LBS | HEIGHT: 67 IN

## 2024-01-03 DIAGNOSIS — N95.1 VASOMOTOR SYMPTOMS DUE TO MENOPAUSE: ICD-10-CM

## 2024-01-03 DIAGNOSIS — Z17.0 MALIGNANT NEOPLASM OF CENTRAL PORTION OF LEFT BREAST IN FEMALE, ESTROGEN RECEPTOR POSITIVE: Primary | ICD-10-CM

## 2024-01-03 DIAGNOSIS — C50.112 MALIGNANT NEOPLASM OF CENTRAL PORTION OF LEFT BREAST IN FEMALE, ESTROGEN RECEPTOR POSITIVE: Primary | ICD-10-CM

## 2024-01-03 RX ORDER — SODIUM CHLORIDE 0.9 % (FLUSH) 0.9 %
10 SYRINGE (ML) INJECTION AS NEEDED
OUTPATIENT
Start: 2024-01-03

## 2024-01-03 RX ORDER — SODIUM CHLORIDE 9 MG/ML
40 INJECTION, SOLUTION INTRAVENOUS AS NEEDED
OUTPATIENT
Start: 2024-01-03

## 2024-01-03 RX ORDER — SODIUM CHLORIDE 0.9 % (FLUSH) 0.9 %
3 SYRINGE (ML) INJECTION EVERY 12 HOURS SCHEDULED
OUTPATIENT
Start: 2024-01-03

## 2024-01-04 NOTE — PROGRESS NOTES
[facility] Cardiology Office Follow Up Note    Kimberley Win  [id number]  2024    Primary Care Provider: Dorinda Covington DO   Referring Provider: No ref. provider found    Chief Complaint: Palpitations    History of Present Illness:   Mrs. Kimberley Win is a 43 y.o. female who presents to the Cardiology Clinic for follow up of palpitations.  The patient has a history of breast cancer, status post bilateral mastectomy and recently finished chemotherapy.  She is planning to undergo radiation therapy.  She has a past cardiac history significant for symptomatic PVCs, with a 1.8% PVC burden on outpatient cardiac monitoring in .  She reports some ongoing palpitations which do not limit her ADLs, but is an aggravation to her.  Episodes are worse during chemotherapy.  She denies chest pain and dyspnea.  She denies dizziness and syncope.  She denies orthopnea, PND, and lower extremity edema.  She offers no other complaints or concerns at this time.        Past Cardiac Testin. Last Coronary Angio: None  2. Prior Stress Testing: None  3. Last Echo: 2023   1.  Normal left ventricular size and systolic function, LVEF 55-60%.  2.  Normal LV diastolic filling pattern.  3.  Normal right ventricular size and systolic function.  4.  Normal left and right atrial size.  5.  Trace MR, TR, and PI.  6.  Normal LV global longitudinal strain pattern.  4. Prior Holter Monitor: 2023   1.8% PVCs.  Associated with symptoms    Review of Systems:   Review of Systems  As Noted in HPI.   I have reviewed and confirmed the accuracy of the ROS as documented by the MA/LPN/RN Dee Calderon RN    I have reviewed and/or updated the patient's past medical, past surgical, family, social history, problem list and allergies as appropriate.     Medications:     Current Outpatient Medications:     docusate sodium (COLACE) 100 MG capsule, Take 1 capsule by mouth Every 12 (Twelve)  "Hours., Disp: , Rfl:     lidocaine-prilocaine (EMLA) 2.5-2.5 % cream, Apply 1 application topically to the appropriate area as directed As Needed (45-60 minutes prior to port access.  Cover with saran/plastic wrap.)., Disp: 30 g, Rfl: 3    metoprolol succinate XL (TOPROL-XL) 25 MG 24 hr tablet, Take 1 tablet by mouth Daily., Disp: 90 tablet, Rfl: 3    omeprazole (priLOSEC) 40 MG capsule, Take 1 capsule by mouth Daily. 30 min prior to evening., Disp: 30 capsule, Rfl: 5    ondansetron (ZOFRAN) 8 MG tablet, Take 1 tablet by mouth 3 (Three) Times a Day As Needed for Nausea or Vomiting (DO NOT USE WHILE SANCUSO IN PLACE)., Disp: 30 tablet, Rfl: 5    Physical Exam:  Vital Signs:   Vitals:    01/09/24 1444   BP: 94/68   BP Location: Left arm   Patient Position: Sitting   Cuff Size: Adult   Pulse: 69   Resp: 20   Temp: 98 °F (36.7 °C)   TempSrc: Infrared   SpO2: 99%  Comment: RA   Weight: 76.3 kg (168 lb 3.2 oz)   Height: 170.2 cm (67\")     Body mass index is 26.34 kg/m².    Physical Exam  Vitals and nursing note reviewed.   Constitutional:       General: She is not in acute distress.  HENT:      Head: Normocephalic and atraumatic.   Neck:      Trachea: Trachea normal.   Cardiovascular:      Rate and Rhythm: Normal rate and regular rhythm.      Pulses: Normal pulses.      Heart sounds: Normal heart sounds. No murmur heard.     No friction rub. No gallop.   Pulmonary:      Effort: Pulmonary effort is normal.      Breath sounds: Normal breath sounds.   Musculoskeletal:      Cervical back: Neck supple.      Right lower leg: No edema.      Left lower leg: No edema.   Skin:     General: Skin is warm and dry.   Neurological:      Mental Status: She is alert and oriented to person, place, and time.   Psychiatric:         Mood and Affect: Mood normal.         Behavior: Behavior normal. Behavior is cooperative.         Thought Content: Thought content does not include suicidal ideation.         Results Review:   I reviewed the " patient's new clinical results.    Procedures    Assessment / Plan:     1.  Symptomatic PVCs  Trial up titration of metoprolol    2.  Malignant neoplasm of upper-outer quadrant of left breast in female, estrogen receptor positive  Ongoing follow-up with oncology        Preventative Cardiology:   Tobacco Cessation: N/A   Advance Care Planning: ACP discussion was declined by the patient. Patient has an advance directive in EMR which is still valid.      Follow Up:   Follow-up with Dr. Costello in March after chemotherapy is complete    Thank you for allowing me to participate in the care of your patient. Please do not hesitate to contact me with additional questions or concerns.     Theresa Mayes, APRN

## 2024-01-09 ENCOUNTER — OFFICE VISIT (OUTPATIENT)
Dept: CARDIOLOGY | Facility: CLINIC | Age: 44
End: 2024-01-09
Payer: COMMERCIAL

## 2024-01-09 VITALS
DIASTOLIC BLOOD PRESSURE: 68 MMHG | BODY MASS INDEX: 26.4 KG/M2 | RESPIRATION RATE: 20 BRPM | OXYGEN SATURATION: 99 % | TEMPERATURE: 98 F | WEIGHT: 168.2 LBS | HEART RATE: 69 BPM | SYSTOLIC BLOOD PRESSURE: 94 MMHG | HEIGHT: 67 IN

## 2024-01-09 DIAGNOSIS — I49.3 PVC (PREMATURE VENTRICULAR CONTRACTION): Primary | ICD-10-CM

## 2024-01-09 RX ORDER — METOPROLOL SUCCINATE 25 MG/1
37 TABLET, EXTENDED RELEASE ORAL DAILY
Start: 2024-01-09

## 2024-01-15 ENCOUNTER — INFUSION (OUTPATIENT)
Dept: ONCOLOGY | Facility: HOSPITAL | Age: 44
End: 2024-01-15
Payer: COMMERCIAL

## 2024-01-15 ENCOUNTER — OFFICE VISIT (OUTPATIENT)
Dept: ONCOLOGY | Facility: CLINIC | Age: 44
End: 2024-01-15
Payer: COMMERCIAL

## 2024-01-15 VITALS
TEMPERATURE: 96.9 F | BODY MASS INDEX: 26.84 KG/M2 | HEART RATE: 65 BPM | HEIGHT: 67 IN | SYSTOLIC BLOOD PRESSURE: 125 MMHG | WEIGHT: 171 LBS | RESPIRATION RATE: 16 BRPM | DIASTOLIC BLOOD PRESSURE: 68 MMHG | OXYGEN SATURATION: 99 %

## 2024-01-15 DIAGNOSIS — Z17.0 MALIGNANT NEOPLASM OF CENTRAL PORTION OF LEFT BREAST IN FEMALE, ESTROGEN RECEPTOR POSITIVE: Primary | ICD-10-CM

## 2024-01-15 DIAGNOSIS — C50.112 MALIGNANT NEOPLASM OF CENTRAL PORTION OF LEFT BREAST IN FEMALE, ESTROGEN RECEPTOR POSITIVE: Primary | ICD-10-CM

## 2024-01-15 LAB
ALBUMIN SERPL-MCNC: 4.2 G/DL (ref 3.5–5.2)
ALBUMIN/GLOB SERPL: 1.4 G/DL
ALP SERPL-CCNC: 79 U/L (ref 39–117)
ALT SERPL W P-5'-P-CCNC: 20 U/L (ref 1–33)
ANION GAP SERPL CALCULATED.3IONS-SCNC: 8.2 MMOL/L (ref 5–15)
AST SERPL-CCNC: 27 U/L (ref 1–32)
BASOPHILS # BLD AUTO: 0.02 10*3/MM3 (ref 0–0.2)
BASOPHILS NFR BLD AUTO: 0.7 % (ref 0–1.5)
BILIRUB SERPL-MCNC: 0.3 MG/DL (ref 0–1.2)
BUN SERPL-MCNC: 10 MG/DL (ref 6–20)
BUN/CREAT SERPL: 13.9 (ref 7–25)
CALCIUM SPEC-SCNC: 9.1 MG/DL (ref 8.6–10.5)
CHLORIDE SERPL-SCNC: 104 MMOL/L (ref 98–107)
CO2 SERPL-SCNC: 27.8 MMOL/L (ref 22–29)
CREAT SERPL-MCNC: 0.72 MG/DL (ref 0.57–1)
DEPRECATED RDW RBC AUTO: 39.9 FL (ref 37–54)
EGFRCR SERPLBLD CKD-EPI 2021: 105.2 ML/MIN/1.73
EOSINOPHIL # BLD AUTO: 0.09 10*3/MM3 (ref 0–0.4)
EOSINOPHIL NFR BLD AUTO: 2.9 % (ref 0.3–6.2)
ERYTHROCYTE [DISTWIDTH] IN BLOOD BY AUTOMATED COUNT: 12.6 % (ref 12.3–15.4)
GLOBULIN UR ELPH-MCNC: 3 GM/DL
GLUCOSE SERPL-MCNC: 95 MG/DL (ref 65–99)
HCT VFR BLD AUTO: 34.8 % (ref 34–46.6)
HGB BLD-MCNC: 11.9 G/DL (ref 12–15.9)
IMM GRANULOCYTES # BLD AUTO: 0.01 10*3/MM3 (ref 0–0.05)
IMM GRANULOCYTES NFR BLD AUTO: 0.3 % (ref 0–0.5)
LYMPHOCYTES # BLD AUTO: 0.61 10*3/MM3 (ref 0.7–3.1)
LYMPHOCYTES NFR BLD AUTO: 19.9 % (ref 19.6–45.3)
MCH RBC QN AUTO: 29.6 PG (ref 26.6–33)
MCHC RBC AUTO-ENTMCNC: 34.2 G/DL (ref 31.5–35.7)
MCV RBC AUTO: 86.6 FL (ref 79–97)
MONOCYTES # BLD AUTO: 0.3 10*3/MM3 (ref 0.1–0.9)
MONOCYTES NFR BLD AUTO: 9.8 % (ref 5–12)
NEUTROPHILS NFR BLD AUTO: 2.04 10*3/MM3 (ref 1.7–7)
NEUTROPHILS NFR BLD AUTO: 66.4 % (ref 42.7–76)
NRBC BLD AUTO-RTO: 0 /100 WBC (ref 0–0.2)
PLATELET # BLD AUTO: 248 10*3/MM3 (ref 140–450)
PMV BLD AUTO: 9.7 FL (ref 6–12)
POTASSIUM SERPL-SCNC: 4.3 MMOL/L (ref 3.5–5.2)
PROT SERPL-MCNC: 7.2 G/DL (ref 6–8.5)
RBC # BLD AUTO: 4.02 10*6/MM3 (ref 3.77–5.28)
SODIUM SERPL-SCNC: 140 MMOL/L (ref 136–145)
WBC NRBC COR # BLD AUTO: 3.07 10*3/MM3 (ref 3.4–10.8)

## 2024-01-15 PROCEDURE — 85025 COMPLETE CBC W/AUTO DIFF WBC: CPT

## 2024-01-15 PROCEDURE — 25010000002 HEPARIN LOCK FLUSH PER 10 UNITS

## 2024-01-15 PROCEDURE — 36591 DRAW BLOOD OFF VENOUS DEVICE: CPT

## 2024-01-15 PROCEDURE — 80053 COMPREHEN METABOLIC PANEL: CPT

## 2024-01-15 RX ORDER — HEPARIN SODIUM (PORCINE) LOCK FLUSH IV SOLN 100 UNIT/ML 100 UNIT/ML
500 SOLUTION INTRAVENOUS AS NEEDED
Status: DISCONTINUED | OUTPATIENT
Start: 2024-01-15 | End: 2024-01-15 | Stop reason: HOSPADM

## 2024-01-15 RX ORDER — HEPARIN SODIUM (PORCINE) LOCK FLUSH IV SOLN 100 UNIT/ML 100 UNIT/ML
SOLUTION INTRAVENOUS
Status: COMPLETED
Start: 2024-01-15 | End: 2024-01-15

## 2024-01-15 RX ORDER — HEPARIN SODIUM (PORCINE) LOCK FLUSH IV SOLN 100 UNIT/ML 100 UNIT/ML
500 SOLUTION INTRAVENOUS AS NEEDED
OUTPATIENT
Start: 2024-01-15

## 2024-01-15 RX ADMIN — HEPARIN 500 UNITS: 100 SYRINGE at 10:16

## 2024-01-15 RX ADMIN — HEPARIN SODIUM (PORCINE) LOCK FLUSH IV SOLN 100 UNIT/ML 500 UNITS: 100 SOLUTION at 10:16

## 2024-01-15 NOTE — PROGRESS NOTES
Hematology and Oncology [place]  Office number [phone]    Fax number [phone]     Follow-up     Date: 01/15/24    Patient Name: Kimberley Win  MRN: [id number]  : 1980    Referring Physician: Dr. Daxa Anderson MD    Chief Complaint: follow up    Cancer Staging:  Cancer Staging   Stage IIA (cT2, cN1, cM0, G2, ER+, AL+, HER2-)    History of Present Illness: Kimberley Win is a pleasant 44 y.o. female who presented for evaluation of left breast cancer.     She notes a history of cystic breast disease for many years. She notes a new breast mass that became progressively harder and associated with pain prompting diagnostic workup.     Diagnostic mammogram 23 showed a dense, spiculated mass in the left breast which on ultrasound corresponded to a 3:00 3 cm hypoechoic mass with internal vascularity. In the 7:00 left breast there was a 7 mm mass corresponding on US to a cluster of cysts spanning 1.4 cm. In the left breast there was a 1.7 cm LN with multiple smaller LN.     Left breast biopsy showed grade 2 invasive ductal carcinoma (ER 90%, AL 10%, HER 2 negative 0+ by IHC). Lymph node FNA was postive for malignancy, metastatic ductal carcinoma.  Ki-67 5%    She underwent bilateral mastectomy and sentinel lymph node biopsy on 2023.  Left breast mastectomy showed residual 2.5 cm of invasive ductal carcinoma with associated DCIS.  Margins negative.  Dawn lymph node biopsy was positive (1/2) with extracapsular extension and 1 of 4 additional lymph nodes were positive for metastatic carcinoma (total lymph node count 2 of 6).      Breast cancer risk profile:  Age of menarche:10  G0; infertility  Age of menopause: premenopausal   Family history of breast, ovarian, prostate or pancreatic cancer: mom stage IV breast cancer in her 50s. M Great Aunt, breast cancer.   Genetic testing: negative 36 gene CancerNext panel     Treatment history:  Dose dense AC followed by weekly taxol:  C1, 7/5/2023.  Weekly Taxol terminated after 2 doses for pneumonitis which fully resolved with oral steroids,  Radiation to complete 1/31/2024 with Dr. Robison  BSO pending 2/5/2024    Interval history:  She presents for follow-up and final pathology review.    Planning BSO after radiation and has questions about longterm screening after menopause  Heart palpitations had decreased and had switched from metoprolol BID to XL once daily but have increased again since then. Saw Dr. Costello NP and increased to 1.5 daily but has not helped yet. No SOA or CP. Continued frequent hot flashes throughout the day.  Not interested in pharmacotherapy. Headaches have persisted. Scheduled for head CT tomorrow in context of MRI expander. Hip and back pain have improved/resolved since starting to exercise again.   Planning port removal at time of implant exchange (6 mo post radiation).    Past Medical History:   Past Medical History:   Diagnosis Date    Abnormal ECG 8/23/23    Anemia     Chemo    Arthritis     Breast cancer 06/01/2023    Left Breast    Hypertension     Malignant neoplasm of central portion of left breast in female, estrogen receptor positive 06/20/2023    Osteoarthritis 2021   No personal history of myocardial infarction, cerebrovascular event, or venous thromboembolism.  Osteoarthritis in her hands. No personal history of autoimmune disease. Fhx of RA in her aunt    Past Surgical History:   Past Surgical History:   Procedure Laterality Date    BREAST BIOPSY  6/1/23    BREAST RECONSTRUCTION, BREAST TISSUE EXPANDER REMOVAL, IMPLANT INSERTION Bilateral 11/06/2023    Procedure: IMMEDIATE BREAST RECONSTRUCTION WITH BREAST TISSUE EXPANDER INSERTION AND ALLODERM - BILATERAL;  Surgeon: Dilip Anderson MD;  Location: [facility];  Service: Plastics;  Laterality: Bilateral;    MASTECTOMY  11/6/23    Double mastectomy    MASTECTOMY W/ SENTINEL NODE BIOPSY Bilateral 11/06/2023    Procedure: BREAST MASTECTOMY WITH NIPPLE SPARING  BILATERAL WITH LEFT SENTINEL NODE BIOPSY, AND AXILLARY DISECTION;  Surgeon: Daxa Anderson MD;  Location: [facility];  Service: General;  Laterality: Bilateral;    US GUIDED LYMPH NODE BIOPSY  2023    VENOUS ACCESS DEVICE (PORT) INSERTION  2023       Family History:   Family History   Problem Relation Age of Onset    Breast cancer Mother 55        Stage 4    COPD Mother     Diabetes Mother     Early death Mother     Heart disease Mother     Stroke Mother     Rheum arthritis Maternal Aunt     Breast cancer Paternal Aunt     COPD Maternal Grandfather     Stroke Maternal Grandfather     Breast cancer Other      Social History:   Social History     Socioeconomic History    Marital status:    Tobacco Use    Smoking status: Former     Packs/day: 0.25     Years: 10.00     Additional pack years: 0.00     Total pack years: 2.50     Types: Cigarettes     Start date: 1995     Quit date: 2005     Years since quittin.9     Passive exposure: Past    Smokeless tobacco: Never   Vaping Use    Vaping Use: Never used   Substance and Sexual Activity    Alcohol use: Not Currently     Comment: rare    Drug use: Never    Sexual activity: Yes     Partners: Male     Birth control/protection: Condom   , lives in [place].     Medications:     Current Outpatient Medications:     docusate sodium (COLACE) 100 MG capsule, Take 1 capsule by mouth Every 12 (Twelve) Hours., Disp: , Rfl:     lidocaine-prilocaine (EMLA) 2.5-2.5 % cream, Apply 1 application topically to the appropriate area as directed As Needed (45-60 minutes prior to port access.  Cover with saran/plastic wrap.)., Disp: 30 g, Rfl: 3    metoprolol succinate XL (TOPROL-XL) 25 MG 24 hr tablet, Take 1.5 tablets by mouth Daily., Disp: , Rfl:     omeprazole (priLOSEC) 40 MG capsule, Take 1 capsule by mouth Daily. 30 min prior to evening., Disp: 30 capsule, Rfl: 5    ondansetron (ZOFRAN) 8 MG tablet, Take 1 tablet by mouth 3 (Three) Times  "a Day As Needed for Nausea or Vomiting (DO NOT USE WHILE SANCUSO IN PLACE)., Disp: 30 tablet, Rfl: 5    Allergies:   Allergies   Allergen Reactions    Nylon Hives    Sunblock [Solbar Pf Spf15] Rash       Objective     Vital Signs:   Vitals:    01/15/24 0929   BP: 125/68   Pulse: 65   Resp: 16   Temp: 96.9 °F (36.1 °C)   SpO2: 99%   Weight: 77.6 kg (171 lb)   Height: 170.2 cm (67\")   PainSc: 0-No pain      Body mass index is 26.78 kg/m².   Pain Score    01/15/24 0929   PainSc: 0-No pain     ECOG Performance Status: 0    Physical Exam:   General: No acute distress. Well appearing.  HEENT: Normocephalic, atraumatic. Sclera anicteric.   Cardiovascular: regular rate and rhythm. No murmurs.   Respiratory: Normal rate. Clear to auscultation bilaterally.  Abdomen: Soft, nontender, non distended with normoactive bowel sounds.  Neuro: Alert and oriented x 3.   Ext: Status post bilateral mastectomy with expanders in place.  Incisions are healing well. No palpable masses    Laboratory/Imaging Reviewed:     Infusion on 01/15/2024   Component Date Value Ref Range Status    Glucose 01/15/2024 95  65 - 99 mg/dL Final    BUN 01/15/2024 10  6 - 20 mg/dL Final    Creatinine 01/15/2024 0.72  0.57 - 1.00 mg/dL Final    Sodium 01/15/2024 140  136 - 145 mmol/L Final    Potassium 01/15/2024 4.3  3.5 - 5.2 mmol/L Final    Slight hemolysis detected by analyzer. Result may be falsely elevated.    Chloride 01/15/2024 104  98 - 107 mmol/L Final    CO2 01/15/2024 27.8  22.0 - 29.0 mmol/L Final    Calcium 01/15/2024 9.1  8.6 - 10.5 mg/dL Final    Total Protein 01/15/2024 7.2  6.0 - 8.5 g/dL Final    Albumin 01/15/2024 4.2  3.5 - 5.2 g/dL Final    ALT (SGPT) 01/15/2024 20  1 - 33 U/L Final    AST (SGOT) 01/15/2024 27  1 - 32 U/L Final    Slight hemolysis detected by analyzer. Result may be falsely elevated.    Alkaline Phosphatase 01/15/2024 79  39 - 117 U/L Final    Total Bilirubin 01/15/2024 0.3  0.0 - 1.2 mg/dL Final    Globulin 01/15/2024 3.0  " gm/dL Final    A/G Ratio 01/15/2024 1.4  g/dL Final    BUN/Creatinine Ratio 01/15/2024 13.9  7.0 - 25.0 Final    Anion Gap 01/15/2024 8.2  5.0 - 15.0 mmol/L Final    eGFR 01/15/2024 105.2  >60.0 mL/min/1.73 Final    WBC 01/15/2024 3.07 (L)  3.40 - 10.80 10*3/mm3 Final    RBC 01/15/2024 4.02  3.77 - 5.28 10*6/mm3 Final    Hemoglobin 01/15/2024 11.9 (L)  12.0 - 15.9 g/dL Final    Hematocrit 01/15/2024 34.8  34.0 - 46.6 % Final    MCV 01/15/2024 86.6  79.0 - 97.0 fL Final    MCH 01/15/2024 29.6  26.6 - 33.0 pg Final    MCHC 01/15/2024 34.2  31.5 - 35.7 g/dL Final    RDW 01/15/2024 12.6  12.3 - 15.4 % Final    RDW-SD 01/15/2024 39.9  37.0 - 54.0 fl Final    MPV 01/15/2024 9.7  6.0 - 12.0 fL Final    Platelets 01/15/2024 248  140 - 450 10*3/mm3 Final    Neutrophil % 01/15/2024 66.4  42.7 - 76.0 % Final    Lymphocyte % 01/15/2024 19.9  19.6 - 45.3 % Final    Monocyte % 01/15/2024 9.8  5.0 - 12.0 % Final    Eosinophil % 01/15/2024 2.9  0.3 - 6.2 % Final    Basophil % 01/15/2024 0.7  0.0 - 1.5 % Final    Immature Grans % 01/15/2024 0.3  0.0 - 0.5 % Final    Neutrophils, Absolute 01/15/2024 2.04  1.70 - 7.00 10*3/mm3 Final    Lymphocytes, Absolute 01/15/2024 0.61 (L)  0.70 - 3.10 10*3/mm3 Final    Monocytes, Absolute 01/15/2024 0.30  0.10 - 0.90 10*3/mm3 Final    Eosinophils, Absolute 01/15/2024 0.09  0.00 - 0.40 10*3/mm3 Final    Basophils, Absolute 01/15/2024 0.02  0.00 - 0.20 10*3/mm3 Final    Immature Grans, Absolute 01/15/2024 0.01  0.00 - 0.05 10*3/mm3 Final    nRBC 01/15/2024 0.0  0.0 - 0.2 /100 WBC Final       No results found.       No results found.      Assessment / Plan      Assessment/Plan:     1.  Malignant neoplasm of central portion of left breast in female, estrogen receptor positive, lymph node positive  2.  Amenorrheic status post chemotherapy  -She is status post neoadjuvant chemotherapy.  This was terminated early for taxane pneumonitis.  She has fully recovered and underwent bilateral mastectomy showing  2.5 cm of residual disease and 2 of 6 lymph nodes positive  -Reviewed final pathology results in detail today.  -Proceed with adjuvant PMRT.  -Discussed options for extended duration endocrine therapy in the context of lymph node positive disease.  Aromatase inhibitor preferred to tamoxifen in the setting of lymph node positive disease.  Discussed options for initial tamoxifen followed by transition to aromatase  in 6 months if remains postmenopausal inhibitor or addition of ovarian suppression or BSO.  Given her young age, she is at risk for ovarian rebound.  She is planning BSO.  -BRCA negative, no indication for adjuvant PARP inhibitor  -With respect to adjuvant CDK 4 6 inhibitors, she has 2 lymph nodes positive but tumor size less than 5%, grade 2, and Ki-67 5% so she does not qualify for adjuvant abemaciclib.   -MIGUEL on exam. We will plan AI initiation 3 weeks after recovery from BSO  -Continues adjuvant radiation  -CBC/CMP reviewed    2. Taxane pneumonitis  -Fully resolved and repeat CT within normal limits    3.  Persisting headaches  -These are new and worsening.  CT head pending     4.  Bone pain  -Negative bone scan reviewed  -Now improved.    5. Bone health  -She will need baseline DEXA at the time of endocrine therapy initiation.  Plan to repeat DEXA every 2-years while on endocrine therapy.    -Encourage adequate calcium intake, vitamin D supplementation, and weightbearing exercise as tolerated to maintain bone density.     Follow Up:   4 weeks    Martha Olguin MD  Hematology and Oncology

## 2024-01-16 ENCOUNTER — HOSPITAL ENCOUNTER (OUTPATIENT)
Dept: CT IMAGING | Facility: HOSPITAL | Age: 44
Discharge: HOME | End: 2024-01-16
Admitting: INTERNAL MEDICINE
Payer: COMMERCIAL

## 2024-01-16 DIAGNOSIS — Z17.0 MALIGNANT NEOPLASM OF CENTRAL PORTION OF LEFT BREAST IN FEMALE, ESTROGEN RECEPTOR POSITIVE: ICD-10-CM

## 2024-01-16 DIAGNOSIS — R51.9 NONINTRACTABLE EPISODIC HEADACHE, UNSPECIFIED HEADACHE TYPE: ICD-10-CM

## 2024-01-16 DIAGNOSIS — C50.112 MALIGNANT NEOPLASM OF CENTRAL PORTION OF LEFT BREAST IN FEMALE, ESTROGEN RECEPTOR POSITIVE: ICD-10-CM

## 2024-01-16 PROCEDURE — 70470 CT HEAD/BRAIN W/O & W/DYE: CPT

## 2024-01-16 PROCEDURE — 25510000001 IOPAMIDOL 61 % SOLUTION: Performed by: INTERNAL MEDICINE

## 2024-01-16 RX ADMIN — IOPAMIDOL 100 ML: 612 INJECTION, SOLUTION INTRAVENOUS at 13:07

## 2024-01-18 ENCOUNTER — TELEPHONE (OUTPATIENT)
Dept: ONCOLOGY | Facility: CLINIC | Age: 44
End: 2024-01-18
Payer: COMMERCIAL

## 2024-01-18 NOTE — TELEPHONE ENCOUNTER
Called patient per Dr. Olguin regarding CT of Head.  Notified her per Dr. Olguin.  Patient verbalized understanding.

## 2024-01-18 NOTE — TELEPHONE ENCOUNTER
----- Message from Martha Olguin MD sent at 1/18/2024  5:56 AM EST -----  Regarding: FW:  Notify normal  ----- Message -----  From: Catherine, Rad Results [place] In  Sent: 1/16/2024   2:38 PM EST  To: Martha Olguin MD

## 2024-01-19 ENCOUNTER — TELEPHONE (OUTPATIENT)
Dept: PREADMISSION TESTING | Facility: HOSPITAL | Age: 44
End: 2024-01-19

## 2024-01-19 NOTE — TELEPHONE ENCOUNTER
Notified patient per Dr. Olguin that recent labs patient inquired about (results) that there was a small variation in results but not anything significant.  Not anything that Dr. Olguin is concerned about and she will continue to trend labs and unless there is a pattern for any worsening, she is not currently concerned.  Patient verbalized understanding.

## 2024-01-22 ENCOUNTER — PRE-ADMISSION TESTING (OUTPATIENT)
Dept: PREADMISSION TESTING | Facility: HOSPITAL | Age: 44
End: 2024-01-22
Payer: COMMERCIAL

## 2024-01-22 VITALS — BODY MASS INDEX: 26.68 KG/M2 | HEIGHT: 67 IN | WEIGHT: 170 LBS

## 2024-01-22 DIAGNOSIS — C50.112 MALIGNANT NEOPLASM OF CENTRAL PORTION OF LEFT BREAST IN FEMALE, ESTROGEN RECEPTOR POSITIVE: ICD-10-CM

## 2024-01-22 DIAGNOSIS — Z17.0 MALIGNANT NEOPLASM OF CENTRAL PORTION OF LEFT BREAST IN FEMALE, ESTROGEN RECEPTOR POSITIVE: ICD-10-CM

## 2024-01-22 LAB
BASOPHILS # BLD AUTO: 0.01 10*3/MM3 (ref 0–0.2)
BASOPHILS NFR BLD AUTO: 0.3 % (ref 0–1.5)
BILIRUB UR QL STRIP: NEGATIVE
CLARITY UR: CLEAR
COLOR UR: YELLOW
DEPRECATED RDW RBC AUTO: 40.8 FL (ref 37–54)
EOSINOPHIL # BLD AUTO: 0.09 10*3/MM3 (ref 0–0.4)
EOSINOPHIL NFR BLD AUTO: 2.5 % (ref 0.3–6.2)
ERYTHROCYTE [DISTWIDTH] IN BLOOD BY AUTOMATED COUNT: 12.8 % (ref 12.3–15.4)
GLUCOSE UR STRIP-MCNC: NEGATIVE MG/DL
HCT VFR BLD AUTO: 37.6 % (ref 34–46.6)
HGB BLD-MCNC: 12.7 G/DL (ref 12–15.9)
HGB UR QL STRIP.AUTO: NEGATIVE
IMM GRANULOCYTES # BLD AUTO: 0.01 10*3/MM3 (ref 0–0.05)
IMM GRANULOCYTES NFR BLD AUTO: 0.3 % (ref 0–0.5)
KETONES UR QL STRIP: NEGATIVE
LEUKOCYTE ESTERASE UR QL STRIP.AUTO: NEGATIVE
LYMPHOCYTES # BLD AUTO: 0.67 10*3/MM3 (ref 0.7–3.1)
LYMPHOCYTES NFR BLD AUTO: 18.5 % (ref 19.6–45.3)
MCH RBC QN AUTO: 29.5 PG (ref 26.6–33)
MCHC RBC AUTO-ENTMCNC: 33.8 G/DL (ref 31.5–35.7)
MCV RBC AUTO: 87.2 FL (ref 79–97)
MONOCYTES # BLD AUTO: 0.3 10*3/MM3 (ref 0.1–0.9)
MONOCYTES NFR BLD AUTO: 8.3 % (ref 5–12)
NEUTROPHILS NFR BLD AUTO: 2.55 10*3/MM3 (ref 1.7–7)
NEUTROPHILS NFR BLD AUTO: 70.1 % (ref 42.7–76)
NITRITE UR QL STRIP: NEGATIVE
NRBC BLD AUTO-RTO: 0 /100 WBC (ref 0–0.2)
PH UR STRIP.AUTO: 5.5 [PH] (ref 5–8)
PLATELET # BLD AUTO: 196 10*3/MM3 (ref 140–450)
PMV BLD AUTO: 9.3 FL (ref 6–12)
PROT UR QL STRIP: NEGATIVE
RBC # BLD AUTO: 4.31 10*6/MM3 (ref 3.77–5.28)
SP GR UR STRIP: 1.02 (ref 1–1.03)
UROBILINOGEN UR QL STRIP: NORMAL
WBC NRBC COR # BLD AUTO: 3.63 10*3/MM3 (ref 3.4–10.8)

## 2024-01-22 PROCEDURE — 85025 COMPLETE CBC W/AUTO DIFF WBC: CPT

## 2024-01-22 PROCEDURE — 81003 URINALYSIS AUTO W/O SCOPE: CPT

## 2024-01-22 PROCEDURE — 36415 COLL VENOUS BLD VENIPUNCTURE: CPT

## 2024-01-22 NOTE — DISCHARGE INSTRUCTIONS
Pre-Admission testing appointment completed today for patient's upcoming procedure here at [facility].    PAT PASS reviewed with patient and they verbalize understanding of the following:     Do not eat or drink anything after midnight the night before procedure unless otherwise instructed by physician/surgeon's office, this includes no gum, candy, mints, tobacco products or e-cigarettes.  Do not shave the area to be operated on at least 48 hours prior to procedure.  Do not wear makeup, lotion, hair products, or nail polish.  Do not wear any jewelry and remove all piercings.  Do not wear any adhesive if you wear dentures.  Do not wear contacts; bring in glasses if needed.  Only take medications on the morning of procedure as instructed by PAT nurse per anesthesia guidelines or as instructed by physician's office.  If you are on any blood thinners reach out to the physician/surgeon's office for instructions on when/if they will need to be stopped prior to procedure.  Bring in picture ID and insurance card, advanced directive copies if applicable, CPAP/BIPAP/Inhalers if indicated morning of procedure, leave any other valuables at home.  Ensure you have arranged for someone to drive you home the day of your procedure and someone to care for you at home afterwards. It is recommended that you do not drive, drink alcohol, or make any major legal decisions for at least 24 hours after your procedure is complete.  Chlorhexadine wipes along with instruction/information sheet given to patient if indicated. Instructed patient to date, time, and initial the verification sheet once skin prep has been  completed, and to return to [facility] the day of the procedure.     Introduction to anesthesia video watched by patient during appointment.  Instructions and information given to patient about parking, hospital entrance, and registration location.Chlorhexidine wipes along with instruction/verification sheet given to pt.   Instructed pt to date, time, and initial the verification sheet once skin prep has been  completed, and to return to Same Day Surgery the day of the procedure.  Pt. Verbalizes understanding. Introduction to anesthesia video viewed by pt in [facility].

## 2024-02-04 NOTE — H&P
GYNECOLOGY HISTORY AND PHYSICAL    CHIEF COMPLAINT: Scheduled surgery    DIAGNOSIS:  History of stage IIa ductal carcinoma of the left breast, ER+    ASSESSMENT/PLAN     44 y.o.  female who presents for scheduled laparoscopic BSO and all other indicated procedures.    - Proceed to the OR for scheduled surgery  - Risks for the procedure reviewed  - SCDs for DVT ppx  - Antibiotics: none indicated    HISTORY OF PRESENT ILLNESS     44 y.o.  female who presents for the scheduled procedure listed above. She has a history of stage IIa ductal carcinoma of the left breast, ER+/HI+/Her2- and is s/p bilateral mastectomy in 2023. Prior to surgery, she completed chemotherapy and now she has completed adjuvant radiation therapy. At the suggestion of her oncologist, she presented to discuss BSO to decrease estrogen exposure, with the plan being to start aromatase inhibitor after recovery from today's procedure.    She has no complaints today and there are no interval changes to the history.    REVIEW OF SYSTEMS: A complete review of systems was performed and was specifically negative for headache, changes in vision, RUQ pain, shortness of breath, chest pain, lower extremity edema and dysuria.     HISTORY:  Obstetrical History:  OB History    Para Term  AB Living   0 0 0 0 0 0   SAB IAB Ectopic Molar Multiple Live Births   0 0 0 0 0 0     Past Medical History:  Past Medical History:   Diagnosis Date    Abnormal ECG 23    Anemia     Chemo    Arthritis     Breast cancer 2023    Left Breast    Elevated cholesterol     improved, not currently taking medication    History of chemotherapy 2023    last treatment    History of radiation therapy     left breast, currently in treatment    Hypertension     Malignant neoplasm of central portion of left breast in female, estrogen receptor positive 2023    Osteoarthritis      Past Surgical History:  Past Surgical History:   Procedure  Laterality Date    BREAST BIOPSY  23    BREAST RECONSTRUCTION, BREAST TISSUE EXPANDER REMOVAL, IMPLANT INSERTION Bilateral 2023    Procedure: IMMEDIATE BREAST RECONSTRUCTION WITH BREAST TISSUE EXPANDER INSERTION AND ALLODERM - BILATERAL;  Surgeon: Dilip Anderson MD;  Location: [facility];  Service: Plastics;  Laterality: Bilateral;    MASTECTOMY W/ SENTINEL NODE BIOPSY Bilateral 2023    Procedure: BREAST MASTECTOMY WITH NIPPLE SPARING BILATERAL WITH LEFT SENTINEL NODE BIOPSY, AND AXILLARY DISECTION;  Surgeon: Daxa Anderson MD;  Location: [facility] OR;  Service: General;  Laterality: Bilateral;    US GUIDED LYMPH NODE BIOPSY  2023    VENOUS ACCESS DEVICE (PORT) INSERTION  2023     Social History:  Social History     Tobacco Use    Smoking status: Former     Packs/day: 0.25     Years: 10.00     Additional pack years: 0.00     Total pack years: 2.50     Types: Cigarettes     Start date: 1995     Quit date: 2005     Years since quittin.9     Passive exposure: Past    Smokeless tobacco: Never   Vaping Use    Vaping Use: Never used   Substance Use Topics    Alcohol use: Not Currently     Comment: rare    Drug use: Never     Family History  Family History   Problem Relation Age of Onset    Breast cancer Mother 55        Stage 4    COPD Mother     Diabetes Mother     Early death Mother     Heart disease Mother     Stroke Mother     Rheum arthritis Maternal Aunt     Breast cancer Paternal Aunt     COPD Maternal Grandfather     Stroke Maternal Grandfather     Breast cancer Other       Allergies:   Allergies   Allergen Reactions    Nylon Hives    Sunblock [Solbar Pf Spf15] Rash     OBJECTIVE   VITALS:   Temp:  [97.3 °F (36.3 °C)] 97.3 °F (36.3 °C)  Heart Rate:  [60] 60  Resp:  [16] 16  BP: (106)/(70) 106/70    PHYSICAL EXAM:  GENERAL: NAD, alert  CHEST: No increased work of breathing, CTAB  CV: RRR, WWP  ABDOMEN: Soft, NTTP  EXTREMITIES:  Warm and well-perfused, nontender,  nonedematous  NEURO: AAO x 3, CN II-XII grossly intact    No current facility-administered medications on file prior to encounter.     Current Outpatient Medications on File Prior to Encounter   Medication Sig Dispense Refill    docusate sodium (COLACE) 100 MG capsule Take 1 capsule by mouth 2 (Two) Times a Day As Needed for Constipation.      lidocaine-prilocaine (EMLA) 2.5-2.5 % cream Apply 1 application topically to the appropriate area as directed As Needed (45-60 minutes prior to port access.  Cover with saran/plastic wrap.). 30 g 3    omeprazole (priLOSEC) 40 MG capsule Take 1 capsule by mouth Daily. 30 min prior to evening. 30 capsule 5    ondansetron (ZOFRAN) 8 MG tablet Take 1 tablet by mouth 3 (Three) Times a Day As Needed for Nausea or Vomiting (DO NOT USE WHILE SANCUSO IN PLACE). 30 tablet 5       DIAGNOSTIC STUDIES:    Latest Reference Range & Units 01/15/24 10:15   WBC 3.40 - 10.80 10*3/mm3 3.07 (L)   RBC 3.77 - 5.28 10*6/mm3 4.02   Hemoglobin 12.0 - 15.9 g/dL 11.9 (L)   Hematocrit 34.0 - 46.6 % 34.8   Platelets 140 - 450 10*3/mm3 248   RDW 12.3 - 15.4 % 12.6   MCV 79.0 - 97.0 fL 86.6   MCH 26.6 - 33.0 pg 29.6   MCHC 31.5 - 35.7 g/dL 34.2   MPV 6.0 - 12.0 fL 9.7   RDW-SD 37.0 - 54.0 fl 39.9   (L): Data is abnormally low   Latest Reference Range & Units 01/15/24 10:15   Sodium 136 - 145 mmol/L 140   Potassium 3.5 - 5.2 mmol/L 4.3   Chloride 98 - 107 mmol/L 104   CO2 22.0 - 29.0 mmol/L 27.8   Anion Gap 5.0 - 15.0 mmol/L 8.2   BUN 6 - 20 mg/dL 10   Creatinine 0.57 - 1.00 mg/dL 0.72   BUN/Creatinine Ratio 7.0 - 25.0  13.9   eGFR >60.0 mL/min/1.73 105.2   Glucose 65 - 99 mg/dL 95   Calcium 8.6 - 10.5 mg/dL 9.1   Alkaline Phosphatase 39 - 117 U/L 79   Total Protein 6.0 - 8.5 g/dL 7.2   Albumin 3.5 - 5.2 g/dL 4.2   Globulin gm/dL 3.0   A/G Ratio g/dL 1.4   AST (SGOT) 1 - 32 U/L 27   ALT (SGPT) 1 - 33 U/L 20   Total Bilirubin 0.0 - 1.2 mg/dL 0.3     Fabby Miranda MD   Obstetrics and Gynecology  [facility], [place], operated by [facility]

## 2024-02-05 ENCOUNTER — ANESTHESIA (OUTPATIENT)
Dept: PERIOP | Facility: HOSPITAL | Age: 44
End: 2024-02-05
Payer: COMMERCIAL

## 2024-02-05 ENCOUNTER — ANESTHESIA EVENT (OUTPATIENT)
Dept: PERIOP | Facility: HOSPITAL | Age: 44
End: 2024-02-05
Payer: COMMERCIAL

## 2024-02-05 ENCOUNTER — HOSPITAL ENCOUNTER (OUTPATIENT)
Facility: HOSPITAL | Age: 44
Setting detail: HOSPITAL OUTPATIENT SURGERY
Discharge: HOME | End: 2024-02-05
Attending: STUDENT IN AN ORGANIZED HEALTH CARE EDUCATION/TRAINING PROGRAM | Admitting: STUDENT IN AN ORGANIZED HEALTH CARE EDUCATION/TRAINING PROGRAM
Payer: COMMERCIAL

## 2024-02-05 VITALS
HEART RATE: 64 BPM | OXYGEN SATURATION: 100 % | DIASTOLIC BLOOD PRESSURE: 84 MMHG | TEMPERATURE: 97.7 F | RESPIRATION RATE: 16 BRPM | SYSTOLIC BLOOD PRESSURE: 115 MMHG

## 2024-02-05 DIAGNOSIS — Z98.890 POST-OPERATIVE STATE: Primary | ICD-10-CM

## 2024-02-05 DIAGNOSIS — Z17.0 MALIGNANT NEOPLASM OF CENTRAL PORTION OF LEFT BREAST IN FEMALE, ESTROGEN RECEPTOR POSITIVE: ICD-10-CM

## 2024-02-05 DIAGNOSIS — C50.112 MALIGNANT NEOPLASM OF CENTRAL PORTION OF LEFT BREAST IN FEMALE, ESTROGEN RECEPTOR POSITIVE: ICD-10-CM

## 2024-02-05 LAB
B-HCG UR QL: NEGATIVE
EXPIRATION DATE: NORMAL
INTERNAL NEGATIVE CONTROL: NORMAL
INTERNAL POSITIVE CONTROL: NORMAL
Lab: NORMAL

## 2024-02-05 PROCEDURE — 25010000002 FENTANYL CITRATE (PF) 50 MCG/ML SOLUTION PREFILLED SYRINGE: Performed by: NURSE ANESTHETIST, CERTIFIED REGISTERED

## 2024-02-05 PROCEDURE — 81025 URINE PREGNANCY TEST: CPT | Performed by: STUDENT IN AN ORGANIZED HEALTH CARE EDUCATION/TRAINING PROGRAM

## 2024-02-05 PROCEDURE — 25010000002 MIDAZOLAM PER 1MG: Performed by: NURSE ANESTHETIST, CERTIFIED REGISTERED

## 2024-02-05 PROCEDURE — 25010000002 SUGAMMADEX 200 MG/2ML SOLUTION: Performed by: NURSE ANESTHETIST, CERTIFIED REGISTERED

## 2024-02-05 PROCEDURE — 25810000003 LACTATED RINGERS PER 1000 ML: Performed by: STUDENT IN AN ORGANIZED HEALTH CARE EDUCATION/TRAINING PROGRAM

## 2024-02-05 PROCEDURE — 25810000003 SODIUM CHLORIDE 0.9 % SOLUTION: Performed by: STUDENT IN AN ORGANIZED HEALTH CARE EDUCATION/TRAINING PROGRAM

## 2024-02-05 PROCEDURE — 25010000002 ONDANSETRON PER 1 MG: Performed by: NURSE ANESTHETIST, CERTIFIED REGISTERED

## 2024-02-05 PROCEDURE — 58661 LAPAROSCOPY REMOVE ADNEXA: CPT | Performed by: STUDENT IN AN ORGANIZED HEALTH CARE EDUCATION/TRAINING PROGRAM

## 2024-02-05 PROCEDURE — 25010000002 BUPIVACAINE (PF) 0.25 % SOLUTION: Performed by: STUDENT IN AN ORGANIZED HEALTH CARE EDUCATION/TRAINING PROGRAM

## 2024-02-05 PROCEDURE — 25010000002 KETOROLAC TROMETHAMINE PER 15 MG: Performed by: NURSE ANESTHETIST, CERTIFIED REGISTERED

## 2024-02-05 PROCEDURE — S0260 H&P FOR SURGERY: HCPCS | Performed by: STUDENT IN AN ORGANIZED HEALTH CARE EDUCATION/TRAINING PROGRAM

## 2024-02-05 PROCEDURE — 25010000002 DEXAMETHASONE PER 1 MG: Performed by: NURSE ANESTHETIST, CERTIFIED REGISTERED

## 2024-02-05 PROCEDURE — 25010000002 PROPOFOL 10 MG/ML EMULSION: Performed by: NURSE ANESTHETIST, CERTIFIED REGISTERED

## 2024-02-05 PROCEDURE — C1894 INTRO/SHEATH, NON-LASER: HCPCS | Performed by: STUDENT IN AN ORGANIZED HEALTH CARE EDUCATION/TRAINING PROGRAM

## 2024-02-05 RX ORDER — MIDAZOLAM HYDROCHLORIDE 2 MG/2ML
INJECTION, SOLUTION INTRAMUSCULAR; INTRAVENOUS AS NEEDED
Status: DISCONTINUED | OUTPATIENT
Start: 2024-02-05 | End: 2024-02-05 | Stop reason: SURG

## 2024-02-05 RX ORDER — IBUPROFEN 800 MG/1
800 TABLET ORAL EVERY 6 HOURS PRN
Qty: 30 TABLET | Refills: 0 | Status: SHIPPED | OUTPATIENT
Start: 2024-02-05

## 2024-02-05 RX ORDER — ONDANSETRON 2 MG/ML
4 INJECTION INTRAMUSCULAR; INTRAVENOUS ONCE AS NEEDED
Status: DISCONTINUED | OUTPATIENT
Start: 2024-02-05 | End: 2024-02-05 | Stop reason: HOSPADM

## 2024-02-05 RX ORDER — KETOROLAC TROMETHAMINE 30 MG/ML
INJECTION, SOLUTION INTRAMUSCULAR; INTRAVENOUS AS NEEDED
Status: DISCONTINUED | OUTPATIENT
Start: 2024-02-05 | End: 2024-02-05 | Stop reason: SURG

## 2024-02-05 RX ORDER — SODIUM CHLORIDE 9 MG/ML
40 INJECTION, SOLUTION INTRAVENOUS AS NEEDED
Status: DISCONTINUED | OUTPATIENT
Start: 2024-02-05 | End: 2024-02-05 | Stop reason: HOSPADM

## 2024-02-05 RX ORDER — SODIUM CHLORIDE 0.9 % (FLUSH) 0.9 %
3 SYRINGE (ML) INJECTION EVERY 12 HOURS SCHEDULED
Status: DISCONTINUED | OUTPATIENT
Start: 2024-02-05 | End: 2024-02-05 | Stop reason: HOSPADM

## 2024-02-05 RX ORDER — SODIUM CHLORIDE 0.9 % (FLUSH) 0.9 %
10 SYRINGE (ML) INJECTION AS NEEDED
Status: DISCONTINUED | OUTPATIENT
Start: 2024-02-05 | End: 2024-02-05 | Stop reason: HOSPADM

## 2024-02-05 RX ORDER — SODIUM CHLORIDE, SODIUM LACTATE, POTASSIUM CHLORIDE, CALCIUM CHLORIDE 600; 310; 30; 20 MG/100ML; MG/100ML; MG/100ML; MG/100ML
1000 INJECTION, SOLUTION INTRAVENOUS CONTINUOUS
Status: DISCONTINUED | OUTPATIENT
Start: 2024-02-05 | End: 2024-02-05 | Stop reason: HOSPADM

## 2024-02-05 RX ORDER — OXYCODONE HYDROCHLORIDE 5 MG/1
5 TABLET ORAL EVERY 4 HOURS PRN
Qty: 10 TABLET | Refills: 0 | Status: SHIPPED | OUTPATIENT
Start: 2024-02-05

## 2024-02-05 RX ORDER — BUPIVACAINE HYDROCHLORIDE 2.5 MG/ML
INJECTION, SOLUTION EPIDURAL; INFILTRATION; INTRACAUDAL AS NEEDED
Status: DISCONTINUED | OUTPATIENT
Start: 2024-02-05 | End: 2024-02-05 | Stop reason: HOSPADM

## 2024-02-05 RX ORDER — LIDOCAINE 40 MG/G
1 CREAM TOPICAL AS NEEDED
Status: DISCONTINUED | OUTPATIENT
Start: 2024-02-05 | End: 2024-02-05 | Stop reason: HOSPADM

## 2024-02-05 RX ORDER — DEXAMETHASONE SODIUM PHOSPHATE 4 MG/ML
INJECTION, SOLUTION INTRA-ARTICULAR; INTRALESIONAL; INTRAMUSCULAR; INTRAVENOUS; SOFT TISSUE AS NEEDED
Status: DISCONTINUED | OUTPATIENT
Start: 2024-02-05 | End: 2024-02-05 | Stop reason: SURG

## 2024-02-05 RX ORDER — MAGNESIUM HYDROXIDE 1200 MG/15ML
LIQUID ORAL AS NEEDED
Status: DISCONTINUED | OUTPATIENT
Start: 2024-02-05 | End: 2024-02-05 | Stop reason: HOSPADM

## 2024-02-05 RX ORDER — ROCURONIUM BROMIDE 50 MG/5 ML
SYRINGE (ML) INTRAVENOUS AS NEEDED
Status: DISCONTINUED | OUTPATIENT
Start: 2024-02-05 | End: 2024-02-05 | Stop reason: SURG

## 2024-02-05 RX ORDER — PROPOFOL 10 MG/ML
VIAL (ML) INTRAVENOUS AS NEEDED
Status: DISCONTINUED | OUTPATIENT
Start: 2024-02-05 | End: 2024-02-05 | Stop reason: SURG

## 2024-02-05 RX ORDER — ACETAMINOPHEN 500 MG
500 TABLET ORAL EVERY 6 HOURS PRN
Qty: 60 TABLET | Refills: 0 | Status: SHIPPED | OUTPATIENT
Start: 2024-02-05

## 2024-02-05 RX ORDER — ONDANSETRON 2 MG/ML
INJECTION INTRAMUSCULAR; INTRAVENOUS AS NEEDED
Status: DISCONTINUED | OUTPATIENT
Start: 2024-02-05 | End: 2024-02-05 | Stop reason: SURG

## 2024-02-05 RX ORDER — FENTANYL CITRATE 50 UG/ML
INJECTION, SOLUTION INTRAMUSCULAR; INTRAVENOUS AS NEEDED
Status: DISCONTINUED | OUTPATIENT
Start: 2024-02-05 | End: 2024-02-05 | Stop reason: SURG

## 2024-02-05 RX ORDER — SODIUM CHLORIDE 9 MG/ML
INJECTION, SOLUTION INTRAVENOUS CONTINUOUS PRN
Status: COMPLETED | OUTPATIENT
Start: 2024-02-05 | End: 2024-02-05

## 2024-02-05 RX ADMIN — SUGAMMADEX 200 MG: 100 INJECTION, SOLUTION INTRAVENOUS at 10:18

## 2024-02-05 RX ADMIN — LIDOCAINE HYDROCHLORIDE 60 MG: 20 INJECTION, SOLUTION INTRAVENOUS at 09:30

## 2024-02-05 RX ADMIN — KETOROLAC TROMETHAMINE 30 MG: 30 INJECTION, SOLUTION INTRAMUSCULAR; INTRAVENOUS at 10:18

## 2024-02-05 RX ADMIN — PROPOFOL 200 MG: 10 INJECTION, EMULSION INTRAVENOUS at 09:30

## 2024-02-05 RX ADMIN — MIDAZOLAM HYDROCHLORIDE 2 MG: 1 INJECTION, SOLUTION INTRAMUSCULAR; INTRAVENOUS at 09:25

## 2024-02-05 RX ADMIN — Medication 10 MG: at 09:50

## 2024-02-05 RX ADMIN — FENTANYL CITRATE 50 MCG: 50 INJECTION, SOLUTION INTRAMUSCULAR; INTRAVENOUS at 09:30

## 2024-02-05 RX ADMIN — ONDANSETRON 4 MG: 2 INJECTION INTRAMUSCULAR; INTRAVENOUS at 09:25

## 2024-02-05 RX ADMIN — Medication 40 MG: at 09:30

## 2024-02-05 RX ADMIN — FENTANYL CITRATE 50 MCG: 50 INJECTION, SOLUTION INTRAMUSCULAR; INTRAVENOUS at 09:50

## 2024-02-05 RX ADMIN — LIDOCAINE 4% 1 APPLICATION: 4 CREAM TOPICAL at 09:01

## 2024-02-05 RX ADMIN — DEXAMETHASONE SODIUM PHOSPHATE 8 MG: 4 INJECTION, SOLUTION INTRA-ARTICULAR; INTRALESIONAL; INTRAMUSCULAR; INTRAVENOUS; SOFT TISSUE at 09:38

## 2024-02-05 RX ADMIN — SODIUM CHLORIDE, POTASSIUM CHLORIDE, SODIUM LACTATE AND CALCIUM CHLORIDE: 600; 310; 30; 20 INJECTION, SOLUTION INTRAVENOUS at 10:42

## 2024-02-05 RX ADMIN — SODIUM CHLORIDE, POTASSIUM CHLORIDE, SODIUM LACTATE AND CALCIUM CHLORIDE: 600; 310; 30; 20 INJECTION, SOLUTION INTRAVENOUS at 09:53

## 2024-02-05 RX ADMIN — SODIUM CHLORIDE, POTASSIUM CHLORIDE, SODIUM LACTATE AND CALCIUM CHLORIDE 1000 ML: 600; 310; 30; 20 INJECTION, SOLUTION INTRAVENOUS at 07:49

## 2024-02-05 NOTE — ANESTHESIA POSTPROCEDURE EVALUATION
Patient: Kimberley Win    Procedure Summary       Date: 02/05/24 Room / Location: [facility] OR 1 / [facility] OR    Anesthesia Start: 0925 Anesthesia Stop: 1102    Procedure: DIAGNOSTIC LAPAROSCOPY, SALPINGO OOPHORECTOMY (Abdomen) Diagnosis:       Malignant neoplasm of central portion of left breast in female, estrogen receptor positive      (Malignant neoplasm of central portion of left breast in female, estrogen receptor positive [C50.112, Z17.0])    Surgeons: Fabby Miranda MD Provider: Eric Sherman CRNA    Anesthesia Type: MAC ASA Status: 2            Anesthesia Type: MAC    Vitals  No vitals data found for the desired time range.          Post Anesthesia Care and Evaluation    Patient location during evaluation: PACU  Patient participation: complete - patient participated  Level of consciousness: awake and alert  Pain score: 2  Pain management: satisfactory to patient    Airway patency: patent  Anesthetic complications: No anesthetic complications  PONV Status: none  Cardiovascular status: acceptable and stable  Respiratory status: acceptable and spontaneous ventilation  Hydration status: acceptable    Comments: Vitals signs as noted in nursing documentation as per protocol.

## 2024-02-05 NOTE — DISCHARGE INSTRUCTIONS
No pushing, pulling, tugging,  heavy lifting, or strenuous activity.  No major decision making, driving, or drinking alcoholic beverages for 24 hours. ( due to the medications you have  received)  Always use good hand hygiene/washing techniques.  NO driving while taking pain medications.    * if you have an incision:  Check your incision area every day for signs of infection.   Check for:  * more redness, swelling, or pain  *more fluid or blood  *warmth  *pus or bad smell.    Pelvic rest is best described as not putting anything in your vagina. This includes tampons, douching, tub bathing or sexual activity.

## 2024-02-05 NOTE — ANESTHESIA PREPROCEDURE EVALUATION
Anesthesia Evaluation     Patient summary reviewed and Nursing notes reviewed   no history of anesthetic complications:   NPO Solid Status: > 8 hours  NPO Liquid Status: > 8 hours           Airway   Mallampati: I  TM distance: >3 FB  Neck ROM: full  no difficulty expected  Dental - normal exam     Pulmonary - normal exam   (+) a smoker Former,  Cardiovascular - normal exam    Patient on routine beta blocker  Rhythm: regular  Rate: normal    (+) hypertension, hyperlipidemia      Neuro/Psych  GI/Hepatic/Renal/Endo    (+) GERD    Musculoskeletal     Abdominal  - normal exam    Abdomen: soft.  Bowel sounds: normal.   Substance History      OB/GYN          Other   arthritis,   history of cancer active    ROS/Med Hx Other: Left axillary radiation burn post cancer treatment               Anesthesia Plan    ASA 2     MAC     (Risks and benefits discussed including risk of aspiration, recall and dental damage. All patient questions answered. Will continue with POC. )  intravenous induction     Anesthetic plan, risks, benefits, and alternatives have been provided, discussed and informed consent has been obtained with: patient.  Pre-procedure education provided    CODE STATUS:

## 2024-02-05 NOTE — ANESTHESIA PROCEDURE NOTES
Airway  Urgency: elective    Date/Time: 2/5/2024 9:31 AM  End Time:2/5/2024 9:32 AM  Airway not difficult    General Information and Staff    Patient location during procedure: OR  CRNA/CAA: Eric Sherman CRNA    Indications and Patient Condition  Indications for airway management: airway protection    Preoxygenated: yes  Mask difficulty assessment: 1 - vent by mask    Final Airway Details  Final airway type: endotracheal airway      Successful airway: ETT  Cuffed: yes   Successful intubation technique: direct laryngoscopy  Facilitating devices/methods: anterior pressure/BURP  Endotracheal tube insertion site: oral  Blade: Abdoul  Blade size: 3  ETT size (mm): 7.0  Cormack-Lehane Classification: grade IIa - partial view of glottis  Placement verified by: chest auscultation and capnometry   Cuff volume (mL): 6  Measured from: lips  ETT/EBT  to lips (cm): 21  Number of attempts at approach: 1  Assessment: lips, teeth, and gum same as pre-op and atraumatic intubation    Additional Comments  +ETCO2, BBS, atraumatic

## 2024-02-05 NOTE — OP NOTE
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   Anesthesia Staff: CRNA: Eric Sherman CRNA    Anesthesia: General   OR Staff: Circulator: Kristel Rose RN; Fabby Keller RN  Scrub Person: Billie Wan   Specimens:  Bilateral fallopian tubes and ovaries   Estimated Blood Loss: 10 ml   Complications: None     Findings:  Normal liver edge. Approximately 1 cm subserosal fibroid at the posterior right cornua of the uterus. Filmy adhesions of the bilateral adnexa, left greater than right, to the pelvic side walls. Otherwise grossly normal uterus, fallopian tubes and ovaries bilaterally. Endometriosis implants seen along the left ovarian fossa and pelvic side wall. Filmy bowel adhesions to the posterior uterine serosa. Anterior uterine serosa adhered to the bladder/ anterior abdominal wall peritoneum.    Indications:   Kimberley Win is a 44 y.o.  who presented for a bilateral salpingo-oophorectomy to decrease her exposure to estrogen due to a history of estrogen-receptor positive invasive ductal carcinoma of the left breast.    Procedure:   After the appropriate time out and after adequate dosing of anesthesia, the patient was prepped and draped in the usual sterile fashion. She was placed in the dorsal lithotomy position using Lai stirrups. A soriano catheter had been placed per nursing for drainage during the procedure. A 5 mm infraumbilical skin incision was made with the knife.  The abdominal wall was elevated and the peritoneum was entered under direct visualization using a Visiport and the laparoscope. The abdomen was insufflated with carbon dioxide to a pressure of 15 mmHg. The patient was placed in Trendelenburg position. Two ancillary trocars were placed in the lower quadrants, a 5 mm trocar on the left and an 11 mm trocar on the right, both lateral to the epigastric vessels, under direct visualization without any complications. The pelvis was explored with the above findings noted. The ureters were identified and noted to be well out of the operative fields. Using the Harmonic scalpel, the filmy adhesions noted above were carefully taken down to restore normal pelvic anatomy. The right infundibulopelvic ligament was then clamped, cauterized and transected. The mesosalpinx underling the fallopian tube, the proximal fallopian tube and the utero-ovarian ligament were also clamped, cauterized and transected, freeing the ovary and fallopian tube, which were placed in the anterior cul de sac. The same procedure was performed on the contralateral side. The specimens were placed in an Endopouch and retrieved through the right lateral port after extending the incision to approximately 2 cm. The pelvis was irrigated and suctioned. Inspection revealed adequate hemostasis at the surgical sites. The abdomen was partially released of carbon dioxide and the site of surgical excision was again noted to be hemostatic. The 5 mm trocar sleeves were removed and noted to be hemostatic. After full desufflation, the right lateral port was also removed and the fascia was closed with 0 Vicryl. The subcutaneous tissue was closed with 3-0 Vicryl in a running fashion. The skin was then closed at all sites using 4-0 Monocryl in a subcuticular fashion. Surgical glue was placed. All instrument and sponge counts were correct at the end of the procedure. The patient tolerated the procedure well and was taken to the  postoperative recovery room in stable condition.     Fabby Miranda MD   Obstetrics and Gynecology  [facility]

## 2024-02-06 ENCOUNTER — TELEPHONE (OUTPATIENT)
Dept: OBSTETRICS AND GYNECOLOGY | Facility: CLINIC | Age: 44
End: 2024-02-06
Payer: COMMERCIAL

## 2024-02-06 LAB — REF LAB TEST METHOD: NORMAL

## 2024-02-06 NOTE — TELEPHONE ENCOUNTER
Patient called with vaginal burning the day after surgery. Spoke with Dr Miranda and she states that it may be from the Hibiclens that they used to prep for surgery. Advised patient to  do warm soaks.

## 2024-02-06 NOTE — TELEPHONE ENCOUNTER
----- Message from Kimberley Win sent at 2/5/2024 11:25 PM EST -----  Regarding: Post surgery questions   Contact: [phone]  Louis Sequeira,  Call me when you get this message. My  said you found and removed scar tissue and wanted to know about it and other details about the procedure.   Thank you,  Kimberley   rx trazodone 25mg/QHS.

## 2024-02-13 ENCOUNTER — OFFICE VISIT (OUTPATIENT)
Dept: OBSTETRICS AND GYNECOLOGY | Facility: CLINIC | Age: 44
End: 2024-02-13
Payer: COMMERCIAL

## 2024-02-13 VITALS
HEIGHT: 67 IN | DIASTOLIC BLOOD PRESSURE: 60 MMHG | BODY MASS INDEX: 26.53 KG/M2 | WEIGHT: 169 LBS | SYSTOLIC BLOOD PRESSURE: 102 MMHG

## 2024-02-13 DIAGNOSIS — Z09 POSTOPERATIVE FOLLOW-UP: Primary | ICD-10-CM

## 2024-02-13 DIAGNOSIS — Z17.0 MALIGNANT NEOPLASM OF CENTRAL PORTION OF LEFT BREAST IN FEMALE, ESTROGEN RECEPTOR POSITIVE: ICD-10-CM

## 2024-02-13 DIAGNOSIS — C50.112 MALIGNANT NEOPLASM OF CENTRAL PORTION OF LEFT BREAST IN FEMALE, ESTROGEN RECEPTOR POSITIVE: ICD-10-CM

## 2024-02-13 DIAGNOSIS — N95.1 VASOMOTOR SYMPTOMS DUE TO MENOPAUSE: ICD-10-CM

## 2024-02-13 PROCEDURE — 99024 POSTOP FOLLOW-UP VISIT: CPT | Performed by: STUDENT IN AN ORGANIZED HEALTH CARE EDUCATION/TRAINING PROGRAM

## 2024-02-15 ENCOUNTER — OFFICE VISIT (OUTPATIENT)
Dept: INTERNAL MEDICINE | Facility: CLINIC | Age: 44
End: 2024-02-15
Payer: COMMERCIAL

## 2024-02-15 ENCOUNTER — TELEPHONE (OUTPATIENT)
Dept: INTERNAL MEDICINE | Facility: CLINIC | Age: 44
End: 2024-02-15

## 2024-02-15 VITALS
DIASTOLIC BLOOD PRESSURE: 80 MMHG | SYSTOLIC BLOOD PRESSURE: 120 MMHG | BODY MASS INDEX: 25.74 KG/M2 | TEMPERATURE: 97 F | WEIGHT: 164 LBS | HEART RATE: 68 BPM | OXYGEN SATURATION: 100 % | HEIGHT: 67 IN

## 2024-02-15 DIAGNOSIS — G89.29 CHRONIC SHOULDER PAIN, UNSPECIFIED LATERALITY: ICD-10-CM

## 2024-02-15 DIAGNOSIS — M25.519 CHRONIC SHOULDER PAIN, UNSPECIFIED LATERALITY: ICD-10-CM

## 2024-02-15 DIAGNOSIS — Z12.12 SCREENING FOR COLORECTAL CANCER: ICD-10-CM

## 2024-02-15 DIAGNOSIS — Z13.220 SCREENING, LIPID: ICD-10-CM

## 2024-02-15 DIAGNOSIS — Z00.00 WELL ADULT EXAM: Primary | ICD-10-CM

## 2024-02-15 DIAGNOSIS — R51.9 NONINTRACTABLE HEADACHE, UNSPECIFIED CHRONICITY PATTERN, UNSPECIFIED HEADACHE TYPE: ICD-10-CM

## 2024-02-15 DIAGNOSIS — Z12.11 SCREENING FOR COLORECTAL CANCER: ICD-10-CM

## 2024-02-15 RX ORDER — MOMETASONE FUROATE 1 MG/G
CREAM TOPICAL
COMMUNITY
Start: 2024-02-12

## 2024-02-15 RX ORDER — TIZANIDINE 4 MG/1
4 TABLET ORAL EVERY 8 HOURS PRN
Qty: 90 TABLET | Refills: 0 | Status: SHIPPED | OUTPATIENT
Start: 2024-02-15

## 2024-02-21 DIAGNOSIS — I49.3 PVC (PREMATURE VENTRICULAR CONTRACTION): ICD-10-CM

## 2024-02-21 RX ORDER — METOPROLOL SUCCINATE 25 MG/1
37 TABLET, EXTENDED RELEASE ORAL DAILY
Qty: 45 TABLET | Refills: 3 | Status: SHIPPED | OUTPATIENT
Start: 2024-02-21

## 2024-02-26 ENCOUNTER — OFFICE VISIT (OUTPATIENT)
Dept: ONCOLOGY | Facility: CLINIC | Age: 44
End: 2024-02-26
Payer: COMMERCIAL

## 2024-02-26 ENCOUNTER — INFUSION (OUTPATIENT)
Dept: ONCOLOGY | Facility: HOSPITAL | Age: 44
End: 2024-02-26
Payer: COMMERCIAL

## 2024-02-26 VITALS
SYSTOLIC BLOOD PRESSURE: 115 MMHG | WEIGHT: 169 LBS | RESPIRATION RATE: 12 BRPM | HEART RATE: 60 BPM | HEIGHT: 67 IN | OXYGEN SATURATION: 98 % | TEMPERATURE: 97.7 F | BODY MASS INDEX: 26.53 KG/M2 | DIASTOLIC BLOOD PRESSURE: 73 MMHG

## 2024-02-26 DIAGNOSIS — C50.112 MALIGNANT NEOPLASM OF CENTRAL PORTION OF LEFT BREAST IN FEMALE, ESTROGEN RECEPTOR POSITIVE: Primary | ICD-10-CM

## 2024-02-26 DIAGNOSIS — Z17.0 MALIGNANT NEOPLASM OF CENTRAL PORTION OF LEFT BREAST IN FEMALE, ESTROGEN RECEPTOR POSITIVE: Primary | ICD-10-CM

## 2024-02-26 LAB
25(OH)D3 SERPL-MCNC: 28.9 NG/ML (ref 30–100)
ALBUMIN SERPL-MCNC: 4.2 G/DL (ref 3.5–5.2)
ALBUMIN/GLOB SERPL: 1.2 G/DL
ALP SERPL-CCNC: 87 U/L (ref 39–117)
ALT SERPL W P-5'-P-CCNC: 57 U/L (ref 1–33)
ANION GAP SERPL CALCULATED.3IONS-SCNC: 9.4 MMOL/L (ref 5–15)
AST SERPL-CCNC: 39 U/L (ref 1–32)
BASOPHILS # BLD AUTO: 0.02 10*3/MM3 (ref 0–0.2)
BASOPHILS NFR BLD AUTO: 0.6 % (ref 0–1.5)
BILIRUB SERPL-MCNC: 0.2 MG/DL (ref 0–1.2)
BUN SERPL-MCNC: 15 MG/DL (ref 6–20)
BUN/CREAT SERPL: 24.6 (ref 7–25)
CALCIUM SPEC-SCNC: 9.4 MG/DL (ref 8.6–10.5)
CHLORIDE SERPL-SCNC: 103 MMOL/L (ref 98–107)
CHOLEST SERPL-MCNC: 240 MG/DL (ref 0–200)
CO2 SERPL-SCNC: 27.6 MMOL/L (ref 22–29)
CREAT SERPL-MCNC: 0.61 MG/DL (ref 0.57–1)
CRP SERPL-MCNC: 0.45 MG/DL (ref 0–0.5)
DEPRECATED RDW RBC AUTO: 41.4 FL (ref 37–54)
EGFRCR SERPLBLD CKD-EPI 2021: 112.5 ML/MIN/1.73
EOSINOPHIL # BLD AUTO: 0.16 10*3/MM3 (ref 0–0.4)
EOSINOPHIL NFR BLD AUTO: 4.7 % (ref 0.3–6.2)
ERYTHROCYTE [DISTWIDTH] IN BLOOD BY AUTOMATED COUNT: 13 % (ref 12.3–15.4)
GLOBULIN UR ELPH-MCNC: 3.4 GM/DL
GLUCOSE SERPL-MCNC: 86 MG/DL (ref 65–99)
HCT VFR BLD AUTO: 35 % (ref 34–46.6)
HDLC SERPL-MCNC: 79 MG/DL (ref 40–60)
HGB BLD-MCNC: 12.1 G/DL (ref 12–15.9)
IMM GRANULOCYTES # BLD AUTO: 0 10*3/MM3 (ref 0–0.05)
IMM GRANULOCYTES NFR BLD AUTO: 0 % (ref 0–0.5)
LDLC SERPL CALC-MCNC: 135 MG/DL (ref 0–100)
LDLC/HDLC SERPL: 1.66 {RATIO}
LYMPHOCYTES # BLD AUTO: 0.76 10*3/MM3 (ref 0.7–3.1)
LYMPHOCYTES NFR BLD AUTO: 22.2 % (ref 19.6–45.3)
MCH RBC QN AUTO: 30.3 PG (ref 26.6–33)
MCHC RBC AUTO-ENTMCNC: 34.6 G/DL (ref 31.5–35.7)
MCV RBC AUTO: 87.5 FL (ref 79–97)
MONOCYTES # BLD AUTO: 0.32 10*3/MM3 (ref 0.1–0.9)
MONOCYTES NFR BLD AUTO: 9.3 % (ref 5–12)
NEUTROPHILS NFR BLD AUTO: 2.17 10*3/MM3 (ref 1.7–7)
NEUTROPHILS NFR BLD AUTO: 63.2 % (ref 42.7–76)
NRBC BLD AUTO-RTO: 0 /100 WBC (ref 0–0.2)
PLATELET # BLD AUTO: 263 10*3/MM3 (ref 140–450)
PMV BLD AUTO: 8.3 FL (ref 6–12)
POTASSIUM SERPL-SCNC: 3.9 MMOL/L (ref 3.5–5.2)
PROT SERPL-MCNC: 7.6 G/DL (ref 6–8.5)
RBC # BLD AUTO: 4 10*6/MM3 (ref 3.77–5.28)
SODIUM SERPL-SCNC: 140 MMOL/L (ref 136–145)
TRIGL SERPL-MCNC: 151 MG/DL (ref 0–150)
VLDLC SERPL-MCNC: 26 MG/DL (ref 5–40)
WBC NRBC COR # BLD AUTO: 3.43 10*3/MM3 (ref 3.4–10.8)

## 2024-02-26 PROCEDURE — 85025 COMPLETE CBC W/AUTO DIFF WBC: CPT

## 2024-02-26 PROCEDURE — 80053 COMPREHEN METABOLIC PANEL: CPT

## 2024-02-26 PROCEDURE — 99214 OFFICE O/P EST MOD 30 MIN: CPT | Performed by: INTERNAL MEDICINE

## 2024-02-26 PROCEDURE — 86140 C-REACTIVE PROTEIN: CPT

## 2024-02-26 PROCEDURE — 82306 VITAMIN D 25 HYDROXY: CPT

## 2024-02-26 PROCEDURE — 80061 LIPID PANEL: CPT | Performed by: FAMILY MEDICINE

## 2024-02-26 PROCEDURE — 25010000002 HEPARIN LOCK FLUSH PER 10 UNITS: Performed by: INTERNAL MEDICINE

## 2024-02-26 PROCEDURE — 36591 DRAW BLOOD OFF VENOUS DEVICE: CPT

## 2024-02-26 PROCEDURE — 96523 IRRIG DRUG DELIVERY DEVICE: CPT

## 2024-02-26 RX ORDER — HEPARIN SODIUM (PORCINE) LOCK FLUSH IV SOLN 100 UNIT/ML 100 UNIT/ML
500 SOLUTION INTRAVENOUS AS NEEDED
Status: DISCONTINUED | OUTPATIENT
Start: 2024-02-26 | End: 2024-02-26 | Stop reason: HOSPADM

## 2024-02-26 RX ORDER — ANASTROZOLE 1 MG/1
1 TABLET ORAL DAILY
Qty: 30 TABLET | Refills: 3 | Status: SHIPPED | OUTPATIENT
Start: 2024-02-26

## 2024-02-26 RX ORDER — HEPARIN SODIUM (PORCINE) LOCK FLUSH IV SOLN 100 UNIT/ML 100 UNIT/ML
500 SOLUTION INTRAVENOUS AS NEEDED
OUTPATIENT
Start: 2024-02-26

## 2024-02-26 RX ADMIN — HEPARIN 500 UNITS: 100 SYRINGE at 10:36

## 2024-02-26 NOTE — PROGRESS NOTES
[facility]ology [facility] [place]  Office number [phone]    Fax number [phone]     Follow-up     Date: 24    Patient Name: Kimberley Win  MRN: [id number]  : 1980    Referring Physician: Dr. Daxa Anderson MD    Chief Complaint: follow up    Cancer Staging:  Cancer Staging   Stage IIA (cT2, cN1, cM0, G2, ER+, MS+, HER2-)    History of Present Illness: Kimberley Win is a pleasant 44 y.o. female who presented for evaluation of left breast cancer.     She notes a history of cystic breast disease for many years. She notes a new breast mass that became progressively harder and associated with pain prompting diagnostic workup.     Diagnostic mammogram 23 showed a dense, spiculated mass in the left breast which on ultrasound corresponded to a 3:00 3 cm hypoechoic mass with internal vascularity. In the 7:00 left breast there was a 7 mm mass corresponding on US to a cluster of cysts spanning 1.4 cm. In the left breast there was a 1.7 cm LN with multiple smaller LN.     Left breast biopsy showed grade 2 invasive ductal carcinoma (ER 90%, MS 10%, HER 2 negative 0+ by IHC). Lymph node FNA was postive for malignancy, metastatic ductal carcinoma.  Ki-67 5%    She underwent bilateral mastectomy and sentinel lymph node biopsy on 2023.  Left breast mastectomy showed residual 2.5 cm of invasive ductal carcinoma with associated DCIS.  Margins negative.  [place] lymph node biopsy was positive (1/2) with extracapsular extension and 1 of 4 additional lymph nodes were positive for metastatic carcinoma (total lymph node count 2 of 6).      Breast cancer risk profile:  Age of menarche:10  G0; infertility  Age of menopause: premenopausal   Family history of breast, ovarian, prostate or pancreatic cancer: mom stage IV breast cancer in her 50s. M Great Aunt, breast cancer.   Genetic testing: negative 36 gene CancerNext panel     Treatment history:  Dose dense AC followed by weekly taxol:  C1, 7/5/2023.  Weekly Taxol terminated after 2 doses for pneumonitis which fully resolved with oral steroids,  Radiation completed 2/1/2024 with Dr. Robison  BSO 2/5/2024  Arimidex 2/26/2024, planning    Interval history:  She returns for AI initiation. +Radiation dermatitis with desquamation. Has sujey having right shoulder pain and locking remotely in right arm. She has re aggravated this injury with overhead reach for radiation. No she has pain biceps insertion site with radiation down to thumb. She is using muscle relaxer, tylenol and ibuprofen without relief. Present x 1 mo and not improving. Otherwise doing well. Center of back hurts at T8 level with coughing x 1 week    CT chest/neck 2/19/24:  COMBINED IMPRESSION:   1. No CT evidence of neoplastic spread in the neck, chest or upper   abdomen.   2. No cause of shoulder pain has been demonstrated.   3. Chronic maxillary sinusitis.     COMBINED IMPRESSION:   1. No CT evidence of neoplastic spread in the neck, chest or upper   abdomen.   2. No cause of shoulder pain has been demonstrated.   3. Chronic maxillary sinusitis.     Scheduled to start PT next month.   Can't get MRI because of expanders.   Not interested in ortho referral yet but will let me know if interested. .     S/p BSO 2/5/24  OVARY AND FALLOPIAN TUBE, BILATERAL:  Ovary with benign physiologic cysts  Fallopian tubes with benign paratubal cysts  No evidence of malignancy    Headaches are stable, no focal symptoms, associated N/V. Not intractable.     Mild hot flashes.     Past Medical History:   Past Medical History:   Diagnosis Date    Abnormal ECG 8/23/23    Anemia     Chemo    Arthritis     Breast cancer 06/01/2023    Left Breast    Elevated cholesterol     improved, not currently taking medication    History of chemotherapy 09/05/2023    last treatment    History of radiation therapy     left breast, currently in treatment    Hypertension     Malignant neoplasm of central portion of left breast in  female, estrogen receptor positive 06/20/2023    Osteoarthritis 2021   No personal history of myocardial infarction, cerebrovascular event, or venous thromboembolism.  Osteoarthritis in her hands. No personal history of autoimmune disease. Fhx of RA in her aunt    Past Surgical History:   Past Surgical History:   Procedure Laterality Date    BREAST BIOPSY  6/1/23    BREAST RECONSTRUCTION, BREAST TISSUE EXPANDER REMOVAL, IMPLANT INSERTION Bilateral 11/06/2023    Procedure: IMMEDIATE BREAST RECONSTRUCTION WITH BREAST TISSUE EXPANDER INSERTION AND ALLODERM - BILATERAL;  Surgeon: Dilip Anderson MD;  Location: [facility] OR;  Service: Plastics;  Laterality: Bilateral;    DIAGNOSTIC LAPAROSCOPY, SALPINGO OOPHORECTOMY LAPAROSCOPIC N/A 2/5/2024    Procedure: DIAGNOSTIC LAPAROSCOPY, SALPINGO OOPHORECTOMY;  Surgeon: Fabby Miranda MD;  Location: [facility] OR;  Service: Obstetrics/Gynecology;  Laterality: N/A;    MASTECTOMY W/ SENTINEL NODE BIOPSY Bilateral 11/06/2023    Procedure: BREAST MASTECTOMY WITH NIPPLE SPARING BILATERAL WITH LEFT SENTINEL NODE BIOPSY, AND AXILLARY DISECTION;  Surgeon: Daxa Anderson MD;  Location: [facility] OR;  Service: General;  Laterality: Bilateral;    US GUIDED LYMPH NODE BIOPSY  06/01/2023    VENOUS ACCESS DEVICE (PORT) INSERTION  06/30/2023       Family History:   Family History   Problem Relation Age of Onset    Breast cancer Mother 55        Stage 4    COPD Mother     Diabetes Mother     Early death Mother     Heart disease Mother     Stroke Mother     Rheum arthritis Maternal Aunt     Breast cancer Paternal Aunt     COPD Maternal Grandfather     Stroke Maternal Grandfather     Breast cancer Other      Social History:   Social History     Socioeconomic History    Marital status:    Tobacco Use    Smoking status: Former     Packs/day: 0.25     Years: 10.00     Additional pack years: 0.00     Total pack years: 2.50     Types: Cigarettes     Start date: 2/12/1995     Quit date: 2/12/2005      Years since quittin.0     Passive exposure: Past    Smokeless tobacco: Never   Vaping Use    Vaping Use: Never used   Substance and Sexual Activity    Alcohol use: Not Currently     Comment: rare    Drug use: Never    Sexual activity: Yes     Partners: Male     Birth control/protection: Condom   , lives in [place].     Medications:     Current Outpatient Medications:     acetaminophen (TYLENOL) 500 MG tablet, Take 1 tablet by mouth Every 6 (Six) Hours As Needed for Mild Pain., Disp: 60 tablet, Rfl: 0    diphenhydrAMINE HCl, Sleep, (ZZZQUIL PO), Take 1 tablet by mouth Every Night., Disp: , Rfl:     docusate sodium (COLACE) 100 MG capsule, Take 1 capsule by mouth 2 (Two) Times a Day As Needed for Constipation., Disp: , Rfl:     ibuprofen (ADVIL,MOTRIN) 800 MG tablet, Take 1 tablet by mouth Every 6 (Six) Hours As Needed for Mild Pain., Disp: 30 tablet, Rfl: 0    lidocaine-prilocaine (EMLA) 2.5-2.5 % cream, Apply 1 application topically to the appropriate area as directed As Needed (45-60 minutes prior to port access.  Cover with saran/plastic wrap.)., Disp: 30 g, Rfl: 3    metoprolol succinate XL (TOPROL-XL) 25 MG 24 hr tablet, Take 1.5 tablets by mouth Daily., Disp: 45 tablet, Rfl: 3    mometasone (ELOCON) 0.1 % cream, APPLY TOPICALLY EVERY DAY AFTER RADIATION TREATMENT, Disp: , Rfl:     omeprazole (priLOSEC) 40 MG capsule, Take 1 capsule by mouth Daily. 30 min prior to evening., Disp: 30 capsule, Rfl: 5    ondansetron (ZOFRAN) 8 MG tablet, Take 1 tablet by mouth 3 (Three) Times a Day As Needed for Nausea or Vomiting (DO NOT USE WHILE SANCUSO IN PLACE)., Disp: 30 tablet, Rfl: 5    tiZANidine (ZANAFLEX) 4 MG tablet, Take 1 tablet by mouth Every 8 (Eight) Hours As Needed for Muscle Spasms., Disp: 90 tablet, Rfl: 0    Allergies:   Allergies   Allergen Reactions    Chlorhexidine Other (See Comments)     Burning    Nylon Hives    Sunblock [Solbar Pf Spf15] Rash       Objective     Vital Signs:  "  Vitals:    2438   BP: 115/73   Pulse: 60   Resp: 12   Temp: 97.7 °F (36.5 °C)   SpO2: 98%   Weight: 76.7 kg (169 lb)   Height: 170.2 cm (67\")   PainSc:   5   PainLoc: Breast  Comment: Left from radiation      Body mass index is 26.47 kg/m².   Pain Score    24 0938   PainSc:   5   PainLoc: Breast  Comment: Left from radiation     ECOG Performance Status: 0    Physical Exam:   General: No acute distress. Well appearing.  HEENT: Normocephalic, atraumatic. Sclera anicteric.   Cardiovascular: regular rate and rhythm. No murmurs.   Respiratory: Normal rate. Clear to auscultation bilaterally.  Abdomen: Soft, nontender, non distended with normoactive bowel sounds.  Neuro: Alert and oriented x 3.   Ext: Status post bilateral mastectomy with expanders in place.  Radiation dermatitis involving the left axilla  MSK: Painful abduction beyond 90 degrees both passive and active at the right shoulder.  Pain with posterior rotation.    Laboratory/Imaging Reviewed:     No visits with results within 2 Week(s) from this visit.   Latest known visit with results is:   Admission on 2024, Discharged on 2024   Component Date Value Ref Range Status    HCG, Urine, QL 2024 Negative  Negative Final    Lot Number [id number]   Final    Internal Positive Control 2024 Passed  Positive, Passed Final    Internal Negative Control 2024 Passed  Negative, Passed Final    Expiration Date 2024   Final    Reference Lab Report 2024    Final                    Value:[facility]  [address] [place] [address]  Phone: [phone] or [phone]  Fax: [phone]  Benjamín Ramos M.D., Medical Director    PATIENT NAME                           LABORATORY NO.  [id number]  STEPH CARTWRIGHT.                [id number]  [id number]                         AGE              SEX  SSN           CLIENT REF #  [place] [id number]      " "1980      [id number]   [id number]    [address] [place] [address] BY-PASS                REQUESTING M.D.     ATTENDING M.D.     [place] [address]                                     LESTER  DATE COLLECTED      DATE RECEIVED      DATE REPORTED  02/05/2024 02/05/2024 02/06/2024    DIAGNOSIS:  OVARY AND FALLOPIAN TUBE, BILATERAL:  Ovary with benign physiologic cysts  Fallopian tubes with benign paratubal cysts  No evidence of malignancy    CLINICAL HISTORY:  Malignant neoplasm of central                           portion of left breast in female, estrogen receptor  positive    SPECIMENS RECEIVED:  OVARY AND FALLOPIAN TUBE, BILATERAL    MICROSCOPIC DESCRIPTION:  Tissue blocks are prepared and slides are examined microscopically on all  specimens. See diagnosis for details.    Professional interpretation rendered by Trae Acuna M.D.,F.C.A.P. at [facility], [address] [place] [address].    GROSS DESCRIPTION:  Received in formalin labeled \"ovary, bilateral fallopian tubes\" are 2  undesignated, pink-purple, and fimbriated fallopian tubes that measure 4.2 x 0.7  cm and 5.0 x 0.8 cm.  Each fallopian tube displays an attached ovary, and no  distinct lesions are identified within either fallopian tube upon sectioning.  The  ovary associated with the shorter fallopian tube measures 2 g, 2.1 x 1.6 x 0.8 cm  and displays a tan-yellow, smooth external surface.  Sectioning reveals tan-pink  to white, unremarkable cut surfaces.  The ovary associated with the longer  fallopian tube measures 2                           g, 2.7 x 1.5 x 0.9 cm and displays a tan-pink, smooth  external surface.  Sectioning reveals tan-pink, unremarkable, and focally  hemorrhagic cut surfaces.  The specimen is submitted entirely as follows:  A1-A2: [place] fallopian tube  A3-A4: Longer fallopian tube  A5-A6: Ovary associated with shorter fallopian " tube  A7-A9: Ovary associated with longer fallopian tube  AKG    REVIEWED, DIAGNOSED AND ELECTRONICALLY  SIGNED BY:    Trae Acuna M.D.,F.C.A.P.  CPT CODES:  51527         CT Chest With Contrast Diagnostic    Result Date: 2024  Narrative: [place] [id number] [place] [address] Patient Name: STEPH CARTWRIGHT Patient : 1980 Patient MRN: [id number] Ordering Provider: SCHUYLER HERNANDEZ EXAM DATE: 2024 EXAM: CT CHEST WITH CONTRAST CLINICAL INFORMATION: Left breast cancer. Status post bilateral mastectomy. Right shoulder pain shooting into the arm. TECHNIQUE: No POC testing for eGFR was performed due to absence of risk factors. Multiple axial CT images of the neck, and chest were obtained after injection of 100 mL Omnipaque 350 (1 x 100 mL bottle of NDC [address]-66). None was wasted and discarded. NOTE: This report contains CT neck and CT chest performed on the same day. COMPARISON: None. FINDINGS ON CT NECK:   LOWER CRANIUM: No obvious intracranial abnormality. Small retention polyps are seen in the maxillary sinuses.  Skull base is normal. SUPRAHYOID REGION: Nasopharynx, oropharynx, valleculae and epiglottis are normal. Deep spaces of the neck appear normal. Bilateral parotid and submandibular salivary glands are normal. Small subcentimeter bilateral cervical lymph nodes are seen. Not significant by size criteria. INFRAHYOID REGION: Hypopharynx, piriform sinuses and vocal cords are normal. Thyroid gland is normal. No airway abnormality. Small subcentimeter bilateral cervical lymph nodes are seen. Not significant by size criteria. CERVICAL SPINE: Mild degenerative changes are noted. FINDINGS ON CT CHEST: AIRWAYS AND LUNGS: Trachea, principal bronchi and major bronchial branches are patent and normal. Lungs are clear. MEDIASTINUM: Right IJ PowerPort is seen in place. The tip is at mid SVC level. No mediastinal lymphadenopathy. Aorta, SVC, pulmonary arteries, pulmonary veins and  their major branches and tributaries are normal. No gross cardiac abnormality. PLEURA AND CHEST WALL: No pleural effusion or mass. Bilateral breast reconstruction is seen. There is no axillary lymphadenopathy. No chest wall abnormality. UPPER ABDOMINAL ORGANS: Liver, gallbladder, spleen, pancreas, adrenals and upper visualized portions of both kidneys are normal. COMBINED IMPRESSION: 1. No CT evidence of neoplastic spread in the neck, chest or upper abdomen. 2. No cause of shoulder pain has been demonstrated. 3. Chronic maxillary sinusitis. Interpreted By: Rene Dozier MD Electronically Signed By: Rene Dozier MD on 2024 1:54 PM    CT Soft Tissue Neck With Contrast    Result Date: 2024  Narrative: [place] [id number] Patient Name: STEPH CARTWRIGHT Patient : 1980 Patient MRN: [id number] Ordering Provider: SCHUYLER HERNANDEZ EXAM DATE: 2024 EXAM: CT SOFT TISSUE NECK WITH CONTRAST CLINICAL INFORMATION: Left breast cancer. Status post bilateral mastectomy. Right shoulder pain shooting into the arm. TECHNIQUE: No POC testing for eGFR was performed due to absence of risk factors. Multiple axial CT images of the neck, and chest were obtained after injection of 100 mL Omnipaque 350 (1 x 100 mL bottle of NDC [address]-99). None was wasted and discarded. NOTE: This report contains CT neck and CT chest performed on the same day. COMPARISON: None. FINDINGS ON CT NECK:   LOWER CRANIUM: No obvious intracranial abnormality. Small retention polyps are seen in the maxillary sinuses.  Skull base is normal. SUPRAHYOID REGION: Nasopharynx, oropharynx, valleculae and epiglottis are normal. Deep spaces of the neck appear normal. Bilateral parotid and submandibular salivary glands are normal. Small subcentimeter bilateral cervical lymph nodes are seen. Not significant by size criteria. INFRAHYOID REGION: Hypopharynx, piriform sinuses and vocal cords are normal. Thyroid  gland is normal. No airway abnormality. Small subcentimeter bilateral cervical lymph nodes are seen. Not significant by size criteria. CERVICAL SPINE: Mild degenerative changes are noted. FINDINGS ON CT CHEST: AIRWAYS AND LUNGS: Trachea, principal bronchi and major bronchial branches are patent and normal. Lungs are clear. MEDIASTINUM: Right IJ PowerPort is seen in place. The tip is at mid SVC level. No mediastinal lymphadenopathy. Aorta, SVC, pulmonary arteries, pulmonary veins and their major branches and tributaries are normal. No gross cardiac abnormality. PLEURA AND CHEST WALL: No pleural effusion or mass. Bilateral breast reconstruction is seen. There is no axillary lymphadenopathy. No chest wall abnormality. UPPER ABDOMINAL ORGANS: Liver, gallbladder, spleen, pancreas, adrenals and upper visualized portions of both kidneys are normal. COMBINED IMPRESSION: 1. No CT evidence of neoplastic spread in the neck, chest or upper abdomen. 2. No cause of shoulder pain has been demonstrated. 3. Chronic maxillary sinusitis. Interpreted By: Rene Dozier MD Electronically Signed By: Rene Dozier MD on 2024 1:53 PM        CT Chest With Contrast Diagnostic    Result Date: 2024  Narrative: [place] [id number] Patient Name: STEPH CARTWRIGHT Patient : 1980 Patient MRN: [id number] Ordering Provider: SCHUYLER HERNANDEZ EXAM DATE: 2024 EXAM: CT CHEST WITH CONTRAST CLINICAL INFORMATION: Left breast cancer. Status post bilateral mastectomy. Right shoulder pain shooting into the arm. TECHNIQUE: No POC testing for eGFR was performed due to absence of risk factors. Multiple axial CT images of the neck, and chest were obtained after injection of 100 mL Omnipaque 350 (1 x 100 mL bottle of NDC [address]-96). None was wasted and discarded. NOTE: This report contains CT neck and CT chest performed on the same day. COMPARISON: None. FINDINGS ON CT NECK:   LOWER CRANIUM:  No obvious intracranial abnormality. Small retention polyps are seen in the maxillary sinuses.  Skull base is normal. SUPRAHYOID REGION: Nasopharynx, oropharynx, valleculae and epiglottis are normal. Deep spaces of the neck appear normal. Bilateral parotid and submandibular salivary glands are normal. Small subcentimeter bilateral cervical lymph nodes are seen. Not significant by size criteria. INFRAHYOID REGION: Hypopharynx, piriform sinuses and vocal cords are normal. Thyroid gland is normal. No airway abnormality. Small subcentimeter bilateral cervical lymph nodes are seen. Not significant by size criteria. CERVICAL SPINE: Mild degenerative changes are noted. FINDINGS ON CT CHEST: AIRWAYS AND LUNGS: Trachea, principal bronchi and major bronchial branches are patent and normal. Lungs are clear. MEDIASTINUM: Right IJ PowerPort is seen in place. The tip is at mid SVC level. No mediastinal lymphadenopathy. Aorta, SVC, pulmonary arteries, pulmonary veins and their major branches and tributaries are normal. No gross cardiac abnormality. PLEURA AND CHEST WALL: No pleural effusion or mass. Bilateral breast reconstruction is seen. There is no axillary lymphadenopathy. No chest wall abnormality. UPPER ABDOMINAL ORGANS: Liver, gallbladder, spleen, pancreas, adrenals and upper visualized portions of both kidneys are normal. COMBINED IMPRESSION: 1. No CT evidence of neoplastic spread in the neck, chest or upper abdomen. 2. No cause of shoulder pain has been demonstrated. 3. Chronic maxillary sinusitis. Interpreted By: Rene Dozier MD Electronically Signed By: Rene Dozier MD on 2024 1:54 PM    CT Soft Tissue Neck With Contrast    Result Date: 2024  Narrative: [address] [address] Patient Name: STEPH CARTWRIGHT Patient : 1980 Patient MRN: [id number] Ordering Provider: SCHUYLER HERNANDEZ EXAM DATE: 2024 EXAM: CT SOFT TISSUE NECK WITH CONTRAST CLINICAL  INFORMATION: Left breast cancer. Status post bilateral mastectomy. Right shoulder pain shooting into the arm. TECHNIQUE: No POC testing for eGFR was performed due to absence of risk factors. Multiple axial CT images of the neck, and chest were obtained after injection of 100 mL Omnipaque 350 (1 x 100 mL bottle of NDC [id number]). None was wasted and discarded. NOTE: This report contains CT neck and CT chest performed on the same day. COMPARISON: None. FINDINGS ON CT NECK:   LOWER CRANIUM: No obvious intracranial abnormality. Small retention polyps are seen in the maxillary sinuses.  Skull base is normal. SUPRAHYOID REGION: Nasopharynx, oropharynx, valleculae and epiglottis are normal. Deep spaces of the neck appear normal. Bilateral parotid and submandibular salivary glands are normal. Small subcentimeter bilateral cervical lymph nodes are seen. Not significant by size criteria. INFRAHYOID REGION: Hypopharynx, piriform sinuses and vocal cords are normal. Thyroid gland is normal. No airway abnormality. Small subcentimeter bilateral cervical lymph nodes are seen. Not significant by size criteria. CERVICAL SPINE: Mild degenerative changes are noted. FINDINGS ON CT CHEST: AIRWAYS AND LUNGS: Trachea, principal bronchi and major bronchial branches are patent and normal. Lungs are clear. MEDIASTINUM: Right IJ PowerPort is seen in place. The tip is at mid SVC level. No mediastinal lymphadenopathy. Aorta, SVC, pulmonary arteries, pulmonary veins and their major branches and tributaries are normal. No gross cardiac abnormality. PLEURA AND CHEST WALL: No pleural effusion or mass. Bilateral breast reconstruction is seen. There is no axillary lymphadenopathy. No chest wall abnormality. UPPER ABDOMINAL ORGANS: Liver, gallbladder, spleen, pancreas, adrenals and upper visualized portions of both kidneys are normal. COMBINED IMPRESSION: 1. No CT evidence of neoplastic spread in the neck, chest or upper abdomen. 2. No cause of  shoulder pain has been demonstrated. 3. Chronic maxillary sinusitis. Interpreted By: Rene Dozier MD Electronically Signed By: Rene Dozier MD on 2/19/2024 1:53 PM       Assessment / Plan      Assessment/Plan:     1.  Malignant neoplasm of central portion of left breast in female, estrogen receptor positive, lymph node positive  2.  AI use  -She is status post neoadjuvant chemotherapy.  This was terminated early for taxane pneumonitis.  She has fully recovered and underwent bilateral mastectomy showing 2.5 cm of residual disease and 2 of 6 lymph nodes positive  -Reviewed final pathology results in detail today.  -Proceed with adjuvant PMRT.  -Discussed options for extended duration endocrine therapy in the context of lymph node positive disease.  Aromatase inhibitor preferred to tamoxifen in the setting of lymph node positive disease.  Discussed options for initial tamoxifen followed by transition to aromatase  in 6 months if remains postmenopausal inhibitor or addition of ovarian suppression or BSO.  Given her young age, she is at risk for ovarian rebound.  She is planning BSO.  -BRCA negative, no indication for adjuvant PARP inhibitor  -With respect to adjuvant CDK 4 6 inhibitors, she has 2 lymph nodes positive but tumor size less than 5%, grade 2, and Ki-67 5% so she does not qualify for adjuvant abemaciclib.   -MIGUEL on exam.   -Completed adjuvant radiation  -Now status post BSO.  Pathology reviewed and benign.  We reviewed the rationale for adjuvant endocrine therapy being increased likelihood of cure of early stage breast cancer.  We discussed schedule, planned duration of 5-10 years, and potential side effects including but not limited to fracture, bone loss, arthralgias, risk for accelerated cardiovascular disease/stroke, and vasomotor symptoms. Informed consent was obtained, and the patient elected to proceed with adjuvant Arimidex    Orders Placed This Encounter   Procedures    Vitamin D 25  Hydroxy    Comprehensive Metabolic Panel    C-reactive Protein    CBC & Differential     3. Taxane pneumonitis  -Fully resolved and repeat CT within normal limits    4.  Persisting headaches  -CT head without localizing symptoms.  She reports the symptoms are stable and not escalating.  Consider referral to neurology if worsen.  Unable to complete an MRI while expanders in place.    5.  Right shoulder pain  -Reviewed her CT chest and neck results.  Exam is consistent with rotator or biceps tendinopathy.  Planning PT trial.  Offered referral to orthopedics.  She will let me know if interested    6. Bone health  -She will need baseline DEXA at the time of endocrine therapy initiation.  Plan to repeat DEXA every 2-years while on endocrine therapy.    -Encourage adequate calcium intake, vitamin D supplementation, and weightbearing exercise as tolerated to maintain bone density.     7. Access:  -Planning port removal at time of implant exchange (6 mo post radiation).  -Flush every 6 to 8 weeks in the interval.    8.  Hot flashes  -Mild currently.  Monitor for worsening on AI.    Follow Up:   6 to 8 weeks    Martha Olguin MD  Hematology and Oncology

## 2024-02-29 DIAGNOSIS — Z17.0 MALIGNANT NEOPLASM OF CENTRAL PORTION OF LEFT BREAST IN FEMALE, ESTROGEN RECEPTOR POSITIVE: Primary | ICD-10-CM

## 2024-02-29 DIAGNOSIS — C50.112 MALIGNANT NEOPLASM OF CENTRAL PORTION OF LEFT BREAST IN FEMALE, ESTROGEN RECEPTOR POSITIVE: Primary | ICD-10-CM

## 2024-02-29 DIAGNOSIS — M25.511 RIGHT SHOULDER PAIN, UNSPECIFIED CHRONICITY: ICD-10-CM

## 2024-03-05 DIAGNOSIS — M25.511 ARTHRALGIA OF RIGHT SHOULDER REGION: Primary | ICD-10-CM

## 2024-03-06 ENCOUNTER — OFFICE VISIT (OUTPATIENT)
Dept: ORTHOPEDIC SURGERY | Facility: CLINIC | Age: 44
End: 2024-03-06
Payer: COMMERCIAL

## 2024-03-06 VITALS — TEMPERATURE: 98 F | HEIGHT: 67 IN | BODY MASS INDEX: 27.15 KG/M2 | WEIGHT: 173 LBS

## 2024-03-06 DIAGNOSIS — M75.81 TENDINITIS OF RIGHT ROTATOR CUFF: Primary | ICD-10-CM

## 2024-03-06 DIAGNOSIS — M75.82 TENDINITIS OF LEFT ROTATOR CUFF: ICD-10-CM

## 2024-03-06 RX ORDER — CYCLOBENZAPRINE HCL 10 MG
10 TABLET ORAL NIGHTLY PRN
Qty: 30 TABLET | Refills: 0 | Status: SHIPPED | OUTPATIENT
Start: 2024-03-06

## 2024-03-06 NOTE — PROGRESS NOTES
"    Office Note     Name: Kimberley Win    : 1980     MRN: [id number]     Chief Complaint  Pain of the Right Shoulder (States she was doing radiation for five weeks in February, her shoulders started hurting while getting the radiation and have gotten progressively worse. She does have limited range of motion, some radiating pain down her arm and some numbness and tingling in the right hand.) and Pain of the Left Shoulder    Subjective     History of Present Illness:  Kimberley Win is a 44 y.o. female presenting today for bilatearl shoulder pain ongoing for approximately 3 months.  Pain began around the time that she began receiving radiation treatment for ongoing breast cancer.  She states that approximately 6 weeks ago she was receiving radiation and the machine broke so she was \"stuck\" in the machine, laying with her arms fully abducted for about 20 minutes and that's when the pain became worse and hasn't gotten better since.  She denies any significant paresthesias.  States the pain is mostly in the lateral shoulders and radiates down to the mid humerus with abduction and flexion and internal rotation.  She denies any superior shoulder pain or pain at the AC joint.  She has been taking Tylenol and ibuprofen for pain though was advised by her doctor to stop taking Tylenol due to elevated liver enzymes.  She states that she received a CT scan of the chest with extension into the right shoulder due to concerning symptoms between her shoulder blades and states that the CT scan did not show anything going on with her shoulder.  He states MRIs are contraindicated due to her port and tissue expanders that she has due to her bilateral mastectomies.  She states that she is scheduled to begin physical therapy for her bilateral upper extremities on .    Review of Systems   Constitutional:  Negative for fever.   HENT:  Negative for dental problem and voice change.    Eyes:  Negative for " visual disturbance.   Respiratory:  Negative for shortness of breath.    Cardiovascular:  Negative for chest pain.   Gastrointestinal:  Negative for abdominal pain.   Genitourinary:  Negative for dysuria.   Musculoskeletal:  Positive for arthralgias (bilateral shoulder). Negative for gait problem and joint swelling.   Skin:  Negative for rash.   Neurological:  Positive for numbness (right hand). Negative for speech difficulty.   Hematological:  Does not bruise/bleed easily.   Psychiatric/Behavioral:  Negative for confusion.         Past Medical History:   Diagnosis Date    Abnormal ECG 8/23/23    Anemia     Chemo    Arthritis     Breast cancer 06/01/2023    Left Breast    Elevated cholesterol     improved, not currently taking medication    History of chemotherapy 09/05/2023    last treatment    History of radiation therapy     left breast, currently in treatment    Hypertension     Malignant neoplasm of central portion of left breast in female, estrogen receptor positive 06/20/2023    Osteoarthritis 2021        Past Surgical History:   Procedure Laterality Date    BREAST BIOPSY  6/1/23    BREAST RECONSTRUCTION, BREAST TISSUE EXPANDER REMOVAL, IMPLANT INSERTION Bilateral 11/06/2023    Procedure: IMMEDIATE BREAST RECONSTRUCTION WITH BREAST TISSUE EXPANDER INSERTION AND ALLODERM - BILATERAL;  Surgeon: Dilip Anderson MD;  Location: [facility] OR;  Service: Plastics;  Laterality: Bilateral;    DIAGNOSTIC LAPAROSCOPY, SALPINGO OOPHORECTOMY LAPAROSCOPIC N/A 2/5/2024    Procedure: DIAGNOSTIC LAPAROSCOPY, SALPINGO OOPHORECTOMY;  Surgeon: Fabby Miranda MD;  Location: [facility] OR;  Service: Obstetrics/Gynecology;  Laterality: N/A;    MASTECTOMY W/ SENTINEL NODE BIOPSY Bilateral 11/06/2023    Procedure: BREAST MASTECTOMY WITH NIPPLE SPARING BILATERAL WITH LEFT SENTINEL NODE BIOPSY, AND AXILLARY DISECTION;  Surgeon: Daxa Anderson MD;  Location: [facility] OR;  Service: General;  Laterality: Bilateral;    US GUIDED LYMPH NODE  BIOPSY  2023    VENOUS ACCESS DEVICE (PORT) INSERTION  2023       Family History   Problem Relation Age of Onset    Breast cancer Mother 55        Stage 4    COPD Mother     Diabetes Mother     Early death Mother     Heart disease Mother     Stroke Mother     Rheum arthritis Maternal Aunt     Breast cancer Paternal Aunt     COPD Maternal Grandfather     Stroke Maternal Grandfather     Breast cancer Other        Social History     Socioeconomic History    Marital status:    Tobacco Use    Smoking status: Former     Current packs/day: 0.00     Average packs/day: 0.3 packs/day for 10.0 years (2.5 ttl pk-yrs)     Types: Cigarettes     Start date: 1995     Quit date: 2005     Years since quittin.0     Passive exposure: Past    Smokeless tobacco: Never   Vaping Use    Vaping status: Never Used   Substance and Sexual Activity    Alcohol use: Not Currently     Comment: rare    Drug use: Never    Sexual activity: Yes     Partners: Male     Birth control/protection: Condom         Current Outpatient Medications:     acetaminophen (TYLENOL) 500 MG tablet, Take 1 tablet by mouth Every 6 (Six) Hours As Needed for Mild Pain., Disp: 60 tablet, Rfl: 0    anastrozole (ARIMIDEX) 1 MG tablet, Take 1 tablet by mouth Daily., Disp: 30 tablet, Rfl: 3    cyclobenzaprine (FLEXERIL) 10 MG tablet, Take 1 tablet by mouth At Night As Needed for Muscle Spasms., Disp: 30 tablet, Rfl: 0    diphenhydrAMINE HCl, Sleep, (ZZZQUIL PO), Take 1 tablet by mouth Every Night., Disp: , Rfl:     docusate sodium (COLACE) 100 MG capsule, Take 1 capsule by mouth 2 (Two) Times a Day As Needed for Constipation., Disp: , Rfl:     ibuprofen (ADVIL,MOTRIN) 800 MG tablet, Take 1 tablet by mouth Every 6 (Six) Hours As Needed for Mild Pain., Disp: 30 tablet, Rfl: 0    lidocaine-prilocaine (EMLA) 2.5-2.5 % cream, Apply 1 application topically to the appropriate area as directed As Needed (45-60 minutes prior to port access.  Cover with  "saran/plastic wrap.)., Disp: 30 g, Rfl: 3    metoprolol succinate XL (TOPROL-XL) 25 MG 24 hr tablet, Take 1.5 tablets by mouth Daily., Disp: 45 tablet, Rfl: 3    mometasone (ELOCON) 0.1 % cream, APPLY TOPICALLY EVERY DAY AFTER RADIATION TREATMENT, Disp: , Rfl:     omeprazole (priLOSEC) 40 MG capsule, Take 1 capsule by mouth Daily. 30 min prior to evening., Disp: 30 capsule, Rfl: 5    ondansetron (ZOFRAN) 8 MG tablet, Take 1 tablet by mouth 3 (Three) Times a Day As Needed for Nausea or Vomiting (DO NOT USE WHILE SANCUSO IN PLACE)., Disp: 30 tablet, Rfl: 5    tiZANidine (ZANAFLEX) 4 MG tablet, Take 1 tablet by mouth Every 8 (Eight) Hours As Needed for Muscle Spasms., Disp: 90 tablet, Rfl: 0    Allergies   Allergen Reactions    Chlorhexidine Other (See Comments)     Burning    Nylon Hives    Sunblock [Solbar Pf Spf15] Rash           Objective   Temp 98 °F (36.7 °C)   Ht 170.2 cm (67.01\")   Wt 78.5 kg (173 lb)   LMP 07/15/2023 (Approximate)   BMI 27.09 kg/m²            Physical Exam  Right Shoulder Exam     Tenderness   The patient is experiencing no tenderness.    Range of Motion   Active abduction:  60   Passive abduction:  80   Extension:  20   External rotation:  60   Forward flexion:  60   Internal rotation 0 degrees:  Sacrum   Internal rotation 90 degrees:  normal     Tests   Drop arm: positive    Other   Erythema: absent  Sensation: normal  Pulse: present      Left Shoulder Exam     Tenderness   The patient is experiencing no tenderness.     Range of Motion   Active abduction:  130   Passive abduction:  normal   Extension:  normal   External rotation:  normal   Forward flexion:  140   Internal rotation 0 degrees:  normal   Internal rotation 90 degrees:  normal     Tests   Drop arm: negative    Other   Erythema: absent  Sensation: normal  Pulse: present            Extremity DVT signs are negative by clinical screen.     Independent Review of Radiographic Studies:    Three-view plain films of bilateral " shoulders were done in office today for the evaluation of pain, no comparison views available.  No acute osseous abnormalities are noted.  The right shoulder AP and Grashey view reveals a calcification in the area of the supraspinatus insertion, most likely indicates calcific tendinopathy or possible loose body.  Surgical clips are noted in the left breast area.  Views of the right shoulder show what appears to be a central line insertion.    Procedures    Assessment and Plan   Diagnoses and all orders for this visit:    1. Tendinitis of right rotator cuff (Primary)  -     cyclobenzaprine (FLEXERIL) 10 MG tablet; Take 1 tablet by mouth At Night As Needed for Muscle Spasms.  Dispense: 30 tablet; Refill: 0    2. Tendinitis of left rotator cuff       Discussion of orthopedic goals  Orthopedic activities reviewed and patient expressed appreciation  Regular exercise as tolerated  Risk, benefits, and merits of treatment alternatives reviewed with the patient and questions answered  Patient guided on mobility and conditioning exercises  Ice, heat, and/or modalities as beneficial  Take prescribed medications as instructed only as tolerated  Reduced physical activity as appropriate and avoid offending activity  Weight bearing parameters reviewed    Recommendations/Plan:  Exercise, medications, injections, other patient advice, and return appointment as noted.  Patient is encouraged to call or return for any issues or concerns.    Ddx:   RTC tendonitis within enthesiopathy on the right, subacomrial bursitis, adhesive capsulitis especially in the right.    Dr. Olguin, oncologist for patient's breast cancer.  [facility]      Discussed assessment and plan of this patient with Dr. Oswald Leger.  Also discussed the radiology images from today's visit by Dr. Jara's including bilateral shoulder views.  He agrees that the calcification seen on the right shoulder views most likely represents calcific changes  consistent with rotator cuff enthesiopathy.  High suspicion for rotator cuff tendinitis with or without subacromial bursitis, some suspicion for right shoulder adhesive capsulitis.  Patient was prescribed Flexeril for pain relief and to help with sleeping.  Advised to discontinue tizanidine.  Advised to continue with over-the-counter ibuprofen as needed for pain and I recommended over-the-counter muscle creams such as Biofreeze or capsaicin.  Also recommended ice heat and rest.  Patient was provided with a handout for shoulder stretches and exercises.  Advised to begin her scheduled physical therapy.  Subacromial cortisone injection was deferred today until patient gets the okay from her cancer doctor.  Advised to follow-up with me in 6 weeks to evaluate progress and discuss further treatment options.  Patient agreeable with plan advised to call or return office if she develops any new or worsening symptoms.    Return in about 6 weeks (around 4/17/2024) for Recheck.  Patient agreeable to call or return sooner for any concerns.

## 2024-03-07 ENCOUNTER — OFFICE VISIT (OUTPATIENT)
Dept: ORTHOPEDIC SURGERY | Facility: CLINIC | Age: 44
End: 2024-03-07
Payer: COMMERCIAL

## 2024-03-07 DIAGNOSIS — M75.81 TENDINITIS OF RIGHT ROTATOR CUFF: Primary | ICD-10-CM

## 2024-03-07 RX ORDER — METHYLPREDNISOLONE ACETATE 40 MG/ML
40 INJECTION, SUSPENSION INTRA-ARTICULAR; INTRALESIONAL; INTRAMUSCULAR; SOFT TISSUE
Status: COMPLETED | OUTPATIENT
Start: 2024-03-07 | End: 2024-03-07

## 2024-03-07 RX ORDER — LIDOCAINE HYDROCHLORIDE 20 MG/ML
2 INJECTION, SOLUTION INFILTRATION; PERINEURAL
Status: COMPLETED | OUTPATIENT
Start: 2024-03-07 | End: 2024-03-07

## 2024-03-07 RX ADMIN — LIDOCAINE HYDROCHLORIDE 2 ML: 20 INJECTION, SOLUTION INFILTRATION; PERINEURAL at 15:45

## 2024-03-07 RX ADMIN — METHYLPREDNISOLONE ACETATE 40 MG: 40 INJECTION, SUSPENSION INTRA-ARTICULAR; INTRALESIONAL; INTRAMUSCULAR; SOFT TISSUE at 15:45

## 2024-03-07 NOTE — PROGRESS NOTES
Office Note     Name: Kimberley Win    : 1980     MRN: [id number]     Chief Complaint  Injections of the Right Shoulder    Subjective     History of Present Illness:  Kimberley Win is a 44 y.o. female here today for injection therapy.    Review of Systems     Past Medical History:   Diagnosis Date    Abnormal ECG 23    Anemia     Chemo    Arthritis     Breast cancer 2023    Left Breast    Elevated cholesterol     improved, not currently taking medication    History of chemotherapy 2023    last treatment    History of radiation therapy     left breast, currently in treatment    Hypertension     Malignant neoplasm of central portion of left breast in female, estrogen receptor positive 2023    Osteoarthritis          Past Surgical History:   Procedure Laterality Date    BREAST BIOPSY  23    BREAST RECONSTRUCTION, BREAST TISSUE EXPANDER REMOVAL, IMPLANT INSERTION Bilateral 2023    Procedure: IMMEDIATE BREAST RECONSTRUCTION WITH BREAST TISSUE EXPANDER INSERTION AND ALLODERM - BILATERAL;  Surgeon: Dilip Anderson MD;  [facility]  Service: Plastics;  Laterality: Bilateral;    DIAGNOSTIC LAPAROSCOPY, SALPINGO OOPHORECTOMY LAPAROSCOPIC N/A 2024    Procedure: DIAGNOSTIC LAPAROSCOPY, SALPINGO OOPHORECTOMY;  Surgeon: Fabby Miranda MD;  Location: [facility] OR;  Service: Obstetrics/Gynecology;  Laterality: N/A;    MASTECTOMY W/ SENTINEL NODE BIOPSY Bilateral 2023    Procedure: BREAST MASTECTOMY WITH NIPPLE SPARING BILATERAL WITH LEFT SENTINEL NODE BIOPSY, AND AXILLARY DISECTION;  Surgeon: Daxa Anderson MD;  Location: [facility] OR;  Service: General;  Laterality: Bilateral;    US GUIDED LYMPH NODE BIOPSY  2023    VENOUS ACCESS DEVICE (PORT) INSERTION  2023       Allergies   Allergen Reactions    Chlorhexidine Other (See Comments)     Burning    Nylon Hives    Sunblock [Solbar Pf Spf15] Rash         Objective   LMP 07/15/2023  (Approximate)    Physical Exam    Skin exam stable with no erythema, ecchymosis or rash.  No new swelling.  No motor or sensory changes.  Distal pulse intact.    Large Joint Arthrocentesis: R subacromial bursa  Date/Time: 3/7/2024 3:45 PM  Consent given by: patient  Site marked: site marked  Timeout: Immediately prior to procedure a time out was called to verify the correct patient, procedure, equipment, support staff and site/side marked as required   Supporting Documentation  Indications: pain   Procedure Details  Location: shoulder - R subacromial bursa  Preparation: Patient was prepped and draped in the usual sterile fashion  Needle size: 22 G  Approach: posterior  Medications administered: 2 mL lidocaine 2%; 40 mg methylPREDNISolone acetate 40 MG/ML  Patient tolerance: patient tolerated the procedure well with no immediate complications        Assessment and Plan      Diagnosis Plan   1. Tendinitis of right rotator cuff            Discussion of orthopaedic goals and activities and patient expressed appreciation.  Regular exercise as tolerated  Ice, heat, and/or modalities as beneficial  Watch for signs and symptoms of infection  Call or notify for any adverse effect from injection therapy    Recommendations:  Usual activities, routine exercise as tolerated, light physical work as tolerated, no strenuous activity.    Patient discussed injection with her cancer doctor and the cancer doctor said that it was okay to undergo the injection.    Return in about 6 weeks (around 4/18/2024) for Recheck.  Patient agreeable to call or return sooner for any concerns.

## 2024-04-01 ENCOUNTER — INFUSION (OUTPATIENT)
Dept: ONCOLOGY | Facility: HOSPITAL | Age: 44
End: 2024-04-01
Payer: COMMERCIAL

## 2024-04-01 DIAGNOSIS — Z17.0 MALIGNANT NEOPLASM OF CENTRAL PORTION OF LEFT BREAST IN FEMALE, ESTROGEN RECEPTOR POSITIVE: Primary | ICD-10-CM

## 2024-04-01 DIAGNOSIS — C50.112 MALIGNANT NEOPLASM OF CENTRAL PORTION OF LEFT BREAST IN FEMALE, ESTROGEN RECEPTOR POSITIVE: Primary | ICD-10-CM

## 2024-04-01 RX ORDER — HEPARIN SODIUM (PORCINE) LOCK FLUSH IV SOLN 100 UNIT/ML 100 UNIT/ML
500 SOLUTION INTRAVENOUS AS NEEDED
Status: DISCONTINUED | OUTPATIENT
Start: 2024-04-01 | End: 2024-08-30 | Stop reason: HOSPADM

## 2024-04-01 RX ORDER — HEPARIN SODIUM (PORCINE) LOCK FLUSH IV SOLN 100 UNIT/ML 100 UNIT/ML
500 SOLUTION INTRAVENOUS AS NEEDED
Status: CANCELLED | OUTPATIENT
Start: 2024-04-01

## 2024-04-15 ENCOUNTER — INFUSION (OUTPATIENT)
Dept: ONCOLOGY | Facility: HOSPITAL | Age: 44
End: 2024-04-15
Payer: COMMERCIAL

## 2024-04-15 ENCOUNTER — OFFICE VISIT (OUTPATIENT)
Dept: ONCOLOGY | Facility: CLINIC | Age: 44
End: 2024-04-15
Payer: COMMERCIAL

## 2024-04-15 VITALS
WEIGHT: 168 LBS | RESPIRATION RATE: 16 BRPM | HEIGHT: 67 IN | DIASTOLIC BLOOD PRESSURE: 71 MMHG | TEMPERATURE: 97.8 F | BODY MASS INDEX: 26.37 KG/M2 | OXYGEN SATURATION: 99 % | SYSTOLIC BLOOD PRESSURE: 118 MMHG | HEART RATE: 77 BPM

## 2024-04-15 DIAGNOSIS — T45.1X5A AROMATASE INHIBITOR-ASSOCIATED ARTHRALGIA: ICD-10-CM

## 2024-04-15 DIAGNOSIS — C50.112 MALIGNANT NEOPLASM OF CENTRAL PORTION OF LEFT BREAST IN FEMALE, ESTROGEN RECEPTOR POSITIVE: Primary | ICD-10-CM

## 2024-04-15 DIAGNOSIS — Z17.0 MALIGNANT NEOPLASM OF CENTRAL PORTION OF LEFT BREAST IN FEMALE, ESTROGEN RECEPTOR POSITIVE: Primary | ICD-10-CM

## 2024-04-15 DIAGNOSIS — Z79.811 AROMATASE INHIBITOR USE: ICD-10-CM

## 2024-04-15 DIAGNOSIS — M25.50 AROMATASE INHIBITOR-ASSOCIATED ARTHRALGIA: ICD-10-CM

## 2024-04-15 LAB
ALBUMIN SERPL-MCNC: 4.3 G/DL (ref 3.5–5.2)
ALBUMIN/GLOB SERPL: 1.3 G/DL
ALP SERPL-CCNC: 93 U/L (ref 39–117)
ALT SERPL W P-5'-P-CCNC: 19 U/L (ref 1–33)
ANION GAP SERPL CALCULATED.3IONS-SCNC: 9.3 MMOL/L (ref 5–15)
AST SERPL-CCNC: 22 U/L (ref 1–32)
BASOPHILS # BLD AUTO: 0.02 10*3/MM3 (ref 0–0.2)
BASOPHILS NFR BLD AUTO: 0.4 % (ref 0–1.5)
BILIRUB SERPL-MCNC: 0.3 MG/DL (ref 0–1.2)
BUN SERPL-MCNC: 13 MG/DL (ref 6–20)
BUN/CREAT SERPL: 21.7 (ref 7–25)
CALCIUM SPEC-SCNC: 9.7 MG/DL (ref 8.6–10.5)
CHLORIDE SERPL-SCNC: 104 MMOL/L (ref 98–107)
CO2 SERPL-SCNC: 26.7 MMOL/L (ref 22–29)
CREAT SERPL-MCNC: 0.6 MG/DL (ref 0.57–1)
DEPRECATED RDW RBC AUTO: 36.9 FL (ref 37–54)
EGFRCR SERPLBLD CKD-EPI 2021: 113 ML/MIN/1.73
EOSINOPHIL # BLD AUTO: 0.12 10*3/MM3 (ref 0–0.4)
EOSINOPHIL NFR BLD AUTO: 2.3 % (ref 0.3–6.2)
ERYTHROCYTE [DISTWIDTH] IN BLOOD BY AUTOMATED COUNT: 11.6 % (ref 12.3–15.4)
GLOBULIN UR ELPH-MCNC: 3.2 GM/DL
GLUCOSE SERPL-MCNC: 109 MG/DL (ref 65–99)
HCT VFR BLD AUTO: 35.8 % (ref 34–46.6)
HGB BLD-MCNC: 12.2 G/DL (ref 12–15.9)
IMM GRANULOCYTES # BLD AUTO: 0.02 10*3/MM3 (ref 0–0.05)
IMM GRANULOCYTES NFR BLD AUTO: 0.4 % (ref 0–0.5)
LYMPHOCYTES # BLD AUTO: 0.73 10*3/MM3 (ref 0.7–3.1)
LYMPHOCYTES NFR BLD AUTO: 13.9 % (ref 19.6–45.3)
MCH RBC QN AUTO: 30.3 PG (ref 26.6–33)
MCHC RBC AUTO-ENTMCNC: 34.1 G/DL (ref 31.5–35.7)
MCV RBC AUTO: 88.8 FL (ref 79–97)
MONOCYTES # BLD AUTO: 0.33 10*3/MM3 (ref 0.1–0.9)
MONOCYTES NFR BLD AUTO: 6.3 % (ref 5–12)
NEUTROPHILS NFR BLD AUTO: 4.05 10*3/MM3 (ref 1.7–7)
NEUTROPHILS NFR BLD AUTO: 76.7 % (ref 42.7–76)
NRBC BLD AUTO-RTO: 0 /100 WBC (ref 0–0.2)
PLATELET # BLD AUTO: 228 10*3/MM3 (ref 140–450)
PMV BLD AUTO: 9.1 FL (ref 6–12)
POTASSIUM SERPL-SCNC: 3.6 MMOL/L (ref 3.5–5.2)
PROT SERPL-MCNC: 7.5 G/DL (ref 6–8.5)
RBC # BLD AUTO: 4.03 10*6/MM3 (ref 3.77–5.28)
SODIUM SERPL-SCNC: 140 MMOL/L (ref 136–145)
WBC NRBC COR # BLD AUTO: 5.27 10*3/MM3 (ref 3.4–10.8)

## 2024-04-15 PROCEDURE — 80053 COMPREHEN METABOLIC PANEL: CPT

## 2024-04-15 PROCEDURE — 25010000002 HEPARIN LOCK FLUSH PER 10 UNITS: Performed by: INTERNAL MEDICINE

## 2024-04-15 PROCEDURE — 85025 COMPLETE CBC W/AUTO DIFF WBC: CPT

## 2024-04-15 PROCEDURE — 36591 DRAW BLOOD OFF VENOUS DEVICE: CPT

## 2024-04-15 PROCEDURE — 99214 OFFICE O/P EST MOD 30 MIN: CPT | Performed by: INTERNAL MEDICINE

## 2024-04-15 RX ORDER — HEPARIN SODIUM (PORCINE) LOCK FLUSH IV SOLN 100 UNIT/ML 100 UNIT/ML
500 SOLUTION INTRAVENOUS AS NEEDED
OUTPATIENT
Start: 2024-04-15

## 2024-04-15 RX ORDER — HEPARIN SODIUM (PORCINE) LOCK FLUSH IV SOLN 100 UNIT/ML 100 UNIT/ML
500 SOLUTION INTRAVENOUS AS NEEDED
Status: DISCONTINUED | OUTPATIENT
Start: 2024-04-15 | End: 2024-04-15 | Stop reason: HOSPADM

## 2024-04-15 RX ADMIN — HEPARIN 500 UNITS: 100 SYRINGE at 10:30

## 2024-04-15 NOTE — PROGRESS NOTES
Hematology and Oncology [place]  Office number [phone]    Fax number [phone]     Follow-up     Date: 04/15/24    Patient Name: Kimberley Win  MRN: [id number]  : 1980    Referring Physician: Dr. Daxa Anderson MD    Chief Complaint: follow up    Cancer Staging:  Cancer Staging   Stage IIA (cT2, cN1, cM0, G2, ER+, MI+, HER2-)    History of Present Illness: Kimberley Win is a pleasant 44 y.o. female who presented for evaluation of left breast cancer.     She notes a history of cystic breast disease for many years. She notes a new breast mass that became progressively harder and associated with pain prompting diagnostic workup.     Diagnostic mammogram 23 showed a dense, spiculated mass in the left breast which on ultrasound corresponded to a 3:00 3 cm hypoechoic mass with internal vascularity. In the 7:00 left breast there was a 7 mm mass corresponding on US to a cluster of cysts spanning 1.4 cm. In the left breast there was a 1.7 cm LN with multiple smaller LN.     Left breast biopsy showed grade 2 invasive ductal carcinoma (ER 90%, MI 10%, HER 2 negative 0+ by IHC). Lymph node FNA was postive for malignancy, metastatic ductal carcinoma.  Ki-67 5%    She underwent bilateral mastectomy and sentinel lymph node biopsy on 2023.  Left breast mastectomy showed residual 2.5 cm of invasive ductal carcinoma with associated DCIS.  Margins negative.  [place] lymph node biopsy was positive (1/2) with extracapsular extension and 1 of 4 additional lymph nodes were positive for metastatic carcinoma (total lymph node count 2 of 6).      Breast cancer risk profile:  Age of menarche:10  G0; infertility  Age of menopause: premenopausal   Family history of breast, ovarian, prostate or pancreatic cancer: mom stage IV breast cancer in her 50s. M Great Aunt, breast cancer.   Genetic testing: negative 36 gene CancerNext panel     Treatment history:  Dose dense AC followed by weekly taxol:  Detail Level: Detailed C1, 7/5/2023.  Weekly Taxol terminated after 2 doses for pneumonitis which fully resolved with oral steroids,  Radiation completed 2/1/2024 with Dr. Robison  BSO 2/5/2024  Arimidex 2/26/2024 to present    Interval history:  Since her last visit Kimberley was diagnosed with left rotator cuff tendinitis and a right frozen shoulder.  She continues to work with physical therapy.  She had a cortisone injection by orthopedics which did not help her right shoulder.  She is following up with orthopedist on Wednesday.  More recently, in the last 1 to 2 weeks she has noted recurrent bilateral hip pain.  This was initially present following taxane treatment but had subsided when she reintroduced regular exercise.  She is planning to increase her exercise and see if this helps her symptoms.  She is concerned whether it could be related to the aromatase inhibitor but is not yet interested in medication changes.  Otherwise she is feeling well.  Mild hot flashes are controlled.    Past Medical History:   Past Medical History:   Diagnosis Date    Abnormal ECG 8/23/23    Anemia     Chemo    Arthritis     Breast cancer 06/01/2023    Left Breast    Elevated cholesterol     improved, not currently taking medication    History of chemotherapy 09/05/2023    last treatment    History of radiation therapy     left breast, currently in treatment    Hypertension     Malignant neoplasm of central portion of left breast in female, estrogen receptor positive 06/20/2023    Osteoarthritis 2021   No personal history of myocardial infarction, cerebrovascular event, or venous thromboembolism.  Osteoarthritis in her hands. No personal history of autoimmune disease. Fhx of RA in her aunt    Past Surgical History:   Past Surgical History:   Procedure Laterality Date    BREAST BIOPSY  6/1/23    BREAST RECONSTRUCTION, BREAST TISSUE EXPANDER REMOVAL, IMPLANT INSERTION Bilateral 11/06/2023    Procedure: IMMEDIATE BREAST RECONSTRUCTION WITH BREAST TISSUE  Quality 110: Preventive Care And Screening: Influenza Immunization: Influenza Immunization previously received during influenza season EXPANDER INSERTION AND ALLODERM - BILATERAL;  Surgeon: Dilip Anderson MD;  Location:  [facility];  Service: Plastics;  Laterality: Bilateral;    DIAGNOSTIC LAPAROSCOPY, SALPINGO OOPHORECTOMY LAPAROSCOPIC N/A 2024    Procedure: DIAGNOSTIC LAPAROSCOPY, SALPINGO OOPHORECTOMY;  Surgeon: Fabby Miranda MD;  Location:  [facility];  Service: Obstetrics/Gynecology;  Laterality: N/A;    MASTECTOMY W/ SENTINEL NODE BIOPSY Bilateral 2023    Procedure: BREAST MASTECTOMY WITH NIPPLE SPARING BILATERAL WITH LEFT SENTINEL NODE BIOPSY, AND AXILLARY DISECTION;  Surgeon: Daxa Anderson MD;  Location:  [facility];  Service: General;  Laterality: Bilateral;    US GUIDED LYMPH NODE BIOPSY  2023    VENOUS ACCESS DEVICE (PORT) INSERTION  2023       Family History:   Family History   Problem Relation Age of Onset    Breast cancer Mother 55        Stage 4    COPD Mother     Diabetes Mother     Early death Mother     Heart disease Mother     Stroke Mother     Rheum arthritis Maternal Aunt     Breast cancer Paternal Aunt     COPD Maternal Grandfather     Stroke Maternal Grandfather     Breast cancer Other      Social History:   Social History     Socioeconomic History    Marital status:    Tobacco Use    Smoking status: Former     Current packs/day: 0.00     Average packs/day: 0.3 packs/day for 10.0 years (2.5 ttl pk-yrs)     Types: Cigarettes     Start date: 1995     Quit date: 2005     Years since quittin.1     Passive exposure: Past    Smokeless tobacco: Never   Vaping Use    Vaping status: Never Used   Substance and Sexual Activity    Alcohol use: Not Currently     Comment: rare    Drug use: Never    Sexual activity: Yes     Partners: Male     Birth control/protection: Condom   , lives in [place].     Medications:     Current Outpatient Medications:     acetaminophen (TYLENOL) 500 MG tablet, Take 1 tablet by mouth Every 6 (Six) Hours As Needed for Mild Pain., Disp: 60 tablet,  "Rfl: 0    anastrozole (ARIMIDEX) 1 MG tablet, Take 1 tablet by mouth Daily., Disp: 30 tablet, Rfl: 3    diphenhydrAMINE HCl, Sleep, (ZZZQUIL PO), Take 1 tablet by mouth Every Night., Disp: , Rfl:     docusate sodium (COLACE) 100 MG capsule, Take 1 capsule by mouth 2 (Two) Times a Day As Needed for Constipation., Disp: , Rfl:     ibuprofen (ADVIL,MOTRIN) 800 MG tablet, Take 1 tablet by mouth Every 6 (Six) Hours As Needed for Mild Pain., Disp: 30 tablet, Rfl: 0    lidocaine-prilocaine (EMLA) 2.5-2.5 % cream, Apply 1 application topically to the appropriate area as directed As Needed (45-60 minutes prior to port access.  Cover with saran/plastic wrap.)., Disp: 30 g, Rfl: 3    metoprolol succinate XL (TOPROL-XL) 25 MG 24 hr tablet, Take 1.5 tablets by mouth Daily., Disp: 45 tablet, Rfl: 3    omeprazole (priLOSEC) 40 MG capsule, Take 1 capsule by mouth Daily. 30 min prior to evening., Disp: 30 capsule, Rfl: 5    ondansetron (ZOFRAN) 8 MG tablet, Take 1 tablet by mouth 3 (Three) Times a Day As Needed for Nausea or Vomiting (DO NOT USE WHILE SANCUSO IN PLACE)., Disp: 30 tablet, Rfl: 5    tiZANidine (ZANAFLEX) 4 MG tablet, Take 1 tablet by mouth Every 8 (Eight) Hours As Needed for Muscle Spasms., Disp: 90 tablet, Rfl: 0  No current facility-administered medications for this visit.    Facility-Administered Medications Ordered in Other Visits:     heparin injection 500 Units, 500 Units, Intravenous, PRN, Martha Olguin MD    heparin injection 500 Units, 500 Units, Intravenous, PRN, Martha Olguin MD, 500 Units at 04/15/24 1030    Allergies:   Allergies   Allergen Reactions    Chlorhexidine Other (See Comments)     Burning    Nylon Hives    Sunblock [Solbar Pf Spf15] Rash       Objective     Vital Signs:   Vitals:    04/15/24 1008   BP: 118/71   Pulse: 77   Resp: 16   Temp: 97.8 °F (36.6 °C)   SpO2: 99%   Weight: 76.2 kg (168 lb)   Height: 170.2 cm (67\")   PainSc:   3   PainLoc: Shoulder      Body mass index is 26.31 " kg/m².   Pain Score    04/15/24 1008   PainSc:   3   PainLoc: Shoulder     ECOG Performance Status: 0    Physical Exam:   General: No acute distress. Well appearing.  HEENT: Normocephalic, atraumatic. Sclera anicteric.   Cardiovascular: regular rate and rhythm. No murmurs.   Respiratory: Normal rate. Clear to auscultation bilaterally.  Abdomen: Soft, nontender, non distended with normoactive bowel sounds.  Neuro: Alert and oriented x 3.   Ext: Status post bilateral mastectomy with expanders in place.  Radiation dermatitis involving the left axilla  Breast: Status post bilateral mastectomy.  Keratosis scattered, ortherwise no rashes, or erythema.  No palpable masses.    Laboratory/Imaging Reviewed:     Infusion on 04/15/2024   Component Date Value Ref Range Status    Glucose 04/15/2024 109 (H)  65 - 99 mg/dL Final    BUN 04/15/2024 13  6 - 20 mg/dL Final    Creatinine 04/15/2024 0.60  0.57 - 1.00 mg/dL Final    Sodium 04/15/2024 140  136 - 145 mmol/L Final    Potassium 04/15/2024 3.6  3.5 - 5.2 mmol/L Final    Chloride 04/15/2024 104  98 - 107 mmol/L Final    CO2 04/15/2024 26.7  22.0 - 29.0 mmol/L Final    Calcium 04/15/2024 9.7  8.6 - 10.5 mg/dL Final    Total Protein 04/15/2024 7.5  6.0 - 8.5 g/dL Final    Albumin 04/15/2024 4.3  3.5 - 5.2 g/dL Final    ALT (SGPT) 04/15/2024 19  1 - 33 U/L Final    AST (SGOT) 04/15/2024 22  1 - 32 U/L Final    Alkaline Phosphatase 04/15/2024 93  39 - 117 U/L Final    Total Bilirubin 04/15/2024 0.3  0.0 - 1.2 mg/dL Final    Globulin 04/15/2024 3.2  gm/dL Final    A/G Ratio 04/15/2024 1.3  g/dL Final    BUN/Creatinine Ratio 04/15/2024 21.7  7.0 - 25.0 Final    Anion Gap 04/15/2024 9.3  5.0 - 15.0 mmol/L Final    eGFR 04/15/2024 113.0  >60.0 mL/min/1.73 Final    WBC 04/15/2024 5.27  3.40 - 10.80 10*3/mm3 Final    RBC 04/15/2024 4.03  3.77 - 5.28 10*6/mm3 Final    Hemoglobin 04/15/2024 12.2  12.0 - 15.9 g/dL Final    Hematocrit 04/15/2024 35.8  34.0 - 46.6 % Final    MCV 04/15/2024  88.8  79.0 - 97.0 fL Final    MCH 04/15/2024 30.3  26.6 - 33.0 pg Final    MCHC 04/15/2024 34.1  31.5 - 35.7 g/dL Final    RDW 04/15/2024 11.6 (L)  12.3 - 15.4 % Final    RDW-SD 04/15/2024 36.9 (L)  37.0 - 54.0 fl Final    MPV 04/15/2024 9.1  6.0 - 12.0 fL Final    Platelets 04/15/2024 228  140 - 450 10*3/mm3 Final    Neutrophil % 04/15/2024 76.7 (H)  42.7 - 76.0 % Final    Lymphocyte % 04/15/2024 13.9 (L)  19.6 - 45.3 % Final    Monocyte % 04/15/2024 6.3  5.0 - 12.0 % Final    Eosinophil % 04/15/2024 2.3  0.3 - 6.2 % Final    Basophil % 04/15/2024 0.4  0.0 - 1.5 % Final    Immature Grans % 04/15/2024 0.4  0.0 - 0.5 % Final    Neutrophils, Absolute 04/15/2024 4.05  1.70 - 7.00 10*3/mm3 Final    Lymphocytes, Absolute 04/15/2024 0.73  0.70 - 3.10 10*3/mm3 Final    Monocytes, Absolute 04/15/2024 0.33  0.10 - 0.90 10*3/mm3 Final    Eosinophils, Absolute 04/15/2024 0.12  0.00 - 0.40 10*3/mm3 Final    Basophils, Absolute 04/15/2024 0.02  0.00 - 0.20 10*3/mm3 Final    Immature Grans, Absolute 04/15/2024 0.02  0.00 - 0.05 10*3/mm3 Final    nRBC 04/15/2024 0.0  0.0 - 0.2 /100 WBC Final       No results found.       No results found.      Assessment / Plan      Assessment/Plan:     1.  Malignant neoplasm of central portion of left breast in female, estrogen receptor positive, lymph node positive  2.  AI use  3. Bilateral hip pain  -She is status post neoadjuvant chemotherapy.  This was terminated early for taxane pneumonitis.  She has fully recovered and underwent bilateral mastectomy showing 2.5 cm of residual disease and 2 of 6 lymph nodes positive  -Reviewed final pathology results in detail today.  -Proceed with adjuvant PMRT.  -Discussed options for extended duration endocrine therapy in the context of lymph node positive disease.  Aromatase inhibitor preferred to tamoxifen in the setting of lymph node positive disease.  Discussed options for initial tamoxifen followed by transition to aromatase  in 6 months if remains  postmenopausal inhibitor or addition of ovarian suppression or BSO.  Given her young age, she is at risk for ovarian rebound.  She is planning BSO.  -BRCA negative, no indication for adjuvant PARP inhibitor  -With respect to adjuvant CDK 4 6 inhibitors, she has 2 lymph nodes positive but tumor size less than 5%, grade 2, and Ki-67 5% so she does not qualify for adjuvant abemaciclib.   -MIGUEL on exam.   -Completed adjuvant radiation  -Now status post BSO.  Pathology reviewed and benign.  We reviewed the rationale for adjuvant endocrine therapy being increased likelihood of cure of early stage breast cancer.  We discussed schedule, planned duration of 5-10 years, and potential side effects including but not limited to fracture, bone loss, arthralgias, risk for accelerated cardiovascular disease/stroke, and vasomotor symptoms. Informed consent was obtained, and the patient elected to proceed with adjuvant Arimidex  -Potentially changing her aromatase inhibitor for arthralgias versus addition of Cymbalta.  She was to continue with the same aromatase inhibitor for now but will call me if she has progressive intolerance side effects.  Follow-up in 8 to 12 weeks.  MIGUEL On exam.      4.  Bilateral shoulder pain  -Undergoing physical therapy and follows orthopedics this week.    6. Bone health  -She will need baseline DEXA   Plan to repeat DEXA every 2-years while on endocrine therapy.    -Encourage adequate calcium intake, vitamin D supplementation, and weightbearing exercise as tolerated to maintain bone density.     7. Access:  S/p port removal    8.  Hot flashes  -Mild currently.  Monitor for worsening on AI.    Follow Up:   8-12 weeks    Martha Olguin MD  Hematology and Oncology

## 2024-04-17 ENCOUNTER — OFFICE VISIT (OUTPATIENT)
Dept: ORTHOPEDIC SURGERY | Facility: CLINIC | Age: 44
End: 2024-04-17
Payer: COMMERCIAL

## 2024-04-17 VITALS — BODY MASS INDEX: 26.37 KG/M2 | HEIGHT: 67 IN | TEMPERATURE: 98 F | WEIGHT: 168 LBS

## 2024-04-17 DIAGNOSIS — G89.29 CHRONIC LEFT SHOULDER PAIN: ICD-10-CM

## 2024-04-17 DIAGNOSIS — M25.512 CHRONIC LEFT SHOULDER PAIN: ICD-10-CM

## 2024-04-17 DIAGNOSIS — M75.01 ADHESIVE CAPSULITIS OF RIGHT SHOULDER: Primary | ICD-10-CM

## 2024-04-17 RX ORDER — LIDOCAINE HYDROCHLORIDE 20 MG/ML
2 INJECTION, SOLUTION INFILTRATION; PERINEURAL
Status: COMPLETED | OUTPATIENT
Start: 2024-04-17 | End: 2024-04-17

## 2024-04-17 RX ORDER — METHYLPREDNISOLONE ACETATE 40 MG/ML
40 INJECTION, SUSPENSION INTRA-ARTICULAR; INTRALESIONAL; INTRAMUSCULAR; SOFT TISSUE
Status: COMPLETED | OUTPATIENT
Start: 2024-04-17 | End: 2024-04-17

## 2024-04-17 RX ADMIN — METHYLPREDNISOLONE ACETATE 40 MG: 40 INJECTION, SUSPENSION INTRA-ARTICULAR; INTRALESIONAL; INTRAMUSCULAR; SOFT TISSUE at 10:11

## 2024-04-17 RX ADMIN — LIDOCAINE HYDROCHLORIDE 2 ML: 20 INJECTION, SOLUTION INFILTRATION; PERINEURAL at 10:11

## 2024-04-17 NOTE — PROGRESS NOTES
Office Note     Name: Kimberley Win    : 1980     MRN: [id number]     Chief Complaint  Follow-up of the Right Shoulder (States she is not doing much better, PT told her they think her right shoulder is frozen. The injection on 3/7/24 did not help.) and Pain of the Left Shoulder    Subjective     History of Present Illness:  Kimberley Win is a 44 y.o. female presenting today for follow-up for chronic right and left shoulder pain.  Patient states that the injection did not alleviate her right shoulder pain.  She has been in physical therapy for the past few weeks and the physical therapist believes that the patient has developed frozen shoulder in the right shoulder.  Furthermore she notes that the physical therapist thinks she might have rotator cuff involvement of the left shoulder.  She states her symptoms have remained relatively unchanged since her previous visit with me.  She notes that the right shoulder pain is less noticeable but she also has decreased range of motion in the shoulder.  She also notes the muscle relaxer did not provide any significant relief.  She continues to have difficulty sleeping and has been using ZzzQuil to help fall asleep.    Review of Systems   Constitutional:  Negative for fever.   HENT:  Negative for dental problem and voice change.    Eyes:  Negative for visual disturbance.   Respiratory:  Negative for shortness of breath.    Cardiovascular:  Negative for chest pain.   Gastrointestinal:  Negative for abdominal pain.   Genitourinary:  Negative for dysuria.   Musculoskeletal:  Positive for arthralgias (bilateral shoulder). Negative for gait problem and joint swelling.   Skin:  Negative for rash.   Neurological:  Negative for speech difficulty.   Hematological:  Does not bruise/bleed easily.   Psychiatric/Behavioral:  Negative for confusion.         Past Medical History:   Diagnosis Date    Abnormal ECG 23    Anemia     Chemo    Arthritis     Breast  cancer 06/01/2023    Left Breast    Elevated cholesterol     improved, not currently taking medication    History of chemotherapy 09/05/2023    last treatment    History of radiation therapy     left breast, currently in treatment    Hypertension     Malignant neoplasm of central portion of left breast in female, estrogen receptor positive 06/20/2023    Osteoarthritis 2021        Past Surgical History:   Procedure Laterality Date    BREAST BIOPSY  6/1/23    BREAST RECONSTRUCTION, BREAST TISSUE EXPANDER REMOVAL, IMPLANT INSERTION Bilateral 11/06/2023    Procedure: IMMEDIATE BREAST RECONSTRUCTION WITH BREAST TISSUE EXPANDER INSERTION AND ALLODERM - BILATERAL;  Surgeon: Dilip Anderson MD;  Location: [facility];  Service: Plastics;  Laterality: Bilateral;    DIAGNOSTIC LAPAROSCOPY, SALPINGO OOPHORECTOMY LAPAROSCOPIC N/A 2/5/2024    Procedure: DIAGNOSTIC LAPAROSCOPY, SALPINGO OOPHORECTOMY;  Surgeon: Fabby Miranda MD;  Location: [facility] OR;  Service: Obstetrics/Gynecology;  Laterality: N/A;    MASTECTOMY W/ SENTINEL NODE BIOPSY Bilateral 11/06/2023    Procedure: BREAST MASTECTOMY WITH NIPPLE SPARING BILATERAL WITH LEFT SENTINEL NODE BIOPSY, AND AXILLARY DISECTION;  Surgeon: Daxa Anderson MD;  Location: [facility];  Service: General;  Laterality: Bilateral;    US GUIDED LYMPH NODE BIOPSY  06/01/2023    VENOUS ACCESS DEVICE (PORT) INSERTION  06/30/2023       Family History   Problem Relation Age of Onset    Breast cancer Mother 55        Stage 4    COPD Mother     Diabetes Mother     Early death Mother     Heart disease Mother     Stroke Mother     Rheum arthritis Maternal Aunt     Breast cancer Paternal Aunt     COPD Maternal Grandfather     Stroke Maternal Grandfather     Breast cancer Other        Social History     Socioeconomic History    Marital status:    Tobacco Use    Smoking status: Former     Current packs/day: 0.00     Average packs/day: 0.3 packs/day for 10.0 years (2.5 ttl pk-yrs)     Types:  Cigarettes     Start date: 1995     Quit date: 2005     Years since quittin.1     Passive exposure: Past    Smokeless tobacco: Never   Vaping Use    Vaping status: Never Used   Substance and Sexual Activity    Alcohol use: Not Currently     Comment: rare    Drug use: Never    Sexual activity: Yes     Partners: Male     Birth control/protection: Condom         Current Outpatient Medications:     acetaminophen (TYLENOL) 500 MG tablet, Take 1 tablet by mouth Every 6 (Six) Hours As Needed for Mild Pain., Disp: 60 tablet, Rfl: 0    anastrozole (ARIMIDEX) 1 MG tablet, Take 1 tablet by mouth Daily., Disp: 30 tablet, Rfl: 3    diphenhydrAMINE HCl, Sleep, (ZZZQUIL PO), Take 1 tablet by mouth Every Night., Disp: , Rfl:     docusate sodium (COLACE) 100 MG capsule, Take 1 capsule by mouth 2 (Two) Times a Day As Needed for Constipation., Disp: , Rfl:     ibuprofen (ADVIL,MOTRIN) 800 MG tablet, Take 1 tablet by mouth Every 6 (Six) Hours As Needed for Mild Pain., Disp: 30 tablet, Rfl: 0    lidocaine-prilocaine (EMLA) 2.5-2.5 % cream, Apply 1 application topically to the appropriate area as directed As Needed (45-60 minutes prior to port access.  Cover with saran/plastic wrap.)., Disp: 30 g, Rfl: 3    metoprolol succinate XL (TOPROL-XL) 25 MG 24 hr tablet, Take 1.5 tablets by mouth Daily., Disp: 45 tablet, Rfl: 3    omeprazole (priLOSEC) 40 MG capsule, Take 1 capsule by mouth Daily. 30 min prior to evening., Disp: 30 capsule, Rfl: 5    ondansetron (ZOFRAN) 8 MG tablet, Take 1 tablet by mouth 3 (Three) Times a Day As Needed for Nausea or Vomiting (DO NOT USE WHILE SANCUSO IN PLACE)., Disp: 30 tablet, Rfl: 5    tiZANidine (ZANAFLEX) 4 MG tablet, Take 1 tablet by mouth Every 8 (Eight) Hours As Needed for Muscle Spasms., Disp: 90 tablet, Rfl: 0  No current facility-administered medications for this visit.    Facility-Administered Medications Ordered in Other Visits:     heparin injection 500 Units, 500 Units,  "Intravenous, PRN, Martha Olguin MD    Allergies   Allergen Reactions    Chlorhexidine Other (See Comments)     Burning    Nylon Hives    Sunblock [Solbar Pf Spf15] Rash           Objective   Temp 98 °F (36.7 °C)   Ht 170.2 cm (67.01\")   Wt 76.2 kg (168 lb)   BMI 26.31 kg/m²            Physical Exam  Right Shoulder Exam     Range of Motion   Active abduction:  90   Passive abduction:  100   Extension:  20   External rotation:  70   Forward flexion:  90   Internal rotation 0 degrees:  Sacrum     Other   Erythema: absent  Sensation: normal  Pulse: present      Left Shoulder Exam     Range of Motion   Active abduction:  140   Passive abduction:  160   Extension:  40   External rotation:  90   Forward flexion:  130   Internal rotation 0 degrees:  L3     Muscle Strength   Abduction: 4/5   Internal rotation: 4/5   External rotation: 4/5   Supraspinatus: 4/5   Subscapularis: 5/5   Biceps: 5/5     Tests   Garcia test: negative  Cross arm: negative  Impingement: positive  Drop arm: negative    Other   Erythema: absent  Sensation: normal  Pulse: present            Extremity DVT signs are negative by clinical screen.     Independent Review of Radiographic Studies:    No new imaging done today.    Large Joint Arthrocentesis: L subacromial bursa  Date/Time: 4/17/2024 10:11 AM  Consent given by: patient  Site marked: site marked  Timeout: Immediately prior to procedure a time out was called to verify the correct patient, procedure, equipment, support staff and site/side marked as required   Supporting Documentation  Indications: pain   Procedure Details  Location: shoulder - L subacromial bursa  Preparation: Patient was prepped and draped in the usual sterile fashion  Needle size: 22 G  Approach: posterior  Medications administered: 40 mg methylPREDNISolone acetate 40 MG/ML; 2 mL lidocaine 2%  Patient tolerance: patient tolerated the procedure well with no immediate complications        Assessment and Plan   Diagnoses and " all orders for this visit:    1. Adhesive capsulitis of right shoulder (Primary)    2. Chronic left shoulder pain    Other orders  -     Large Joint Arthrocentesis: L subacromial bursa       Discussion of orthopedic goals  Orthopedic activities reviewed and patient expressed appreciation  Regular exercise as tolerated  Risk, benefits, and merits of treatment alternatives reviewed with the patient and questions answered  Patient guided on mobility and conditioning exercises  Ice, heat, and/or modalities as beneficial  Physical therapy ongoing  Reduced physical activity as appropriate and avoid offending activity  Counseling on diet, nutrition, fitness exercise, and weight reduction goals    Recommendations/Plan:  Exercise, medications, injections, other patient advice, and return appointment as noted.  Patient is encouraged to call or return for any issues or concerns.    Left shoulder differential diagnosis:  Subacromial bursitis and impingement, partial-thickness rotator cuff tear, early frozen shoulder    I agree with PTs evaluation of right frozen shoulder.  I discussed frozen shoulder and conservative treatment options for this including prolonged physical therapy and home exercise plan and over-the-counter analgesics as needed.  Her left shoulder pain has worsened and I believe that this is most likely subacromial bursitis and impingement though partial-thickness rotator cuff tear cannot be excluded.  She may also be experiencing early frozen shoulder of the left shoulder though it is too early to tell.  She was given a cortisone injection into the left shoulder for symptomatic relief and I encouraged continuing PT and home exercise plan for the left shoulder as well.  Also encouraged turmeric supplementation and capsaicin gel as well as heat and TENS unit therapy.  Advised follow-up with me in 3 months if symptoms fail to improve.    Return in about 3 months (around 7/17/2024), or if symptoms worsen or fail to  improve.  Patient agreeable to call or return sooner for any concerns.

## 2024-04-22 ENCOUNTER — OFFICE VISIT (OUTPATIENT)
Dept: GASTROENTEROLOGY | Facility: CLINIC | Age: 44
End: 2024-04-22
Payer: COMMERCIAL

## 2024-04-22 VITALS
WEIGHT: 171 LBS | SYSTOLIC BLOOD PRESSURE: 116 MMHG | DIASTOLIC BLOOD PRESSURE: 66 MMHG | HEART RATE: 84 BPM | BODY MASS INDEX: 26.84 KG/M2 | OXYGEN SATURATION: 98 % | RESPIRATION RATE: 14 BRPM | HEIGHT: 67 IN

## 2024-04-22 DIAGNOSIS — Z12.11 COLON CANCER SCREENING: Primary | ICD-10-CM

## 2024-04-22 PROCEDURE — S0285 CNSLT BEFORE SCREEN COLONOSC: HCPCS | Performed by: INTERNAL MEDICINE

## 2024-04-22 RX ORDER — SODIUM, POTASSIUM,MAG SULFATES 17.5-3.13G
1 SOLUTION, RECONSTITUTED, ORAL ORAL EVERY 12 HOURS
Qty: 354 ML | Refills: 0 | Status: SHIPPED | OUTPATIENT
Start: 2024-04-22 | End: 2024-04-23

## 2024-04-22 NOTE — PROGRESS NOTES
New Patient Consult      Date: 2024   Patient Name: Kimberley Win  MRN: [id number]  : 1980     Referring Physician: Dorinda Covington, *    Chief Complaint   Patient presents with    Colon Cancer Screening       History of Present Illness: Kimberley Win is a 44 y.o. female who is here today to establish care with Gastroenterology.    No GI sx at this time.    No FH of CRC.    Subjective      Past Medical History:   Diagnosis Date    Abnormal ECG 23    Anemia     Chemo    Arthritis     Breast cancer 2023    Left Breast    Elevated cholesterol     improved, not currently taking medication    History of chemotherapy 2023    last treatment    History of radiation therapy     left breast, currently in treatment    Hypertension     Malignant neoplasm of central portion of left breast in female, estrogen receptor positive 2023    Osteoarthritis        Past Surgical History:   Procedure Laterality Date    BREAST BIOPSY  23    BREAST RECONSTRUCTION, BREAST TISSUE EXPANDER REMOVAL, IMPLANT INSERTION Bilateral 2023    Procedure: IMMEDIATE BREAST RECONSTRUCTION WITH BREAST TISSUE EXPANDER INSERTION AND ALLODERM - BILATERAL;  Surgeon: Dilip Anderson MD;  Location: [facility] OR;  Service: Plastics;  Laterality: Bilateral;    DIAGNOSTIC LAPAROSCOPY, SALPINGO OOPHORECTOMY LAPAROSCOPIC N/A 2024    Procedure: DIAGNOSTIC LAPAROSCOPY, SALPINGO OOPHORECTOMY;  Surgeon: Fabby Miranda MD;  Location: [facility] OR;  Service: Obstetrics/Gynecology;  Laterality: N/A;    MASTECTOMY W/ SENTINEL NODE BIOPSY Bilateral 2023    Procedure: BREAST MASTECTOMY WITH NIPPLE SPARING BILATERAL WITH LEFT SENTINEL NODE BIOPSY, AND AXILLARY DISECTION;  Surgeon: Daxa Anderson MD;  Location: [facility] OR;  Service: General;  Laterality: Bilateral;    US GUIDED LYMPH NODE BIOPSY  2023    VENOUS ACCESS DEVICE (PORT) INSERTION  2023       Family History   Problem  Relation Age of Onset    Breast cancer Mother 55        Stage 4    COPD Mother     Diabetes Mother     Early death Mother     Heart disease Mother     Stroke Mother     Rheum arthritis Maternal Aunt     Breast cancer Paternal Aunt     COPD Maternal Grandfather     Stroke Maternal Grandfather     Breast cancer Other        Social History     Socioeconomic History    Marital status:    Tobacco Use    Smoking status: Former     Current packs/day: 0.00     Average packs/day: 0.3 packs/day for 10.0 years (2.5 ttl pk-yrs)     Types: Cigarettes     Start date: 1995     Quit date: 2005     Years since quittin.2     Passive exposure: Past    Smokeless tobacco: Never   Vaping Use    Vaping status: Never Used   Substance and Sexual Activity    Alcohol use: Not Currently     Comment: rare    Drug use: Never    Sexual activity: Yes     Partners: Male     Birth control/protection: Condom         Current Outpatient Medications:     acetaminophen (TYLENOL) 500 MG tablet, Take 1 tablet by mouth Every 6 (Six) Hours As Needed for Mild Pain., Disp: 60 tablet, Rfl: 0    anastrozole (ARIMIDEX) 1 MG tablet, Take 1 tablet by mouth Daily., Disp: 30 tablet, Rfl: 3    diphenhydrAMINE HCl, Sleep, (ZZZQUIL PO), Take 1 tablet by mouth Every Night., Disp: , Rfl:     docusate sodium (COLACE) 100 MG capsule, Take 1 capsule by mouth 2 (Two) Times a Day As Needed for Constipation., Disp: , Rfl:     ibuprofen (ADVIL,MOTRIN) 800 MG tablet, Take 1 tablet by mouth Every 6 (Six) Hours As Needed for Mild Pain., Disp: 30 tablet, Rfl: 0    lidocaine-prilocaine (EMLA) 2.5-2.5 % cream, Apply 1 application topically to the appropriate area as directed As Needed (45-60 minutes prior to port access.  Cover with saran/plastic wrap.)., Disp: 30 g, Rfl: 3    metoprolol succinate XL (TOPROL-XL) 25 MG 24 hr tablet, Take 1.5 tablets by mouth Daily., Disp: 45 tablet, Rfl: 3    omeprazole (priLOSEC) 40 MG capsule, Take 1 capsule by mouth Daily. 30  "min prior to evening., Disp: 30 capsule, Rfl: 5    ondansetron (ZOFRAN) 8 MG tablet, Take 1 tablet by mouth 3 (Three) Times a Day As Needed for Nausea or Vomiting (DO NOT USE WHILE SANCUSO IN PLACE)., Disp: 30 tablet, Rfl: 5    tiZANidine (ZANAFLEX) 4 MG tablet, Take 1 tablet by mouth Every 8 (Eight) Hours As Needed for Muscle Spasms., Disp: 90 tablet, Rfl: 0    sodium-potassium-magnesium sulfates (Suprep Bowel Prep Kit) 17.5-3.13-1.6 GM/177ML solution oral solution, Take 1 bottle by mouth Every 12 (Twelve) Hours for 1 day., Disp: 354 mL, Rfl: 0  No current facility-administered medications for this visit.    Facility-Administered Medications Ordered in Other Visits:     heparin injection 500 Units, 500 Units, Intravenous, PRN, Martha Olguin MD    Allergies   Allergen Reactions    Chlorhexidine Other (See Comments)     Burning    Nylon Hives    Sunblock [Solbar Pf Spf15] Rash       Review of Systems   Gastrointestinal:  Negative for abdominal distention, abdominal pain, anal bleeding, blood in stool, constipation, diarrhea, nausea, rectal pain, vomiting, GERD and indigestion.       The following portions of the patient's history were reviewed and updated as appropriate: allergies, current medications, past family history, past medical history, past social history, past surgical history and problem list.    Objective     Physical Exam:  Vitals:    04/22/24 1041   BP: 116/66   Pulse: 84   Resp: 14   SpO2: 98%   Weight: 77.6 kg (171 lb)   Height: 170.2 cm (67\")       Physical Exam  Constitutional:       General: She is not in acute distress.     Appearance: Normal appearance.   HENT:      Head: Normocephalic and atraumatic.      Nose: Nose normal.      Mouth/Throat:      Mouth: Mucous membranes are moist.   Eyes:      General: No scleral icterus.     Conjunctiva/sclera: Conjunctivae normal.   Cardiovascular:      Rate and Rhythm: Normal rate.   Pulmonary:      Effort: Pulmonary effort is normal. No respiratory " distress.   Abdominal:      General: There is no distension.      Tenderness: There is no abdominal tenderness. There is no guarding.   Musculoskeletal:         General: No deformity or signs of injury.      Cervical back: Neck supple. No rigidity.   Skin:     Capillary Refill: Capillary refill takes less than 2 seconds.      Coloration: Skin is not jaundiced or pale.   Neurological:      General: No focal deficit present.      Mental Status: She is alert and oriented to person, place, and time.   Psychiatric:         Mood and Affect: Mood normal.         Behavior: Behavior normal.         Results Review:   I have reviewed the patient's new clinical and imaging results and agree with the interpretation.     Infusion on 04/15/2024   Component Date Value Ref Range Status    Glucose 04/15/2024 109 (H)  65 - 99 mg/dL Final    BUN 04/15/2024 13  6 - 20 mg/dL Final    Creatinine 04/15/2024 0.60  0.57 - 1.00 mg/dL Final    Sodium 04/15/2024 140  136 - 145 mmol/L Final    Potassium 04/15/2024 3.6  3.5 - 5.2 mmol/L Final    Chloride 04/15/2024 104  98 - 107 mmol/L Final    CO2 04/15/2024 26.7  22.0 - 29.0 mmol/L Final    Calcium 04/15/2024 9.7  8.6 - 10.5 mg/dL Final    Total Protein 04/15/2024 7.5  6.0 - 8.5 g/dL Final    Albumin 04/15/2024 4.3  3.5 - 5.2 g/dL Final    ALT (SGPT) 04/15/2024 19  1 - 33 U/L Final    AST (SGOT) 04/15/2024 22  1 - 32 U/L Final    Alkaline Phosphatase 04/15/2024 93  39 - 117 U/L Final    Total Bilirubin 04/15/2024 0.3  0.0 - 1.2 mg/dL Final    Globulin 04/15/2024 3.2  gm/dL Final    A/G Ratio 04/15/2024 1.3  g/dL Final    BUN/Creatinine Ratio 04/15/2024 21.7  7.0 - 25.0 Final    Anion Gap 04/15/2024 9.3  5.0 - 15.0 mmol/L Final    eGFR 04/15/2024 113.0  >60.0 mL/min/1.73 Final    WBC 04/15/2024 5.27  3.40 - 10.80 10*3/mm3 Final    RBC 04/15/2024 4.03  3.77 - 5.28 10*6/mm3 Final    Hemoglobin 04/15/2024 12.2  12.0 - 15.9 g/dL Final    Hematocrit 04/15/2024 35.8  34.0 - 46.6 % Final    MCV  04/15/2024 88.8  79.0 - 97.0 fL Final    MCH 04/15/2024 30.3  26.6 - 33.0 pg Final    MCHC 04/15/2024 34.1  31.5 - 35.7 g/dL Final    RDW 04/15/2024 11.6 (L)  12.3 - 15.4 % Final    RDW-SD 04/15/2024 36.9 (L)  37.0 - 54.0 fl Final    MPV 04/15/2024 9.1  6.0 - 12.0 fL Final    Platelets 04/15/2024 228  140 - 450 10*3/mm3 Final    Neutrophil % 04/15/2024 76.7 (H)  42.7 - 76.0 % Final    Lymphocyte % 04/15/2024 13.9 (L)  19.6 - 45.3 % Final    Monocyte % 04/15/2024 6.3  5.0 - 12.0 % Final    Eosinophil % 04/15/2024 2.3  0.3 - 6.2 % Final    Basophil % 04/15/2024 0.4  0.0 - 1.5 % Final    Immature Grans % 04/15/2024 0.4  0.0 - 0.5 % Final    Neutrophils, Absolute 04/15/2024 4.05  1.70 - 7.00 10*3/mm3 Final    Lymphocytes, Absolute 04/15/2024 0.73  0.70 - 3.10 10*3/mm3 Final    Monocytes, Absolute 04/15/2024 0.33  0.10 - 0.90 10*3/mm3 Final    Eosinophils, Absolute 04/15/2024 0.12  0.00 - 0.40 10*3/mm3 Final    Basophils, Absolute 04/15/2024 0.02  0.00 - 0.20 10*3/mm3 Final    Immature Grans, Absolute 04/15/2024 0.02  0.00 - 0.05 10*3/mm3 Final    nRBC 04/15/2024 0.0  0.0 - 0.2 /100 WBC Final   Infusion on 02/26/2024   Component Date Value Ref Range Status    25 Hydroxy, Vitamin D 02/26/2024 28.9 (L)  30.0 - 100.0 ng/ml Final    Glucose 02/26/2024 86  65 - 99 mg/dL Final    BUN 02/26/2024 15  6 - 20 mg/dL Final    Creatinine 02/26/2024 0.61  0.57 - 1.00 mg/dL Final    Sodium 02/26/2024 140  136 - 145 mmol/L Final    Potassium 02/26/2024 3.9  3.5 - 5.2 mmol/L Final    Chloride 02/26/2024 103  98 - 107 mmol/L Final    CO2 02/26/2024 27.6  22.0 - 29.0 mmol/L Final    Calcium 02/26/2024 9.4  8.6 - 10.5 mg/dL Final    Total Protein 02/26/2024 7.6  6.0 - 8.5 g/dL Final    Albumin 02/26/2024 4.2  3.5 - 5.2 g/dL Final    ALT (SGPT) 02/26/2024 57 (H)  1 - 33 U/L Final    AST (SGOT) 02/26/2024 39 (H)  1 - 32 U/L Final    Alkaline Phosphatase 02/26/2024 87  39 - 117 U/L Final    Total Bilirubin 02/26/2024 0.2  0.0 - 1.2 mg/dL Final     Globulin 02/26/2024 3.4  gm/dL Final    A/G Ratio 02/26/2024 1.2  g/dL Final    BUN/Creatinine Ratio 02/26/2024 24.6  7.0 - 25.0 Final    Anion Gap 02/26/2024 9.4  5.0 - 15.0 mmol/L Final    eGFR 02/26/2024 112.5  >60.0 mL/min/1.73 Final    C-Reactive Protein 02/26/2024 0.45  0.00 - 0.50 mg/dL Final    WBC 02/26/2024 3.43  3.40 - 10.80 10*3/mm3 Final    RBC 02/26/2024 4.00  3.77 - 5.28 10*6/mm3 Final    Hemoglobin 02/26/2024 12.1  12.0 - 15.9 g/dL Final    Hematocrit 02/26/2024 35.0  34.0 - 46.6 % Final    MCV 02/26/2024 87.5  79.0 - 97.0 fL Final    MCH 02/26/2024 30.3  26.6 - 33.0 pg Final    MCHC 02/26/2024 34.6  31.5 - 35.7 g/dL Final    RDW 02/26/2024 13.0  12.3 - 15.4 % Final    RDW-SD 02/26/2024 41.4  37.0 - 54.0 fl Final    MPV 02/26/2024 8.3  6.0 - 12.0 fL Final    Platelets 02/26/2024 263  140 - 450 10*3/mm3 Final    Neutrophil % 02/26/2024 63.2  42.7 - 76.0 % Final    Lymphocyte % 02/26/2024 22.2  19.6 - 45.3 % Final    Monocyte % 02/26/2024 9.3  5.0 - 12.0 % Final    Eosinophil % 02/26/2024 4.7  0.3 - 6.2 % Final    Basophil % 02/26/2024 0.6  0.0 - 1.5 % Final    Immature Grans % 02/26/2024 0.0  0.0 - 0.5 % Final    Neutrophils, Absolute 02/26/2024 2.17  1.70 - 7.00 10*3/mm3 Final    Lymphocytes, Absolute 02/26/2024 0.76  0.70 - 3.10 10*3/mm3 Final    Monocytes, Absolute 02/26/2024 0.32  0.10 - 0.90 10*3/mm3 Final    Eosinophils, Absolute 02/26/2024 0.16  0.00 - 0.40 10*3/mm3 Final    Basophils, Absolute 02/26/2024 0.02  0.00 - 0.20 10*3/mm3 Final    Immature Grans, Absolute 02/26/2024 0.00  0.00 - 0.05 10*3/mm3 Final    nRBC 02/26/2024 0.0  0.0 - 0.2 /100 WBC Final   Office Visit on 02/15/2024   Component Date Value Ref Range Status    Total Cholesterol 02/26/2024 240 (H)  0 - 200 mg/dL Final    Triglycerides 02/26/2024 151 (H)  0 - 150 mg/dL Final    HDL Cholesterol 02/26/2024 79 (H)  40 - 60 mg/dL Final    LDL Cholesterol  02/26/2024 135 (H)  0 - 100 mg/dL Final    VLDL Cholesterol 02/26/2024 26  5  - 40 mg/dL Final    LDL/HDL Ratio 2024 1.66   Final   Admission on 2024, Discharged on 2024   Component Date Value Ref Range Status    HCG, Urine, QL 2024 Negative  Negative Final    Lot Number [id number]   Final    Internal Positive Control 2024 Passed  Positive, Passed Final    Internal Negative Control 2024 Passed  Negative, Passed Final    Expiration Date 2024   Final    Reference Lab Report 2024    Final                    Value:[facility]  [address] [place] [address]  Phone: [phone] or [phone]  Fax: [phone]  Benjamín Ramos M.D., Medical Director    PATIENT NAME                           LABORATORY NO.  [id number]                [id number]  [id number]                         AGE              SEX  N           CLIENT REF #  [place] [id number]      1980  F    [id number]   [id number]    [address] [place] [address] BY-PASS                REQUESTING M.D.     ATTENDING M.D.     COPY TO.   [address]                        [facility] MICHELLE DONOVAN[address]  DATE COLLECTED      DATE RECEIVED      DATE REPORTED  2024    DIAGNOSIS:  OVARY AND FALLOPIAN TUBE, BILATERAL:  Ovary with benign physiologic cysts  Fallopian tubes with benign paratubal cysts  No evidence of malignancy    CLINICAL HISTORY:  Malignant neoplasm of central                           portion of left breast in female, estrogen receptor  positive    SPECIMENS RECEIVED:  OVARY AND FALLOPIAN TUBE, BILATERAL    MICROSCOPIC DESCRIPTION:  Tissue blocks are prepared and slides are examined microscopically on all  specimens. See diagnosis for details.    Professional interpretation rendered by Trae Acuna M.D.,F.C.A.P. at [facility], [place], [address] [place] [address].    GROSS  "DESCRIPTION:  Received in formalin labeled \"ovary, bilateral fallopian tubes\" are 2  undesignated, pink-purple, and fimbriated fallopian tubes that measure 4.2 x 0.7  cm and 5.0 x 0.8 cm.  Each fallopian tube displays an attached ovary, and no  distinct lesions are identified within either fallopian tube upon sectioning.  The  ovary associated with the shorter fallopian tube measures 2 g, 2.1 x 1.6 x 0.8 cm  and displays a tan-yellow, smooth external surface.  Sectioning reveals tan-pink  to white, unremarkable cut surfaces.  The ovary associated with the longer  fallopian tube measures 2                           g, 2.7 x 1.5 x 0.9 cm and displays a tan-pink, smooth  external surface.  Sectioning reveals tan-pink, unremarkable, and focally  hemorrhagic cut surfaces.  The specimen is submitted entirely as follows:  A1-A2: Hollins fallopian tube  A3-A4: Longer fallopian tube  A5-A6: Ovary associated with shorter fallopian tube  A7-A9: Ovary associated with longer fallopian tube  [facility]    REVIEWED, DIAGNOSED AND ELECTRONICALLY  SIGNED BY:    Trae Acuna M.D.,F.C.A.P.  CPT CODES:  23630        No radiology results for the last 90 days.    Assessment / Plan      Assessment & Plan:  Diagnoses and all orders for this visit:    1. Colon cancer screening (Primary)  -     Case Request; Standing  -     Case Request  -     sodium-potassium-magnesium sulfates (Suprep Bowel Prep Kit) 17.5-3.13-1.6 GM/177ML solution oral solution; Take 1 bottle by mouth Every 12 (Twelve) Hours for 1 day.  Dispense: 354 mL; Refill: 0    Other orders  -     Follow Anesthesia Guidelines / Protocol; Standing  -     Follow Anesthesia Guidelines / Protocol; Future  -     Obtain Informed Consent; Future  -     Verify NPO; Standing        Plan for colonoscopy with monitored anesthesia care. Counseled in detail regarding the risks, benefits and alternatives of the procedure, including but not limited to perforation, bleeding, infection, post-operative " pain, complications from anesthesia, aspiration, cardiac decompensation, need for further procedures or surgery, which may occur in approximately 1 in 1000 procedures. Counseled also that endoscopy and colonoscopy are not perfect tests, and there is a possibility of missed diagnoses, including but not limited to polyps or cancer. Counseled regarding pre procedural instructions. Also discussed bowel preparation. Counseled regarding potential, albeit rare complications with bowel preparation specific to this patients medical history and medications, including but not limited to renal insufficiency/failure, electrolyte abnormalities, which may lead to other complications. Hydration is encouraged. Written instructions provided. Instructed to arrange for a ride home for the day of procedure. Patient is in agreement with the above plan and voices understanding of the plan as well as the associated risks and the alternative evaluation/treatment/medication options.       Follow Up:   Return if symptoms worsen or fail to improve.    Pamela Ernst MD  [facility]    4/22/2024  11:01 EDT    Part of this note may be an electronic transcription/translation of spoken language to printed text using the Dragon Dictation System.

## 2024-04-25 PROBLEM — Z12.11 COLON CANCER SCREENING: Status: ACTIVE | Noted: 2024-04-22

## 2024-04-26 ENCOUNTER — PATIENT ROUNDING (BHMG ONLY) (OUTPATIENT)
Dept: GASTROENTEROLOGY | Facility: CLINIC | Age: 44
End: 2024-04-26
Payer: COMMERCIAL

## 2024-04-26 NOTE — PROGRESS NOTES
April 26, 2024    Hello, may I speak with Kimberley Win?    My name is Sintia     I am  with [facility]  [address]  [facility] [address] [phone].    Before we get started may I verify your date of birth? 1980    I am calling to officially welcome you to our practice and ask about your recent visit. Is this a good time to talk? yes    Tell me about your visit with us. What things went well?  it was a good visit, and everyone was super nice       We're always looking for ways to make our patients' experiences even better. Do you have recommendations on ways we may improve?  no    Overall were you satisfied with your first visit to our practice? yes       I appreciate you taking the time to speak with me today. Is there anything else I can do for you? no      Thank you, and have a great day.

## 2024-05-22 NOTE — PRE-PROCEDURE INSTRUCTIONS
PAT phone history completed with pt for upcoming procedure on 5/23/24, with Dr. Ernst.     PAT PASS GIVEN/REVIEWED WITH PT.  VERBALIZED UNDERSTANDING OF THE FOLLOWING:  DO NOT EAT, DRINK, SMOKE, USE SMOKELESS TOBACCO OR CHEW GUM AFTER MIDNIGHT THE NIGHT BEFORE SURGERY.  THIS ALSO INCLUDES HARD CANDIES AND MINTS.    DO NOT SHAVE THE AREA TO BE OPERATED ON AT LEAST 48 HOURS PRIOR TO THE PROCEDURE.  DO NOT WEAR MAKE UP OR NAIL POLISH.  DO NOT LEAVE IN ANY PIERCING OR WEAR JEWELRY THE DAY OF SURGERY.      DO NOT USE ADHESIVES IF YOU WEAR DENTURES.    DO NOT WEAR EYE CONTACTS; BRING IN YOUR GLASSES.    ONLY TAKE MEDICATION THE MORNING OF YOUR PROCEDURE IF INSTRUCTED BY YOUR SURGEON WITH ENOUGH WATER TO SWALLOW THE MEDICATION.  IF YOUR SURGEON DID NOT SPECIFY WHICH MEDICATIONS TO TAKE, YOU WILL NEED TO CALL THEIR OFFICE FOR FURTHER INSTRUCTIONS AND DO AS THEY INSTRUCT.    LEAVE ANYTHING YOU CONSIDER VALUABLE AT HOME.    YOU WILL NEED TO ARRANGE FOR SOMEONE TO DRIVE YOU HOME AFTER SURGERY.  IT IS RECOMMENDED THAT YOU DO NOT DRIVE, WORK, DRINK ALCOHOL OR MAKE MAJOR DECISIONS FOR AT LEAST 24 HOURS AFTER YOUR PROCEDURE IS COMPLETE.      THE DAY OF YOUR PROCEDURE, BRING IN THE FOLLOWING IF APPLICABLE:   PICTURE ID AND INSURANCE/MEDICARE OR MEDICAID CARDS/ANY CO-PAY THAT MAY BE DUE   COPY OF ADVANCED DIRECTIVE/LIVING WILL/POWER OR    CPAP/BIPAP/INHALERS   SKIN PREP SHEET   YOUR PREADMISSION TESTING PASS (IF NOT A PHONE HISTORY)    Medication instructions given to pt by RN per anesthesia protocol.  Pt referred back to surgeon for further instructions if he/she is on any blood thinners.

## 2024-05-23 ENCOUNTER — HOSPITAL ENCOUNTER (OUTPATIENT)
Facility: HOSPITAL | Age: 44
Setting detail: HOSPITAL OUTPATIENT SURGERY
Discharge: HOME | End: 2024-05-23
Attending: INTERNAL MEDICINE | Admitting: INTERNAL MEDICINE
Payer: COMMERCIAL

## 2024-05-23 ENCOUNTER — ANESTHESIA (OUTPATIENT)
Dept: GASTROENTEROLOGY | Facility: HOSPITAL | Age: 44
End: 2024-05-23
Payer: COMMERCIAL

## 2024-05-23 ENCOUNTER — ANESTHESIA EVENT (OUTPATIENT)
Dept: GASTROENTEROLOGY | Facility: HOSPITAL | Age: 44
End: 2024-05-23
Payer: COMMERCIAL

## 2024-05-23 VITALS
DIASTOLIC BLOOD PRESSURE: 73 MMHG | RESPIRATION RATE: 16 BRPM | WEIGHT: 170 LBS | TEMPERATURE: 97.2 F | HEIGHT: 67 IN | HEART RATE: 75 BPM | OXYGEN SATURATION: 100 % | SYSTOLIC BLOOD PRESSURE: 115 MMHG | BODY MASS INDEX: 26.68 KG/M2

## 2024-05-23 PROCEDURE — 25010000002 PROPOFOL 10 MG/ML EMULSION: Performed by: NURSE ANESTHETIST, CERTIFIED REGISTERED

## 2024-05-23 PROCEDURE — 25810000003 LACTATED RINGERS PER 1000 ML: Performed by: INTERNAL MEDICINE

## 2024-05-23 PROCEDURE — 45378 DIAGNOSTIC COLONOSCOPY: CPT | Performed by: INTERNAL MEDICINE

## 2024-05-23 RX ORDER — PROPOFOL 10 MG/ML
VIAL (ML) INTRAVENOUS AS NEEDED
Status: DISCONTINUED | OUTPATIENT
Start: 2024-05-23 | End: 2024-05-23 | Stop reason: SURG

## 2024-05-23 RX ORDER — SODIUM CHLORIDE, SODIUM LACTATE, POTASSIUM CHLORIDE, CALCIUM CHLORIDE 600; 310; 30; 20 MG/100ML; MG/100ML; MG/100ML; MG/100ML
1000 INJECTION, SOLUTION INTRAVENOUS CONTINUOUS
Status: DISCONTINUED | OUTPATIENT
Start: 2024-05-23 | End: 2024-05-23 | Stop reason: HOSPADM

## 2024-05-23 RX ORDER — ONDANSETRON 2 MG/ML
4 INJECTION INTRAMUSCULAR; INTRAVENOUS ONCE AS NEEDED
Status: CANCELLED | OUTPATIENT
Start: 2024-05-23 | End: 2024-05-23

## 2024-05-23 RX ORDER — SIMETHICONE 40MG/0.6ML
SUSPENSION, DROPS(FINAL DOSAGE FORM)(ML) ORAL AS NEEDED
Status: DISCONTINUED | OUTPATIENT
Start: 2024-05-23 | End: 2024-05-23 | Stop reason: HOSPADM

## 2024-05-23 RX ADMIN — LIDOCAINE HYDROCHLORIDE 100 MG: 20 INJECTION, SOLUTION INTRAVENOUS at 10:50

## 2024-05-23 RX ADMIN — PROPOFOL 300 MG: 10 INJECTION, EMULSION INTRAVENOUS at 10:50

## 2024-05-23 RX ADMIN — SODIUM CHLORIDE, POTASSIUM CHLORIDE, SODIUM LACTATE AND CALCIUM CHLORIDE 1000 ML: 600; 310; 30; 20 INJECTION, SOLUTION INTRAVENOUS at 09:54

## 2024-05-23 NOTE — H&P
Pre Procedure History and Physical      Date of Procedure: May 23, 2024  Patient Name: Kimberley Win  MRN: [id number]  : 1980     Referring provider: Pamela Ernst MD    History of Present Illness: Patient here for scheduled outpatient endoscopy. See below and prior GI clinic documentation for further details.      Subjective     Past Medical History:   Diagnosis Date    Abnormal ECG 23    Anemia     Chemo    Arthritis     Breast cancer 2023    Left Breast    Elevated cholesterol     improved, not currently taking medication    History of chemotherapy 2023    last treatment    History of radiation therapy     left breast, currently in treatment    Hypertension     Malignant neoplasm of central portion of left breast in female, estrogen receptor positive 2023    Osteoarthritis        Past Surgical History:   Procedure Laterality Date    BREAST BIOPSY  23    BREAST RECONSTRUCTION, BREAST TISSUE EXPANDER REMOVAL, IMPLANT INSERTION Bilateral 2023    Procedure: IMMEDIATE BREAST RECONSTRUCTION WITH BREAST TISSUE EXPANDER INSERTION AND ALLODERM - BILATERAL;  Surgeon: Dilip Anderson MD;  Location: [facility] OR;  Service: Plastics;  Laterality: Bilateral;    DIAGNOSTIC LAPAROSCOPY, SALPINGO OOPHORECTOMY LAPAROSCOPIC N/A 2024    Procedure: DIAGNOSTIC LAPAROSCOPY, SALPINGO OOPHORECTOMY;  Surgeon: Fabby Miranda MD;  Location: [facility] OR;  Service: Obstetrics/Gynecology;  Laterality: N/A;    MASTECTOMY W/ SENTINEL NODE BIOPSY Bilateral 2023    Procedure: BREAST MASTECTOMY WITH NIPPLE SPARING BILATERAL WITH LEFT SENTINEL NODE BIOPSY, AND AXILLARY DISECTION;  Surgeon: Daxa Anderson MD;  Location: [facility] OR;  Service: General;  Laterality: Bilateral;    US GUIDED LYMPH NODE BIOPSY  2023    VENOUS ACCESS DEVICE (PORT) INSERTION  2023       Family History   Problem Relation Age of Onset    Breast cancer Mother 55        Stage 4    COPD Mother      "Diabetes Mother     Early death Mother     Heart disease Mother     Stroke Mother     Rheum arthritis Maternal Aunt     Breast cancer Paternal Aunt     COPD Maternal Grandfather     Stroke Maternal Grandfather     Breast cancer Other        Social History     Socioeconomic History    Marital status:    Tobacco Use    Smoking status: Former     Current packs/day: 0.00     Average packs/day: 0.3 packs/day for 10.0 years (2.5 ttl pk-yrs)     Types: Cigarettes     Start date: 1995     Quit date: 2005     Years since quittin.2     Passive exposure: Past    Smokeless tobacco: Never   Vaping Use    Vaping status: Never Used   Substance and Sexual Activity    Alcohol use: Not Currently     Comment: rare    Drug use: Never    Sexual activity: Defer         Current Facility-Administered Medications:     lactated ringers infusion 1,000 mL, 1,000 mL, Intravenous, Continuous, Pamela Ernst MD, Last Rate: 25 mL/hr at 24 0954, 1,000 mL at 24 0954    Facility-Administered Medications Ordered in Other Encounters:     heparin injection 500 Units, 500 Units, Intravenous, PRN, Martha Olguin MD    Allergies   Allergen Reactions    Chlorhexidine Other (See Comments)     Burning    Nylon Hives    Sunblock [Solbar Pf Spf15] Rash       Review of Systems  Negative, except as below     The following portions of the patient's history were reviewed and updated as appropriate: allergies, current medications, past family history, past medical history, past social history, past surgical history and problem list.    Objective     Vitals:    24 0947 24 0947   BP:  134/92   BP Location:  Right arm   Patient Position:  Sitting   Pulse:  79   Resp:  16   Temp:  97.2 °F (36.2 °C)   TempSrc:  Temporal   SpO2:  99%   Weight: 77.1 kg (170 lb)    Height: 170.2 cm (67\")        Physical Exam  Constitutional:       General: She is not in acute distress.     Appearance: Normal appearance.   HENT:      Head: " Normocephalic and atraumatic.      Mouth/Throat:      Mouth: Mucous membranes are moist.   Eyes:      General: No scleral icterus.     Conjunctiva/sclera: Conjunctivae normal.   Cardiovascular:      Rate and Rhythm: Normal rate.   Pulmonary:      Effort: Pulmonary effort is normal. No respiratory distress.   Abdominal:      General: There is no distension.      Tenderness: There is no abdominal tenderness.   Musculoskeletal:         General: No deformity or signs of injury.   Skin:     Coloration: Skin is not jaundiced or pale.   Neurological:      General: No focal deficit present.      Mental Status: She is alert and oriented to person, place, and time.   Psychiatric:         Mood and Affect: Mood normal.         Behavior: Behavior normal.           Assessment / Plan      Assessment/Recommendations:   Principal Problem:    Colon cancer screening      Plan:  Colonoscopy    See GI clinic note/documentation for further details.      Pamela Ernst MD  [facility]  5/23/2024  10:05 EDT    Part of this note may be an electronic transcription/translation of spoken language to printed text using the Dragon Dictation System.

## 2024-05-23 NOTE — ANESTHESIA PREPROCEDURE EVALUATION
Anesthesia Evaluation     Patient summary reviewed and Nursing notes reviewed   no history of anesthetic complications:   NPO Solid Status: > 8 hours  NPO Liquid Status: > 8 hours           Airway   Mallampati: I  TM distance: >3 FB  Neck ROM: full  no difficulty expected and Possible difficult intubation  Dental - normal exam     Pulmonary - normal exam   (+) a smoker Former,  Cardiovascular - normal exam    Patient on routine beta blocker    (+) hypertension, hyperlipidemia      Neuro/Psych  GI/Hepatic/Renal/Endo    (+) GERD    Musculoskeletal     Abdominal  - normal exam    Abdomen: soft.  Bowel sounds: normal.   Substance History      OB/GYN          Other   arthritis,   history of cancer active    ROS/Med Hx Other: Left axillary radiation burn post cancer treatment               Anesthesia Plan    ASA 2     MAC     (Risks and benefits discussed including risk of aspiration, recall and dental damage. All patient questions answered. Will continue with POC. )  intravenous induction     Anesthetic plan, risks, benefits, and alternatives have been provided, discussed and informed consent has been obtained with: patient.  Pre-procedure education provided      CODE STATUS:

## 2024-05-23 NOTE — ANESTHESIA POSTPROCEDURE EVALUATION
Patient: Kimberley Win    Procedure Summary       Date: 05/23/24 Room / Location: [facility] ENDOSCOPY 1 / [facility] ENDOSCOPY    Anesthesia Start: 1044 Anesthesia Stop: 1104    Procedure: COLONOSCOPY (Anus) Diagnosis:       Colon cancer screening      (Colon cancer screening [Z12.11])    Surgeons: Pmaela Ernst MD Provider: Issa Torrez CRNA    Anesthesia Type: MAC ASA Status: 2            Anesthesia Type: MAC    Vitals  Vitals Value Taken Time   BP 95/60 05/23/24 1106   Temp 97.2 °F (36.2 °C) 05/23/24 1106   Pulse 82 05/23/24 1106   Resp 16 05/23/24 1106   SpO2 99 % 05/23/24 1119   Vitals shown include unfiled device data.          Post Anesthesia Care and Evaluation    Patient location during evaluation: PHASE II  Patient participation: complete - patient participated  Level of consciousness: awake  Pain score: 1  Pain management: adequate    Airway patency: patent  Anesthetic complications: No anesthetic complications  PONV Status: controlled  Cardiovascular status: acceptable and stable  Respiratory status: acceptable  Hydration status: acceptable    Comments: See Nursing record for post procedural Vital Signs as per protocol.

## 2024-06-17 RX ORDER — ANASTROZOLE 1 MG/1
1 TABLET ORAL DAILY
Qty: 30 TABLET | Refills: 3 | Status: SHIPPED | OUTPATIENT
Start: 2024-06-17

## 2024-06-19 ENCOUNTER — OFFICE VISIT (OUTPATIENT)
Dept: CARDIOLOGY | Facility: CLINIC | Age: 44
End: 2024-06-19
Payer: COMMERCIAL

## 2024-06-19 VITALS
HEART RATE: 88 BPM | BODY MASS INDEX: 26.81 KG/M2 | OXYGEN SATURATION: 99 % | RESPIRATION RATE: 18 BRPM | SYSTOLIC BLOOD PRESSURE: 102 MMHG | WEIGHT: 170.8 LBS | DIASTOLIC BLOOD PRESSURE: 72 MMHG | HEIGHT: 67 IN

## 2024-06-19 DIAGNOSIS — I49.3 PVC (PREMATURE VENTRICULAR CONTRACTION): ICD-10-CM

## 2024-06-19 PROCEDURE — 99213 OFFICE O/P EST LOW 20 MIN: CPT | Performed by: INTERNAL MEDICINE

## 2024-06-19 RX ORDER — METOPROLOL SUCCINATE 25 MG/1
25 TABLET, EXTENDED RELEASE ORAL DAILY
Start: 2024-06-19 | End: 2024-06-19 | Stop reason: SDUPTHER

## 2024-06-19 RX ORDER — METOPROLOL SUCCINATE 25 MG/1
25 TABLET, EXTENDED RELEASE ORAL DAILY
Qty: 90 TABLET | Refills: 3 | Status: SHIPPED | OUTPATIENT
Start: 2024-06-19

## 2024-06-19 NOTE — PROGRESS NOTES
[facility] Cardiology Office Follow Up Note    Kimberley Win  [id number]  2024    Primary Care Provider: Dorinda Covington DO    Chief Complaint: Routine follow-up    History of Present Illness:   Mrs. Kimberley Win is a 44 y.o. female who presents to the Cardiology Clinic for routine follow-up.  The patient has a history of breast cancer, status post bilateral mastectomy, chemotherapy, and radiation therapy.  She has a past cardiac history significant for symptomatic ventricular ectopy.  Following completion of radiation therapy, the patient reports significant improvement of palpitations.  Her palpitations are currently minimal on low-dose metoprolol.  No associated dizziness or lightheadedness.  No history of presyncopal or syncopal events.  No specific cardiac complaints today.     Past Cardiac Testin. Last Coronary Angio: None  2. Prior Stress Testing: None  3. Last Echo: 2023              1.  Normal left ventricular size and systolic function, LVEF 55-60%.  2.  Normal LV diastolic filling pattern.  3.  Normal right ventricular size and systolic function.  4.  Normal left and right atrial size.  5.  Trace MR, TR, and PI.  6.  Normal LV global longitudinal strain pattern.  4. Prior Holter Monitor: 2023              1.  1.8% PVCs. Associated with symptoms.     Review of Systems:   Review of Systems   Constitutional:  Negative for activity change, appetite change, chills, diaphoresis, fatigue, fever, unexpected weight gain and unexpected weight loss.   Eyes:  Negative for blurred vision and double vision.   Respiratory:  Negative for cough, chest tightness, shortness of breath and wheezing.    Cardiovascular:  Positive for palpitations. Negative for chest pain and leg swelling.   Gastrointestinal:  Negative for abdominal pain, anal bleeding, blood in stool and GERD.   Endocrine: Negative for cold intolerance and heat intolerance.   Genitourinary:   Negative for hematuria.   Musculoskeletal:  Positive for arthralgias.   Neurological:  Negative for dizziness, syncope, weakness and light-headedness.   Hematological:  Does not bruise/bleed easily.   Psychiatric/Behavioral:  Negative for depressed mood and stress. The patient is not nervous/anxious.        I have reviewed and/or updated the patient's past medical, past surgical, family, social history, problem list and allergies as appropriate.     Medications:     Current Outpatient Medications:     acetaminophen (TYLENOL) 500 MG tablet, Take 1 tablet by mouth Every 6 (Six) Hours As Needed for Mild Pain., Disp: 60 tablet, Rfl: 0    anastrozole (ARIMIDEX) 1 MG tablet, TAKE 1 TABLET BY MOUTH DAILY, Disp: 30 tablet, Rfl: 3    diphenhydrAMINE HCl, Sleep, (ZZZQUIL PO), Take 1 tablet by mouth Every Night., Disp: , Rfl:     docusate sodium (COLACE) 100 MG capsule, Take 1 capsule by mouth 2 (Two) Times a Day As Needed for Constipation., Disp: , Rfl:     ibuprofen (ADVIL,MOTRIN) 800 MG tablet, Take 1 tablet by mouth Every 6 (Six) Hours As Needed for Mild Pain., Disp: 30 tablet, Rfl: 0    lidocaine-prilocaine (EMLA) 2.5-2.5 % cream, Apply 1 application topically to the appropriate area as directed As Needed (45-60 minutes prior to port access.  Cover with saran/plastic wrap.)., Disp: 30 g, Rfl: 3    metoprolol succinate XL (TOPROL-XL) 25 MG 24 hr tablet, Take 1 tablet by mouth Daily. Taking 1 daily, Disp: 90 tablet, Rfl: 3    ondansetron (ZOFRAN) 8 MG tablet, Take 1 tablet by mouth 3 (Three) Times a Day As Needed for Nausea or Vomiting (DO NOT USE WHILE SANCUSO IN PLACE)., Disp: 30 tablet, Rfl: 5    tiZANidine (ZANAFLEX) 4 MG tablet, Take 1 tablet by mouth Every 8 (Eight) Hours As Needed for Muscle Spasms., Disp: 90 tablet, Rfl: 0  No current facility-administered medications for this visit.    Facility-Administered Medications Ordered in Other Visits:     heparin injection 500 Units, 500 Units, Intravenous, PRN, Sharif  "Martha JIM MD    Physical Exam:  Vital Signs:   Vitals:    06/19/24 1415   BP: 102/72   BP Location: Right arm   Patient Position: Sitting   Cuff Size: Adult   Pulse: 88   Resp: 18   SpO2: 99%   Weight: 77.5 kg (170 lb 12.8 oz)   Height: 170.2 cm (67\")       Physical Exam  Constitutional:       General: She is not in acute distress.     Appearance: Normal appearance. She is well-developed. She is not diaphoretic.   HENT:      Head: Normocephalic and atraumatic.   Eyes:      General: No scleral icterus.     Pupils: Pupils are equal, round, and reactive to light.   Neck:      Trachea: No tracheal deviation.   Cardiovascular:      Rate and Rhythm: Normal rate and regular rhythm.      Heart sounds: Normal heart sounds. No murmur heard.     No friction rub. No gallop.      Comments: Normal JVD.  Pulmonary:      Effort: Pulmonary effort is normal. No respiratory distress.      Breath sounds: Normal breath sounds. No stridor. No wheezing or rales.   Chest:      Chest wall: No tenderness.   Abdominal:      General: Bowel sounds are normal. There is no distension.      Palpations: Abdomen is soft.      Tenderness: There is no abdominal tenderness. There is no guarding or rebound.   Musculoskeletal:         General: No swelling. Normal range of motion.      Cervical back: Neck supple. No tenderness.   Lymphadenopathy:      Cervical: No cervical adenopathy.   Skin:     General: Skin is warm and dry.      Findings: No erythema.   Neurological:      General: No focal deficit present.      Mental Status: She is alert and oriented to person, place, and time.   Psychiatric:         Mood and Affect: Mood normal.         Behavior: Behavior normal.         Results Review:   I reviewed the patient's new clinical results.        Assessment / Plan:     1.  Symptomatic PVCs  -- History of palpitations secondary to occasional symptomatic ectopy  -- Palpitations currently minimal  -- Continue low-dose metoprolol for suppression of ventricular " ectopy  -- Follow-up in 1 year, sooner if required    Follow Up:   Return in about 1 year (around 6/19/2025).      Thank you for allowing me to participate in the care of your patient. Please to not hesitate to contact me with additional questions or concerns.     SAVANA Costello MD  Interventional Cardiology   06/19/2024  14:16 EDT

## 2024-06-24 ENCOUNTER — HOSPITAL ENCOUNTER (OUTPATIENT)
Dept: INFUSION THERAPY | Facility: HOSPITAL | Age: 44
Discharge: HOME | End: 2024-06-24
Admitting: INTERNAL MEDICINE
Payer: COMMERCIAL

## 2024-06-24 ENCOUNTER — OFFICE VISIT (OUTPATIENT)
Dept: ONCOLOGY | Facility: CLINIC | Age: 44
End: 2024-06-24
Payer: COMMERCIAL

## 2024-06-24 VITALS
HEIGHT: 67 IN | TEMPERATURE: 97.7 F | SYSTOLIC BLOOD PRESSURE: 117 MMHG | RESPIRATION RATE: 18 BRPM | HEART RATE: 77 BPM | DIASTOLIC BLOOD PRESSURE: 69 MMHG | OXYGEN SATURATION: 100 % | BODY MASS INDEX: 27.12 KG/M2 | WEIGHT: 172.8 LBS

## 2024-06-24 DIAGNOSIS — C50.112 MALIGNANT NEOPLASM OF CENTRAL PORTION OF LEFT BREAST IN FEMALE, ESTROGEN RECEPTOR POSITIVE: Primary | ICD-10-CM

## 2024-06-24 DIAGNOSIS — Z78.0 POSTMENOPAUSAL: ICD-10-CM

## 2024-06-24 DIAGNOSIS — Z17.0 MALIGNANT NEOPLASM OF CENTRAL PORTION OF LEFT BREAST IN FEMALE, ESTROGEN RECEPTOR POSITIVE: Primary | ICD-10-CM

## 2024-06-24 LAB
25(OH)D3 SERPL-MCNC: 39.1 NG/ML (ref 30–100)
ALBUMIN SERPL-MCNC: 4.2 G/DL (ref 3.5–5.2)
ALBUMIN/GLOB SERPL: 1.4 G/DL
ALP SERPL-CCNC: 92 U/L (ref 39–117)
ALT SERPL W P-5'-P-CCNC: 19 U/L (ref 1–33)
ANION GAP SERPL CALCULATED.3IONS-SCNC: 6.5 MMOL/L (ref 5–15)
AST SERPL-CCNC: 23 U/L (ref 1–32)
BASOPHILS # BLD AUTO: 0.03 10*3/MM3 (ref 0–0.2)
BASOPHILS NFR BLD AUTO: 0.6 % (ref 0–1.5)
BILIRUB SERPL-MCNC: 0.3 MG/DL (ref 0–1.2)
BUN SERPL-MCNC: 14 MG/DL (ref 6–20)
BUN/CREAT SERPL: 26.9 (ref 7–25)
CALCIUM SPEC-SCNC: 9.3 MG/DL (ref 8.6–10.5)
CHLORIDE SERPL-SCNC: 105 MMOL/L (ref 98–107)
CO2 SERPL-SCNC: 26.5 MMOL/L (ref 22–29)
CREAT SERPL-MCNC: 0.52 MG/DL (ref 0.57–1)
DEPRECATED RDW RBC AUTO: 39.1 FL (ref 37–54)
EGFRCR SERPLBLD CKD-EPI 2021: 116.9 ML/MIN/1.73
EOSINOPHIL # BLD AUTO: 0.13 10*3/MM3 (ref 0–0.4)
EOSINOPHIL NFR BLD AUTO: 2.6 % (ref 0.3–6.2)
ERYTHROCYTE [DISTWIDTH] IN BLOOD BY AUTOMATED COUNT: 11.9 % (ref 12.3–15.4)
GLOBULIN UR ELPH-MCNC: 3.1 GM/DL
GLUCOSE SERPL-MCNC: 88 MG/DL (ref 65–99)
HCT VFR BLD AUTO: 35.2 % (ref 34–46.6)
HGB BLD-MCNC: 11.6 G/DL (ref 12–15.9)
IMM GRANULOCYTES # BLD AUTO: 0.01 10*3/MM3 (ref 0–0.05)
IMM GRANULOCYTES NFR BLD AUTO: 0.2 % (ref 0–0.5)
LYMPHOCYTES # BLD AUTO: 0.92 10*3/MM3 (ref 0.7–3.1)
LYMPHOCYTES NFR BLD AUTO: 18.5 % (ref 19.6–45.3)
MCH RBC QN AUTO: 29.8 PG (ref 26.6–33)
MCHC RBC AUTO-ENTMCNC: 33 G/DL (ref 31.5–35.7)
MCV RBC AUTO: 90.5 FL (ref 79–97)
MONOCYTES # BLD AUTO: 0.38 10*3/MM3 (ref 0.1–0.9)
MONOCYTES NFR BLD AUTO: 7.6 % (ref 5–12)
NEUTROPHILS NFR BLD AUTO: 3.51 10*3/MM3 (ref 1.7–7)
NEUTROPHILS NFR BLD AUTO: 70.5 % (ref 42.7–76)
NRBC BLD AUTO-RTO: 0 /100 WBC (ref 0–0.2)
PLATELET # BLD AUTO: 229 10*3/MM3 (ref 140–450)
PMV BLD AUTO: 9.1 FL (ref 6–12)
POTASSIUM SERPL-SCNC: 4.2 MMOL/L (ref 3.5–5.2)
PROT SERPL-MCNC: 7.3 G/DL (ref 6–8.5)
RBC # BLD AUTO: 3.89 10*6/MM3 (ref 3.77–5.28)
SODIUM SERPL-SCNC: 138 MMOL/L (ref 136–145)
WBC NRBC COR # BLD AUTO: 4.98 10*3/MM3 (ref 3.4–10.8)

## 2024-06-24 PROCEDURE — 36591 DRAW BLOOD OFF VENOUS DEVICE: CPT

## 2024-06-24 PROCEDURE — 85025 COMPLETE CBC W/AUTO DIFF WBC: CPT | Performed by: INTERNAL MEDICINE

## 2024-06-24 PROCEDURE — 80053 COMPREHEN METABOLIC PANEL: CPT | Performed by: INTERNAL MEDICINE

## 2024-06-24 PROCEDURE — 82306 VITAMIN D 25 HYDROXY: CPT | Performed by: INTERNAL MEDICINE

## 2024-06-24 PROCEDURE — 25010000002 HEPARIN LOCK FLUSH PER 10 UNITS: Performed by: INTERNAL MEDICINE

## 2024-06-24 PROCEDURE — 99214 OFFICE O/P EST MOD 30 MIN: CPT | Performed by: INTERNAL MEDICINE

## 2024-06-24 RX ORDER — GABAPENTIN 100 MG/1
100 CAPSULE ORAL NIGHTLY
Qty: 30 CAPSULE | Refills: 2 | Status: SHIPPED | OUTPATIENT
Start: 2024-06-24

## 2024-06-24 RX ORDER — HEPARIN SODIUM (PORCINE) LOCK FLUSH IV SOLN 100 UNIT/ML 100 UNIT/ML
500 SOLUTION INTRAVENOUS AS NEEDED
OUTPATIENT
Start: 2024-06-24

## 2024-06-24 RX ORDER — HEPARIN SODIUM (PORCINE) LOCK FLUSH IV SOLN 100 UNIT/ML 100 UNIT/ML
500 SOLUTION INTRAVENOUS AS NEEDED
Status: DISCONTINUED | OUTPATIENT
Start: 2024-06-24 | End: 2024-06-26 | Stop reason: HOSPADM

## 2024-06-24 RX ADMIN — HEPARIN 500 UNITS: 100 SYRINGE at 10:48

## 2024-06-24 NOTE — PROGRESS NOTES
Hematology and Oncology [place]  Office number [phone]    Fax number [phone]     Follow-up     Date: 24    Patient Name: Kimberley Win  MRN: [id number]  : 1980    Referring Physician: Dr. Daxa Anderson MD    Chief Complaint: follow up    Cancer Staging:  Cancer Staging   Stage IIA (cT2, cN1, cM0, G2, ER+, WY+, HER2-)    History of Present Illness: Kimberley Win is a pleasant 44 y.o. female who presented for evaluation of left breast cancer.     She notes a history of cystic breast disease for many years. She notes a new breast mass that became progressively harder and associated with pain prompting diagnostic workup.     Diagnostic mammogram 23 showed a dense, spiculated mass in the left breast which on ultrasound corresponded to a 3:00 3 cm hypoechoic mass with internal vascularity. In the 7:00 left breast there was a 7 mm mass corresponding on US to a cluster of cysts spanning 1.4 cm. In the left breast there was a 1.7 cm LN with multiple smaller LN.     Left breast biopsy showed grade 2 invasive ductal carcinoma (ER 90%, WY 10%, HER 2 negative 0+ by IHC). Lymph node FNA was postive for malignancy, metastatic ductal carcinoma.  Ki-67 5%    She underwent bilateral mastectomy and sentinel lymph node biopsy on 2023.  Left breast mastectomy showed residual 2.5 cm of invasive ductal carcinoma with associated DCIS.  Margins negative.  [place] lymph node biopsy was positive (1/2) with extracapsular extension and 1 of 4 additional lymph nodes were positive for metastatic carcinoma (total lymph node count 2 of 6).      Breast cancer risk profile:  Age of menarche:10  G0; infertility  Age of menopause: premenopausal   Family history of breast, ovarian, prostate or pancreatic cancer: mom stage IV breast cancer in her 50s. M Great Aunt, breast cancer.   Genetic testing: negative 36 gene CancerNext panel     Treatment history:  Dose dense AC followed by weekly taxol:  C1, 7/5/2023.  Weekly Taxol terminated after 2 doses for pneumonitis which fully resolved with oral steroids,  Radiation completed 2/1/2024 with Dr. Robison  BSO 2/5/2024  Arimidex 2/26/2024 to present    Interval history:  Has bilateral frozen shoulder. Steroid injections 4/17/24 partially helped but has worsened again after she re-injured. Completed PT 1 mo ago. Seeing ortho next month.     Had CT chest and neck 2/19/24 with contrast was negative for metastatic disease.   Persistent low back pain.   Hip and knee pain better with walking routine.   Shoulder pain predated Arimidex.    8/9/24 planning Exchange surgery and port removal. Wants to defer further imaging for pain until after.   Saw Dr. Anderson with exam last week  Saw cardiology. Palpitations less and metoprolol lowered to once daily.   Not sleeping well due to shoulder pain. Taking advil 800 3-4 x per day. No abdominal pain. Taking with food.   Hot flashes are not as bothersome at night, but still wake her sometimes    Past Medical History:   Past Medical History:   Diagnosis Date    Abnormal ECG 8/23/23    Anemia     Chemo    Arthritis     Breast cancer 06/01/2023    Left Breast    Elevated cholesterol     improved, not currently taking medication    History of chemotherapy 09/05/2023    last treatment    History of radiation therapy     left breast, currently in treatment    Hypertension     Malignant neoplasm of central portion of left breast in female, estrogen receptor positive 06/20/2023    Osteoarthritis 2021   No personal history of myocardial infarction, cerebrovascular event, or venous thromboembolism.  Osteoarthritis in her hands. No personal history of autoimmune disease. Fhx of RA in her aunt    Past Surgical History:   Past Surgical History:   Procedure Laterality Date    BREAST BIOPSY  6/1/23    BREAST RECONSTRUCTION, BREAST TISSUE EXPANDER REMOVAL, IMPLANT INSERTION Bilateral 11/06/2023    Procedure: IMMEDIATE BREAST RECONSTRUCTION WITH  BREAST TISSUE EXPANDER INSERTION AND ALLODERM - BILATERAL;  Surgeon: Dilip Anedrson MD;  Location: [facility] OR;  Service: Plastics;  Laterality: Bilateral;    COLONOSCOPY N/A 2024    Procedure: COLONOSCOPY;  Surgeon: Pamela Ernst MD;  Location: [facility] ENDOSCOPY;  Service: Gastroenterology;  Laterality: N/A;    DIAGNOSTIC LAPAROSCOPY, SALPINGO OOPHORECTOMY LAPAROSCOPIC N/A 2024    Procedure: DIAGNOSTIC LAPAROSCOPY, SALPINGO OOPHORECTOMY;  Surgeon: Fabby Miranda MD;  Location: [facility] OR;  Service: Obstetrics/Gynecology;  Laterality: N/A;    MASTECTOMY W/ SENTINEL NODE BIOPSY Bilateral 2023    Procedure: BREAST MASTECTOMY WITH NIPPLE SPARING BILATERAL WITH LEFT SENTINEL NODE BIOPSY, AND AXILLARY DISECTION;  Surgeon: Daxa Anderson MD;  Location: [facility] OR;  Service: General;  Laterality: Bilateral;    US GUIDED LYMPH NODE BIOPSY  2023    VENOUS ACCESS DEVICE (PORT) INSERTION  2023       Family History:   Family History   Problem Relation Age of Onset    Breast cancer Mother 55        Stage 4    COPD Mother     Diabetes Mother     Early death Mother     Heart disease Mother     Stroke Mother     Rheum arthritis Maternal Aunt     Breast cancer Paternal Aunt     COPD Maternal Grandfather     Stroke Maternal Grandfather     Breast cancer Other      Social History:   Social History     Socioeconomic History    Marital status:    Tobacco Use    Smoking status: Former     Current packs/day: 0.00     Average packs/day: 0.3 packs/day for 10.0 years (2.5 ttl pk-yrs)     Types: Cigarettes     Start date: 1995     Quit date: 2005     Years since quittin.3     Passive exposure: Past    Smokeless tobacco: Never   Vaping Use    Vaping status: Never Used   Substance and Sexual Activity    Alcohol use: Not Currently     Comment: rare    Drug use: Never    Sexual activity: Defer   , lives in [place].     Medications:     Current Outpatient Medications:      "acetaminophen (TYLENOL) 500 MG tablet, Take 1 tablet by mouth Every 6 (Six) Hours As Needed for Mild Pain., Disp: 60 tablet, Rfl: 0    anastrozole (ARIMIDEX) 1 MG tablet, TAKE 1 TABLET BY MOUTH DAILY, Disp: 30 tablet, Rfl: 3    diphenhydrAMINE HCl, Sleep, (ZZZQUIL PO), Take 1 tablet by mouth Every Night., Disp: , Rfl:     docusate sodium (COLACE) 100 MG capsule, Take 1 capsule by mouth 2 (Two) Times a Day As Needed for Constipation., Disp: , Rfl:     ibuprofen (ADVIL,MOTRIN) 800 MG tablet, Take 1 tablet by mouth Every 6 (Six) Hours As Needed for Mild Pain., Disp: 30 tablet, Rfl: 0    lidocaine-prilocaine (EMLA) 2.5-2.5 % cream, Apply 1 application topically to the appropriate area as directed As Needed (45-60 minutes prior to port access.  Cover with saran/plastic wrap.)., Disp: 30 g, Rfl: 3    metoprolol succinate XL (TOPROL-XL) 25 MG 24 hr tablet, Take 1 tablet by mouth Daily. Taking 1 daily, Disp: 90 tablet, Rfl: 3    ondansetron (ZOFRAN) 8 MG tablet, Take 1 tablet by mouth 3 (Three) Times a Day As Needed for Nausea or Vomiting (DO NOT USE WHILE SANCUSO IN PLACE)., Disp: 30 tablet, Rfl: 5  No current facility-administered medications for this visit.    Facility-Administered Medications Ordered in Other Visits:     heparin injection 500 Units, 500 Units, Intravenous, PRN, Martha Olguin MD    Allergies:   Allergies   Allergen Reactions    Chlorhexidine Other (See Comments)     Burning    Nylon Hives    Sunblock [Solbar Pf Spf15] Rash       Objective     Vital Signs:   Vitals:    06/24/24 0945   BP: 117/69   Pulse: 77   Resp: 18   Temp: 97.7 °F (36.5 °C)   SpO2: 100%   Weight: 78.4 kg (172 lb 12.8 oz)   Height: 170.2 cm (67.01\")   PainSc:   5      Body mass index is 27.06 kg/m².   Pain Score    06/24/24 0945   PainSc:   5     ECOG Performance Status: 0    Physical Exam:   General: No acute distress. Well appearing.  HEENT: Normocephalic, atraumatic. Sclera anicteric.   Cardiovascular: regular rate and rhythm. No " murmurs.   Respiratory: Normal rate. Clear to auscultation bilaterally.  Abdomen: Soft, nontender, non distended with normoactive bowel sounds.  Neuro: Alert and oriented x 3.   Ext: Status post bilateral mastectomy with expanders in place.  Radiation dermatitis involving the left axilla  Breast: Status post bilateral mastectomy.  No palpable masses.    Laboratory/Imaging Reviewed:     Hospital Outpatient Visit on 06/24/2024   Component Date Value Ref Range Status    25 Hydroxy, Vitamin D 06/24/2024 39.1  30.0 - 100.0 ng/ml Final    Glucose 06/24/2024 88  65 - 99 mg/dL Final    BUN 06/24/2024 14  6 - 20 mg/dL Final    Creatinine 06/24/2024 0.52 (L)  0.57 - 1.00 mg/dL Final    Sodium 06/24/2024 138  136 - 145 mmol/L Final    Potassium 06/24/2024 4.2  3.5 - 5.2 mmol/L Final    Chloride 06/24/2024 105  98 - 107 mmol/L Final    CO2 06/24/2024 26.5  22.0 - 29.0 mmol/L Final    Calcium 06/24/2024 9.3  8.6 - 10.5 mg/dL Final    Total Protein 06/24/2024 7.3  6.0 - 8.5 g/dL Final    Albumin 06/24/2024 4.2  3.5 - 5.2 g/dL Final    ALT (SGPT) 06/24/2024 19  1 - 33 U/L Final    AST (SGOT) 06/24/2024 23  1 - 32 U/L Final    Alkaline Phosphatase 06/24/2024 92  39 - 117 U/L Final    Total Bilirubin 06/24/2024 0.3  0.0 - 1.2 mg/dL Final    Globulin 06/24/2024 3.1  gm/dL Final    A/G Ratio 06/24/2024 1.4  g/dL Final    BUN/Creatinine Ratio 06/24/2024 26.9 (H)  7.0 - 25.0 Final    Anion Gap 06/24/2024 6.5  5.0 - 15.0 mmol/L Final    eGFR 06/24/2024 116.9  >60.0 mL/min/1.73 Final    WBC 06/24/2024 4.98  3.40 - 10.80 10*3/mm3 Final    RBC 06/24/2024 3.89  3.77 - 5.28 10*6/mm3 Final    Hemoglobin 06/24/2024 11.6 (L)  12.0 - 15.9 g/dL Final    Hematocrit 06/24/2024 35.2  34.0 - 46.6 % Final    MCV 06/24/2024 90.5  79.0 - 97.0 fL Final    MCH 06/24/2024 29.8  26.6 - 33.0 pg Final    MCHC 06/24/2024 33.0  31.5 - 35.7 g/dL Final    RDW 06/24/2024 11.9 (L)  12.3 - 15.4 % Final    RDW-SD 06/24/2024 39.1  37.0 - 54.0 fl Final    MPV 06/24/2024  9.1  6.0 - 12.0 fL Final    Platelets 06/24/2024 229  140 - 450 10*3/mm3 Final    Neutrophil % 06/24/2024 70.5  42.7 - 76.0 % Final    Lymphocyte % 06/24/2024 18.5 (L)  19.6 - 45.3 % Final    Monocyte % 06/24/2024 7.6  5.0 - 12.0 % Final    Eosinophil % 06/24/2024 2.6  0.3 - 6.2 % Final    Basophil % 06/24/2024 0.6  0.0 - 1.5 % Final    Immature Grans % 06/24/2024 0.2  0.0 - 0.5 % Final    Neutrophils, Absolute 06/24/2024 3.51  1.70 - 7.00 10*3/mm3 Final    Lymphocytes, Absolute 06/24/2024 0.92  0.70 - 3.10 10*3/mm3 Final    Monocytes, Absolute 06/24/2024 0.38  0.10 - 0.90 10*3/mm3 Final    Eosinophils, Absolute 06/24/2024 0.13  0.00 - 0.40 10*3/mm3 Final    Basophils, Absolute 06/24/2024 0.03  0.00 - 0.20 10*3/mm3 Final    Immature Grans, Absolute 06/24/2024 0.01  0.00 - 0.05 10*3/mm3 Final    nRBC 06/24/2024 0.0  0.0 - 0.2 /100 WBC Final       Assessment / Plan      Assessment/Plan:     1.  Malignant neoplasm of central portion of left breast in female, estrogen receptor positive, lymph node positive  2.  AI use  3. Bilateral hip pain  -She is status post neoadjuvant chemotherapy.  This was terminated early for taxane pneumonitis.  She has fully recovered and underwent bilateral mastectomy showing 2.5 cm of residual disease and 2 of 6 lymph nodes positive  -Completed adjuvant PMRT.  -Discussed options for extended duration endocrine therapy in the context of lymph node positive disease.  -She completed BSO and is tolerating Arimidex. Discussed AI switch, wishes to defer this.   -BRCA negative, no indication for adjuvant PARP inhibitor  -With respect to adjuvant CDK 4 6 inhibitors, she has 2 lymph nodes positive but tumor size less than 5cm, grade 2, and Ki-67 5% so she does not qualify for adjuvant abemaciclib.   -MIGUEL on exam.     Orders Placed This Encounter   Procedures    Comprehensive Metabolic Panel    Vitamin D 25 Hydroxy    CBC & Differential     4.  Bilateral shoulder pain, low back pain  -Getting  injections and physical therapy and follows orthopedics this month.   -Negative neck/chest imaging at onset. Wants to defer further imaging, revisit post exchange in August if persists.  -Add gabapentin at night.     6. Bone health  -She will need baseline DEXA   Plan to repeat DEXA every 2-years while on endocrine therapy.    -Encourage adequate calcium intake, vitamin D supplementation, and weightbearing exercise as tolerated to maintain bone density.   -ordered and discussed    7. Access:  Port removal planned with exchange    8.  Hot flashes  -gabapentin at night    Follow Up:   8-12 weeks    Martha Olguin MD  Hematology and Oncology

## 2024-07-05 ENCOUNTER — PATIENT MESSAGE (OUTPATIENT)
Dept: ONCOLOGY | Facility: CLINIC | Age: 44
End: 2024-07-05
Payer: COMMERCIAL

## 2024-07-05 DIAGNOSIS — Z17.0 MALIGNANT NEOPLASM OF CENTRAL PORTION OF LEFT BREAST IN FEMALE, ESTROGEN RECEPTOR POSITIVE: Primary | ICD-10-CM

## 2024-07-05 DIAGNOSIS — C50.112 MALIGNANT NEOPLASM OF CENTRAL PORTION OF LEFT BREAST IN FEMALE, ESTROGEN RECEPTOR POSITIVE: Primary | ICD-10-CM

## 2024-07-05 DIAGNOSIS — M25.511 RIGHT SHOULDER PAIN, UNSPECIFIED CHRONICITY: ICD-10-CM

## 2024-07-05 RX ORDER — GABAPENTIN 300 MG/1
300 CAPSULE ORAL 2 TIMES DAILY
Qty: 60 CAPSULE | Refills: 3 | Status: SHIPPED | OUTPATIENT
Start: 2024-07-05

## 2024-07-05 NOTE — TELEPHONE ENCOUNTER
Spoke with patient via telephone.  She stated she is taking 100 mg capsules and taking 3 capsules QHS. She stated it seems to be a little bit better now especially when taking it with zzquill.  Dr. Olguin notified and per MD, patient advised that MD will send in 300 mg capsules and she can take 300 mg BID (take the am dose either in the am or by noon) and keep the QHS dose.  Advised her to call if this dose does not help.  Patient verbalized understanding.

## 2024-07-23 ENCOUNTER — PATIENT MESSAGE (OUTPATIENT)
Dept: ONCOLOGY | Facility: CLINIC | Age: 44
End: 2024-07-23
Payer: COMMERCIAL

## 2024-07-23 ENCOUNTER — OFFICE VISIT (OUTPATIENT)
Dept: ORTHOPEDIC SURGERY | Facility: CLINIC | Age: 44
End: 2024-07-23
Payer: COMMERCIAL

## 2024-07-23 VITALS
BODY MASS INDEX: 27.31 KG/M2 | HEIGHT: 67 IN | SYSTOLIC BLOOD PRESSURE: 102 MMHG | DIASTOLIC BLOOD PRESSURE: 64 MMHG | WEIGHT: 174 LBS | TEMPERATURE: 97.8 F

## 2024-07-23 DIAGNOSIS — M75.02 ADHESIVE CAPSULITIS OF LEFT SHOULDER: ICD-10-CM

## 2024-07-23 DIAGNOSIS — M75.01 ADHESIVE CAPSULITIS OF RIGHT SHOULDER: Primary | ICD-10-CM

## 2024-07-23 DIAGNOSIS — M25.512 ARTHRALGIA OF LEFT SHOULDER REGION: ICD-10-CM

## 2024-07-23 PROCEDURE — 99213 OFFICE O/P EST LOW 20 MIN: CPT | Performed by: STUDENT IN AN ORGANIZED HEALTH CARE EDUCATION/TRAINING PROGRAM

## 2024-07-23 RX ORDER — CYCLOBENZAPRINE HCL 10 MG
10 TABLET ORAL NIGHTLY PRN
Qty: 30 TABLET | Refills: 0 | Status: SHIPPED | OUTPATIENT
Start: 2024-07-23

## 2024-07-23 NOTE — PROGRESS NOTES
Office Note     Name: Kimberley Win    : 1980     MRN: [id number]     Chief Complaint  Follow-up of the Left Shoulder (Left shoulder injection given in . She states both shoulders are frozen, is not having pain, does have difficulty sleeping. She is scheduled for surgery in a few weeks, is not able to get injection today. )    Subjective     History of Present Illness:  Kimberley Win is a 44 y.o. female presenting today for 3-month follow-up for bilateral adhesive capsulitis of both shoulders.  Patient states that she was originally scheduled for repeat injections today although she has impending breast surgery in the next 3 to 4 weeks and notes that she is unable to get injections today.  She does note that she has had significant improvement with her right shoulder and has better range of motion and less pain.  Her left shoulder has not showed as much improvement though does have mild improvement.  She is still in therapy and doing therapy 1 day/week as well as her home exercise plan daily.  She denies any new or worsening symptoms.  She states her pain overall is significantly improved.    Review of Systems   Constitutional:  Negative for fever.   HENT:  Negative for dental problem and voice change.    Eyes:  Negative for visual disturbance.   Respiratory:  Negative for shortness of breath.    Cardiovascular:  Negative for chest pain.   Gastrointestinal:  Negative for abdominal pain.   Genitourinary:  Negative for dysuria.   Musculoskeletal:  Positive for arthralgias. Negative for gait problem and joint swelling.   Skin:  Negative for rash.   Neurological:  Negative for speech difficulty.   Hematological:  Does not bruise/bleed easily.   Psychiatric/Behavioral:  Negative for confusion.         Past Medical History:   Diagnosis Date    Abnormal ECG 23    Anemia     Chemo    Arthritis     Breast cancer 2023    Left Breast    Elevated cholesterol     improved, not  currently taking medication    History of chemotherapy 09/05/2023    last treatment    History of radiation therapy     left breast, currently in treatment    Hypertension     Malignant neoplasm of central portion of left breast in female, estrogen receptor positive 06/20/2023    Osteoarthritis 2021        Past Surgical History:   Procedure Laterality Date    BREAST BIOPSY  6/1/23    BREAST RECONSTRUCTION, BREAST TISSUE EXPANDER REMOVAL, IMPLANT INSERTION Bilateral 11/06/2023    Procedure: IMMEDIATE BREAST RECONSTRUCTION WITH BREAST TISSUE EXPANDER INSERTION AND ALLODERM - BILATERAL;  Surgeon: Dilip Anderson MD;  Location:  [facility] OR;  Service: Plastics;  Laterality: Bilateral;    COLONOSCOPY N/A 5/23/2024    Procedure: COLONOSCOPY;  Surgeon: Pamela Ernst MD;  Location: [facility] ENDOSCOPY;  Service: Gastroenterology;  Laterality: N/A;    DIAGNOSTIC LAPAROSCOPY, SALPINGO OOPHORECTOMY LAPAROSCOPIC N/A 02/05/2024    Procedure: DIAGNOSTIC LAPAROSCOPY, SALPINGO OOPHORECTOMY;  Surgeon: Fabby Miranda MD;  Location: [facility] OR;  Service: Obstetrics/Gynecology;  Laterality: N/A;    MASTECTOMY W/ SENTINEL NODE BIOPSY Bilateral 11/06/2023    Procedure: BREAST MASTECTOMY WITH NIPPLE SPARING BILATERAL WITH LEFT SENTINEL NODE BIOPSY, AND AXILLARY DISECTION;  Surgeon: Daxa Anderson MD;  Location:  [facility] OR;  Service: General;  Laterality: Bilateral;    US GUIDED LYMPH NODE BIOPSY  06/01/2023    VENOUS ACCESS DEVICE (PORT) INSERTION  06/30/2023       Family History   Problem Relation Age of Onset    Breast cancer Mother 55        Stage 4    COPD Mother     Diabetes Mother     Early death Mother     Heart disease Mother     Stroke Mother     Rheum arthritis Maternal Aunt     Breast cancer Paternal Aunt     COPD Maternal Grandfather     Stroke Maternal Grandfather     Breast cancer Other        Social History     Socioeconomic History    Marital status:    Tobacco Use    Smoking status: Former     Current packs/day: 0.00      Average packs/day: 0.3 packs/day for 10.0 years (2.5 ttl pk-yrs)     Types: Cigarettes     Start date: 1995     Quit date: 2005     Years since quittin.4     Passive exposure: Past    Smokeless tobacco: Never   Vaping Use    Vaping status: Never Used   Substance and Sexual Activity    Alcohol use: Not Currently     Comment: rare    Drug use: Never    Sexual activity: Defer         Current Outpatient Medications:     acetaminophen (TYLENOL) 500 MG tablet, Take 1 tablet by mouth Every 6 (Six) Hours As Needed for Mild Pain., Disp: 60 tablet, Rfl: 0    anastrozole (ARIMIDEX) 1 MG tablet, TAKE 1 TABLET BY MOUTH DAILY, Disp: 30 tablet, Rfl: 3    diphenhydrAMINE HCl, Sleep, (ZZZQUIL PO), Take 1 tablet by mouth Every Night., Disp: , Rfl:     docusate sodium (COLACE) 100 MG capsule, Take 1 capsule by mouth 2 (Two) Times a Day As Needed for Constipation., Disp: , Rfl:     gabapentin (NEURONTIN) 300 MG capsule, Take 1 capsule by mouth 2 (Two) Times a Day., Disp: 60 capsule, Rfl: 3    ibuprofen (ADVIL,MOTRIN) 800 MG tablet, Take 1 tablet by mouth Every 6 (Six) Hours As Needed for Mild Pain., Disp: 30 tablet, Rfl: 0    lidocaine-prilocaine (EMLA) 2.5-2.5 % cream, Apply 1 application topically to the appropriate area as directed As Needed (45-60 minutes prior to port access.  Cover with saran/plastic wrap.)., Disp: 30 g, Rfl: 3    metoprolol succinate XL (TOPROL-XL) 25 MG 24 hr tablet, Take 1 tablet by mouth Daily. Taking 1 daily, Disp: 90 tablet, Rfl: 3    ondansetron (ZOFRAN) 8 MG tablet, Take 1 tablet by mouth 3 (Three) Times a Day As Needed for Nausea or Vomiting (DO NOT USE WHILE SANCUSO IN PLACE)., Disp: 30 tablet, Rfl: 5  No current facility-administered medications for this visit.    Facility-Administered Medications Ordered in Other Visits:     heparin injection 500 Units, 500 Units, Intravenous, PRN, Martha Olguin MD    Allergies   Allergen Reactions    Chlorhexidine Other (See Comments)      "Burning    Nylon Hives    Sunblock [Solbar Pf Spf15] Rash           Objective   /64   Temp 97.8 °F (36.6 °C)   Ht 170.2 cm (67.01\")   Wt 78.9 kg (174 lb)   BMI 27.24 kg/m²    BMI is >= 25 and <30. (Overweight) The following options were offered after discussion;: weight loss educational material (shared in after visit summary)       Physical Exam  Right Shoulder Exam     Range of Motion   Active abduction:  60   Passive abduction:  70   Extension:  30   Forward flexion:  70       Left Shoulder Exam     Range of Motion   Active abduction:  40   Passive abduction:  50   Extension:  30   Forward flexion:  40             Extremity DVT signs are negative by clinical screen.     Independent Review of Radiographic Studies:    No new imaging done today.    Procedures    Assessment and Plan   Diagnoses and all orders for this visit:    1. Adhesive capsulitis of right shoulder (Primary)    2. Arthralgia of left shoulder region    3. Adhesive capsulitis of left shoulder       Discussion of orthopedic goals  Orthopedic activities reviewed and patient expressed appreciation  Regular exercise as tolerated  Risk, benefits, and merits of treatment alternatives reviewed with the patient and questions answered  Patient guided on mobility and conditioning exercises  Ice, heat, and/or modalities as beneficial  Physical therapy ongoing  Counseling on diet, nutrition, fitness exercise, and weight reduction goals    Recommendations/Plan:  Exercise, medications, injections, other patient advice, and return appointment as noted.  Patient is encouraged to call or return for any issues or concerns.    Will hold injections at this time and I advised follow-up once appropriate after her surgery and appropriate waiting period for repeat injections if requested.  Otherwise continue PT and home exercise plan.    Return if symptoms worsen or fail to improve.  Patient agreeable to call or return sooner for any concerns.  "

## 2024-07-23 NOTE — TELEPHONE ENCOUNTER
Spoke with patient via telephone.  She stated she was taking Neurontin 100 mg QHS (was prescribed BID but she was only taking it QHS) and Dr. Olguin increased her dose to 300 mg BID (again, she was only taking this dose QHS).  Patient stated it seemed like it was helping at first but her hips have been hurting more.  She's stated Dr. Olguin thought her hip and joint pain to be r/t Anastrozole and stated she may change to a different one or add Celebrex.  Patient asked for flexeril to be sent in for pre-op period to help with pain.  Dr. Barclay, on call physician, notified and RN discussed patient's case with him.  After reviewing, patient contacted back and advised per Dr. Barclay to contact Dr. Rios for a prescription for flexeril for pre-op.  Patient verbalized understanding.

## 2024-09-05 ENCOUNTER — APPOINTMENT (OUTPATIENT)
Dept: BONE DENSITY | Facility: HOSPITAL | Age: 44
End: 2024-09-05
Payer: COMMERCIAL

## 2024-09-05 DIAGNOSIS — Z78.0 POSTMENOPAUSAL: ICD-10-CM

## 2024-09-05 PROCEDURE — 77080 DXA BONE DENSITY AXIAL: CPT

## 2024-09-17 RX ORDER — ANASTROZOLE 1 MG/1
1 TABLET ORAL DAILY
Qty: 90 TABLET | Refills: 3 | Status: SHIPPED | OUTPATIENT
Start: 2024-09-17 | End: 2024-09-23

## 2024-09-23 ENCOUNTER — OFFICE VISIT (OUTPATIENT)
Dept: ONCOLOGY | Facility: CLINIC | Age: 44
End: 2024-09-23
Payer: COMMERCIAL

## 2024-09-23 VITALS
OXYGEN SATURATION: 100 % | BODY MASS INDEX: 25.9 KG/M2 | SYSTOLIC BLOOD PRESSURE: 114 MMHG | TEMPERATURE: 97.5 F | WEIGHT: 165 LBS | RESPIRATION RATE: 18 BRPM | HEIGHT: 67 IN | HEART RATE: 73 BPM | DIASTOLIC BLOOD PRESSURE: 75 MMHG

## 2024-09-23 DIAGNOSIS — C50.112 MALIGNANT NEOPLASM OF CENTRAL PORTION OF LEFT BREAST IN FEMALE, ESTROGEN RECEPTOR POSITIVE: Primary | ICD-10-CM

## 2024-09-23 DIAGNOSIS — Z17.0 MALIGNANT NEOPLASM OF CENTRAL PORTION OF LEFT BREAST IN FEMALE, ESTROGEN RECEPTOR POSITIVE: Primary | ICD-10-CM

## 2024-09-23 DIAGNOSIS — R59.0 ADENOPATHY, CERVICAL: ICD-10-CM

## 2024-09-23 PROCEDURE — 99214 OFFICE O/P EST MOD 30 MIN: CPT | Performed by: INTERNAL MEDICINE

## 2024-09-23 RX ORDER — OXYCODONE HYDROCHLORIDE 5 MG/1
1 TABLET ORAL
COMMUNITY
Start: 2024-07-18 | End: 2024-09-23

## 2024-09-23 RX ORDER — EXEMESTANE 25 MG/1
25 TABLET ORAL DAILY
Qty: 30 TABLET | Refills: 4 | Status: SHIPPED | OUTPATIENT
Start: 2024-09-23

## 2024-09-23 RX ORDER — ONDANSETRON 4 MG/1
TABLET, ORALLY DISINTEGRATING ORAL
COMMUNITY
Start: 2024-07-18 | End: 2024-09-23

## 2024-10-07 ENCOUNTER — HOSPITAL ENCOUNTER (OUTPATIENT)
Facility: HOSPITAL | Age: 44
Discharge: HOME | End: 2024-10-07
Payer: COMMERCIAL

## 2024-10-07 DIAGNOSIS — C50.112 MALIGNANT NEOPLASM OF CENTRAL PORTION OF LEFT BREAST IN FEMALE, ESTROGEN RECEPTOR POSITIVE: ICD-10-CM

## 2024-10-07 DIAGNOSIS — Z17.0 MALIGNANT NEOPLASM OF CENTRAL PORTION OF LEFT BREAST IN FEMALE, ESTROGEN RECEPTOR POSITIVE: ICD-10-CM

## 2024-10-07 DIAGNOSIS — R59.0 ADENOPATHY, CERVICAL: ICD-10-CM

## 2024-10-07 LAB — GLUCOSE BLDC GLUCOMTR-MCNC: 98 MG/DL (ref 70–130)

## 2024-10-07 PROCEDURE — A9552 F18 FDG: HCPCS | Performed by: INTERNAL MEDICINE

## 2024-10-07 PROCEDURE — 0 FLUDEOXYGLUCOSE F18 SOLUTION: Performed by: INTERNAL MEDICINE

## 2024-10-07 PROCEDURE — 78815 PET IMAGE W/CT SKULL-THIGH: CPT

## 2024-10-07 PROCEDURE — 82948 REAGENT STRIP/BLOOD GLUCOSE: CPT

## 2024-10-07 RX ADMIN — FLUDEOXYGLUCOSE F18 1 DOSE: 300 INJECTION INTRAVENOUS at 08:51

## 2024-10-09 ENCOUNTER — TELEPHONE (OUTPATIENT)
Dept: ONCOLOGY | Facility: CLINIC | Age: 44
End: 2024-10-09
Payer: COMMERCIAL

## 2024-10-09 NOTE — TELEPHONE ENCOUNTER
----- Message from Martha Olguin sent at 10/8/2024  4:12 PM EDT -----  Please notify patient of normal result/ no clinically significant results.

## 2024-11-12 ENCOUNTER — OFFICE VISIT (OUTPATIENT)
Dept: ORTHOPEDIC SURGERY | Facility: CLINIC | Age: 44
End: 2024-11-12
Payer: COMMERCIAL

## 2024-11-12 VITALS
SYSTOLIC BLOOD PRESSURE: 116 MMHG | BODY MASS INDEX: 26.4 KG/M2 | DIASTOLIC BLOOD PRESSURE: 80 MMHG | TEMPERATURE: 97.7 F | WEIGHT: 168.2 LBS | HEIGHT: 67 IN

## 2024-11-12 DIAGNOSIS — M75.01 ADHESIVE CAPSULITIS OF RIGHT SHOULDER: ICD-10-CM

## 2024-11-12 DIAGNOSIS — M75.02 ADHESIVE CAPSULITIS OF LEFT SHOULDER: Primary | ICD-10-CM

## 2024-11-12 NOTE — PROGRESS NOTES
Office Note     Name: Kimberley Win    : 1980     MRN: [id number]     Chief Complaint  Follow-up of the Left Shoulder (Pain is decreased, still has trouble reaching in front. ) and Follow-up of the Right Shoulder (Pain is decreased, still has trouble reaching in front. )    Subjective     History of Present Illness:  Kimberley Win is a 44 y.o. female presenting today for follow-up for bilateral adhesive capsulitis.  Patient denies any significant pain at this time.  She states the pain resided approximately at the end of August.  Does continue to have somewhat limited range of motion but notes significant improvement of this.  She is currently seeing a formal physical therapist once every 3 weeks and doing home exercise in between.  She denies any new or worsening symptoms.    Review of Systems   Constitutional:  Negative for fever.   HENT:  Negative for dental problem and voice change.    Eyes:  Negative for visual disturbance.   Respiratory:  Negative for shortness of breath.    Cardiovascular:  Negative for chest pain.   Gastrointestinal:  Negative for abdominal pain.   Genitourinary:  Negative for dysuria.   Musculoskeletal:  Positive for arthralgias. Negative for gait problem and joint swelling.   Skin:  Negative for rash.   Neurological:  Negative for speech difficulty.   Hematological:  Does not bruise/bleed easily.   Psychiatric/Behavioral:  Negative for confusion.         Past Medical History:   Diagnosis Date    Abnormal ECG 23    Anemia     Chemo    Arthritis     Breast cancer 2023    Left Breast    Elevated cholesterol     improved, not currently taking medication    History of chemotherapy 2023    last treatment    History of radiation therapy     left breast, currently in treatment    Hypertension     Malignant neoplasm of central portion of left breast in female, estrogen receptor positive 2023    Osteoarthritis         Past Surgical History:    Procedure Laterality Date    BREAST BIOPSY  23    BREAST RECONSTRUCTION, BREAST TISSUE EXPANDER REMOVAL, IMPLANT INSERTION Bilateral 2023    Procedure: IMMEDIATE BREAST RECONSTRUCTION WITH BREAST TISSUE EXPANDER INSERTION AND ALLODERM - BILATERAL;  Surgeon: Dilip Anderson MD;  Location: [facility] OR;  Service: Plastics;  Laterality: Bilateral;    COLONOSCOPY N/A 2024    Procedure: COLONOSCOPY;  Surgeon: Pamela Ernst MD;  Location: [facility] ENDOSCOPY;  Service: Gastroenterology;  Laterality: N/A;    DIAGNOSTIC LAPAROSCOPY, SALPINGO OOPHORECTOMY LAPAROSCOPIC N/A 2024    Procedure: DIAGNOSTIC LAPAROSCOPY, SALPINGO OOPHORECTOMY;  Surgeon: Fabby Miranda MD;  Location: [facility] OR;  Service: Obstetrics/Gynecology;  Laterality: N/A;    MASTECTOMY W/ SENTINEL NODE BIOPSY Bilateral 2023    Procedure: BREAST MASTECTOMY WITH NIPPLE SPARING BILATERAL WITH LEFT SENTINEL NODE BIOPSY, AND AXILLARY DISECTION;  Surgeon: Daxa Anderson MD;  Location: [facility] OR;  Service: General;  Laterality: Bilateral;    US GUIDED LYMPH NODE BIOPSY  2023    VENOUS ACCESS DEVICE (PORT) INSERTION  2023       Family History   Problem Relation Age of Onset    Breast cancer Mother 55        Stage 4    COPD Mother     Diabetes Mother     Early death Mother     Heart disease Mother     Stroke Mother     Rheum arthritis Maternal Aunt     Breast cancer Paternal Aunt     COPD Maternal Grandfather     Stroke Maternal Grandfather     Breast cancer Other        Social History     Socioeconomic History    Marital status:    Tobacco Use    Smoking status: Former     Current packs/day: 0.00     Average packs/day: 0.3 packs/day for 10.0 years (2.5 ttl pk-yrs)     Types: Cigarettes     Start date: 1995     Quit date: 2005     Years since quittin.7     Passive exposure: Past    Smokeless tobacco: Never   Vaping Use    Vaping status: Never Used   Substance and Sexual Activity    Alcohol use: Not Currently  "    Comment: rare    Drug use: Never    Sexual activity: Defer         Current Outpatient Medications:     exemestane (AROMASIN) 25 MG tablet, Take 1 tablet by mouth Daily. After a meal, Disp: 30 tablet, Rfl: 4    metoprolol succinate XL (TOPROL-XL) 25 MG 24 hr tablet, Take 1 tablet by mouth Daily. Taking 1 daily, Disp: 90 tablet, Rfl: 3    ondansetron (ZOFRAN) 8 MG tablet, Take 1 tablet by mouth 3 (Three) Times a Day As Needed for Nausea or Vomiting (DO NOT USE WHILE SANCUSO IN PLACE)., Disp: 30 tablet, Rfl: 5    Allergies   Allergen Reactions    Chlorhexidine Other (See Comments)     Burning    Nylon Hives    Sunblock [Solbar Pf Spf15] Rash           Objective   /80   Temp 97.7 °F (36.5 °C)   Ht 170.2 cm (67.01\")   Wt 76.3 kg (168 lb 3.2 oz)   BMI 26.34 kg/m²            Physical Exam  Right Shoulder Exam     Tenderness   The patient is experiencing no tenderness.    Range of Motion   Active abduction:  90   Passive abduction:  90   Extension:  normal   External rotation:  50   Forward flexion:  140   Internal rotation 0 degrees:  L1     Other   Erythema: absent  Sensation: normal  Pulse: present      Left Shoulder Exam     Tenderness   The patient is experiencing no tenderness.     Range of Motion   Active abduction:  80   Passive abduction:  80   Extension:  normal   External rotation:  50   Forward flexion:  140   Internal rotation 0 degrees:  L1     Other   Erythema: absent  Sensation: normal  Pulse: present            Extremity DVT signs are negative by clinical screen.     Independent Review of Radiographic Studies:    No new imaging done today.    Procedures    Assessment and Plan   Diagnoses and all orders for this visit:    1. Adhesive capsulitis of left shoulder (Primary)    2. Adhesive capsulitis of right shoulder       Discussion of orthopedic goals  Orthopedic activities reviewed and patient expressed appreciation  Regular exercise as tolerated  Risk, benefits, and merits of treatment " alternatives reviewed with the patient and questions answered  Patient guided on mobility and conditioning exercises  Physical therapy ongoing    Recommendations/Plan:  Exercise, medications, injections, other patient advice, and return appointment as noted.  Patient is encouraged to call or return for any issues or concerns.    Patient is doing very well and has made significant improvements in her range of motion.  I encouraged her to continue with formal physical therapy and home exercise plan.  Advised follow-up with me as needed if symptoms fail to improve.    Return if symptoms worsen or fail to improve.  Patient agreeable to call or return sooner for any concerns.

## 2024-11-18 ENCOUNTER — OFFICE VISIT (OUTPATIENT)
Dept: ONCOLOGY | Facility: CLINIC | Age: 44
End: 2024-11-18
Payer: COMMERCIAL

## 2024-11-18 VITALS
RESPIRATION RATE: 18 BRPM | TEMPERATURE: 97.7 F | BODY MASS INDEX: 26.26 KG/M2 | HEART RATE: 63 BPM | SYSTOLIC BLOOD PRESSURE: 121 MMHG | OXYGEN SATURATION: 98 % | WEIGHT: 167.3 LBS | HEIGHT: 67 IN | DIASTOLIC BLOOD PRESSURE: 68 MMHG

## 2024-11-18 DIAGNOSIS — Z17.0 MALIGNANT NEOPLASM OF CENTRAL PORTION OF LEFT BREAST IN FEMALE, ESTROGEN RECEPTOR POSITIVE: Primary | ICD-10-CM

## 2024-11-18 DIAGNOSIS — C50.112 MALIGNANT NEOPLASM OF CENTRAL PORTION OF LEFT BREAST IN FEMALE, ESTROGEN RECEPTOR POSITIVE: Primary | ICD-10-CM

## 2024-11-18 PROCEDURE — 99214 OFFICE O/P EST MOD 30 MIN: CPT | Performed by: INTERNAL MEDICINE

## 2024-11-18 RX ORDER — KETOCONAZOLE 20 MG/G
CREAM TOPICAL
COMMUNITY
Start: 2024-11-06

## 2024-11-18 RX ORDER — HYDROCORTISONE 25 MG/G
CREAM TOPICAL
COMMUNITY
Start: 2024-11-12

## 2024-11-18 RX ORDER — LETROZOLE 2.5 MG/1
2.5 TABLET, FILM COATED ORAL DAILY
Qty: 30 TABLET | Refills: 2 | Status: SHIPPED | OUTPATIENT
Start: 2024-11-18

## 2024-11-18 NOTE — PROGRESS NOTES
Hematology and Oncology [place]  Office number [phone]    Fax number [phone]     Follow-up     Date: 24    Patient Name: Kimberley Win  MRN: [id number]  : 1980    Referring Physician: Dr. Daxa Anderson MD    Chief Complaint: follow up    Cancer Staging:  Cancer Staging   Stage IIA (cT2, cN1, cM0, G2, ER+, NM+, HER2-)    History of Present Illness: Kimberley Win is a pleasant 44 y.o. female who presented for evaluation of left breast cancer.     She notes a history of cystic breast disease for many years. She notes a new breast mass that became progressively harder and associated with pain prompting diagnostic workup.     Diagnostic mammogram 23 showed a dense, spiculated mass in the left breast which on ultrasound corresponded to a 3:00 3 cm hypoechoic mass with internal vascularity. In the 7:00 left breast there was a 7 mm mass corresponding on US to a cluster of cysts spanning 1.4 cm. In the left breast there was a 1.7 cm LN with multiple smaller LN.     Left breast biopsy showed grade 2 invasive ductal carcinoma (ER 90%, NM 10%, HER 2 negative 0+ by IHC). Lymph node FNA was postive for malignancy, metastatic ductal carcinoma.  Ki-67 5%    She underwent bilateral mastectomy and sentinel lymph node biopsy on 2023.  Left breast mastectomy showed residual 2.5 cm of invasive ductal carcinoma with associated DCIS.  Margins negative.  [place] lymph node biopsy was positive (1/2) with extracapsular extension and 1 of 4 additional lymph nodes were positive for metastatic carcinoma (total lymph node count 2 of 6).      Breast cancer risk profile:  Age of menarche:10  G0; infertility  Age of menopause: premenopausal   Family history of breast, ovarian, prostate or pancreatic cancer: mom stage IV breast cancer in her 50s. M Great Aunt, breast cancer.   Genetic testing: negative 36 gene CancerNext panel     Treatment history:  Dose dense AC followed by weekly taxol:  C1, 7/5/2023.  Weekly Taxol terminated after 2 doses for pneumonitis which fully resolved with oral steroids,  Radiation completed 2/1/2024 with Dr. Robison  BSO 2/5/2024  Arimidex 2/26/2024 to 9/2024: arthralgias  Exemestane: 9/2024: mild arthralgias, hot flashes, palpitations  Letrozole: planning    Interval history:  She is here for follow-up on aromatase inhibitor therapy. Since switching to exemestane, her arthralgias were initially better, but have gradually worsened again, but not as bad. Also with recurrent palpitations since 11/8/24. Happening daily. No associated SOA, dizziness, but they are bothersome. Was not having these on anastrozole.   Hot flashes multiple times per day which is worse than on anastrozole.    Would like to change back    Past Medical History:   Past Medical History:   Diagnosis Date    Abnormal ECG 8/23/23    Anemia     Chemo    Arthritis     Breast cancer 06/01/2023    Left Breast    Elevated cholesterol     improved, not currently taking medication    History of chemotherapy 09/05/2023    last treatment    History of radiation therapy     left breast, currently in treatment    Hypertension     Malignant neoplasm of central portion of left breast in female, estrogen receptor positive 06/20/2023    Osteoarthritis 2021   No personal history of myocardial infarction, cerebrovascular event, or venous thromboembolism.  Osteoarthritis in her hands. No personal history of autoimmune disease. Fhx of RA in her aunt    Past Surgical History:   Past Surgical History:   Procedure Laterality Date    BREAST BIOPSY  6/1/23    BREAST RECONSTRUCTION, BREAST TISSUE EXPANDER REMOVAL, IMPLANT INSERTION Bilateral 11/06/2023    Procedure: IMMEDIATE BREAST RECONSTRUCTION WITH BREAST TISSUE EXPANDER INSERTION AND ALLODERM - BILATERAL;  Surgeon: Dilip Anderson MD;  Location: [facility];  Service: Plastics;  Laterality: Bilateral;    COLONOSCOPY N/A 5/23/2024    Procedure: COLONOSCOPY;  Surgeon: Pamela Ernst  MD;  Location: [facility] ENDOSCOPY;  Service: Gastroenterology;  Laterality: N/A;    DIAGNOSTIC LAPAROSCOPY, SALPINGO OOPHORECTOMY LAPAROSCOPIC N/A 2024    Procedure: DIAGNOSTIC LAPAROSCOPY, SALPINGO OOPHORECTOMY;  Surgeon: Fabby Miranda MD;  Location: [facility] OR;  Service: Obstetrics/Gynecology;  Laterality: N/A;    MASTECTOMY W/ SENTINEL NODE BIOPSY Bilateral 2023    Procedure: BREAST MASTECTOMY WITH NIPPLE SPARING BILATERAL WITH LEFT SENTINEL NODE BIOPSY, AND AXILLARY DISECTION;  Surgeon: Daxa Anderson MD;  Location: [facility] OR;  Service: General;  Laterality: Bilateral;    US GUIDED LYMPH NODE BIOPSY  2023    VENOUS ACCESS DEVICE (PORT) INSERTION  2023       Family History:   Family History   Problem Relation Age of Onset    Breast cancer Mother 55        Stage 4    COPD Mother     Diabetes Mother     Early death Mother     Heart disease Mother     Stroke Mother     Rheum arthritis Maternal Aunt     Breast cancer Paternal Aunt     COPD Maternal Grandfather     Stroke Maternal Grandfather     Breast cancer Other      Social History:   Social History     Socioeconomic History    Marital status:    Tobacco Use    Smoking status: Former     Current packs/day: 0.00     Average packs/day: 0.3 packs/day for 10.0 years (2.5 ttl pk-yrs)     Types: Cigarettes     Start date: 1995     Quit date: 2005     Years since quittin.7     Passive exposure: Past    Smokeless tobacco: Never   Vaping Use    Vaping status: Never Used   Substance and Sexual Activity    Alcohol use: Not Currently     Comment: rare    Drug use: Never    Sexual activity: Defer   , lives in [place].     Medications:     Current Outpatient Medications:     exemestane (AROMASIN) 25 MG tablet, Take 1 tablet by mouth Daily. After a meal, Disp: 30 tablet, Rfl: 4    hydrocortisone 2.5 % cream, , Disp: , Rfl:     ketoconazole (NIZORAL) 2 % cream, , Disp: , Rfl:     metoprolol succinate XL  "(TOPROL-XL) 25 MG 24 hr tablet, Take 1 tablet by mouth Daily. Taking 1 daily, Disp: 90 tablet, Rfl: 3    ondansetron (ZOFRAN) 8 MG tablet, Take 1 tablet by mouth 3 (Three) Times a Day As Needed for Nausea or Vomiting (DO NOT USE WHILE SANCUSO IN PLACE)., Disp: 30 tablet, Rfl: 5    Allergies:   Allergies   Allergen Reactions    Chlorhexidine Other (See Comments)     Burning    Nylon Hives    Sunblock [Solbar Pf Spf15] Rash       Objective     Vital Signs:   Vitals:    11/18/24 0940   BP: 121/68   Pulse: 63   Resp: 18   Temp: 97.7 °F (36.5 °C)   SpO2: 98%   Weight: 75.9 kg (167 lb 4.8 oz)   Height: 170.2 cm (67.01\")   PainSc: 0-No pain      Body mass index is 26.2 kg/m².   Pain Score    11/18/24 0940   PainSc: 0-No pain     ECOG Performance Status: 0    Physical Exam:   General: No acute distress. Well appearing.  HEENT: Normocephalic, atraumatic. Sclera anicteric.   Neck: Asymmetric fullness in the left neck without discrete adenopathy  Cardiovascular: regular rate and rhythm. No murmurs.   Respiratory: Normal rate. Clear to auscultation bilaterally.  Abdomen: Soft, nontender, non distended with normoactive bowel sounds.  Lymph: no cervical, supraclavicular or axillary adenopathy.  Neuro: Alert and oriented x 3. No focal deficits.   Ext: Symmetric, no swelling.   Psych: Euthymic.  Breast: no palpable masses bilaterally s/p bilateral mastectomy      Laboratory/Imaging Reviewed:     No visits with results within 2 Week(s) from this visit.   Latest known visit with results is:   Hospital Outpatient Visit on 10/07/2024   Component Date Value Ref Range Status    Glucose 10/07/2024 98  70 - 130 mg/dL Final    Serial Number: [id number]:  [id number]       Assessment / Plan      Assessment/Plan:     1.  Malignant neoplasm of central portion of left breast in female, estrogen receptor positive, lymph node positive  2.  AI use  3.  AI arthralgias  4.  Bilateral frozen shoulder  -She is status post neoadjuvant chemotherapy.  " This was terminated early for taxane pneumonitis.  She has fully recovered and underwent bilateral mastectomy showing 2.5 cm of residual disease and 2 of 6 lymph nodes positive  -Completed adjuvant PMRT.  -Discussed options for extended duration endocrine therapy in the context of lymph node positive disease.    -She completed BSO and is tolerating Arimidex.  She is having progressive AI arthralgias which are more noticeable now that her severe shoulder pain from bilateral frozen shoulder has improved.  I am going to have her trial exemestane to see if her joint symptoms are more tolerable on this regimen.  -BRCA negative, no indication for adjuvant PARP inhibitor  -With respect to adjuvant CDK 4 6 inhibitors, she has 2 lymph nodes positive but tumor size less than 5cm, grade 2, and Ki-67 5% so she does not qualify for adjuvant abemaciclib.   -MIGUEL on exam.   -We could consider adjuvant bisphosphonate. Defer final decision until tolerating AI  -Trial of AI switch to letrozole    5. Bone health  -Baseline DEXA shows osteopenia.   Plan to repeat DEXA every 2-years while on endocrine therapy.    -Encourage adequate calcium intake, vitamin D supplementation, and weightbearing exercise as tolerated to maintain bone density.   -ordered and discussed  -Consider adjuvant bisphosphonate after tolerating AI. We discussed today, consider in Spring 2025, once tolerating AI    6. Access:  Status post port removal    7. Left neck adenopathy  -CT did not show any enlarged lymph nodes, but as her disease is left-sided, and high risk with lymph node involvement as well as persistent recurrent symptoms, I recommended a PET/CT which was also negative.   -Normal exam today    8. Palpitations  -Monitor after AI switch. Follow up with cardiology if persistent symptoms    9. HLD  -Check new baseline given planned switch to letrozole.   -Monitor on AI. Recommend follow up with PCP re: HLD    Orders Placed This Encounter   Procedures     Lipid Panel    Comprehensive Metabolic Panel    Vitamin D 25 Hydroxy    CBC & Differential     Patient Instructions   Stop Exemestane.   Wait 2-4 weeks and then start Letrozole.   Call if intolerable side effects.   If palpitations continue, follow up with cardiology.   If hot flashes worsen/persist, call if you desire a medication to control.      Follow Up:   3 mos    Martha Olguin MD  Hematology and Oncology

## 2024-11-18 NOTE — PATIENT INSTRUCTIONS
Stop Exemestane.   Wait 2-4 weeks and then start Letrozole.   Call if intolerable side effects.   If palpitations continue, follow up with cardiology.   If hot flashes worsen/persist, call if you desire a medication to control.

## 2024-11-25 ENCOUNTER — TELEPHONE (OUTPATIENT)
Dept: CARDIOLOGY | Facility: CLINIC | Age: 44
End: 2024-11-25
Payer: COMMERCIAL

## 2024-11-25 NOTE — TELEPHONE ENCOUNTER
Pt called states she has been having palpitations that are happening about every 30 seconds, she had been prescribed medication by another provider that has since been stopped because they felt this was causing the palpitations. She states she was advised to reach out to cardiology if the symptoms didn't stop. I advised patient that if her symptoms worsen or she develops new ones to go to the ED for evaluation not to wait for her appointment in the morning.

## 2024-11-26 ENCOUNTER — LAB (OUTPATIENT)
Dept: LAB | Facility: HOSPITAL | Age: 44
End: 2024-11-26
Payer: COMMERCIAL

## 2024-11-26 ENCOUNTER — OFFICE VISIT (OUTPATIENT)
Dept: CARDIOLOGY | Facility: CLINIC | Age: 44
End: 2024-11-26
Payer: COMMERCIAL

## 2024-11-26 VITALS
OXYGEN SATURATION: 99 % | HEART RATE: 83 BPM | SYSTOLIC BLOOD PRESSURE: 118 MMHG | WEIGHT: 170.2 LBS | BODY MASS INDEX: 26.71 KG/M2 | HEIGHT: 67 IN | DIASTOLIC BLOOD PRESSURE: 82 MMHG | RESPIRATION RATE: 19 BRPM

## 2024-11-26 DIAGNOSIS — R07.89 BURNING CHEST PAIN: ICD-10-CM

## 2024-11-26 DIAGNOSIS — I49.3 PVC (PREMATURE VENTRICULAR CONTRACTION): ICD-10-CM

## 2024-11-26 DIAGNOSIS — R00.2 PALPITATIONS: ICD-10-CM

## 2024-11-26 DIAGNOSIS — E78.5 HYPERLIPIDEMIA LDL GOAL <100: ICD-10-CM

## 2024-11-26 DIAGNOSIS — Z17.0 MALIGNANT NEOPLASM OF CENTRAL PORTION OF LEFT BREAST IN FEMALE, ESTROGEN RECEPTOR POSITIVE: ICD-10-CM

## 2024-11-26 DIAGNOSIS — C50.112 MALIGNANT NEOPLASM OF CENTRAL PORTION OF LEFT BREAST IN FEMALE, ESTROGEN RECEPTOR POSITIVE: ICD-10-CM

## 2024-11-26 DIAGNOSIS — R00.2 PALPITATIONS: Primary | ICD-10-CM

## 2024-11-26 LAB
25(OH)D3 SERPL-MCNC: 46.3 NG/ML (ref 30–100)
ALBUMIN SERPL-MCNC: 4.3 G/DL (ref 3.5–5.2)
ALBUMIN/GLOB SERPL: 1.3 G/DL
ALP SERPL-CCNC: 93 U/L (ref 39–117)
ALT SERPL W P-5'-P-CCNC: 11 U/L (ref 1–33)
ANION GAP SERPL CALCULATED.3IONS-SCNC: 9.5 MMOL/L (ref 5–15)
AST SERPL-CCNC: 20 U/L (ref 1–32)
BASOPHILS # BLD AUTO: 0.03 10*3/MM3 (ref 0–0.2)
BASOPHILS NFR BLD AUTO: 0.8 % (ref 0–1.5)
BILIRUB SERPL-MCNC: 0.5 MG/DL (ref 0–1.2)
BUN SERPL-MCNC: 14 MG/DL (ref 6–20)
BUN/CREAT SERPL: 19.2 (ref 7–25)
CALCIUM SPEC-SCNC: 9.8 MG/DL (ref 8.6–10.5)
CHLORIDE SERPL-SCNC: 100 MMOL/L (ref 98–107)
CHOLEST SERPL-MCNC: 219 MG/DL (ref 0–200)
CO2 SERPL-SCNC: 28.5 MMOL/L (ref 22–29)
CREAT SERPL-MCNC: 0.73 MG/DL (ref 0.57–1)
DEPRECATED RDW RBC AUTO: 39.4 FL (ref 37–54)
EGFRCR SERPLBLD CKD-EPI 2021: 103.5 ML/MIN/1.73
EOSINOPHIL # BLD AUTO: 0.1 10*3/MM3 (ref 0–0.4)
EOSINOPHIL NFR BLD AUTO: 2.7 % (ref 0.3–6.2)
ERYTHROCYTE [DISTWIDTH] IN BLOOD BY AUTOMATED COUNT: 11.9 % (ref 12.3–15.4)
GLOBULIN UR ELPH-MCNC: 3.4 GM/DL
GLUCOSE SERPL-MCNC: 93 MG/DL (ref 65–99)
HCT VFR BLD AUTO: 39.1 % (ref 34–46.6)
HDLC SERPL-MCNC: 81 MG/DL (ref 40–60)
HGB BLD-MCNC: 13 G/DL (ref 12–15.9)
IMM GRANULOCYTES # BLD AUTO: 0.01 10*3/MM3 (ref 0–0.05)
IMM GRANULOCYTES NFR BLD AUTO: 0.3 % (ref 0–0.5)
LDLC SERPL CALC-MCNC: 122 MG/DL (ref 0–100)
LDLC/HDLC SERPL: 1.47 {RATIO}
LYMPHOCYTES # BLD AUTO: 1.02 10*3/MM3 (ref 0.7–3.1)
LYMPHOCYTES NFR BLD AUTO: 27.7 % (ref 19.6–45.3)
MCH RBC QN AUTO: 30 PG (ref 26.6–33)
MCHC RBC AUTO-ENTMCNC: 33.2 G/DL (ref 31.5–35.7)
MCV RBC AUTO: 90.3 FL (ref 79–97)
MONOCYTES # BLD AUTO: 0.29 10*3/MM3 (ref 0.1–0.9)
MONOCYTES NFR BLD AUTO: 7.9 % (ref 5–12)
NEUTROPHILS NFR BLD AUTO: 2.23 10*3/MM3 (ref 1.7–7)
NEUTROPHILS NFR BLD AUTO: 60.6 % (ref 42.7–76)
NRBC BLD AUTO-RTO: 0 /100 WBC (ref 0–0.2)
PLATELET # BLD AUTO: 254 10*3/MM3 (ref 140–450)
PMV BLD AUTO: 9 FL (ref 6–12)
POTASSIUM SERPL-SCNC: 4.1 MMOL/L (ref 3.5–5.2)
PROT SERPL-MCNC: 7.7 G/DL (ref 6–8.5)
RBC # BLD AUTO: 4.33 10*6/MM3 (ref 3.77–5.28)
SODIUM SERPL-SCNC: 138 MMOL/L (ref 136–145)
TRIGL SERPL-MCNC: 94 MG/DL (ref 0–150)
TSH SERPL DL<=0.05 MIU/L-ACNC: 5.81 UIU/ML (ref 0.27–4.2)
VLDLC SERPL-MCNC: 16 MG/DL (ref 5–40)
WBC NRBC COR # BLD AUTO: 3.68 10*3/MM3 (ref 3.4–10.8)

## 2024-11-26 PROCEDURE — 36415 COLL VENOUS BLD VENIPUNCTURE: CPT

## 2024-11-26 PROCEDURE — 99214 OFFICE O/P EST MOD 30 MIN: CPT | Performed by: NURSE PRACTITIONER

## 2024-11-26 PROCEDURE — 80050 GENERAL HEALTH PANEL: CPT

## 2024-11-26 PROCEDURE — 82306 VITAMIN D 25 HYDROXY: CPT

## 2024-11-26 PROCEDURE — 93000 ELECTROCARDIOGRAM COMPLETE: CPT | Performed by: NURSE PRACTITIONER

## 2024-11-26 PROCEDURE — 80061 LIPID PANEL: CPT

## 2024-11-26 RX ORDER — METOPROLOL TARTRATE 50 MG
50 TABLET ORAL 2 TIMES DAILY
Qty: 180 TABLET | Refills: 0 | Status: SHIPPED | OUTPATIENT
Start: 2024-11-26

## 2024-11-26 NOTE — PROGRESS NOTES
"             [facility] Cardiology Office Follow Up Note    Kimberley Win  [id number]  2024    Primary Care Provider: Dorinda Covington DO   Referring Provider: No ref. provider found    Chief Complaint: Palpitations    History of Present Illness:   Mrs. Kimberley Win is a 44 y.o. female who presents to the Cardiology Clinic for follow up of palpitations.  The patient has a history of breast cancer, status post bilateral mastectomy and recently finished chemotherapy.  She is planning to undergo radiation therapy.  She has a past cardiac history significant for symptomatic PVCs, with a 1.8% PVC burden on outpatient cardiac monitoring in .  The patient reports having palpitations every day since .  In the past 3 days, however, the palpitations have become constant--even at night when she is trying to sleep.  She feels like her heart \"beats all over\"--from the center of her chest to the R thoracic area.  She may also feel this in her throat on occasion. She denies dizziness and syncope, but may experience mild lightheadedness with this.  In addition, she may experience chest discomfort.  The patient feels like her heart if \"beating [her] all over\".  She fears this is causing heart damage.  The patient reports having a burning sensation in her chest.  She reports having heartburn in the past, but \"it's been a long time\".  She specifically denies shortness of breath, jaw/neck pain and pain between the shoulder blades.  She reports ongoing musculoskeletal discomfort and the inability to fully lift her arms over her head after having reconstructive breast surgery.  She drinks 1 cup of coffee some days, otherwise no caffeine.  She drinks mostly water.  She denies the use of pseudoephedrine, energy drinks, and illicit drugs.  She denies any recent illness or dehydration.  She offers no other complaints or concerns at this time.     Past Cardiac Testin. Last Coronary " "Angio: None  2. Prior Stress Testing: None  3. Last Echo: 6/29/2023              1.  Normal left ventricular size and systolic function, LVEF 55-60%.  2.  Normal LV diastolic filling pattern.  3.  Normal right ventricular size and systolic function.  4.  Normal left and right atrial size.  5.  Trace MR, TR, and PI.  6.  Normal LV global longitudinal strain pattern.  4. Prior Holter Monitor: 8/18/2023              1.8% PVCs.  Associated with symptoms    Review of Systems:   Review of Systems  As Noted in HPI.   I have reviewed and confirmed the accuracy of the ROS as documented by the MA/LPN/RN NELLA Palacios    I have reviewed and/or updated the patient's past medical, past surgical, family, social history, problem list and allergies as appropriate.     Medications:     Current Outpatient Medications:     hydrocortisone 2.5 % cream, , Disp: , Rfl:     ketoconazole (NIZORAL) 2 % cream, , Disp: , Rfl:     ondansetron (ZOFRAN) 8 MG tablet, Take 1 tablet by mouth 3 (Three) Times a Day As Needed for Nausea or Vomiting (DO NOT USE WHILE SANCUSO IN PLACE)., Disp: 30 tablet, Rfl: 5    metoprolol tartrate (LOPRESSOR) 50 MG tablet, Take 1 tablet by mouth 2 (Two) Times a Day., Disp: 180 tablet, Rfl: 0    Physical Exam:  Vital Signs:   Vitals:    11/26/24 0828   BP: 118/82   Pulse: 83   Resp: 19   SpO2: 99%   Weight: 77.2 kg (170 lb 3.2 oz)   Height: 170.2 cm (67\")     Body mass index is 26.66 kg/m².    Physical Exam  Vitals and nursing note reviewed.   Constitutional:       General: She is not in acute distress.  HENT:      Head: Normocephalic and atraumatic.   Neck:      Trachea: Trachea normal.   Cardiovascular:      Rate and Rhythm: Normal rate and regular rhythm.      Pulses: Normal pulses.      Heart sounds: Normal heart sounds. No murmur heard.     No friction rub. No gallop.   Pulmonary:      Effort: Pulmonary effort is normal.      Breath sounds: Normal breath sounds.   Musculoskeletal:      Cervical back: " Neck supple.      Right lower leg: No edema.      Left lower leg: No edema.   Skin:     General: Skin is warm and dry.   Neurological:      Mental Status: She is alert and oriented to person, place, and time.   Psychiatric:         Mood and Affect: Mood normal.         Behavior: Behavior normal. Behavior is cooperative.         Thought Content: Thought content does not include suicidal ideation.         Results Review:   I reviewed the patient's new clinical results.      ECG 12 Lead    Date/Time: 11/26/2024 9:15 AM  Performed by: Theresa Mayes APRN    Authorized by: Theresa Mayes APRN  Rhythm: sinus rhythm  Ectopy: atrial premature contractions  Rate: normal  BPM: 77  QRS axis: normal      Assessment / Plan:   Diagnoses and all orders for this visit:    1. Palpitations (Primary)  TSH ordered for surveillance  Plan for 7-day outpatient cardiac monitor study  CHANGE metoprolol succinate to tartrate, 50 mg BID  Patient to follow-up in ~6 weeks to reassess symptoms and discuss test results.  She is agreeable to calling back before this time if new dose of metoprolol is not adequately controlling her symptoms.    2. PVC (premature ventricular contraction)  Change metoprolol as previously described    3. Burning chest pain  Low suspicion for CAD  Mother had CAD, CVA  Patient currently has untreated HLD (which is not likely to improve without Rx given she no longer produces estrogen, s/p oophorectomy)   START omeprazole 20 mg daily (patient will use the Rx she already has at home)      4. Hyperlipidemia LDL goal <100       Component  Ref Range & Units 9 mo ago 1 yr ago   Total Cholesterol  0 - 200 mg/dL 240 High  214 High  CM   Triglycerides  0 - 150 mg/dL 151 High  86   HDL Cholesterol  40 - 60 mg/dL 79 High  89 High    LDL Cholesterol  0 - 100 mg/dL 135 High  110 High       Patient has an order for lipid panel to be drawn.  She is fasting this morning and plans on getting this drawn.      Preventative  Cardiology:   Tobacco Cessation: N/A   Advance Care Planning: ACP discussion was declined by the patient. Patient has an advance directive in EMR which is still valid.      Follow Up:   Return in about 6 weeks (around 1/7/2025) for Follow-up with Dr. Costello.      Thank you for allowing me to participate in the care of your patient. Please do not hesitate to contact me with additional questions or concerns.     Theresa Mayes APRN

## 2024-12-12 ENCOUNTER — OFFICE VISIT (OUTPATIENT)
Dept: INTERNAL MEDICINE | Facility: CLINIC | Age: 44
End: 2024-12-12
Payer: COMMERCIAL

## 2024-12-12 VITALS
BODY MASS INDEX: 26.84 KG/M2 | HEART RATE: 72 BPM | HEIGHT: 67 IN | OXYGEN SATURATION: 100 % | TEMPERATURE: 98 F | SYSTOLIC BLOOD PRESSURE: 118 MMHG | WEIGHT: 171 LBS | DIASTOLIC BLOOD PRESSURE: 76 MMHG

## 2024-12-12 DIAGNOSIS — M79.10 MYALGIA: ICD-10-CM

## 2024-12-12 DIAGNOSIS — M25.50 ARTHRALGIA, UNSPECIFIED JOINT: ICD-10-CM

## 2024-12-12 DIAGNOSIS — R94.6 ABNORMAL RESULTS OF THYROID FUNCTION STUDIES: ICD-10-CM

## 2024-12-12 DIAGNOSIS — J02.9 SORE THROAT: ICD-10-CM

## 2024-12-12 DIAGNOSIS — J06.9 UPPER RESPIRATORY TRACT INFECTION, UNSPECIFIED TYPE: Primary | ICD-10-CM

## 2024-12-12 DIAGNOSIS — R09.81 NASAL CONGESTION: ICD-10-CM

## 2024-12-12 LAB
EXPIRATION DATE: NORMAL
EXPIRATION DATE: NORMAL
FLUAV AG UPPER RESP QL IA.RAPID: NOT DETECTED
FLUBV AG UPPER RESP QL IA.RAPID: NOT DETECTED
INTERNAL CONTROL: NORMAL
INTERNAL CONTROL: NORMAL
Lab: NORMAL
Lab: NORMAL
S PYO AG THROAT QL: NEGATIVE
SARS-COV-2 AG UPPER RESP QL IA.RAPID: NOT DETECTED

## 2024-12-12 PROCEDURE — 87428 SARSCOV & INF VIR A&B AG IA: CPT | Performed by: FAMILY MEDICINE

## 2024-12-12 PROCEDURE — 99214 OFFICE O/P EST MOD 30 MIN: CPT | Performed by: FAMILY MEDICINE

## 2024-12-12 PROCEDURE — 87880 STREP A ASSAY W/OPTIC: CPT | Performed by: FAMILY MEDICINE

## 2024-12-12 RX ORDER — FLUTICASONE PROPIONATE 50 MCG
1 SPRAY, SUSPENSION (ML) NASAL DAILY
Qty: 16 G | Refills: 0 | Status: SHIPPED | OUTPATIENT
Start: 2024-12-12 | End: 2024-12-13

## 2024-12-12 RX ORDER — AZITHROMYCIN 250 MG/1
TABLET, FILM COATED ORAL
Qty: 6 TABLET | Refills: 0 | Status: SHIPPED | OUTPATIENT
Start: 2024-12-12

## 2024-12-12 NOTE — PROGRESS NOTES
Kimberley Win is a 44 y.o. female.    Chief Complaint   Patient presents with    Sore Throat    Cough     With congestion    Hypothyroidism     Underactive thyroid per her cardiologist        HPI   History of Present Illness  The patient presents today with concern for possible hypothyroidism based on an elevated TSH completed by cardiology. She also reports upper respiratory symptoms.    She began experiencing a sore throat on Monday, which initially felt scratchy upon waking but resolved by the afternoon. The following day, the scratchiness returned and escalated to significant discomfort by Wednesday. Today, she reports feeling fatigued, with nasal congestion and drainage, particularly at night, leading to morning expectoration. She does not experience sinus pain or pressure, earache, or nausea but does report a mild headache and low-grade fever. She describes a general feeling of tiredness and joint discomfort, which she does not believe is related to COVID-19 or influenza. She has been managing her symptoms with Tylenol and Unisom, although the latter has not been effective. She is uncertain about the availability of Flonase at home.    She recently underwent laboratory studies with cardiology.  TSH was mildly elevated.  She has no previous history of thyroid disorder.  She reports no excessive fatigue beyond her current illness, attributing her tiredness to previous chemotherapy. She experiences heart palpitations and was recently monitored for this. She also reports hot flashes, which she believes are due to hormone blockers, although she did not experience them yesterday. She discontinued exemestane last week, which initially seemed to reduce her palpitations, but they have since returned. She underwent a PET scan in October due to intermittent throat tenderness, which began in October of the previous year and recurred in February and June. She has been experiencing joint pain and muscle spasms,  including leg weakness and pulsation, which occur frequently. She was informed that these symptoms might be thyroid-related. She was previously on anastrozole, which caused joint pain, and was subsequently switched to exemestane, which led to constant worsening heart palpitations after a month. She is due to start letrozole but is concerned about its potential to raise cholesterol levels. She is currently taking calcium and vitamin D supplements, as well as turmeric for her joints. She was advised that chemotherapy could lead to significant muscle deterioration.    She was advised that chemotherapy could lead to significant muscle deterioration. She is currently taking calcium and vitamin D supplements, as well as turmeric for her joints. She was previously on anastrozole, which caused joint pain, and was subsequently switched to exemestane, which led to constant worsening heart palpitations after a month. She is due to start letrozole but is concerned about its potential to raise cholesterol levels.        The following portions of the patient's history were reviewed and updated as appropriate: allergies, current medications, past family history, past medical history, past social history, past surgical history and problem list.     Allergies   Allergen Reactions    Chlorhexidine Other (See Comments)     Burning    Nylon Hives    Sunblock [Solbar Pf Spf15] Rash         Current Outpatient Medications:     hydrocortisone 2.5 % cream, , Disp: , Rfl:     ketoconazole (NIZORAL) 2 % cream, , Disp: , Rfl:     metoprolol tartrate (LOPRESSOR) 50 MG tablet, Take 1 tablet by mouth 2 (Two) Times a Day., Disp: 180 tablet, Rfl: 0    omeprazole (priLOSEC) 20 MG capsule, Take 1 capsule by mouth Daily., Disp: , Rfl:     ondansetron (ZOFRAN) 8 MG tablet, Take 1 tablet by mouth 3 (Three) Times a Day As Needed for Nausea or Vomiting (DO NOT USE WHILE SANCUSO IN PLACE)., Disp: 30 tablet, Rfl: 5    azithromycin (Zithromax Z-Db) 250 MG  "tablet, Take 2 tablets the first day, then 1 tablet daily for 4 days., Disp: 6 tablet, Rfl: 0    fluticasone (FLONASE) 50 MCG/ACT nasal spray, Administer 1 spray into the nostril(s) as directed by provider Daily., Disp: 16 g, Rfl: 0    ROS    Review of Systems   Constitutional:  Positive for fatigue. Negative for chills and fever (low grade bump in temp).   HENT:  Positive for congestion, ear pain, postnasal drip and sore throat (scratchy).    Respiratory:  Positive for cough.    Cardiovascular:  Positive for chest pain and palpitations.   Endocrine: Positive for heat intolerance. Negative for cold intolerance.   Musculoskeletal:  Positive for arthralgias and neck pain.   Neurological:  Positive for headache.       Vitals:    12/12/24 1455   BP: 118/76   Pulse: 72   Temp: 98 °F (36.7 °C)   SpO2: 100%   Weight: 77.6 kg (171 lb)   Height: 170.2 cm (67\")         Physical Exam     Physical Exam  Constitutional:       General: She is not in acute distress.     Appearance: Normal appearance. She is well-developed. She is ill-appearing.   HENT:      Head: Normocephalic and atraumatic.      Right Ear: Tympanic membrane and external ear normal.      Left Ear: Tympanic membrane and external ear normal.      Mouth/Throat:      Pharynx: Posterior oropharyngeal erythema present.   Eyes:      Extraocular Movements: Extraocular movements intact.      Conjunctiva/sclera: Conjunctivae normal.   Cardiovascular:      Rate and Rhythm: Normal rate and regular rhythm.      Heart sounds: No murmur heard.  Pulmonary:      Effort: Pulmonary effort is normal. No respiratory distress.      Breath sounds: Normal breath sounds. No wheezing.   Abdominal:      General: Bowel sounds are normal. There is no distension.      Palpations: Abdomen is soft.      Tenderness: There is no abdominal tenderness.   Musculoskeletal:      Cervical back: Neck supple.      Right lower leg: No edema.      Left lower leg: No edema.   Lymphadenopathy:      Cervical: " No cervical adenopathy.   Skin:     General: Skin is warm and dry.   Neurological:      Mental Status: She is alert and oriented to person, place, and time.      Cranial Nerves: No cranial nerve deficit.   Psychiatric:         Mood and Affect: Mood normal.         Behavior: Behavior normal.         Assessment/Plan    Diagnoses and all orders for this visit:    1. Sore throat (Primary)  -     POCT SARS-CoV-2 Antigen HUGO + Flu  -     POCT rapid strep A    2. Nasal congestion  -     POCT SARS-CoV-2 Antigen HUGO + Flu  -     POCT rapid strep A    3. Arthralgia, unspecified joint  -     CARLOS With / DsDNA, RNP, Sjogrens A / B, Calderon  -     Uric Acid  -     Cyclic Citrul Peptide Antibody, IgG / IgA  -     Rheumatoid Factor    4. Myalgia  -     CARLOS With / DsDNA, RNP, Sjogrens A / B, Calderon  -     Folate  -     Ferritin  -     Iron Profile  -     Vitamin B12  -     Magnesium  -     CK    5. Abnormal results of thyroid function studies  -     TSH  -     T4, Free  -     Thyroid Peroxidase Antibody  -     Thyroglobulin Antibody and Thyroglobulin, DANELLE or LC/MS-MS    Other orders  -     fluticasone (FLONASE) 50 MCG/ACT nasal spray; Administer 1 spray into the nostril(s) as directed by provider Daily.  Dispense: 16 g; Refill: 0  -     azithromycin (Zithromax Z-Db) 250 MG tablet; Take 2 tablets the first day, then 1 tablet daily for 4 days.  Dispense: 6 tablet; Refill: 0        Assessment & Plan  1. Upper respiratory infection.  The symptoms suggest a potential viral etiology; however, given her immunocompromised status, bacterial infection can not be ruled out. Azithromycin and Flonase have been prescribed to manage the condition. She is advised to use the nasal spray 1-2 times a day, with her head tilted forward to ensure proper delivery into the sinuses.    2. Abnormal thyroid studies.  Her TSH levels were found to be elevated recently.  A repeat TSH test, along with free T4 and thyroid antibodies, will be conducted in a couple of  weeks to further evaluate her thyroid function and rule out autoimmune-related thyroid disorders.    3. Arthralgias and myalgias.  Laboratory studies, including CARLOS level, uric acid level, CK level, vitamin levels, iron studies, and magnesium, will be obtained to further evaluate her condition.         New Medications Ordered This Visit   Medications    fluticasone (FLONASE) 50 MCG/ACT nasal spray     Sig: Administer 1 spray into the nostril(s) as directed by provider Daily.     Dispense:  16 g     Refill:  0    azithromycin (Zithromax Z-Db) 250 MG tablet     Sig: Take 2 tablets the first day, then 1 tablet daily for 4 days.     Dispense:  6 tablet     Refill:  0       No orders of the defined types were placed in this encounter.      Return if symptoms worsen or fail to improve.    Dorinda Covington, DO

## 2024-12-13 RX ORDER — FLUTICASONE PROPIONATE 50 MCG
SPRAY, SUSPENSION (ML) NASAL
Qty: 18.2 G | Refills: 0 | Status: SHIPPED | OUTPATIENT
Start: 2024-12-13

## 2024-12-31 ENCOUNTER — OFFICE VISIT (OUTPATIENT)
Dept: INTERNAL MEDICINE | Facility: CLINIC | Age: 44
End: 2024-12-31
Payer: COMMERCIAL

## 2024-12-31 VITALS
BODY MASS INDEX: 26.68 KG/M2 | WEIGHT: 170 LBS | OXYGEN SATURATION: 96 % | HEART RATE: 54 BPM | HEIGHT: 67 IN | SYSTOLIC BLOOD PRESSURE: 124 MMHG | RESPIRATION RATE: 18 BRPM | DIASTOLIC BLOOD PRESSURE: 78 MMHG | TEMPERATURE: 97.6 F

## 2024-12-31 DIAGNOSIS — J32.0 RIGHT MAXILLARY SINUSITIS: Primary | ICD-10-CM

## 2024-12-31 LAB
EXPIRATION DATE: NORMAL
INTERNAL CONTROL: NORMAL
Lab: NORMAL
S PYO AG THROAT QL: NEGATIVE

## 2024-12-31 PROCEDURE — 99213 OFFICE O/P EST LOW 20 MIN: CPT | Performed by: PHYSICIAN ASSISTANT

## 2024-12-31 PROCEDURE — 87880 STREP A ASSAY W/OPTIC: CPT | Performed by: PHYSICIAN ASSISTANT

## 2024-12-31 NOTE — PROGRESS NOTES
Office Visit      Patient Name: Kimberley Win  : 1980   MRN: [id number]     Chief Complaint:    Chief Complaint   Patient presents with    Neck Pain     Right side     Headache     Right side of head, pressure        History of Present Illness: Kimberley Win is a 44 y.o. female who is here today to discuss headache, neck pain and sinus pressure. For around 23 weeks she has had right-sided headaches, neck soreness, earache and teeth hurting. The left side does not bother her at all. When she wakes up the right side of her head feels swollen but there is no visible swelling. No known fever at any time. No sore throat. Neck pain is triggered by looking upward and touch. On 2024 she was treated with azithromycin and Flonase for upper respiratory infection.  She has a little lingering cough but otherwise symptoms resolved.       Subjective      I have reviewed and the following portions of the patient's history were updated as appropriate: past family history, past medical history, past social history, past surgical history and problem list.      Current Outpatient Medications:     fluticasone (FLONASE) 50 MCG/ACT nasal spray, USE 1 SPRAY TO EACH NOSTRIL EVERY DAY, Disp: 18.2 g, Rfl: 0    hydrocortisone 2.5 % cream, , Disp: , Rfl:     ketoconazole (NIZORAL) 2 % cream, , Disp: , Rfl:     metoprolol tartrate (LOPRESSOR) 50 MG tablet, Take 1 tablet by mouth 2 (Two) Times a Day., Disp: 180 tablet, Rfl: 0    omeprazole (priLOSEC) 20 MG capsule, Take 1 capsule by mouth Daily., Disp: , Rfl:     ondansetron (ZOFRAN) 8 MG tablet, Take 1 tablet by mouth 3 (Three) Times a Day As Needed for Nausea or Vomiting (DO NOT USE WHILE SANCUSO IN PLACE)., Disp: 30 tablet, Rfl: 5    amoxicillin-clavulanate (AUGMENTIN) 875-125 MG per tablet, Take 1 tablet by mouth 2 (Two) Times a Day for 10 days., Disp: 20 tablet, Rfl: 0    Allergies   Allergen Reactions    Chlorhexidine Other (See Comments)     Burning     "Nylon Hives    Sunblock [Solbar Pf Spf15] Rash       Objective     Vital Signs:   Vitals:    12/31/24 0852   BP: 124/78   Pulse: 54   Resp: 18   Temp: 97.6 °F (36.4 °C)   SpO2: 96%   Weight: 77.1 kg (170 lb)   Height: 170.2 cm (67\")     Body mass index is 26.63 kg/m².    Physical Exam  Vitals and nursing note reviewed.   Constitutional:       General: She is not in acute distress.     Appearance: She is well-developed. She is not ill-appearing, toxic-appearing or diaphoretic.   HENT:      Head: Normocephalic and atraumatic.      Jaw: There is normal jaw occlusion. Tenderness (mild right side TMJ) present. No trismus, swelling, pain on movement or malocclusion.      Salivary Glands: Right salivary gland is not diffusely enlarged or tender. Left salivary gland is not diffusely enlarged or tender.      Right Ear: Tympanic membrane, ear canal and external ear normal. There is no impacted cerumen.      Left Ear: Tympanic membrane, ear canal and external ear normal. There is no impacted cerumen.      Nose: Mucosal edema present. No congestion or rhinorrhea.      Right Turbinates: Not enlarged, swollen or pale.      Left Turbinates: Not enlarged, swollen or pale.      Right Sinus: Maxillary sinus tenderness present. No frontal sinus tenderness.      Left Sinus: No maxillary sinus tenderness or frontal sinus tenderness.      Mouth/Throat:      Mouth: Mucous membranes are moist.      Pharynx: Posterior oropharyngeal erythema (bilateral) present. No oropharyngeal exudate.   Eyes:      General: No scleral icterus.        Right eye: No discharge.         Left eye: No discharge.      Extraocular Movements: Extraocular movements intact.      Conjunctiva/sclera: Conjunctivae normal.      Pupils: Pupils are equal, round, and reactive to light.   Neck:      Trachea: Trachea and phonation normal.      Meningeal: Brudzinski's sign and Kernig's sign absent.   Cardiovascular:      Rate and Rhythm: Normal rate and regular rhythm.      " Heart sounds: Normal heart sounds. No murmur heard.     No friction rub. No gallop.   Pulmonary:      Effort: Pulmonary effort is normal. No respiratory distress.      Breath sounds: Normal breath sounds. No wheezing, rhonchi or rales.   Chest:      Chest wall: No tenderness.   Musculoskeletal:         General: No tenderness or deformity. Normal range of motion.      Cervical back: Normal range of motion and neck supple. Tenderness (right lateral) present. No edema, erythema, rigidity or crepitus. Pain with movement (only when looking upward) and muscular tenderness present. No spinous process tenderness.      Right lower leg: No edema.      Left lower leg: No edema.   Lymphadenopathy:      Cervical: Cervical adenopathy present.      Right cervical: Superficial cervical adenopathy and posterior cervical adenopathy present. No deep cervical adenopathy.     Left cervical: No superficial, deep or posterior cervical adenopathy.   Skin:     General: Skin is warm and dry.      Capillary Refill: Capillary refill takes less than 2 seconds.      Coloration: Skin is not jaundiced or pale.      Findings: No rash.   Neurological:      Mental Status: She is alert and oriented to person, place, and time.      Cranial Nerves: No cranial nerve deficit.      Sensory: No sensory deficit.      Motor: No abnormal muscle tone.      Coordination: Coordination normal.      Gait: Gait normal.      Deep Tendon Reflexes: Reflexes normal.   Psychiatric:         Mood and Affect: Mood normal.         Behavior: Behavior normal.         Thought Content: Thought content normal.         Judgment: Judgment normal.         Common labs          4/15/2024    10:35 6/24/2024    10:45 11/26/2024    09:39   Common Labs   Glucose 109  88  93    BUN 13  14  14    Creatinine 0.60  0.52  0.73    Sodium 140  138  138    Potassium 3.6  4.2  4.1    Chloride 104  105  100    Calcium 9.7  9.3  9.8    Albumin 4.3  4.2  4.3    Total Bilirubin 0.3  0.3  0.5     Alkaline Phosphatase 93  92  93    AST (SGOT) 22  23  20    ALT (SGPT) 19  19  11    WBC 5.27  4.98  3.68    Hemoglobin 12.2  11.6  13.0    Hematocrit 35.8  35.2  39.1    Platelets 228  229  254    Total Cholesterol   219    Triglycerides   94    HDL Cholesterol   81    LDL Cholesterol    122          Assessment / Plan      Assessment/Plan:   Diagnoses and all orders for this visit:    1. Right maxillary sinusitis (Primary)  -     POCT rapid strep A  -     amoxicillin-clavulanate (AUGMENTIN) 875-125 MG per tablet; Take 1 tablet by mouth 2 (Two) Times a Day for 10 days.  Dispense: 20 tablet; Refill: 0       Negative rapid strep test in office today.      No physical exam evidence of meningitis at this time but she was advised to proceed directly to the ED with any development of fever or worsened symptoms.     Begin Augmentin for treatment of right maxillary sinusitis.  If not beginning to improve within 4-5 days will evaluate with CT scan and labs.            Follow Up:   Return if symptoms worsen or fail to improve.    Patient was given instructions and counseling regarding her condition or for health maintenance advice. Please see specific information pulled into the AVS if appropriate.     Tiffanie Redd PA-C  [facility]     Please note that portions of this note may have been completed with a voice recognition program.

## 2025-01-09 RX ORDER — FLUTICASONE PROPIONATE 50 MCG
SPRAY, SUSPENSION (ML) NASAL
Qty: 48 G | Refills: 0 | Status: SHIPPED | OUTPATIENT
Start: 2025-01-09

## 2025-01-13 ENCOUNTER — OFFICE VISIT (OUTPATIENT)
Dept: ORTHOPEDIC SURGERY | Facility: CLINIC | Age: 45
End: 2025-01-13
Payer: COMMERCIAL

## 2025-01-13 VITALS
BODY MASS INDEX: 26.53 KG/M2 | HEIGHT: 67 IN | SYSTOLIC BLOOD PRESSURE: 126 MMHG | WEIGHT: 169 LBS | DIASTOLIC BLOOD PRESSURE: 76 MMHG

## 2025-01-13 DIAGNOSIS — S50.02XA CONTUSION OF LEFT ELBOW, INITIAL ENCOUNTER: Primary | ICD-10-CM

## 2025-01-13 DIAGNOSIS — M25.522 LEFT ELBOW PAIN: ICD-10-CM

## 2025-01-13 PROCEDURE — 99213 OFFICE O/P EST LOW 20 MIN: CPT | Performed by: STUDENT IN AN ORGANIZED HEALTH CARE EDUCATION/TRAINING PROGRAM

## 2025-01-13 RX ORDER — LETROZOLE 2.5 MG/1
1 TABLET, FILM COATED ORAL DAILY
COMMUNITY
Start: 2024-12-23

## 2025-01-13 NOTE — PROGRESS NOTES
Office Note     Name: Kimberley Win    : 1980     MRN: [id number]     Chief Complaint  Injury of the Left Elbow (States she was cleaning her shower, she slipped on water and is unsure how she landed. )    Subjective     History of Present Illness:  Kimberley Win is a 45 y.o. female presenting today for evaluation of acute left elbow injury after the mechanism described above.  She states the entry occurred approximately 8 days ago.  She does not recall the injury does not know what she hit or how she fell.  She did sustain a contusion to her head and left elbow region.  Today she complains of left elbow pain in the area of the radial head and mild pain in the area of the olecranon.  The pain occasionally radiates down her forearm and upper humerus with certain motions such as pronation and supination.  She has been doing ice and rotating Tylenol and ibuprofen as needed.  She presents today in a shoulder sling.  She was initially seen in an urgent care and underwent x-rays of the left elbow forearm and wrist.  Forearm and wrist x-rays were normal though a small calcification was seen in the area of the olecranon on her elbow view.  She denies paresthesias.  Denies previous injury or surgery to the affected area.  She currently has bilateral shoulder adhesive capsulitis which is been improving significantly over the past few months.    Pertinent urgent care note reviewed, imaging reviewed    Review of Systems     Past Medical History:   Diagnosis Date    Abnormal ECG 23    Anemia     Chemo    Arthritis     Breast cancer 2023    Left Breast    Elevated cholesterol     improved, not currently taking medication    History of chemotherapy 2023    last treatment    History of radiation therapy     left breast, currently in treatment    Hyperlipidemia LDL goal <100 2024    Hypertension     Malignant neoplasm of central portion of left breast in female, estrogen receptor  positive 2023    Osteoarthritis         Past Surgical History:   Procedure Laterality Date    BREAST BIOPSY  23    BREAST RECONSTRUCTION, BREAST TISSUE EXPANDER REMOVAL, IMPLANT INSERTION Bilateral 2023    Procedure: IMMEDIATE BREAST RECONSTRUCTION WITH BREAST TISSUE EXPANDER INSERTION AND ALLODERM - BILATERAL;  Surgeon: Dilip Anderson MD;  Location: [facility] OR;  Service: Plastics;  Laterality: Bilateral;    COLONOSCOPY N/A 2024    Procedure: COLONOSCOPY;  Surgeon: Pamela Ernst MD;  Location: [facility] ENDOSCOPY;  Service: Gastroenterology;  Laterality: N/A;    COSMETIC SURGERY      Tissue expander insertion    DIAGNOSTIC LAPAROSCOPY, SALPINGO OOPHORECTOMY LAPAROSCOPIC N/A 2024    Procedure: DIAGNOSTIC LAPAROSCOPY, SALPINGO OOPHORECTOMY;  Surgeon: Fabby Miranda MD;  Location: [facility] OR;  Service: Obstetrics/Gynecology;  Laterality: N/A;    LYMPH NODE BIOPSY      Left underarm    MASTECTOMY W/ SENTINEL NODE BIOPSY Bilateral 2023    Procedure: BREAST MASTECTOMY WITH NIPPLE SPARING BILATERAL WITH LEFT SENTINEL NODE BIOPSY, AND AXILLARY DISECTION;  Surgeon: Daxa Anderson MD;  Location: [facility] OR;  Service: General;  Laterality: Bilateral;    US GUIDED LYMPH NODE BIOPSY  2023    VENOUS ACCESS DEVICE (PORT) INSERTION  2023       Family History   Problem Relation Age of Onset    Breast cancer Mother 55        Stage 4    COPD Mother     Diabetes Mother     Early death Mother     Heart disease Mother     Stroke Mother     Cancer Mother         Stage 4, unknown origin,  at 57    Rheum arthritis Maternal Aunt     Breast cancer Paternal Aunt     Cancer Paternal Aunt         Breast cancer    COPD Maternal Grandfather     Stroke Maternal Grandfather     Breast cancer Other     Cancer Paternal Grandmother         Ovarian cancer,  at 55    Asthma Maternal Aunt        Social History     Socioeconomic History    Marital status:    Tobacco Use    Smoking  "status: Former     Current packs/day: 0.00     Average packs/day: 0.3 packs/day for 10.0 years (2.5 ttl pk-yrs)     Types: Cigarettes     Start date: 1995     Quit date: 2005     Years since quittin.9     Passive exposure: Past    Smokeless tobacco: Never   Vaping Use    Vaping status: Never Used   Substance and Sexual Activity    Alcohol use: Yes     Comment: Beer once in maybe a month, not very often    Drug use: Never    Sexual activity: Yes     Partners: Male     Birth control/protection: Bilateral salpingectomy      Comment:  had a bilateral salpingo-oopherectomy         Current Outpatient Medications:     letrozole (FEMARA) 2.5 MG tablet, Take 1 tablet by mouth Daily., Disp: , Rfl:     fluticasone (FLONASE) 50 MCG/ACT nasal spray, USE 1 SPRAY TO EACH NOSTRIL EVERY DAY, Disp: 48 g, Rfl: 0    hydrocortisone 2.5 % cream, , Disp: , Rfl:     ketoconazole (NIZORAL) 2 % cream, , Disp: , Rfl:     metoprolol tartrate (LOPRESSOR) 50 MG tablet, Take 1 tablet by mouth 2 (Two) Times a Day., Disp: 180 tablet, Rfl: 0    omeprazole (priLOSEC) 20 MG capsule, Take 1 capsule by mouth Daily., Disp: , Rfl:     ondansetron (ZOFRAN) 8 MG tablet, Take 1 tablet by mouth 3 (Three) Times a Day As Needed for Nausea or Vomiting (DO NOT USE WHILE SANCUSO IN PLACE)., Disp: 30 tablet, Rfl: 5    Allergies   Allergen Reactions    Chlorhexidine Other (See Comments)     Burning    Nylon Hives    Sunblock [Solbar Pf Spf15] Rash           Objective   /76   Ht 170.2 cm (67.01\")   Wt 76.7 kg (169 lb)   BMI 26.46 kg/m²            Physical Exam  Left Elbow Exam     Tenderness   The patient is experiencing tenderness in the radial head (No tenderness at olecranon process).     Range of Motion   Extension:  -10   Flexion:  120   Pronation:  0   Supination:  90     Tests   Varus: negative  Valgus: negative    Other   Erythema: absent  Sensation: normal  Pulse: present    Comments:  Faint bruising is noted over the radial head, " no significant soft tissue swelling or erythema.  No open wounds or gross deformity.           Extremity DVT signs are negative by clinical screen.     Independent Review of Radiographic Studies:    Three-view plain films of the left elbow without in office today for evaluation of pain, lateral view left elbow comparison views available.  No obvious acute osseous abnormalities are seen.  The radial head is subluxed.  There is a small calcification in the area of the olecranon, best seen on her previous view, though this does not have the typical appearance of an avulsion fracture.  There is an anterior and posterior fat pad sign.    Procedures    Assessment and Plan   Diagnoses and all orders for this visit:    1. Contusion of left elbow, initial encounter (Primary)    2. Left elbow pain  -     XR Elbow 3+ View Left       Discussion of orthopedic goals  Orthopedic activities reviewed and patient expressed appreciation  Regular exercise as tolerated  Risk, benefits, and merits of treatment alternatives reviewed with the patient and questions answered  Patient guided on mobility and conditioning exercises  Ice, heat, and/or modalities as beneficial  Reduced physical activity as appropriate and avoid offending activity  Weight bearing parameters reviewed  Use brace as instructed    Recommendations/Plan:  Exercise, medications, injections, other patient advice, and return appointment as noted.  Patient is encouraged to call or return for any issues or concerns.    DDx:  Radial head sprain, radial head occult fracture, olecranon fracture, soft tissue injury    At this time I am most suspicious of a radial head sprain and perhaps occult fracture of the radial head.  I discussed imaging and treatment modalities with the patient and the patient has elected to continue with conservative treatment without further imaging.  We will continue with shoulder sling and conservative treatment for left elbow pain.  I did advise  gentle range of motion of the elbow to reduce stiffness and promote rehab.  If symptoms fail to improve at next follow-up consider MRI of the left elbow.    Return in about 2 weeks (around 1/27/2025) for XOA, left elbow.  Patient agreeable to call or return sooner for any concerns.

## 2025-01-24 DIAGNOSIS — S50.02XA CONTUSION OF LEFT ELBOW, INITIAL ENCOUNTER: Primary | ICD-10-CM

## 2025-01-24 DIAGNOSIS — M25.522 LEFT ELBOW PAIN: ICD-10-CM

## 2025-01-27 ENCOUNTER — LAB (OUTPATIENT)
Dept: LAB | Facility: HOSPITAL | Age: 45
End: 2025-01-27
Payer: COMMERCIAL

## 2025-01-27 ENCOUNTER — OFFICE VISIT (OUTPATIENT)
Dept: CARDIOLOGY | Facility: CLINIC | Age: 45
End: 2025-01-27
Payer: COMMERCIAL

## 2025-01-27 ENCOUNTER — OFFICE VISIT (OUTPATIENT)
Dept: ORTHOPEDIC SURGERY | Facility: CLINIC | Age: 45
End: 2025-01-27
Payer: COMMERCIAL

## 2025-01-27 VITALS
BODY MASS INDEX: 27.44 KG/M2 | WEIGHT: 174.8 LBS | OXYGEN SATURATION: 99 % | SYSTOLIC BLOOD PRESSURE: 104 MMHG | HEART RATE: 57 BPM | HEIGHT: 67 IN | DIASTOLIC BLOOD PRESSURE: 68 MMHG

## 2025-01-27 VITALS
WEIGHT: 174.8 LBS | BODY MASS INDEX: 27.44 KG/M2 | DIASTOLIC BLOOD PRESSURE: 80 MMHG | SYSTOLIC BLOOD PRESSURE: 118 MMHG | HEIGHT: 67 IN

## 2025-01-27 DIAGNOSIS — M25.522 LEFT ELBOW PAIN: Primary | ICD-10-CM

## 2025-01-27 DIAGNOSIS — I49.3 SYMPTOMATIC PVCS: Primary | ICD-10-CM

## 2025-01-27 LAB
CHROMATIN AB SERPL-ACNC: <10 IU/ML (ref 0–14)
CK SERPL-CCNC: 98 U/L (ref 20–180)
FERRITIN SERPL-MCNC: 69.5 NG/ML (ref 13–150)
FOLATE SERPL-MCNC: 8.39 NG/ML (ref 4.78–24.2)
IRON 24H UR-MRATE: 97 MCG/DL (ref 37–145)
IRON SATN MFR SERPL: 26 % (ref 20–50)
MAGNESIUM SERPL-MCNC: 1.9 MG/DL (ref 1.6–2.6)
T4 FREE SERPL-MCNC: 0.96 NG/DL (ref 0.92–1.68)
TIBC SERPL-MCNC: 374 MCG/DL (ref 298–536)
TRANSFERRIN SERPL-MCNC: 251 MG/DL (ref 200–360)
TSH SERPL DL<=0.05 MIU/L-ACNC: 4.56 UIU/ML (ref 0.27–4.2)
URATE SERPL-MCNC: 3.6 MG/DL (ref 2.4–5.7)
VIT B12 BLD-MCNC: 514 PG/ML (ref 211–946)

## 2025-01-27 PROCEDURE — 84443 ASSAY THYROID STIM HORMONE: CPT | Performed by: FAMILY MEDICINE

## 2025-01-27 PROCEDURE — 86800 THYROGLOBULIN ANTIBODY: CPT | Performed by: FAMILY MEDICINE

## 2025-01-27 PROCEDURE — 86038 ANTINUCLEAR ANTIBODIES: CPT | Performed by: FAMILY MEDICINE

## 2025-01-27 PROCEDURE — 99213 OFFICE O/P EST LOW 20 MIN: CPT | Performed by: STUDENT IN AN ORGANIZED HEALTH CARE EDUCATION/TRAINING PROGRAM

## 2025-01-27 PROCEDURE — 82550 ASSAY OF CK (CPK): CPT | Performed by: FAMILY MEDICINE

## 2025-01-27 PROCEDURE — 84432 ASSAY OF THYROGLOBULIN: CPT | Performed by: FAMILY MEDICINE

## 2025-01-27 PROCEDURE — 82746 ASSAY OF FOLIC ACID SERUM: CPT | Performed by: FAMILY MEDICINE

## 2025-01-27 PROCEDURE — 82728 ASSAY OF FERRITIN: CPT | Performed by: FAMILY MEDICINE

## 2025-01-27 PROCEDURE — 84550 ASSAY OF BLOOD/URIC ACID: CPT | Performed by: FAMILY MEDICINE

## 2025-01-27 PROCEDURE — 86376 MICROSOMAL ANTIBODY EACH: CPT | Performed by: FAMILY MEDICINE

## 2025-01-27 PROCEDURE — 83540 ASSAY OF IRON: CPT | Performed by: FAMILY MEDICINE

## 2025-01-27 PROCEDURE — 82607 VITAMIN B-12: CPT | Performed by: FAMILY MEDICINE

## 2025-01-27 PROCEDURE — 99213 OFFICE O/P EST LOW 20 MIN: CPT | Performed by: INTERNAL MEDICINE

## 2025-01-27 PROCEDURE — 86431 RHEUMATOID FACTOR QUANT: CPT | Performed by: FAMILY MEDICINE

## 2025-01-27 PROCEDURE — 86200 CCP ANTIBODY: CPT | Performed by: FAMILY MEDICINE

## 2025-01-27 PROCEDURE — 84439 ASSAY OF FREE THYROXINE: CPT | Performed by: FAMILY MEDICINE

## 2025-01-27 PROCEDURE — 84466 ASSAY OF TRANSFERRIN: CPT | Performed by: FAMILY MEDICINE

## 2025-01-27 PROCEDURE — 83735 ASSAY OF MAGNESIUM: CPT | Performed by: FAMILY MEDICINE

## 2025-01-27 RX ORDER — METOPROLOL SUCCINATE 25 MG/1
25 TABLET, EXTENDED RELEASE ORAL DAILY
Qty: 90 TABLET | Refills: 3 | Status: SHIPPED | OUTPATIENT
Start: 2025-01-27

## 2025-01-27 NOTE — PROGRESS NOTES
[facility] Cardiology Office Follow Up Note    Kimberley Win  [id number]  2025    Primary Care Provider: Dorinda Covington DO    Chief Complaint: Routine follow-up    History of Present Illness:   Mrs. Kimberley Win is a 45 y.o. female who presents to the Cardiology Clinic for routine follow-up.  The patient has a history of breast cancer, status post bilateral mastectomy, chemotherapy, and radiation therapy.  She has a past cardiac history significant for symptomatic ventricular ectopy.  The patient had a recent flare of her palpitations, which she believes was related to her hormone blocker.  With discontinuation of the medication, her palpitations have significantly improved and are now minimal.  She does report that with changing metoprolol XL to Lopressor she has had periods of bradycardia with a heart rate as low as the 40s.  These episodes appear to be primarily asymptomatic.  No new complaints today.    Past Cardiac Testin. Last Coronary Angio: None  2. Prior Stress Testing: None  3. Last Echo: 2023              1.  Normal left ventricular size and systolic function, LVEF 55-60%.  2.  Normal LV diastolic filling pattern.  3.  Normal right ventricular size and systolic function.  4.  Normal left and right atrial size.  5.  Trace MR, TR, and PI.  6.  Normal LV global longitudinal strain pattern.  4. Prior Holter Monitor: 2024              1.  The predominant rhythm is sinus rhythm with an average heart rate 77 bpm.  2.  No sustained arrhythmias.  3.  Rare supraventricular ectopy.  4.  Occasional ventricular ectopy, burden 10.5%.  5.  6 symptomatic events corresponded to sinus rhythm and sinus rhythm with PVCs.    Review of Systems:   Review of Systems   Constitutional:  Negative for activity change, appetite change, chills, diaphoresis, fatigue, fever, unexpected weight gain and unexpected weight loss.   Eyes:  Negative for blurred vision  "and double vision.   Respiratory:  Negative for cough, chest tightness, shortness of breath and wheezing.    Cardiovascular:  Negative for chest pain, palpitations and leg swelling.   Gastrointestinal:  Negative for abdominal pain, anal bleeding, blood in stool and GERD.   Endocrine: Negative for cold intolerance and heat intolerance.   Genitourinary:  Negative for hematuria.   Neurological:  Negative for dizziness, syncope, weakness and light-headedness.   Hematological:  Does not bruise/bleed easily.   Psychiatric/Behavioral:  Negative for depressed mood and stress. The patient is not nervous/anxious.        I have reviewed and/or updated the patient's past medical, past surgical, family, social history, problem list and allergies as appropriate.     Medications:     Current Outpatient Medications:     hydrocortisone 2.5 % cream, , Disp: , Rfl:     ketoconazole (NIZORAL) 2 % cream, , Disp: , Rfl:     letrozole (FEMARA) 2.5 MG tablet, Take 1 tablet by mouth Daily., Disp: , Rfl:     omeprazole (priLOSEC) 20 MG capsule, Take 1 capsule by mouth Daily., Disp: , Rfl:     ondansetron (ZOFRAN) 8 MG tablet, Take 1 tablet by mouth 3 (Three) Times a Day As Needed for Nausea or Vomiting (DO NOT USE WHILE SANCUSO IN PLACE)., Disp: 30 tablet, Rfl: 5    fluticasone (FLONASE) 50 MCG/ACT nasal spray, USE 1 SPRAY TO EACH NOSTRIL EVERY DAY (Patient not taking: Reported on 1/27/2025), Disp: 48 g, Rfl: 0    metoprolol succinate XL (TOPROL-XL) 25 MG 24 hr tablet, Take 1 tablet by mouth Daily., Disp: 90 tablet, Rfl: 3    Physical Exam:  Vital Signs:   Vitals:    01/27/25 0823   BP: 104/68   BP Location: Right arm   Patient Position: Sitting   Cuff Size: Adult   Pulse: 57   SpO2: 99%   Weight: 79.3 kg (174 lb 12.8 oz)   Height: 170.2 cm (67\")       Physical Exam  Constitutional:       General: She is not in acute distress.     Appearance: Normal appearance. She is well-developed. She is not diaphoretic.   HENT:      Head: Normocephalic " and atraumatic.   Eyes:      General: No scleral icterus.     Pupils: Pupils are equal, round, and reactive to light.   Neck:      Trachea: No tracheal deviation.   Cardiovascular:      Rate and Rhythm: Normal rate and regular rhythm.      Heart sounds: Normal heart sounds. No murmur heard.     No friction rub. No gallop.      Comments: Normal JVD.    Pulmonary:      Effort: Pulmonary effort is normal. No respiratory distress.      Breath sounds: Normal breath sounds. No stridor. No wheezing or rales.   Chest:      Chest wall: No tenderness.   Abdominal:      General: Bowel sounds are normal. There is no distension.      Palpations: Abdomen is soft.      Tenderness: There is no abdominal tenderness. There is no guarding or rebound.   Musculoskeletal:         General: No swelling. Normal range of motion.      Cervical back: Neck supple. No tenderness.   Lymphadenopathy:      Cervical: No cervical adenopathy.   Skin:     General: Skin is warm and dry.      Findings: No erythema.   Neurological:      General: No focal deficit present.      Mental Status: She is alert and oriented to person, place, and time.   Psychiatric:         Mood and Affect: Mood normal.         Behavior: Behavior normal.         Results Review:   I reviewed the patient's new clinical results.    Procedures    Assessment / Plan:     1.  Symptomatic PVCs  -- History of palpitations secondary to occasional symptomatic ectopy  -- Recent outpatient cardiac monitoring showed a PVC burden of 10%, occasionally symptomatic  -- Per patient preference, change metoprolol back to XL for suppression of ectopy  -- Follow-up in 1 year, sooner if required    Follow Up:   Return in about 1 year (around 1/27/2026).      Thank you for allowing me to participate in the care of your patient. Please to not hesitate to contact me with additional questions or concerns.     SAVANA Costello MD  Interventional Cardiology   01/27/2025  08:39 EST

## 2025-01-27 NOTE — PROGRESS NOTES
Office Note     Name: Kimberley Win    : 1980     MRN: [id number]     Chief Complaint  Follow-up of the Left Elbow (Reports still having pain and unable to straighten her arm. Has been doing HEP that is helpful)    Subjective     History of Present Illness:  Kimberley Win is a 45 y.o. female presenting today for left elbow pain follow-up.  Patient states that she does continue to have pain and decreased range of motion in the left elbow though her symptoms have significantly improved over the past 2 weeks.  The pain is felt in the common extensor area as well as the tip of the olecranon.  She reports good compliance with her home exercise plan and her left elbow was also been evaluated by her physical therapist.  She notes that she is scheduled to start treatment for the left elbow concurrently with her lateral shoulder PT.  She denies any new or worsening symptoms.    Review of Systems     Past Medical History:   Diagnosis Date    Abnormal ECG 23    Anemia     Chemo    Arthritis     Arthritis of back     Breast cancer 2023    Left Breast    Elevated cholesterol     improved, not currently taking medication    History of chemotherapy 2023    last treatment    History of radiation therapy     left breast, currently in treatment    Hyperlipidemia LDL goal <100 2024    Hypertension     Malignant neoplasm of central portion of left breast in female, estrogen receptor positive 2023    Osteoarthritis         Past Surgical History:   Procedure Laterality Date    BREAST BIOPSY  23    BREAST RECONSTRUCTION, BREAST TISSUE EXPANDER REMOVAL, IMPLANT INSERTION Bilateral 2023    Procedure: IMMEDIATE BREAST RECONSTRUCTION WITH BREAST TISSUE EXPANDER INSERTION AND ALLODERM - BILATERAL;  Surgeon: Dilip Anderson MD;  Location: [facility];  Service: Plastics;  Laterality: Bilateral;    COLONOSCOPY N/A 2024    Procedure: COLONOSCOPY;  Surgeon: Pamela Ernst  MD;  Location: [facility] ENDOSCOPY;  Service: Gastroenterology;  Laterality: N/A;    COSMETIC SURGERY      Tissue expander insertion    DIAGNOSTIC LAPAROSCOPY, SALPINGO OOPHORECTOMY LAPAROSCOPIC N/A 2024    Procedure: DIAGNOSTIC LAPAROSCOPY, SALPINGO OOPHORECTOMY;  Surgeon: Fabby Miranda MD;  Location: [facility] OR;  Service: Obstetrics/Gynecology;  Laterality: N/A;    LYMPH NODE BIOPSY      Left underarm    MASTECTOMY W/ SENTINEL NODE BIOPSY Bilateral 2023    Procedure: BREAST MASTECTOMY WITH NIPPLE SPARING BILATERAL WITH LEFT SENTINEL NODE BIOPSY, AND AXILLARY DISECTION;  Surgeon: Daxa Anderson MD;  Location: [facility] OR;  Service: General;  Laterality: Bilateral;    US GUIDED LYMPH NODE BIOPSY  2023    VENOUS ACCESS DEVICE (PORT) INSERTION  2023       Family History   Problem Relation Age of Onset    Breast cancer Mother 55        Stage 4    COPD Mother     Diabetes Mother     Early death Mother     Heart disease Mother     Stroke Mother     Cancer Mother         Stage 4, unknown origin,  at 57    Rheum arthritis Maternal Aunt     Breast cancer Paternal Aunt     Cancer Paternal Aunt         Breast cancer    COPD Maternal Grandfather     Stroke Maternal Grandfather     Breast cancer Other     Cancer Paternal Grandmother         Ovarian cancer,  at 55    Asthma Maternal Aunt        Social History     Socioeconomic History    Marital status:    Tobacco Use    Smoking status: Former     Current packs/day: 0.00     Average packs/day: 0.3 packs/day for 10.0 years (2.5 ttl pk-yrs)     Types: Cigarettes     Start date: 1995     Quit date: 2005     Years since quittin.9     Passive exposure: Past    Smokeless tobacco: Never   Vaping Use    Vaping status: Never Used   Substance and Sexual Activity    Alcohol use: Yes     Comment: Beer once in maybe a month, not very often    Drug use: Never    Sexual activity: Yes     Partners: Male     Birth control/protection:  "Bilateral salpingectomy      Comment: 2/5 had a bilateral salpingo-oopherectomy         Current Outpatient Medications:     fluticasone (FLONASE) 50 MCG/ACT nasal spray, USE 1 SPRAY TO EACH NOSTRIL EVERY DAY (Patient not taking: Reported on 1/27/2025), Disp: 48 g, Rfl: 0    hydrocortisone 2.5 % cream, , Disp: , Rfl:     ketoconazole (NIZORAL) 2 % cream, , Disp: , Rfl:     letrozole (FEMARA) 2.5 MG tablet, Take 1 tablet by mouth Daily., Disp: , Rfl:     metoprolol succinate XL (TOPROL-XL) 25 MG 24 hr tablet, Take 1 tablet by mouth Daily., Disp: 90 tablet, Rfl: 3    omeprazole (priLOSEC) 20 MG capsule, Take 1 capsule by mouth Daily., Disp: , Rfl:     ondansetron (ZOFRAN) 8 MG tablet, Take 1 tablet by mouth 3 (Three) Times a Day As Needed for Nausea or Vomiting (DO NOT USE WHILE SANCUSO IN PLACE)., Disp: 30 tablet, Rfl: 5    Allergies   Allergen Reactions    Chlorhexidine Other (See Comments)     Burning    Nylon Hives    Sunblock [Solbar Pf Spf15] Rash           Objective   /80   Ht 170.2 cm (67\")   Wt 79.3 kg (174 lb 12.8 oz)   BMI 27.38 kg/m²            Physical Exam  Left Elbow Exam     Tenderness   Left elbow tenderness location: Tenderness at the tip of the olecranon, mild tenderness in the common extensor tendon area, no significant tenderness over the radial head itself.     Range of Motion   Extension:  10   Flexion:  normal   Pronation:  normal   Supination:  70     Tests   Valgus: negative  Tinel's sign (cubital tunnel): negative    Other   Erythema: absent  Sensation: normal  Pulse: present           Extremity DVT signs are negative by clinical screen.     Independent Review of Radiographic Studies:    Three-view plain films of the left elbow were done in office today for the evaluation of pain, comparison views available.  No obvious acute osseous abnormalities are seen.  The radial head continues to appear subluxed.  The calcification on her olecranon is not seen today.  There is no posterior fat " pad sign today.    Procedures    Assessment and Plan   Diagnoses and all orders for this visit:    1. Left elbow pain (Primary)       Discussion of orthopedic goals  Orthopedic activities reviewed and patient expressed appreciation  Regular exercise as tolerated  Risk, benefits, and merits of treatment alternatives reviewed with the patient and questions answered  Patient guided on mobility and conditioning exercises  Ice, heat, and/or modalities as beneficial  Physical therapy ongoing  Reduced physical activity as appropriate and avoid offending activity  Weight bearing parameters reviewed    Recommendations/Plan:  Exercise, medications, injections, other patient advice, and return appointment as noted.  Patient is encouraged to call or return for any issues or concerns.    At this time I suspect patient is suffering from a radial joint sprain and perhaps bone contusion to the tip of the olecranon.  Given that there is no obvious fracture this is of less suspicion though an occult fracture still cannot be ruled out.  I recommended continuing conservative treatment including physical therapy, ice heat, and over-the-counter analgesics as needed and beneficial.  I advised follow-up with me in 3 weeks for reevaluation if she continues to have significant symptoms.  If so consider MRI for further evaluation of the soft tissue.    Return in about 3 weeks (around 2/17/2025) for Recheck.  Patient agreeable to call or return sooner for any concerns.

## 2025-01-28 ENCOUNTER — TELEPHONE (OUTPATIENT)
Dept: INTERNAL MEDICINE | Facility: CLINIC | Age: 45
End: 2025-01-28
Payer: COMMERCIAL

## 2025-01-28 LAB
ANA SER QL: NEGATIVE
CCP IGA+IGG SERPL IA-ACNC: 1 UNITS (ref 0–19)
THYROPEROXIDASE AB SERPL-ACNC: 11 IU/ML (ref 0–34)

## 2025-01-28 NOTE — TELEPHONE ENCOUNTER
"Relay     \"Dr. Covington's review of your labs:   TSH remains a little high, which could still be related to an inflammatory response.  Thyroid function is low normal.  Additional labs are normal at this time.  This could be subclinical hypothyroidism, which typically indicated low normal thyroid function with thyroid symptoms (fatigue, cold intolerance, etc).  If agreeable, we can start a low dose thyroid medication.     Please let us know about the medication and where to send it.     Thank you. \"                  "

## 2025-01-29 NOTE — TELEPHONE ENCOUNTER
Last read by Kimberley Win at  5:01 PM on 1/28/2025.   Patient would prefer to wait until her appointment to discuss Thyroid labs.

## 2025-01-30 LAB
THYROGLOB AB SERPL-ACNC: <1 IU/ML (ref 0–0.9)
THYROGLOB SERPL-MCNC: 17.3 NG/ML (ref 1.5–38.5)

## 2025-01-31 ENCOUNTER — TELEPHONE (OUTPATIENT)
Dept: INTERNAL MEDICINE | Facility: CLINIC | Age: 45
End: 2025-01-31
Payer: COMMERCIAL

## 2025-01-31 NOTE — TELEPHONE ENCOUNTER
"Relay     \"  Results have been reviewed. Normal results of final thyroid antibodies.  No autoimmune thyroid disease.    \"                  "

## 2025-02-17 ENCOUNTER — OFFICE VISIT (OUTPATIENT)
Dept: INTERNAL MEDICINE | Facility: CLINIC | Age: 45
End: 2025-02-17
Payer: COMMERCIAL

## 2025-02-17 VITALS
SYSTOLIC BLOOD PRESSURE: 120 MMHG | HEART RATE: 78 BPM | TEMPERATURE: 98 F | DIASTOLIC BLOOD PRESSURE: 80 MMHG | WEIGHT: 176.8 LBS | OXYGEN SATURATION: 98 % | HEIGHT: 67 IN | BODY MASS INDEX: 27.75 KG/M2

## 2025-02-17 DIAGNOSIS — Z79.899 MEDICATION MANAGEMENT: ICD-10-CM

## 2025-02-17 DIAGNOSIS — Z00.00 WELL ADULT EXAM: Primary | ICD-10-CM

## 2025-02-17 DIAGNOSIS — Z17.0 MALIGNANT NEOPLASM OF CENTRAL PORTION OF LEFT BREAST IN FEMALE, ESTROGEN RECEPTOR POSITIVE: Primary | ICD-10-CM

## 2025-02-17 DIAGNOSIS — R94.6 ABNORMAL RESULTS OF THYROID FUNCTION STUDIES: ICD-10-CM

## 2025-02-17 DIAGNOSIS — C50.112 MALIGNANT NEOPLASM OF CENTRAL PORTION OF LEFT BREAST IN FEMALE, ESTROGEN RECEPTOR POSITIVE: Primary | ICD-10-CM

## 2025-02-17 PROCEDURE — 99396 PREV VISIT EST AGE 40-64: CPT | Performed by: FAMILY MEDICINE

## 2025-02-17 NOTE — PROGRESS NOTES
Kimberley Win is a 45 y.o. female.    Chief Complaint   Patient presents with    Annual Exam       HPI   History of Present Illness  The patient presents for an annual physical exam.    She is improving her diet and engaging in lower body exercises, restricted in upper body activities due to elbow fracture. She received her tetanus vaccine and the first 2 doses of the COVID-19 vaccine but typically does not receive the influenza vaccine. She has a scheduled appointment with her medical oncologist, Dr. Olguin, next Monday. She has undergone a double mastectomy with implants and consults her breast surgeon every 3 months. No chest pain, shortness of breath, nausea, vomiting, diarrhea, constipation, cough, congestion, or runny nose. Energy levels are satisfactory. No vision problems; eye exam due 04/2025. Up to date with dental exams. Experiences hot flashes but no urinary issues. Intermittent rashes on hips and back, treated with hydrocortisone cream and ketoconazole prescribed by a dermatologist in 09/2024. Rare headaches, no anxiety or depression concerns. Currently on letrozole, wishes to repeat cholesterol test due to potential medication effects, considering switching back to anastrozole. Interested in monitoring kidney and liver function. On metoprolol, managed by Dr. Costello. Advised by specialty to increase calcium intake and engage in weight-bearing exercises due to borderline osteopenia. Recently started using ankle weights for lower body workouts.    Experiencing joint soreness attributed to letrozole therapy started a month ago. Previous medications caused cardiac complications or joint pain, prefers joint pain. Joint pain has improved but occurs sporadically without exacerbation.    Sustained an elbow fracture on 01/03/2025 after a fall in the shower, sought immediate medical attention. Reported head injury but no significant symptoms. Undergoing physical therapy to regain full elbow extension,  experiencing difficulty in certain activities due to the injury.        The following portions of the patient's history were reviewed and updated as appropriate: allergies, current medications, past family history, past medical history, past social history, past surgical history and problem list.     Past Medical History:   Diagnosis Date    Abnormal ECG 8/23/23    Anemia     Chemo    Arthritis     Arthritis of back     Breast cancer 06/01/2023    Left Breast    Elevated cholesterol     improved, not currently taking medication    History of chemotherapy 09/05/2023    last treatment    History of radiation therapy     left breast, currently in treatment    Hyperlipidemia LDL goal <100 11/26/2024    Hypertension     Malignant neoplasm of central portion of left breast in female, estrogen receptor positive 06/20/2023    Osteoarthritis 2021       Past Surgical History:   Procedure Laterality Date    BREAST BIOPSY  6/1/23    BREAST RECONSTRUCTION, BREAST TISSUE EXPANDER REMOVAL, IMPLANT INSERTION Bilateral 11/06/2023    Procedure: IMMEDIATE BREAST RECONSTRUCTION WITH BREAST TISSUE EXPANDER INSERTION AND ALLODERM - BILATERAL;  Surgeon: Dilip Anderson MD;  Location: [facility] OR;  Service: Plastics;  Laterality: Bilateral;    COLONOSCOPY N/A 05/23/2024    Procedure: COLONOSCOPY;  Surgeon: Pamela Ernst MD;  Location: [facility] ENDOSCOPY;  Service: Gastroenterology;  Laterality: N/A;    COSMETIC SURGERY  11/6    Tissue expander insertion    DIAGNOSTIC LAPAROSCOPY, SALPINGO OOPHORECTOMY LAPAROSCOPIC N/A 02/05/2024    Procedure: DIAGNOSTIC LAPAROSCOPY, SALPINGO OOPHORECTOMY;  Surgeon: Fabby Miranda MD;  Location: [facility] OR;  Service: Obstetrics/Gynecology;  Laterality: N/A;    LYMPH NODE BIOPSY  6/1    Left underarm    MASTECTOMY W/ SENTINEL NODE BIOPSY Bilateral 11/06/2023    Procedure: BREAST MASTECTOMY WITH NIPPLE SPARING BILATERAL WITH LEFT SENTINEL NODE BIOPSY, AND AXILLARY DISECTION;  Surgeon: Daxa Anderson MD;   Location: [facility] OR;  Service: General;  Laterality: Bilateral;    US GUIDED LYMPH NODE BIOPSY  2023    VENOUS ACCESS DEVICE (PORT) INSERTION  2023       Family History   Problem Relation Age of Onset    Breast cancer Mother 55        Stage 4    COPD Mother     Diabetes Mother     Early death Mother     Heart disease Mother     Stroke Mother     Cancer Mother         Stage 4, unknown origin,  at 57    Rheum arthritis Maternal Aunt     Breast cancer Paternal Aunt     Cancer Paternal Aunt         Breast cancer    COPD Maternal Grandfather     Stroke Maternal Grandfather     Breast cancer Other     Cancer Paternal Grandmother         Ovarian cancer,  at 55    Asthma Maternal Aunt        Social History     Socioeconomic History    Marital status:    Tobacco Use    Smoking status: Former     Current packs/day: 0.00     Average packs/day: 0.3 packs/day for 10.0 years (2.5 ttl pk-yrs)     Types: Cigarettes     Start date: 1995     Quit date: 2005     Years since quittin.0     Passive exposure: Past    Smokeless tobacco: Never   Vaping Use    Vaping status: Never Used   Substance and Sexual Activity    Alcohol use: Yes     Comment: Beer once in maybe a month, not very often    Drug use: Never    Sexual activity: Yes     Partners: Male     Birth control/protection: Bilateral salpingectomy      Comment:  had a bilateral salpingo-oopherectomy       Allergies   Allergen Reactions    Chlorhexidine Other (See Comments)     Burning    Nylon Hives    Sunblock [Solbar Pf Spf15] Rash         Current Outpatient Medications:     hydrocortisone 2.5 % cream, , Disp: , Rfl:     ketoconazole (NIZORAL) 2 % cream, , Disp: , Rfl:     letrozole (FEMARA) 2.5 MG tablet, Take 1 tablet by mouth Daily., Disp: , Rfl:     metoprolol succinate XL (TOPROL-XL) 25 MG 24 hr tablet, Take 1 tablet by mouth Daily., Disp: 90 tablet, Rfl: 3    omeprazole (priLOSEC) 20 MG capsule, Take 1 capsule by mouth Daily.,  "Disp: , Rfl:     ondansetron (ZOFRAN) 8 MG tablet, Take 1 tablet by mouth 3 (Three) Times a Day As Needed for Nausea or Vomiting (DO NOT USE WHILE SANCUSO IN PLACE)., Disp: 30 tablet, Rfl: 5    fluticasone (FLONASE) 50 MCG/ACT nasal spray, USE 1 SPRAY TO EACH NOSTRIL EVERY DAY (Patient not taking: Reported on 2/17/2025), Disp: 48 g, Rfl: 0    ROS    Review of Systems   Constitutional:  Negative for chills, fatigue and fever.   HENT:  Negative for congestion, postnasal drip and sore throat.    Eyes:  Negative for visual disturbance.   Respiratory:  Negative for cough and shortness of breath.    Cardiovascular:  Negative for chest pain.   Gastrointestinal:  Negative for abdominal pain, constipation, diarrhea, nausea and vomiting.   Endocrine: Positive for heat intolerance. Negative for cold intolerance.   Genitourinary:  Negative for difficulty urinating.   Musculoskeletal:  Positive for arthralgias.   Skin:  Positive for rash.   Allergic/Immunologic: Negative for environmental allergies.   Neurological:  Negative for headache.   Hematological:  Does not bruise/bleed easily.   Psychiatric/Behavioral:  Negative for depressed mood. The patient is not nervous/anxious.        Vitals:    02/17/25 0958   BP: 120/80   Pulse: 78   Temp: 98 °F (36.7 °C)   SpO2: 98%   Weight: 80.2 kg (176 lb 12.8 oz)   Height: 170.2 cm (67\")   PainSc:   2     Body mass index is 27.69 kg/m².    Physical Exam     Physical Exam  Constitutional:       General: She is not in acute distress.     Appearance: Normal appearance. She is well-developed.   HENT:      Head: Normocephalic and atraumatic.      Right Ear: Tympanic membrane and external ear normal.      Left Ear: Tympanic membrane and external ear normal.      Mouth/Throat:      Pharynx: No posterior oropharyngeal erythema.   Eyes:      Extraocular Movements: Extraocular movements intact.      Conjunctiva/sclera: Conjunctivae normal.      Pupils: Pupils are equal, round, and reactive to light. "   Cardiovascular:      Rate and Rhythm: Normal rate and regular rhythm.      Heart sounds: No murmur heard.  Pulmonary:      Effort: Pulmonary effort is normal. No respiratory distress.      Breath sounds: Normal breath sounds. No wheezing.   Abdominal:      General: Bowel sounds are normal. There is no distension.      Palpations: Abdomen is soft.      Tenderness: There is no abdominal tenderness.   Musculoskeletal:      Cervical back: Neck supple.      Right lower leg: No edema.      Left lower leg: No edema.   Lymphadenopathy:      Cervical: No cervical adenopathy.   Skin:     General: Skin is warm and dry.   Neurological:      Mental Status: She is alert and oriented to person, place, and time.      Cranial Nerves: No cranial nerve deficit.      Deep Tendon Reflexes: Reflexes normal.   Psychiatric:         Mood and Affect: Mood normal.         Behavior: Behavior normal.             Diagnoses and all orders for this visit:    1. Well adult exam (Primary)    2. Abnormal results of thyroid function studies  -     TSH  -     T4, Free    3. Medication management  -     Lipid Panel  -     CBC & Differential  -     Comprehensive Metabolic Panel        Assessment & Plan  1. Annual physical examination.  Discussed with the patient routine health maintenance including: Vaccines, dental/eye health, healthy diet and exercise, colorectal cancer screening and breast cancer screening.  Pap is up-to-date, completed in 2023.  Mental health has been addressed as well.  Routine labs have been ordered.  Declines flu shot at this time.  May return at a later date for nurses visit.    2.  Abnormal thyroid studies.  Will obtain updated thyroid studies.  TSH was mildly elevated on last check.    No orders of the defined types were placed in this encounter.      No orders of the defined types were placed in this encounter.      Return in about 1 year (around 2/17/2026) for Annual.      Dorinda Covington DO

## 2025-02-18 LAB
ALBUMIN SERPL-MCNC: 4.2 G/DL (ref 3.5–5.2)
ALBUMIN/GLOB SERPL: 1.2 G/DL
ALP SERPL-CCNC: 103 U/L (ref 39–117)
ALT SERPL-CCNC: 18 U/L (ref 1–33)
AST SERPL-CCNC: 26 U/L (ref 1–32)
BASOPHILS # BLD AUTO: 0.03 10*3/MM3 (ref 0–0.2)
BASOPHILS NFR BLD AUTO: 0.8 % (ref 0–1.5)
BILIRUB SERPL-MCNC: 0.4 MG/DL (ref 0–1.2)
BUN SERPL-MCNC: 11 MG/DL (ref 6–20)
BUN/CREAT SERPL: 15.3 (ref 7–25)
CALCIUM SERPL-MCNC: 10.1 MG/DL (ref 8.6–10.5)
CHLORIDE SERPL-SCNC: 103 MMOL/L (ref 98–107)
CHOLEST SERPL-MCNC: 236 MG/DL (ref 0–200)
CO2 SERPL-SCNC: 27.8 MMOL/L (ref 22–29)
CREAT SERPL-MCNC: 0.72 MG/DL (ref 0.57–1)
EGFRCR SERPLBLD CKD-EPI 2021: 104.6 ML/MIN/1.73
EOSINOPHIL # BLD AUTO: 0.11 10*3/MM3 (ref 0–0.4)
EOSINOPHIL NFR BLD AUTO: 2.9 % (ref 0.3–6.2)
ERYTHROCYTE [DISTWIDTH] IN BLOOD BY AUTOMATED COUNT: 12.6 % (ref 12.3–15.4)
GLOBULIN SER CALC-MCNC: 3.4 GM/DL
GLUCOSE SERPL-MCNC: 92 MG/DL (ref 65–99)
HCT VFR BLD AUTO: 38.2 % (ref 34–46.6)
HDLC SERPL-MCNC: 83 MG/DL (ref 40–60)
HGB BLD-MCNC: 12.5 G/DL (ref 12–15.9)
IMM GRANULOCYTES # BLD AUTO: 0 10*3/MM3 (ref 0–0.05)
IMM GRANULOCYTES NFR BLD AUTO: 0 % (ref 0–0.5)
LDLC SERPL CALC-MCNC: 140 MG/DL (ref 0–100)
LYMPHOCYTES # BLD AUTO: 1.22 10*3/MM3 (ref 0.7–3.1)
LYMPHOCYTES NFR BLD AUTO: 31.9 % (ref 19.6–45.3)
MCH RBC QN AUTO: 29.8 PG (ref 26.6–33)
MCHC RBC AUTO-ENTMCNC: 32.7 G/DL (ref 31.5–35.7)
MCV RBC AUTO: 91 FL (ref 79–97)
MONOCYTES # BLD AUTO: 0.25 10*3/MM3 (ref 0.1–0.9)
MONOCYTES NFR BLD AUTO: 6.5 % (ref 5–12)
NEUTROPHILS # BLD AUTO: 2.21 10*3/MM3 (ref 1.7–7)
NEUTROPHILS NFR BLD AUTO: 57.9 % (ref 42.7–76)
NRBC BLD AUTO-RTO: 0 /100 WBC (ref 0–0.2)
PLATELET # BLD AUTO: 278 10*3/MM3 (ref 140–450)
POTASSIUM SERPL-SCNC: 4 MMOL/L (ref 3.5–5.2)
PROT SERPL-MCNC: 7.6 G/DL (ref 6–8.5)
RBC # BLD AUTO: 4.2 10*6/MM3 (ref 3.77–5.28)
SODIUM SERPL-SCNC: 139 MMOL/L (ref 136–145)
T4 FREE SERPL-MCNC: 0.93 NG/DL (ref 0.92–1.68)
TRIGL SERPL-MCNC: 76 MG/DL (ref 0–150)
TSH SERPL DL<=0.005 MIU/L-ACNC: 3.68 UIU/ML (ref 0.27–4.2)
VLDLC SERPL CALC-MCNC: 13 MG/DL (ref 5–40)
WBC # BLD AUTO: 3.82 10*3/MM3 (ref 3.4–10.8)

## 2025-02-18 RX ORDER — LETROZOLE 2.5 MG/1
2.5 TABLET, FILM COATED ORAL DAILY
Qty: 90 TABLET | Refills: 3 | Status: SHIPPED | OUTPATIENT
Start: 2025-02-18 | End: 2025-02-24

## 2025-02-24 ENCOUNTER — SPECIALTY PHARMACY (OUTPATIENT)
Facility: HOSPITAL | Age: 45
End: 2025-02-24
Payer: COMMERCIAL

## 2025-02-24 ENCOUNTER — PATIENT MESSAGE (OUTPATIENT)
Dept: ONCOLOGY | Facility: CLINIC | Age: 45
End: 2025-02-24

## 2025-02-24 ENCOUNTER — OFFICE VISIT (OUTPATIENT)
Dept: ONCOLOGY | Facility: CLINIC | Age: 45
End: 2025-02-24
Payer: COMMERCIAL

## 2025-02-24 VITALS
SYSTOLIC BLOOD PRESSURE: 128 MMHG | HEIGHT: 67 IN | WEIGHT: 172.4 LBS | DIASTOLIC BLOOD PRESSURE: 67 MMHG | RESPIRATION RATE: 16 BRPM | TEMPERATURE: 98 F | BODY MASS INDEX: 27.06 KG/M2 | HEART RATE: 64 BPM | OXYGEN SATURATION: 98 %

## 2025-02-24 DIAGNOSIS — Z17.0 MALIGNANT NEOPLASM OF CENTRAL PORTION OF LEFT BREAST IN FEMALE, ESTROGEN RECEPTOR POSITIVE: Primary | ICD-10-CM

## 2025-02-24 DIAGNOSIS — C50.112 MALIGNANT NEOPLASM OF CENTRAL PORTION OF LEFT BREAST IN FEMALE, ESTROGEN RECEPTOR POSITIVE: Primary | ICD-10-CM

## 2025-02-24 PROCEDURE — 99215 OFFICE O/P EST HI 40 MIN: CPT | Performed by: INTERNAL MEDICINE

## 2025-02-24 RX ORDER — ANASTROZOLE 1 MG/1
1 TABLET ORAL DAILY
Qty: 90 TABLET | Refills: 3 | Status: SHIPPED | OUTPATIENT
Start: 2025-02-24

## 2025-02-24 NOTE — PATIENT INSTRUCTIONS
Call with your decision about Heberqgaby, we will arrange a chemo education in [place] if you would like to proceed.         1) Use a daily moisturizer such as Replens or Oasis. Use a lubricant such as Astroglide with intercourse.  2) If this is ineffective, trial of Hyalogyn, purchased online: www.[email or web address].com    HYALO GYN®   Does HYALO GYN® contain estrogen?  No, HYALO GYN® is completely estrogen free.  How often is HYALO GYN® applied?  One application every three days for a period of 30 days is recommended, unless otherwise recommended by your health care provider.

## 2025-02-24 NOTE — PROGRESS NOTES
Oral Chemotherapy - New Referral    Received a referral from Dr. Olguin    Treatment Plan: Arimidex (anastrozole) and Kisqali (ribociclib)  Start Date of Treatment:  Patient is deciding if she wants to proceed with treatment; follow-up with Dr. Olguin on 3/24/25 to discuss decision  Indication: Stage IIA (cT2, cN1, cM0)  breast cancer, ER/NV+, HER2 negative (0+), Ki67 5%, 2 out of 6 LN positive, 2.5cm residual disease  Relevant Past Treatments:   Dose dense AC followed by weekly taxol: C1, 7/5/2023.  Weekly Taxol terminated after 2 doses for pneumonitis which fully resolved with oral steroids  Bilateral mastectomy 11/6/2023  Radiation completed 2/1/2024 with Dr. Robison  BSO 2/5/2024  Arimidex 2/26/2024 to 9/2024: arthralgias  Exemestane: 9/2024: mild arthralgias, hot flashes, palpitations  Letrozole: arthralgias  Anastrazole: planning 2/2025  Ribociclib: considering 2/2025    Therapy Appropriate Based on Treatment Guidelines and FDA Labeling?: Yes  Therapeutic Goals:  3 years for ribociclib  Patient Can Self-Administer Oral Medications?: Yes  Port: N/A  Supportive care medications: Ondansetron at home    Drug-Drug Interactions: The current medication list was reviewed and there are no relevant drug-drug interactions with the specialty medication.  Medication Allergies: The patient has no relevant allergies as it relates to their oral specialty medication  Review of Labs/Dose Adjustments: The patient's most recent labs were reviewed and all are WNL to start treatment at this dose.     A prescription was released to [facility] for   Drug: Kisqali (ribociclib)  Strength: 200 mg tablet therapy pack   Directions: Take 2 tablets by mouth daily for 21 days then off 7 days of each 28-day cycle  Quantity: 42  Refills: 11    Pharmacy education is not yet scheduled. CCA and consent will be signed at that time.    Maxine Torres PharmD, BCOP  Clinical Oncology Pharmacy Specialist  Phone: [phone]      2/24/2025  14:46 EST

## 2025-02-24 NOTE — PROGRESS NOTES
Hematology and Oncology [place]  Office number [phone]    Fax number [phone]     Follow-up     Date: 25    Patient Name: Kimberley Win  MRN: [id number]  : 1980    Referring Physician: Dr. Daxa Anderson MD    Chief Complaint: follow up    Cancer Staging:  Cancer Staging   Stage IIA (cT2, cN1, cM0, G2, ER+, OH+, HER2-)    History of Present Illness: Kimberley Win is a pleasant 45 y.o. female who presented for evaluation of left breast cancer.     She notes a history of cystic breast disease for many years. She notes a new breast mass that became progressively harder and associated with pain prompting diagnostic workup.     Diagnostic mammogram 23 showed a dense, spiculated mass in the left breast which on ultrasound corresponded to a 3:00 3 cm hypoechoic mass with internal vascularity. In the 7:00 left breast there was a 7 mm mass corresponding on US to a cluster of cysts spanning 1.4 cm. In the left breast there was a 1.7 cm LN with multiple smaller LN.     Left breast biopsy showed grade 2 invasive ductal carcinoma (ER 90%, OH 10%, HER 2 negative 0+ by IHC). Lymph node FNA was postive for malignancy, metastatic ductal carcinoma.  Ki-67 5%    She underwent bilateral mastectomy and sentinel lymph node biopsy on 2023.  Left breast mastectomy showed residual 2.5 cm of invasive ductal carcinoma with associated DCIS.  Margins negative.  [place] lymph node biopsy was positive (1/2) with extracapsular extension and 1 of 4 additional lymph nodes were positive for metastatic carcinoma (total lymph node count 2 of 6).      Breast cancer risk profile:  Age of menarche:10  G0; infertility  Age of menopause: premenopausal   Family history of breast, ovarian, prostate or pancreatic cancer: mom stage IV breast cancer in her 50s. M Great Aunt, breast cancer.   Genetic testing: negative 36 gene CancerNext panel     Treatment history:  Dose dense AC followed by weekly taxol:  C1, 7/5/2023.  Weekly Taxol terminated after 2 doses for pneumonitis which fully resolved with oral steroids,  Radiation completed 2/1/2024 with Dr. Robison  BSO 2/5/2024  Arimidex 2/26/2024 to 9/2024: arthralgias  Exemestane: 9/2024: mild arthralgias, hot flashes, palpitations  Letrozole: arthralgias  Anastrazole: planning 2/2025  Ribociclib: considering 2/2025    Interval history:  Joint pain diffuse on letrozole, similar to anastrozole. Just had lipid panel and LDL had increased 20 points on letrozole  Fell and broke elbow cleaning shower in Jan. Recovering well from that  Palpitations are back to baseline   Painful intercourse, used lots of lubricant, has not tried moisturizers. GYN exam upcoming.   Hot flashes stable.   More situational anxiety, has recently known two support group members who have passed away or had a recurrence.     Past Medical History:   Past Medical History:   Diagnosis Date    Abnormal ECG 8/23/23    Anemia     Chemo    Arthritis     Arthritis of back     Breast cancer 06/01/2023    Left Breast    Elevated cholesterol     improved, not currently taking medication    History of chemotherapy 09/05/2023    last treatment    History of radiation therapy     left breast, currently in treatment    Hyperlipidemia LDL goal <100 11/26/2024    Hypertension     Malignant neoplasm of central portion of left breast in female, estrogen receptor positive 06/20/2023    Osteoarthritis 2021   No personal history of myocardial infarction, cerebrovascular event, or venous thromboembolism.  Osteoarthritis in her hands. No personal history of autoimmune disease. Fhx of RA in her aunt    Past Surgical History:   Past Surgical History:   Procedure Laterality Date    BREAST BIOPSY  6/1/23    BREAST RECONSTRUCTION, BREAST TISSUE EXPANDER REMOVAL, IMPLANT INSERTION Bilateral 11/06/2023    Procedure: IMMEDIATE BREAST RECONSTRUCTION WITH BREAST TISSUE EXPANDER INSERTION AND ALLODERM - BILATERAL;  Surgeon: Dilip Anderson  MD EDIS;  Location: [facility] OR;  Service: Plastics;  Laterality: Bilateral;    COLONOSCOPY N/A 2024    Procedure: COLONOSCOPY;  Surgeon: Pamela Ernst MD;  Location: [facility] ENDOSCOPY;  Service: Gastroenterology;  Laterality: N/A;    COSMETIC SURGERY      Tissue expander insertion    DIAGNOSTIC LAPAROSCOPY, SALPINGO OOPHORECTOMY LAPAROSCOPIC N/A 2024    Procedure: DIAGNOSTIC LAPAROSCOPY, SALPINGO OOPHORECTOMY;  Surgeon: Fabby Miranda MD;  Location: [facility] OR;  Service: Obstetrics/Gynecology;  Laterality: N/A;    LYMPH NODE BIOPSY      Left underarm    MASTECTOMY W/ SENTINEL NODE BIOPSY Bilateral 2023    Procedure: BREAST MASTECTOMY WITH NIPPLE SPARING BILATERAL WITH LEFT SENTINEL NODE BIOPSY, AND AXILLARY DISECTION;  Surgeon: Daxa Anderson MD;  Location: [facility] OR;  Service: General;  Laterality: Bilateral;    US GUIDED LYMPH NODE BIOPSY  2023    VENOUS ACCESS DEVICE (PORT) INSERTION  2023       Family History:   Family History   Problem Relation Age of Onset    Breast cancer Mother 55        Stage 4    COPD Mother     Diabetes Mother     Early death Mother     Heart disease Mother     Stroke Mother     Cancer Mother         Stage 4, unknown origin,  at 57    Rheum arthritis Maternal Aunt     Breast cancer Paternal Aunt     Cancer Paternal Aunt         Breast cancer    COPD Maternal Grandfather     Stroke Maternal Grandfather     Breast cancer Other     Cancer Paternal Grandmother         Ovarian cancer,  at 55    Asthma Maternal Aunt      Social History:   Social History     Socioeconomic History    Marital status:    Tobacco Use    Smoking status: Former     Current packs/day: 0.00     Average packs/day: 0.3 packs/day for 10.0 years (2.5 ttl pk-yrs)     Types: Cigarettes     Start date: 1995     Quit date: 2005     Years since quittin.0     Passive exposure: Past    Smokeless tobacco: Never   Vaping Use    Vaping status: Never Used   Substance  "and Sexual Activity    Alcohol use: Yes     Comment: Beer once in maybe a month, not very often    Drug use: Never    Sexual activity: Yes     Partners: Male     Birth control/protection: Bilateral salpingectomy      Comment: 2/5 had a bilateral salpingo-oopherectomy   , lives in [place].     Medications:     Current Outpatient Medications:     fluticasone (FLONASE) 50 MCG/ACT nasal spray, USE 1 SPRAY TO EACH NOSTRIL EVERY DAY, Disp: 48 g, Rfl: 0    hydrocortisone 2.5 % cream, , Disp: , Rfl:     ketoconazole (NIZORAL) 2 % cream, , Disp: , Rfl:     letrozole (FEMARA) 2.5 MG tablet, Take 1 tablet by mouth Daily., Disp: 90 tablet, Rfl: 3    metoprolol succinate XL (TOPROL-XL) 25 MG 24 hr tablet, Take 1 tablet by mouth Daily., Disp: 90 tablet, Rfl: 3    omeprazole (priLOSEC) 20 MG capsule, Take 1 capsule by mouth Daily., Disp: , Rfl:     ondansetron (ZOFRAN) 8 MG tablet, Take 1 tablet by mouth 3 (Three) Times a Day As Needed for Nausea or Vomiting (DO NOT USE WHILE SANCUSO IN PLACE)., Disp: 30 tablet, Rfl: 5    Allergies:   Allergies   Allergen Reactions    Chlorhexidine Other (See Comments)     Burning    Nylon Hives    Sunblock [Solbar Pf Spf15] Rash       Objective     Vital Signs:   Vitals:    02/24/25 1000   BP: 128/67   Pulse: 64   Resp: 16   Temp: 98 °F (36.7 °C)   SpO2: 98%   Weight: 78.2 kg (172 lb 6.4 oz)   Height: 170.2 cm (67.01\")   PainSc: 3   Comment: Hip and Joint Pain      Body mass index is 27 kg/m².   Pain Score    02/24/25 1000   PainSc: 3   Comment: Hip and Joint Pain     ECOG Performance Status: 0    Physical Exam:   General: No acute distress.   HEENT: Normocephalic, atraumatic. Sclera anicteric.   Neck: Asymmetric fullness in the left neck without discrete adenopathy  Cardiovascular: regular rate and rhythm. No murmurs.   Respiratory: Normal rate. Clear to auscultation bilaterally.  Abdomen: Soft, nontender, non distended with normoactive bowel sounds.  Lymph: no cervical, " supraclavicular or axillary adenopathy.  Neuro: Alert and oriented x 3. No focal deficits.   Ext: Symmetric, no swelling.   Psych: Euthymic.  Breast: no palpable masses bilaterally s/p bilateral mastectomy      Laboratory/Imaging Reviewed:     Office Visit on 02/17/2025   Component Date Value Ref Range Status    TSH 02/17/2025 3.680  0.270 - 4.200 uIU/mL Final    Free T4 02/17/2025 0.93  0.92 - 1.68 ng/dL Final    Total Cholesterol 02/17/2025 236 (H)  0 - 200 mg/dL Final    Comment: Cholesterol Reference Ranges  (U.S. Department of Health and Human Services ATP III  Classifications)  Desirable          <200 mg/dL  Borderline High    200-239 mg/dL  High Risk          >240 mg/dL  Triglyceride Reference Ranges  (U.S. Department of Health and Human Services ATP III  Classifications)  Normal           <150 mg/dL  Borderline High  150-199 mg/dL  High             200-499 mg/dL  Very High        >500 mg/dL  HDL Reference Ranges  (U.S. Department of Health and Human Services ATP III  Classifications)  Low     <40 mg/dl (major risk factor for CHD)  High    >60 mg/dl ('negative' risk factor for CHD)  LDL Reference Ranges  (U.S. Department of Health and Human Services ATP III  Classifications)  Optimal          <100 mg/dL  Near Optimal     100-129 mg/dL  Borderline High  130-159 mg/dL  High             160-189 mg/dL  Very High        >189 mg/dL  LDL is calculated using the NIH LDL-C calculation.      Triglycerides 02/17/2025 76  0 - 150 mg/dL Final    HDL Cholesterol 02/17/2025 83 (H)  40 - 60 mg/dL Final    VLDL Cholesterol Bill 02/17/2025 13  5 - 40 mg/dL Final    LDL Chol Calc (NIH) 02/17/2025 140 (H)  0 - 100 mg/dL Final    WBC 02/17/2025 3.82  3.40 - 10.80 10*3/mm3 Final    RBC 02/17/2025 4.20  3.77 - 5.28 10*6/mm3 Final    Hemoglobin 02/17/2025 12.5  12.0 - 15.9 g/dL Final    Hematocrit 02/17/2025 38.2  34.0 - 46.6 % Final    MCV 02/17/2025 91.0  79.0 - 97.0 fL Final    MCH 02/17/2025 29.8  26.6 - 33.0 pg Final    MCHC  02/17/2025 32.7  31.5 - 35.7 g/dL Final    RDW 02/17/2025 12.6  12.3 - 15.4 % Final    Platelets 02/17/2025 278  140 - 450 10*3/mm3 Final    Neutrophil Rel % 02/17/2025 57.9  42.7 - 76.0 % Final    Lymphocyte Rel % 02/17/2025 31.9  19.6 - 45.3 % Final    Monocyte Rel % 02/17/2025 6.5  5.0 - 12.0 % Final    Eosinophil Rel % 02/17/2025 2.9  0.3 - 6.2 % Final    Basophil Rel % 02/17/2025 0.8  0.0 - 1.5 % Final    Neutrophils Absolute 02/17/2025 2.21  1.70 - 7.00 10*3/mm3 Final    Lymphocytes Absolute 02/17/2025 1.22  0.70 - 3.10 10*3/mm3 Final    Monocytes Absolute 02/17/2025 0.25  0.10 - 0.90 10*3/mm3 Final    Eosinophils Absolute 02/17/2025 0.11  0.00 - 0.40 10*3/mm3 Final    Basophils Absolute 02/17/2025 0.03  0.00 - 0.20 10*3/mm3 Final    Immature Granulocyte Rel % 02/17/2025 0.0  0.0 - 0.5 % Final    Immature Grans Absolute 02/17/2025 0.00  0.00 - 0.05 10*3/mm3 Final    nRBC 02/17/2025 0.0  0.0 - 0.2 /100 WBC Final    Glucose 02/17/2025 92  65 - 99 mg/dL Final    BUN 02/17/2025 11  6 - 20 mg/dL Final    Creatinine 02/17/2025 0.72  0.57 - 1.00 mg/dL Final    EGFR Result 02/17/2025 104.6  >60.0 mL/min/1.73 Final    Comment: GFR Categories in Chronic Kidney Disease (CKD)/X09/  /X09/  GFR Category          GFR (mL/min/1.73)    Interpretation  G1/X09/                    90 or greater/X09/        Normal or high  (1)  G2//                    60-89                Mild decrease  (1)  G3a                   45-59                Mild to moderate  decrease  G3b                   30-44                Moderate to  severe decrease  G4                    15-29                Severe decrease  G5                    14 or less           Kidney failure//  /[id number]/  (1)In the absence of evidence of kidney disease, neither  GFR category G1 or G2 fulfill the criteria for CKD.  eGFR calculation 2021 CKD-EPI creatinine equation, which  does not include race as a factor      BUN/Creatinine Ratio 02/17/2025 15.3  7.0 - 25.0  Final    Sodium 02/17/2025 139  136 - 145 mmol/L Final    Potassium 02/17/2025 4.0  3.5 - 5.2 mmol/L Final    Chloride 02/17/2025 103  98 - 107 mmol/L Final    Total CO2 02/17/2025 27.8  22.0 - 29.0 mmol/L Final    Calcium 02/17/2025 10.1  8.6 - 10.5 mg/dL Final    Total Protein 02/17/2025 7.6  6.0 - 8.5 g/dL Final    Albumin 02/17/2025 4.2  3.5 - 5.2 g/dL Final    Globulin 02/17/2025 3.4  gm/dL Final    A/G Ratio 02/17/2025 1.2  g/dL Final    Total Bilirubin 02/17/2025 0.4  0.0 - 1.2 mg/dL Final    Alkaline Phosphatase 02/17/2025 103  39 - 117 U/L Final    AST (SGOT) 02/17/2025 26  1 - 32 U/L Final    ALT (SGPT) 02/17/2025 18  1 - 33 U/L Final       Assessment / Plan      Assessment/Plan:     1.  Malignant neoplasm of central portion of left breast in female, estrogen receptor positive, lymph node positive  2.  AI use  3.  AI arthralgias  4.  Bilateral frozen shoulder  -She is status post neoadjuvant chemotherapy.  This was terminated early for taxane pneumonitis.  She has fully recovered and underwent bilateral mastectomy showing 2.5 cm of residual disease and 2 of 6 lymph nodes positive  -Completed adjuvant PMRT.  -Discussed options for extended duration endocrine therapy in the context of lymph node positive disease.    -She completed BSO and is tolerating Arimidex.  She is having progressive AI arthralgias which are more noticeable now that her severe shoulder pain from bilateral frozen shoulder has improved.  I am going to have her trial exemestane to see if her joint symptoms are more tolerable on this regimen.  -BRCA negative, no indication for adjuvant PARP inhibitor  -With respect to adjuvant CDK 4 6 inhibitors, she has 2 lymph nodes positive but tumor size less than 5cm, grade 2, and Ki-67 5% so she does not qualify for adjuvant abemaciclib. We discussed new FDA approval for Ribociclib. She is within 18 mo of initiating endocrine therapy and is interested in a trial. She is going to discuss with her   and call us with a final decision, but we will initiate PA in the meantime and obtain baseline EKG (prior Qtc borderline).  -Change letrozole to anastrozole. If joint stiffness persists, trial duloxetine or acupuncture.   -MIGUEL on exam.   -We could consider adjuvant bisphosphonate.    5. Bone health  -Baseline DEXA shows osteopenia.   Plan to repeat DEXA every 2-years while on endocrine therapy.    -Encourage adequate calcium intake, vitamin D supplementation, and weightbearing exercise as tolerated to maintain bone density.   -ordered and discussed  -Consider adjuvant bisphosphonate after tolerating AI. We have previously discussed, consider in Spring 2025, once tolerating AI +ribo    6. Access:  Status post port removal    7.  Palpitations  -Monitor after AI switch. Follow up with cardiology if persistent symptoms    9. HLD  -reviewed recent lipid panel. She is planning to resume Anastrozole. Recommend follow up with PCP re: NAY    10. Vaginal dryness  -Trial of vaginal moisturizers followed by Hyalogyn    Orders Placed This Encounter   Procedures    ECG 12 Lead     Patient Instructions   Call with your decision about Kisqali, we will arrange a chemo education in [place] if you would like to proceed.         1) Use a daily moisturizer such as Replens or Oasis. Use a lubricant such as Astroglide with intercourse.  2) If this is ineffective, trial of Hyalogyn, purchased online: www.hyalogyn.com    HYALO GYN®   Does HYALO GYN® contain estrogen?  No, HYALO GYN® is completely estrogen free.  How often is HYALO GYN® applied?  One application every three days for a period of 30 days is recommended, unless otherwise recommended by your health care provider.       Follow Up:   2 weeks after starting kisqali or 3 mo if elects against Kisqali    Martha Olguin MD  Hematology and Oncology     I have spent a total of >40 min on the day of service including time before, during, and after the office visit on reviewing  test results/preparing to see patient, counseling patient, performing medically appropriate exam, placing orders, coordinating care and documenting clinical information in the electronic or other health record

## 2025-02-25 NOTE — PROGRESS NOTES
Oral Chemotherapy - Double Check    Drug:   ribociclib succinate, 400 MG Dose, (KISQALI) 200 MG tablet therapy pack tablet  Dose: 400 mg Route: Oral Frequency: Daily   Dispense Quantity: 42 tablet Refills: 11 Fills remainin         Sig: Take 2 tablets by mouth Daily. Take 400 mg by mouth daily for 21 days then off 7 days of each 28-day cycle     Released to pharmacy: [facility]    I completed an independent review of the medication order and prescription, and I agree with the assessment and note.     Ann Cowden Mayer, PharmD, BCPS  Clinical Oncology Pharmacy Specialist  Phone [phone]    2025  08:52 EST

## 2025-02-26 ENCOUNTER — TELEPHONE (OUTPATIENT)
Dept: INTERNAL MEDICINE | Facility: CLINIC | Age: 45
End: 2025-02-26
Payer: COMMERCIAL

## 2025-02-26 ENCOUNTER — PATIENT MESSAGE (OUTPATIENT)
Dept: ONCOLOGY | Facility: CLINIC | Age: 45
End: 2025-02-26
Payer: COMMERCIAL

## 2025-02-26 NOTE — TELEPHONE ENCOUNTER
Contacted patient via telephone at phone number provided in chart at 15:31.  Patient denied the area being warm or having drainage.  Patient denied fever/chills. Patient stated she has normal movement of all fingers.  She stated it is not painful right now but it was slightly painful when driving and gripping the steering wheel.  Dr. Olguin notified and patient contacted back via telephone at 16:25 and advised that if she develops a fever/chills or the area becomes warm and/or red, she should go to [facility] for evaluation but otherwise Dr. Olguin advises that she get scheduled to be seen by her PCP for evaluation. Patient verbalized understanding.

## 2025-02-26 NOTE — TELEPHONE ENCOUNTER
----- Message from Dorinda Covington sent at 2/26/2025  2:08 PM EST -----  Results have been reviewed. Please notify patient of abnormal results.  Cholesterol is mildly elevated, but good cholesterol is high, which is protective.  Thyroid studies have normalized.  All other labs are normal as well.

## 2025-02-26 NOTE — PROGRESS NOTES
The patient cannot fill ribociclib at [facility]. The patient's insurance requires her to fill ribociclib at [facility] instead. I am sending a new ribociclib prescription to [facility] today on 2/26/25.    Dorothy Tavera, PharmD  Clinic Oncology Pharmacy Specialist  [phone]

## 2025-02-26 NOTE — TELEPHONE ENCOUNTER
"LVM for pt to return call to our office for lab results.     Relay     \"Results have been reviewed. Please notify patient of abnormal results.  Cholesterol is mildly elevated, but good cholesterol is high, which is protective.  Thyroid studies have normalized.  All other labs are normal as well. \"                  "

## 2025-02-28 ENCOUNTER — SPECIALTY PHARMACY (OUTPATIENT)
Facility: HOSPITAL | Age: 45
End: 2025-02-28
Payer: COMMERCIAL

## 2025-02-28 ENCOUNTER — HOSPITAL ENCOUNTER (OUTPATIENT)
Facility: HOSPITAL | Age: 45
Discharge: HOME | End: 2025-02-28
Payer: COMMERCIAL

## 2025-02-28 DIAGNOSIS — C50.112 MALIGNANT NEOPLASM OF CENTRAL PORTION OF LEFT BREAST IN FEMALE, ESTROGEN RECEPTOR POSITIVE: ICD-10-CM

## 2025-02-28 DIAGNOSIS — Z17.0 MALIGNANT NEOPLASM OF CENTRAL PORTION OF LEFT BREAST IN FEMALE, ESTROGEN RECEPTOR POSITIVE: ICD-10-CM

## 2025-02-28 LAB
QT INTERVAL: 384 MS
QTC INTERVAL: 423 MS

## 2025-02-28 PROCEDURE — 93005 ELECTROCARDIOGRAM TRACING: CPT | Performed by: INTERNAL MEDICINE

## 2025-02-28 NOTE — PROGRESS NOTES
Specialty Pharmacy Patient Management Program  Oncology Initial Assessment       Kimberley Win is a 45 y.o. female with breast cancer seen by an Oncology provider and enrolled in the Oncology Patient Management program offered by [facility].  An initial outreach was conducted, including assessment of therapy appropriateness and specialty medication education for ribociclib and anastrozole. The patient was introduced to services offered by [facility], including: regular assessments, refill coordination, curbside pick-up or mail order delivery options, prior authorization maintenance, and financial assistance programs as applicable. The patient was also provided with contact information for the pharmacy team.     Regimen: Ribociclib 400mg PO daily Days 1-21 then 7 days off of a 28-day cycle + anastrozole 1mg PO daily    Start date of oral specialty medication:  Patient has already started on anastrozole and will add in ribociclib once delivered    Relevant Past Medical History, Comorbidities, and Vaccines  Relevant medical history and concomitant health conditions were discussed with the patient. The patient's chart has been reviewed for relevant past medical history and comorbid health conditions and updated as necessary.  Vaccines are coordinated by the patient's oncologist and primary care provider.  Past Medical History:   Diagnosis Date    Abnormal ECG 8/23/23    Anemia     Chemo    Arthritis     Arthritis of back     Breast cancer 06/01/2023    Left Breast    Elevated cholesterol     improved, not currently taking medication    History of chemotherapy 09/05/2023    last treatment    History of radiation therapy     left breast, currently in treatment    Hyperlipidemia LDL goal <100 11/26/2024    Hypertension     Malignant neoplasm of central portion of left breast in female, estrogen receptor positive 06/20/2023    Osteoarthritis 2021     Social History      Socioeconomic History    Marital status:    Tobacco Use    Smoking status: Former     Current packs/day: 0.00     Average packs/day: 0.3 packs/day for 10.0 years (2.5 ttl pk-yrs)     Types: Cigarettes     Start date: 1995     Quit date: 2005     Years since quittin.0     Passive exposure: Past    Smokeless tobacco: Never   Vaping Use    Vaping status: Never Used   Substance and Sexual Activity    Alcohol use: Yes     Comment: Beer once in maybe a month, not very often    Drug use: Never    Sexual activity: Yes     Partners: Male     Birth control/protection: Bilateral salpingectomy      Comment:  had a bilateral salpingo-oopherectomy       Allergies  Known allergies and reactions were discussed with the patient. The patient's chart has been reviewed for allergy information and updated as necessary.   Allergies   Allergen Reactions    Chlorhexidine Other (See Comments)     Burning    Nylon Hives    Sunblock [Solbar Pf Spf15] Rash        Current Medication List  This medication list has been reviewed with the patient and evaluated for any interactions or necessary modifications/recommendations, and updated to include all prescription medications, OTC medications, and supplements the patient is currently taking.  This list reflects what is contained in the patient's profile, which has also been marked as reviewed to communicate to other providers it is the most up to date version of the patient's current medication therapy.   Prior to Admission medications    Medication Sig Start Date End Date Taking? Authorizing Provider   anastrozole (ARIMIDEX) 1 MG tablet Take 1 tablet by mouth Daily. 25   Martha Olguin MD   fluticasone (FLONASE) 50 MCG/ACT nasal spray USE 1 SPRAY TO EACH NOSTRIL EVERY DAY 25   Dorinda Covington DO   hydrocortisone 2.5 % cream  24   ProviderKelsie MD   ketoconazole (NIZORAL) 2 % cream  24   Kelsie Espinoza MD   metoprolol  succinate XL (TOPROL-XL) 25 MG 24 hr tablet Take 1 tablet by mouth Daily. 1/27/25   Sriram Costello MD   omeprazole (priLOSEC) 20 MG capsule Take 1 capsule by mouth Daily. 11/26/24   Theresa Mayes APRN   ondansetron (ZOFRAN) 8 MG tablet Take 1 tablet by mouth 3 (Three) Times a Day As Needed for Nausea or Vomiting (DO NOT USE WHILE SANCUSO IN PLACE). 7/17/23   Martha Olguin MD   ribociclib succinate, 400 MG Dose, (KISQALI) 200 MG tablet therapy pack tablet Take 2 tablets by mouth Daily. on days 1-21 then off 7 days in each 28-day cycle. 2/27/25   Martha Olguin MD   letrozole (FEMARA) 2.5 MG tablet Take 1 tablet by mouth Daily. 12/23/24 2/17/25  Kelsie Espinoza MD   letrozole (FEMARA) 2.5 MG tablet Take 1 tablet by mouth Daily. 2/18/25 2/24/25  Martha Olguin MD   ribociclib succinate, 400 MG Dose, (KISQALI) 200 MG tablet therapy pack tablet Take 2 tablets by mouth Daily. Take 400 mg by mouth daily for 21 days then off 7 days of each 28-day cycle 2/24/25 2/26/25  Martha Olguin MD   ribociclib succinate, 400 MG Dose, (KISQALI) 200 MG tablet therapy pack tablet Take 2 tablets by mouth Daily. Take 400 mg by mouth daily for 21 days then off 7 days of each 28-day cycle 2/26/25 2/27/25  Martha Olguin MD       Drug Interactions  Reviewed concomitant medications, allergies, labs, comorbidities/medical history, quality of life, and immunization history.   Drug-drug interactions noted and discussed during education:   no significant drug interactions noted.   Reminded the patient to let us know before making any changes or starting any new prescription or OTC medications so we can first assess drug interactions.  Drug-food interactions noted and discussed during education. Patient was instructed to avoid eating grapefruit and drinking grapefruit juice and eating pomegranate and drinking pomegranate juice    Relevant Laboratory Values  Lab Results   Component Value Date    GLUCOSE 92 02/17/2025     CALCIUM 10.1 02/17/2025     02/17/2025    K 4.0 02/17/2025    CO2 27.8 02/17/2025     02/17/2025    BUN 11 02/17/2025    CREATININE 0.72 02/17/2025    BCR 15.3 02/17/2025    ANIONGAP 9.5 11/26/2024     Lab Results   Component Value Date    WBC 3.82 02/17/2025    RBC 4.20 02/17/2025    HGB 12.5 02/17/2025    HCT 38.2 02/17/2025    MCV 91.0 02/17/2025    MCH 29.8 02/17/2025    MCHC 32.7 02/17/2025    RDW 12.6 02/17/2025    RDWSD 39.4 11/26/2024    MPV 9.0 11/26/2024     02/17/2025    NEUTRORELPCT 57.9 02/17/2025    LYMPHORELPCT 31.9 02/17/2025    MONORELPCT 6.5 02/17/2025    EOSRELPCT 2.9 02/17/2025    BASORELPCT 0.8 02/17/2025    AUTOIGPER 0.3 11/26/2024    NEUTROABS 2.21 02/17/2025    LYMPHSABS 1.22 02/17/2025    MONOSABS 0.25 02/17/2025    EOSABS 0.11 02/17/2025    BASOSABS 0.03 02/17/2025    AUTOIGNUM 0.01 11/26/2024    NRBC 0.0 02/17/2025       The above labs have been reviewed. No dose adjustments are needed for the oral specialty medication(s) based on the labs.    Initial Education Provided for Specialty Medication  The patient has been provided with the following education. All questions and concerns have been addressed prior to the patient receiving the medication, and the patient has verbalized understanding of the education and any materials provided.  Additional patient education shall be provided and documented upon request by the patient, provider or payer.      Provided patient with:   Chemo calendar to help improve adherence., Education sheets about the medication, 24-hour clinic phone number and my contact information and instructions to call should additional questions arise.     Medication Education Sheets Provided: (select all that apply)  Oral Specialty Medication: Arimidex (anastrozole) and Kisqali (ribociclib)  IV or SQ:  none  Steroid: None    Other Education Sheets Provided: (select all that apply)  Adherence, CINV, Diarrhea, Symptom Tracker Sheet, and Proper Disposal  Information    TOPICS COMMENTS   Storage and Handling of Oral Specialty Medication Store in the original container, in a dry location out of direct sunlight, and out of reach of children or pets. Store at room temperature.  Discussed safe handling and what to do with any unused medication.   Administration of Oral Specialty Medication Take with or without food at the same time(s) each day. Take with a full glass of water. Do not crush or chew tablets.   Adherence to Oral Specialty Regimen and Handling Missed Doses Patient is likely to have good treatment adherence; reinforced the importance of adherence. Reviewed how to address missed doses and encouraged the patient to let us know of any missed doses.   Anemia: role of RBC, cause, s/s, ways to manage, role of transfusion Reviewed the role of RBC and the use of transfusions if hemoglobin decreases too much.  Patient to notify us if she experiences shortness of breath, dizziness, or palpitations.  Also let patient know that she could feel more tired than usual and to try to stay active, but rest if she needs to.    Thrombocytopenia: role of platelet, cause, s/s, ways to prevent bleeding, things to avoid, when to seek help Reviewed the role of platelets in blood clotting and when to call clinic (bloody nose that bleeds for 5 minutes despite pressure, a cut that won't stop bleeding despite pressure, gums that bleed excessively with brushing or flossing). Recommended using an electric razor, soft bristle toothbrush, and blowing your nose gently.    Neutropenia: role of WBC, cause, infection precautions, s/s of infection, when to call MD Reviewed the role of WBC, good infection prevention practices, and when to call the clinic (temperature 100.4F, sore throat, burning urination, etc).   Nutrition and Appetite Changes:  importance of maintaining healthy diet & weight, ways to manage to improve intake, dietary consult, exercise regimen, electrolyte and/or blood glucose  abnormalities    Diarrhea: causes, s/s of dehydration, ways to manage, dietary changes, when to call MD Discussed the risk of diarrhea. Instructed patient on use OTC loperamide with diarrhea onset, but emphasized the importance of calling the clinic if 4-6 episodes in 24 hours not relieved by OTC loperamide.   Constipation: causes, ways to manage, dietary changes, when to call MD Provided supplementary handout with instructions for use of docusate and other OTC therapies to manage constipation.  Patient was instructed to call us if medications aren't working.   Nausea/Vomiting: cause, use of antiemetics, dietary changes, when to call MD Emetic risk: Minimal  PRN home meds: Ondansetron 8mg PO every 8 hours PRN nausea / vomiting    Instructed the patient to take a dose of the PRN medication at the first onset of nausea and if it's not working to call us for additional medications.  Also provided non-drug measures to mitigate nausea.   Mouth Sores: causes, oral care, ways to manage    Alopecia: cause, ways to manage, resources Discussed the possibility of hair loss with the patient. Informed patient that she could request a prescription for a wig if desired and most of the cost is usually covered by insurance. Recommended covering the head with a hat and/or protecting the skin on the head with SPF 30 or higher.    Nervous System Changes: causes, s/s, neuropathies, cognitive changes, ways to manage    Pain: causes, ways to manage Discussed muscle and joint aches/pains with therapy, and recommended the use of OTC pain relief with ibuprofen or acetaminophen if needed.   Skin/Nail Changes: cause, s/s, ways to manage Fingernails/Toenails: Informed the patient of the possibility for dark or white lines on finger and toenails during treatment, and that nails may become brittle and even fall off in extreme cases. This will likely reverse after treatment concludes. , Generalized Rash: Discussed the potential for a rash or itchy  skin, offered nonpharmacologic prevention strategies, and instructed the patient to call if a rash develops and worsens.   Organ Toxicities: cause, s/s, need for diagnostic tests, labs, when to notify MD Discussed potential effects on organ systems, monitoring, diagnostic tests, labs, and when to notify the MD. Discussed the signs/symptoms of the following: cardiotoxicity, hepatotoxicity, lung changes, nephrotoxicity, and skin changes.   Miscellaneous Financial Issues: Patient will be getting from [employer]   Lab Draws: On or before day 1 of each cycle, no sooner than 3 days early.  Patient is getting an EKG today and will get another around Cycle 1, Day 15.   Infertility and Sexuality:  causes, fertility preservation options, sexuality changes, ways to manage, importance of birth control Oral Oncology Therapy: Reviewed safe sex practices and the importance of minimizing exposure to body fluids while on oral oncology therapy. The patient is not of childbearing potential.   Home Care: how to manage bodily fluids Counseled on management of soiled linens and proper flush technique.  Discussed how to manage all the side effects at home and advised when to contact the MD office     Adherence and Self-Administration  Barriers to Patient Adherence and/or Self-Administration: no barriers identified  Methods for Supporting Patient Adherence and/or Self-Administration: Dosing calendar  Expected duration of therapy:  3 years of ribociclib    Goals of Therapy  Patient Goals of Therapy:   Consistently take medications as prescribed  Patient will adhere to medication regimen  Patient will report any medication side effects to healthcare provider  Clinical Goals:    Goals Addressed Today    None       Support patient understanding of medication regimen  Ensure patient knows the pharmacy contact information  Schedule regular follow-up to monitor the treatment serious adverse events  Schedule regular follow-up to confirm  medication adherence  Schedule regular follow-up to monitor disease progression or stability    Quality of Life Assessment   The patient's current (pre-therapy) quality of life was discussed with the patient. The QOL segment of this outreach has been reviewed and updated.   Quality of Life Score: 10/10    Reassessment Plan & Follow-Up  Pharmacist to perform regular reassessments no more than (6) months from the previous assessment.  Welcome information and patient satisfaction survey to be sent by retail team with patient's initial fill.  Care Coordinator to set up future refill outreaches, coordinate prescription delivery, and escalate clinical questions to pharmacist.     Additional Plans, Therapy Recommendations or Therapy Problems to Be Addressed: no further concerns     Attestation  I attest the patient was actively involved in and has agreed to the above plan of care. If the prescribed therapy is at any point deemed not appropriate based on the current or future assessments, a consultation will be initiated with the patient's specialty care provider to determine the best course of action. The revised plan of therapy will be documented along with any required assessments and/or additional patient education provided.     Maxine Torres PharmD, [facility]  Clinical Oncology Pharmacy Specialist  Phone: [phone]      Date and Time: 2/28/2025  10:25 EST

## 2025-03-03 ENCOUNTER — OFFICE VISIT (OUTPATIENT)
Dept: INTERNAL MEDICINE | Facility: CLINIC | Age: 45
End: 2025-03-03
Payer: COMMERCIAL

## 2025-03-03 VITALS
OXYGEN SATURATION: 99 % | SYSTOLIC BLOOD PRESSURE: 118 MMHG | HEART RATE: 78 BPM | TEMPERATURE: 98 F | HEIGHT: 67 IN | WEIGHT: 171 LBS | DIASTOLIC BLOOD PRESSURE: 76 MMHG | BODY MASS INDEX: 26.84 KG/M2

## 2025-03-03 DIAGNOSIS — S09.90XD TRAUMATIC INJURY OF HEAD, SUBSEQUENT ENCOUNTER: ICD-10-CM

## 2025-03-03 DIAGNOSIS — R22.32 NODULE OF SKIN OF LEFT HAND: Primary | ICD-10-CM

## 2025-03-03 PROCEDURE — 99214 OFFICE O/P EST MOD 30 MIN: CPT | Performed by: FAMILY MEDICINE

## 2025-03-03 NOTE — PROGRESS NOTES
Kimberley Win is a 45 y.o. female.    Chief Complaint   Patient presents with    Cyst     On left hand. Just popped up, hurts when something touches it.        HPI   History of Present Illness  The patient presents with a nodule on her left hand.    She reports a tender nodule on her left hand since 02/26/2024. The nodule is painful only when touched or during activities like lifting or washing dishes. No injury preceded the nodule. Her  recalls a physical therapy session the day before its appearance, but she doubts it caused the nodule. She feels pressure on the tendon due to the nodule. She is concerned about its sudden onset and has not treated it with ice or heat. She has Voltaren gel at home from a previous elbow injury.    She continues to experience head pain from a fall 2 months ago, especially when lying down. She also reports occasional headaches, sometimes due to missing her morning coffee. The pain is more noticeable at night, requiring specific head positioning to avoid discomfort. She is unsure if these symptoms are normal post-head injury.    Supplemental Information  Dr. Olguin switched her back to anastrozole due to slightly elevated cholesterol. She had heart issues from exemestane, and the cardiologist checked her thyroid, which was elevated. She is starting on Aroclor.        The following portions of the patient's history were reviewed and updated as appropriate: allergies, current medications, past family history, past medical history, past social history, past surgical history and problem list.     Allergies   Allergen Reactions    Chlorhexidine Other (See Comments)     Burning    Nylon Hives    Sunblock [Solbar Pf Spf15] Rash         Current Outpatient Medications:     anastrozole (ARIMIDEX) 1 MG tablet, Take 1 tablet by mouth Daily., Disp: 90 tablet, Rfl: 3    fluticasone (FLONASE) 50 MCG/ACT nasal spray, USE 1 SPRAY TO EACH NOSTRIL EVERY DAY, Disp: 48 g, Rfl: 0     "hydrocortisone 2.5 % cream, , Disp: , Rfl:     ketoconazole (NIZORAL) 2 % cream, , Disp: , Rfl:     metoprolol succinate XL (TOPROL-XL) 25 MG 24 hr tablet, Take 1 tablet by mouth Daily., Disp: 90 tablet, Rfl: 3    omeprazole (priLOSEC) 20 MG capsule, Take 1 capsule by mouth Daily., Disp: , Rfl:     ondansetron (ZOFRAN) 8 MG tablet, Take 1 tablet by mouth 3 (Three) Times a Day As Needed for Nausea or Vomiting (DO NOT USE WHILE SANCUSO IN PLACE)., Disp: 30 tablet, Rfl: 5    ribociclib succinate, 400 MG Dose, (KISQALI) 200 MG tablet therapy pack tablet, Take 2 tablets by mouth Daily. on days 1-21 then off 7 days in each 28-day cycle., Disp: 42 tablet, Rfl: 11    ROS    Review of Systems   Constitutional:  Positive for fatigue. Negative for chills, diaphoresis and fever.   HENT:  Negative for congestion, sore throat and swollen glands.    Respiratory:  Negative for cough.    Cardiovascular:  Negative for chest pain.   Gastrointestinal:  Negative for abdominal pain, nausea and vomiting.   Genitourinary:  Negative for dysuria.   Musculoskeletal:  Negative for myalgias and neck pain.   Skin:  Positive for skin lesions. Negative for rash.   Neurological:  Negative for weakness and numbness.       Vitals:    03/03/25 0920   BP: 118/76   Pulse: 78   Temp: 98 °F (36.7 °C)   SpO2: 99%   Weight: 77.6 kg (171 lb)   Height: 170.4 cm (67.07\")   PainSc: 3          Physical Exam     Physical Exam  Constitutional:       General: She is not in acute distress.     Appearance: Normal appearance. She is well-developed.   HENT:      Head: Normocephalic and atraumatic.      Right Ear: External ear normal.      Left Ear: External ear normal.   Eyes:      Extraocular Movements: Extraocular movements intact.      Conjunctiva/sclera: Conjunctivae normal.   Cardiovascular:      Rate and Rhythm: Normal rate.   Pulmonary:      Effort: Pulmonary effort is normal. No respiratory distress.   Abdominal:      General: There is no distension. "   Musculoskeletal:         General: Deformity (pea-sized firm nodule to left palm) present.      Right lower leg: No edema.      Left lower leg: No edema.   Skin:     General: Skin is warm and dry.   Neurological:      Mental Status: She is alert and oriented to person, place, and time.      Cranial Nerves: No cranial nerve deficit.   Psychiatric:         Mood and Affect: Mood normal.         Behavior: Behavior normal.             Diagnoses and all orders for this visit:    1. Nodule of skin of left hand (Primary)  -     Ambulatory Referral to Orthopedic Surgery    2. Traumatic injury of head, subsequent encounter  -     CT Head Without Contrast        Assessment & Plan  1. Nodule on the left hand.  Likely a benign cyst on the tendon.  Referral to Dr. Rayo for further evaluation. Apply ice 2-3 times daily and use Voltaren gel 2 times a day.     2. Head trauma.  Continues to experience tenderness and occasional headaches post-fall. Order CT scan of the head without contrast.     No orders of the defined types were placed in this encounter.      No orders of the defined types were placed in this encounter.      Return if symptoms worsen or fail to improve.    Dorinda Covington, DO

## 2025-03-04 ENCOUNTER — TELEPHONE (OUTPATIENT)
Dept: ONCOLOGY | Facility: CLINIC | Age: 45
End: 2025-03-04
Payer: COMMERCIAL

## 2025-03-04 DIAGNOSIS — Z17.0 MALIGNANT NEOPLASM OF CENTRAL PORTION OF LEFT BREAST IN FEMALE, ESTROGEN RECEPTOR POSITIVE: Primary | ICD-10-CM

## 2025-03-04 DIAGNOSIS — C50.112 MALIGNANT NEOPLASM OF CENTRAL PORTION OF LEFT BREAST IN FEMALE, ESTROGEN RECEPTOR POSITIVE: Primary | ICD-10-CM

## 2025-03-04 RX ORDER — ONDANSETRON 8 MG/1
8 TABLET, FILM COATED ORAL 3 TIMES DAILY PRN
Qty: 30 TABLET | Refills: 5 | Status: SHIPPED | OUTPATIENT
Start: 2025-03-04

## 2025-03-04 NOTE — TELEPHONE ENCOUNTER
Advised patient that she does not need an appointment to complete EKG at [facility] and she should go to registration 2 weeks after she begins ribociclib. Also, advised patient to follow up 2 weeks after this medication start with Dr. Olguin. (Currently scheduled 3-24-25).  Advised patient zofran sent to her pharmacy.  Patient verbalized understanding.

## 2025-03-07 DIAGNOSIS — R00.2 PALPITATIONS: ICD-10-CM

## 2025-03-07 DIAGNOSIS — I49.3 PVC (PREMATURE VENTRICULAR CONTRACTION): ICD-10-CM

## 2025-03-10 ENCOUNTER — SPECIALTY PHARMACY (OUTPATIENT)
Dept: ONCOLOGY | Facility: HOSPITAL | Age: 45
End: 2025-03-10
Payer: COMMERCIAL

## 2025-03-10 RX ORDER — METOPROLOL TARTRATE 50 MG
50 TABLET ORAL 2 TIMES DAILY
Qty: 180 TABLET | Refills: 0 | OUTPATIENT
Start: 2025-03-10

## 2025-03-10 NOTE — PROGRESS NOTES
Specialty Pharmacy Patient Management Program         Kisqali received from [facility] specialty pharmacy and first dose taken on 3/9/25.     Mikki Pitts, Pharmacy Care Coordinator  3/10/2025  14:27 EDT

## 2025-03-11 ENCOUNTER — OFFICE VISIT (OUTPATIENT)
Age: 45
End: 2025-03-11
Payer: COMMERCIAL

## 2025-03-11 ENCOUNTER — HOSPITAL ENCOUNTER (OUTPATIENT)
Dept: CT IMAGING | Facility: HOSPITAL | Age: 45
Discharge: HOME | End: 2025-03-11
Admitting: FAMILY MEDICINE
Payer: COMMERCIAL

## 2025-03-11 VITALS
WEIGHT: 171 LBS | HEIGHT: 67 IN | SYSTOLIC BLOOD PRESSURE: 118 MMHG | DIASTOLIC BLOOD PRESSURE: 70 MMHG | BODY MASS INDEX: 26.84 KG/M2

## 2025-03-11 DIAGNOSIS — R22.32 PALMAR NODULE, LEFT: Primary | ICD-10-CM

## 2025-03-11 PROCEDURE — 70450 CT HEAD/BRAIN W/O DYE: CPT

## 2025-03-11 NOTE — PROGRESS NOTES
"                                                                 [facility]     Office Visit       Date: 03/11/2025   Patient Name: Kimberley Win  MRN: [id number]  YOB: 1980    Referring Physician: Dorinda Covington, *     Chief Complaint:   Chief Complaint   Patient presents with    Left Hand - Pain     History of Present Illness:   Kimberley Win is a 45 y.o. female right-hand-dominant seen to clinic as new patient with complaints of left hand nodule.  She reports noticing a nodule to the palmar aspect of her left hand on 2/26/2025.  No known injury or trauma.  She states there is no pain at rest but with heavy gripping and grasping there is increased pain, 5/10.  She denies numbness or tingling.  No contractures of the digits.  No other complaints or concerns.    PMH: HLD, breast cancer, GERD    Subjective   Review of Systems:   Review of Systems   Pertinent review of systems per HPI    I reviewed the patient's chief complaint, history of present illness, review of systems, past medical history, surgical history, family history, social history, medications and allergy list in the EMR on 03/11/2025 and agree with the findings above.    Objective    Vital Signs:   Vitals:    03/11/25 1331   BP: 118/70   Weight: 77.6 kg (171 lb)   Height: 170.4 cm (67.09\")     General: No acute distress. Alert and oriented.   Cardiovascular: Palpable radial pulse.   Respiratory: Breathing is nonlabored.   Ortho Exam:  Examination of the left upper extremity demonstrates no deformity.  No skin lesions or abrasions.  There is a small superficial palmar nodule within the palmar aspect of the hand in line with the long finger.  This is mildly tender to deep palpation.  No pits or additional nodules.  No cords.  No contractures of the PIP or MCP joints.  She is able to lay her hand flat on the table.  She is able to make a composite fist.  Sensation is intact to light touch of the hand.  " Warm well-perfused distally.    Imaging / Studies:    Imaging Results (Last 24 Hours)       ** No results found for the last 24 hours. **        Left wrist x-rays obtained on 1/4/2025 were personally reviewed inter by myself.  No evidence of acute fracture or dislocation.  Assessment / Plan    Assessment/Plan:   Kimberley Win is a 45 y.o. female with a left hand isolated palmar nodule.    I discussed with the patient their clinical and radiographic findings demonstrate hand palmar nodule.  We had a lengthy discussion regarding the pathophysiology of their diagnosis.  Her exam demonstrates an isolated palmar nodule.  There is no evidence of Dupuytren's contracture.  I discussed that these nodules are benign and typically regress with time.  There are no concerning exam findings given the patient's history of prior breast cancer.  We will continue to monitor for any progression.  I will see her back in 4 months for reevaluation.  They were agreeable with the plan.  All questions and concerns were addressed.       ICD-10-CM ICD-9-CM   1. Palmar nodule, left  R22.32 782.2     Follow Up:   Return in about 4 months (around 7/11/2025) for Follow Up.      Maxine Chadwick MD  [facility] [place] [facility]

## 2025-03-24 ENCOUNTER — OFFICE VISIT (OUTPATIENT)
Dept: ONCOLOGY | Facility: CLINIC | Age: 45
End: 2025-03-24
Payer: COMMERCIAL

## 2025-03-24 ENCOUNTER — LAB (OUTPATIENT)
Dept: LAB | Facility: HOSPITAL | Age: 45
End: 2025-03-24
Payer: COMMERCIAL

## 2025-03-24 ENCOUNTER — HOSPITAL ENCOUNTER (OUTPATIENT)
Dept: CARDIOLOGY | Facility: HOSPITAL | Age: 45
Discharge: HOME | End: 2025-03-24
Payer: COMMERCIAL

## 2025-03-24 VITALS
HEART RATE: 71 BPM | WEIGHT: 171.2 LBS | HEIGHT: 67 IN | RESPIRATION RATE: 16 BRPM | SYSTOLIC BLOOD PRESSURE: 120 MMHG | OXYGEN SATURATION: 99 % | DIASTOLIC BLOOD PRESSURE: 75 MMHG | BODY MASS INDEX: 26.87 KG/M2 | TEMPERATURE: 97.5 F

## 2025-03-24 DIAGNOSIS — Z17.0 MALIGNANT NEOPLASM OF CENTRAL PORTION OF LEFT BREAST IN FEMALE, ESTROGEN RECEPTOR POSITIVE: ICD-10-CM

## 2025-03-24 DIAGNOSIS — C50.112 MALIGNANT NEOPLASM OF CENTRAL PORTION OF LEFT BREAST IN FEMALE, ESTROGEN RECEPTOR POSITIVE: ICD-10-CM

## 2025-03-24 DIAGNOSIS — Z17.0 MALIGNANT NEOPLASM OF CENTRAL PORTION OF LEFT BREAST IN FEMALE, ESTROGEN RECEPTOR POSITIVE: Primary | ICD-10-CM

## 2025-03-24 DIAGNOSIS — C50.112 MALIGNANT NEOPLASM OF CENTRAL PORTION OF LEFT BREAST IN FEMALE, ESTROGEN RECEPTOR POSITIVE: Primary | ICD-10-CM

## 2025-03-24 LAB
ALBUMIN SERPL-MCNC: 4.1 G/DL (ref 3.5–5.2)
ALBUMIN/GLOB SERPL: 1.2 G/DL
ALP SERPL-CCNC: 94 U/L (ref 39–117)
ALT SERPL W P-5'-P-CCNC: 11 U/L (ref 1–33)
ANION GAP SERPL CALCULATED.3IONS-SCNC: 10.3 MMOL/L (ref 5–15)
AST SERPL-CCNC: 19 U/L (ref 1–32)
BASOPHILS # BLD AUTO: 0.03 10*3/MM3 (ref 0–0.2)
BASOPHILS NFR BLD AUTO: 1.2 % (ref 0–1.5)
BILIRUB SERPL-MCNC: 0.3 MG/DL (ref 0–1.2)
BUN SERPL-MCNC: 15 MG/DL (ref 6–20)
BUN/CREAT SERPL: 17.2 (ref 7–25)
CALCIUM SPEC-SCNC: 9.6 MG/DL (ref 8.6–10.5)
CHLORIDE SERPL-SCNC: 103 MMOL/L (ref 98–107)
CO2 SERPL-SCNC: 27.7 MMOL/L (ref 22–29)
CREAT SERPL-MCNC: 0.87 MG/DL (ref 0.57–1)
DEPRECATED RDW RBC AUTO: 37.3 FL (ref 37–54)
EGFRCR SERPLBLD CKD-EPI 2021: 83.3 ML/MIN/1.73
EOSINOPHIL # BLD AUTO: 0.02 10*3/MM3 (ref 0–0.4)
EOSINOPHIL NFR BLD AUTO: 0.8 % (ref 0.3–6.2)
ERYTHROCYTE [DISTWIDTH] IN BLOOD BY AUTOMATED COUNT: 11.9 % (ref 12.3–15.4)
GLOBULIN UR ELPH-MCNC: 3.5 GM/DL
GLUCOSE SERPL-MCNC: 86 MG/DL (ref 65–99)
HCT VFR BLD AUTO: 36.2 % (ref 34–46.6)
HGB BLD-MCNC: 12.2 G/DL (ref 12–15.9)
IMM GRANULOCYTES # BLD AUTO: 0.01 10*3/MM3 (ref 0–0.05)
IMM GRANULOCYTES NFR BLD AUTO: 0.4 % (ref 0–0.5)
LYMPHOCYTES # BLD AUTO: 0.9 10*3/MM3 (ref 0.7–3.1)
LYMPHOCYTES NFR BLD AUTO: 36.6 % (ref 19.6–45.3)
MCH RBC QN AUTO: 30.1 PG (ref 26.6–33)
MCHC RBC AUTO-ENTMCNC: 33.7 G/DL (ref 31.5–35.7)
MCV RBC AUTO: 89.4 FL (ref 79–97)
MONOCYTES # BLD AUTO: 0.11 10*3/MM3 (ref 0.1–0.9)
MONOCYTES NFR BLD AUTO: 4.5 % (ref 5–12)
NEUTROPHILS NFR BLD AUTO: 1.39 10*3/MM3 (ref 1.7–7)
NEUTROPHILS NFR BLD AUTO: 56.5 % (ref 42.7–76)
NRBC BLD AUTO-RTO: 0 /100 WBC (ref 0–0.2)
PLATELET # BLD AUTO: 239 10*3/MM3 (ref 140–450)
PMV BLD AUTO: 8.8 FL (ref 6–12)
POTASSIUM SERPL-SCNC: 4.1 MMOL/L (ref 3.5–5.2)
PROT SERPL-MCNC: 7.6 G/DL (ref 6–8.5)
RBC # BLD AUTO: 4.05 10*6/MM3 (ref 3.77–5.28)
SODIUM SERPL-SCNC: 141 MMOL/L (ref 136–145)
WBC NRBC COR # BLD AUTO: 2.46 10*3/MM3 (ref 3.4–10.8)

## 2025-03-24 PROCEDURE — 80053 COMPREHEN METABOLIC PANEL: CPT

## 2025-03-24 PROCEDURE — 93010 ELECTROCARDIOGRAM REPORT: CPT | Performed by: INTERNAL MEDICINE

## 2025-03-24 PROCEDURE — 85025 COMPLETE CBC W/AUTO DIFF WBC: CPT

## 2025-03-24 PROCEDURE — 36415 COLL VENOUS BLD VENIPUNCTURE: CPT

## 2025-03-24 PROCEDURE — 99214 OFFICE O/P EST MOD 30 MIN: CPT | Performed by: INTERNAL MEDICINE

## 2025-03-24 PROCEDURE — 93005 ELECTROCARDIOGRAM TRACING: CPT | Performed by: INTERNAL MEDICINE

## 2025-03-24 NOTE — PROGRESS NOTES
Hematology and Oncology [place]  Office number [phone]    Fax number [phone]     Follow-up     Date: 25    Patient Name: Kimberley Win  MRN: [id number]  : 1980    Referring Physician: Dr. Daxa Anderson MD    Chief Complaint: follow up    Cancer Staging:  Cancer Staging   Stage IIA (cT2, cN1, cM0, G2, ER+, SD+, HER2-)    History of Present Illness: Kimberley Win is a pleasant 45 y.o. female who presented for evaluation of left breast cancer.     She notes a history of cystic breast disease for many years. She notes a new breast mass that became progressively harder and associated with pain prompting diagnostic workup.     Diagnostic mammogram 23 showed a dense, spiculated mass in the left breast which on ultrasound corresponded to a 3:00 3 cm hypoechoic mass with internal vascularity. In the 7:00 left breast there was a 7 mm mass corresponding on US to a cluster of cysts spanning 1.4 cm. In the left breast there was a 1.7 cm LN with multiple smaller LN.     Left breast biopsy showed grade 2 invasive ductal carcinoma (ER 90%, SD 10%, HER 2 negative 0+ by IHC). Lymph node FNA was postive for malignancy, metastatic ductal carcinoma.  Ki-67 5%    She underwent bilateral mastectomy and sentinel lymph node biopsy on 2023.  Left breast mastectomy showed residual 2.5 cm of invasive ductal carcinoma with associated DCIS.  Margins negative.  [facility] lymph node biopsy was positive (1/2) with extracapsular extension and 1 of 4 additional lymph nodes were positive for metastatic carcinoma (total lymph node count 2 of 6).      Breast cancer risk profile:  Age of menarche:10  G0; infertility  Age of menopause: premenopausal   Family history of breast, ovarian, prostate or pancreatic cancer: mom stage IV breast cancer in her 50s. M Great Aunt, breast cancer.   Genetic testing: negative 36 gene CancerNext panel     Treatment history:  Dose dense AC followed by weekly taxol:  C1, 7/5/2023.  Weekly Taxol terminated after 2 doses for pneumonitis which fully resolved with oral steroids,  Radiation completed 2/1/2024 with Dr. Robison  BSO 2/5/2024  Arimidex 2/26/2024 to 9/2024: arthralgias  Exemestane: 9/2024: mild arthralgias, hot flashes, palpitations  Letrozole: arthralgias  Anastrazole: planning 2/2025  Ribociclib: considering 2/2025    Interval history:  Had a URI with cough and fever 3/12/25. Breathing is mildly off but improving. Persistent fatigue  Started Kisqali and Anastrozole 3/9/25.   So far doing well with no nausea or diarrhea. Has not needed antiemetic. Was having palpitation flare prior but that resolved after she started these drugs.   Joints are doing ok.   Hot flashes stable.   Mood is better.  Has scattered ringworm, hand, right axilla, leg. Lotrimin is helping. Resolved some lesions, others she is just starting to treat.     Past Medical History:   Past Medical History:   Diagnosis Date    Abnormal ECG 8/23/23    Anemia     Chemo    Arthritis     Arthritis of back     Breast cancer 06/01/2023    Left Breast    Elevated cholesterol     improved, not currently taking medication    History of chemotherapy 09/05/2023    last treatment    History of radiation therapy     left breast, currently in treatment    Hyperlipidemia LDL goal <100 11/26/2024    Hypertension     Malignant neoplasm of central portion of left breast in female, estrogen receptor positive 06/20/2023    Osteoarthritis 2021   No personal history of myocardial infarction, cerebrovascular event, or venous thromboembolism.  Osteoarthritis in her hands. No personal history of autoimmune disease. Fhx of RA in her aunt    Past Surgical History:   Past Surgical History:   Procedure Laterality Date    BREAST BIOPSY  6/1/23    BREAST RECONSTRUCTION, BREAST TISSUE EXPANDER REMOVAL, IMPLANT INSERTION Bilateral 11/06/2023    Procedure: IMMEDIATE BREAST RECONSTRUCTION WITH BREAST TISSUE EXPANDER INSERTION AND ALLODERM -  BILATERAL;  Surgeon: Dilip Anderson MD;  Location: [facility] OR;  Service: Plastics;  Laterality: Bilateral;    COLONOSCOPY N/A 2024    Procedure: COLONOSCOPY;  Surgeon: Pamela Ernst MD;  Location: [facility] ENDOSCOPY;  Service: Gastroenterology;  Laterality: N/A;    COSMETIC SURGERY      Tissue expander insertion    DIAGNOSTIC LAPAROSCOPY, SALPINGO OOPHORECTOMY LAPAROSCOPIC N/A 2024    Procedure: DIAGNOSTIC LAPAROSCOPY, SALPINGO OOPHORECTOMY;  Surgeon: Fabby Miranda MD;  Location: [facility] OR;  Service: Obstetrics/Gynecology;  Laterality: N/A;    LYMPH NODE BIOPSY      Left underarm    MASTECTOMY W/ SENTINEL NODE BIOPSY Bilateral 2023    Procedure: BREAST MASTECTOMY WITH NIPPLE SPARING BILATERAL WITH LEFT SENTINEL NODE BIOPSY, AND AXILLARY DISECTION;  Surgeon: Daxa Anderson MD;  Location: [facility] OR;  Service: General;  Laterality: Bilateral;    US GUIDED LYMPH NODE BIOPSY  2023    VENOUS ACCESS DEVICE (PORT) INSERTION  2023       Family History:   Family History   Problem Relation Age of Onset    Breast cancer Mother 55        Stage 4    COPD Mother     Diabetes Mother     Early death Mother     Heart disease Mother     Stroke Mother     Cancer Mother         Stage 4, unknown origin,  at 57    Rheum arthritis Maternal Aunt     Breast cancer Paternal Aunt     Cancer Paternal Aunt         Breast cancer    COPD Maternal Grandfather     Stroke Maternal Grandfather     Breast cancer Other     Cancer Paternal Grandmother         Ovarian cancer,  at 55    Asthma Maternal Aunt      Social History:   Social History     Socioeconomic History    Marital status:    Tobacco Use    Smoking status: Former     Current packs/day: 0.00     Average packs/day: 0.3 packs/day for 10.0 years (2.5 ttl pk-yrs)     Types: Cigarettes     Start date: 1995     Quit date: 2005     Years since quittin.1     Passive exposure: Past    Smokeless tobacco: Never   Vaping Use     "Vaping status: Never Used   Substance and Sexual Activity    Alcohol use: Yes     Comment: Beer once in maybe a month, not very often    Drug use: Never    Sexual activity: Yes     Partners: Male     Birth control/protection: Bilateral salpingectomy      Comment: 2/5 had a bilateral salpingo-oopherectomy   , lives in [place].     Medications:     Current Outpatient Medications:     anastrozole (ARIMIDEX) 1 MG tablet, Take 1 tablet by mouth Daily., Disp: 90 tablet, Rfl: 3    fluticasone (FLONASE) 50 MCG/ACT nasal spray, USE 1 SPRAY TO EACH NOSTRIL EVERY DAY, Disp: 48 g, Rfl: 0    hydrocortisone 2.5 % cream, , Disp: , Rfl:     ketoconazole (NIZORAL) 2 % cream, , Disp: , Rfl:     metoprolol succinate XL (TOPROL-XL) 25 MG 24 hr tablet, Take 1 tablet by mouth Daily., Disp: 90 tablet, Rfl: 3    omeprazole (priLOSEC) 20 MG capsule, Take 1 capsule by mouth Daily., Disp: , Rfl:     ondansetron (ZOFRAN) 8 MG tablet, Take 1 tablet by mouth 3 (Three) Times a Day As Needed for Nausea or Vomiting (DO NOT USE WHILE SANCUSO IN PLACE)., Disp: 30 tablet, Rfl: 5    ondansetron (ZOFRAN) 8 MG tablet, Take 1 tablet by mouth 3 (Three) Times a Day As Needed for Nausea or Vomiting., Disp: 30 tablet, Rfl: 5    ribociclib succinate, 400 MG Dose, (KISQALI) 200 MG tablet therapy pack tablet, Take 2 tablets by mouth Daily. on days 1-21 then off 7 days in each 28-day cycle., Disp: 42 tablet, Rfl: 11    Allergies:   Allergies   Allergen Reactions    Chlorhexidine Other (See Comments)     Burning    Nylon Hives    Sunblock [Solbar Pf Spf15] Rash       Objective     Vital Signs:   Vitals:    03/24/25 1012   BP: 120/75   Pulse: 71   Resp: 16   Temp: 97.5 °F (36.4 °C)   SpO2: 99%   Weight: 77.7 kg (171 lb 3.2 oz)   Height: 170.4 cm (67.09\")   PainSc: 0-No pain      Body mass index is 26.74 kg/m².   Pain Score    03/24/25 1012   PainSc: 0-No pain     ECOG Performance Status: 0    Physical Exam:   General: No acute distress.   HEENT: " Normocephalic, atraumatic. Sclera anicteric.   Neck: Asymmetric fullness in the left neck without discrete adenopathy  Cardiovascular: regular rate and rhythm. No murmurs.   Respiratory: Normal rate. Clear to auscultation bilaterally.  Abdomen: Soft, nontender, non distended with normoactive bowel sounds.  Lymph: no cervical, supraclavicular or axillary adenopathy.  Neuro: Alert and oriented x 3. No focal deficits.   Ext: Symmetric, no swelling.   Psych: Euthymic.  Breast: no palpable masses bilaterally s/p bilateral mastectomy (not re-examined today)  Skin: scattered circular rash with raised edge    Laboratory/Imaging Reviewed:     No visits with results within 2 Week(s) from this visit.   Latest known visit with results is:   Hospital Outpatient Visit on 02/28/2025   Component Date Value Ref Range Status    QT Interval 02/28/2025 384  ms Final    QTC Interval 02/28/2025 423  ms Final       Assessment / Plan      Assessment/Plan:     1.  Malignant neoplasm of central portion of left breast in female, estrogen receptor positive, lymph node positive  2.  AI use  3.  AI arthralgias  4.  Bilateral frozen shoulder  -She is status post neoadjuvant chemotherapy.  This was terminated early for taxane pneumonitis.  She has fully recovered and underwent bilateral mastectomy showing 2.5 cm of residual disease and 2 of 6 lymph nodes positive  -Completed adjuvant PMRT.  -Discussed options for extended duration endocrine therapy in the context of lymph node positive disease.    -She completed BSO and is tolerating Arimidex.  She is having progressive AI arthralgias which are more noticeable now that her severe shoulder pain from bilateral frozen shoulder has improved.  I am going to have her trial exemestane to see if her joint symptoms are more tolerable on this regimen.  -BRCA negative, no indication for adjuvant PARP inhibitor  -With respect to adjuvant CDK 4 6 inhibitors, she has 2 lymph nodes positive but tumor size less  than 5cm, grade 2, and Ki-67 5% so she does not qualify for adjuvant abemaciclib. We discussed new FDA approval for Ribociclib. She is within 18 mo of initiating endocrine therapy and is interested in a trial. She is going to discuss with her  and call us with a final decision, but we will initiate PA in the meantime and obtain baseline EKG (prior Qtc borderline).  -Change letrozole to anastrozole. If joint stiffness persists, trial duloxetine or acupuncture.   -Tolerating Anastrozole and Kisqali for last 2 weeks. Check CBC/CMP today and every 2 weeks. Check EKG today, then as clinically indicated  -We could consider adjuvant bisphosphonate pending further tolerance.    5. Bone health  -Baseline DEXA shows osteopenia.   Plan to repeat DEXA every 2-years while on endocrine therapy.    -Encourage adequate calcium intake, vitamin D supplementation, and weightbearing exercise as tolerated to maintain bone density.   -ordered and discussed  -Consider adjuvant bisphosphonate after tolerating AI. We have previously discussed, consider in Spring 2025, once tolerating AI +ribo    6. Access:  Status post port removal    7.  Palpitations  -Monitor after AI switch. EKG pending. Not current    8. Cough  -Started with URI and improving, repeat scan if fails to resolve. Predated Kisqali and improving    9. HLD  -reviewed recent lipid panel. She is planning to resume Anastrozole. Recommend follow up with PCP re: [id number]    10. Vaginal dryness  -Trial of vaginal moisturizers followed by Hyalogyn      Follow Up:   Every 2 weeks Cbc/cmp  2 weeks NP; 6 weeks cielo Olguin MD  Hematology and Oncology

## 2025-03-25 ENCOUNTER — SPECIALTY PHARMACY (OUTPATIENT)
Dept: ONCOLOGY | Facility: HOSPITAL | Age: 45
End: 2025-03-25
Payer: COMMERCIAL

## 2025-03-25 LAB
QT INTERVAL: 396 MS
QTC INTERVAL: 392 MS

## 2025-04-07 ENCOUNTER — HOSPITAL ENCOUNTER (OUTPATIENT)
Dept: CARDIOLOGY | Facility: HOSPITAL | Age: 45
Discharge: HOME | End: 2025-04-07
Payer: COMMERCIAL

## 2025-04-07 ENCOUNTER — TELEPHONE (OUTPATIENT)
Dept: ONCOLOGY | Facility: CLINIC | Age: 45
End: 2025-04-07

## 2025-04-07 ENCOUNTER — LAB (OUTPATIENT)
Dept: LAB | Facility: HOSPITAL | Age: 45
End: 2025-04-07
Payer: COMMERCIAL

## 2025-04-07 DIAGNOSIS — C50.112 MALIGNANT NEOPLASM OF CENTRAL PORTION OF LEFT BREAST IN FEMALE, ESTROGEN RECEPTOR POSITIVE: ICD-10-CM

## 2025-04-07 DIAGNOSIS — Z17.0 MALIGNANT NEOPLASM OF CENTRAL PORTION OF LEFT BREAST IN FEMALE, ESTROGEN RECEPTOR POSITIVE: ICD-10-CM

## 2025-04-07 DIAGNOSIS — C50.112 MALIGNANT NEOPLASM OF CENTRAL PORTION OF LEFT BREAST IN FEMALE, ESTROGEN RECEPTOR POSITIVE: Primary | ICD-10-CM

## 2025-04-07 DIAGNOSIS — Z17.0 MALIGNANT NEOPLASM OF CENTRAL PORTION OF LEFT BREAST IN FEMALE, ESTROGEN RECEPTOR POSITIVE: Primary | ICD-10-CM

## 2025-04-07 LAB
ALBUMIN SERPL-MCNC: 4.4 G/DL (ref 3.5–5.2)
ALBUMIN/GLOB SERPL: 1.5 G/DL
ALP SERPL-CCNC: 96 U/L (ref 39–117)
ALT SERPL W P-5'-P-CCNC: 13 U/L (ref 1–33)
ANION GAP SERPL CALCULATED.3IONS-SCNC: 10.6 MMOL/L (ref 5–15)
AST SERPL-CCNC: 20 U/L (ref 1–32)
BASOPHILS # BLD AUTO: 0.04 10*3/MM3 (ref 0–0.2)
BASOPHILS NFR BLD AUTO: 1.5 % (ref 0–1.5)
BILIRUB SERPL-MCNC: 0.2 MG/DL (ref 0–1.2)
BUN SERPL-MCNC: 12 MG/DL (ref 6–20)
BUN/CREAT SERPL: 14.6 (ref 7–25)
CALCIUM SPEC-SCNC: 9.6 MG/DL (ref 8.6–10.5)
CHLORIDE SERPL-SCNC: 106 MMOL/L (ref 98–107)
CO2 SERPL-SCNC: 26.4 MMOL/L (ref 22–29)
CREAT SERPL-MCNC: 0.82 MG/DL (ref 0.57–1)
DEPRECATED RDW RBC AUTO: 42.9 FL (ref 37–54)
EGFRCR SERPLBLD CKD-EPI 2021: 89.5 ML/MIN/1.73
EOSINOPHIL # BLD AUTO: 0.05 10*3/MM3 (ref 0–0.4)
EOSINOPHIL NFR BLD AUTO: 1.9 % (ref 0.3–6.2)
ERYTHROCYTE [DISTWIDTH] IN BLOOD BY AUTOMATED COUNT: 13.4 % (ref 12.3–15.4)
GLOBULIN UR ELPH-MCNC: 3 GM/DL
GLUCOSE SERPL-MCNC: 85 MG/DL (ref 65–99)
HCT VFR BLD AUTO: 37.5 % (ref 34–46.6)
HGB BLD-MCNC: 12.6 G/DL (ref 12–15.9)
IMM GRANULOCYTES # BLD AUTO: 0 10*3/MM3 (ref 0–0.05)
IMM GRANULOCYTES NFR BLD AUTO: 0 % (ref 0–0.5)
LYMPHOCYTES # BLD AUTO: 0.92 10*3/MM3 (ref 0.7–3.1)
LYMPHOCYTES NFR BLD AUTO: 34.1 % (ref 19.6–45.3)
MCH RBC QN AUTO: 30.4 PG (ref 26.6–33)
MCHC RBC AUTO-ENTMCNC: 33.6 G/DL (ref 31.5–35.7)
MCV RBC AUTO: 90.6 FL (ref 79–97)
MONOCYTES # BLD AUTO: 0.31 10*3/MM3 (ref 0.1–0.9)
MONOCYTES NFR BLD AUTO: 11.5 % (ref 5–12)
NEUTROPHILS NFR BLD AUTO: 1.38 10*3/MM3 (ref 1.7–7)
NEUTROPHILS NFR BLD AUTO: 51 % (ref 42.7–76)
NRBC BLD AUTO-RTO: 0 /100 WBC (ref 0–0.2)
PLATELET # BLD AUTO: 274 10*3/MM3 (ref 140–450)
PMV BLD AUTO: 8.5 FL (ref 6–12)
POTASSIUM SERPL-SCNC: 3.9 MMOL/L (ref 3.5–5.2)
PROT SERPL-MCNC: 7.4 G/DL (ref 6–8.5)
QT INTERVAL: 350 MS
QTC INTERVAL: 430 MS
RBC # BLD AUTO: 4.14 10*6/MM3 (ref 3.77–5.28)
SODIUM SERPL-SCNC: 143 MMOL/L (ref 136–145)
WBC NRBC COR # BLD AUTO: 2.7 10*3/MM3 (ref 3.4–10.8)

## 2025-04-07 PROCEDURE — 80053 COMPREHEN METABOLIC PANEL: CPT

## 2025-04-07 PROCEDURE — 93005 ELECTROCARDIOGRAM TRACING: CPT | Performed by: NURSE PRACTITIONER

## 2025-04-07 PROCEDURE — 36415 COLL VENOUS BLD VENIPUNCTURE: CPT

## 2025-04-07 PROCEDURE — 85025 COMPLETE CBC W/AUTO DIFF WBC: CPT

## 2025-04-07 NOTE — TELEPHONE ENCOUNTER
Leah with [facility] called patient is there now states she is supposed to get a EKG, and have labs no orders are in.

## 2025-04-08 ENCOUNTER — SPECIALTY PHARMACY (OUTPATIENT)
Dept: ONCOLOGY | Facility: HOSPITAL | Age: 45
End: 2025-04-08
Payer: COMMERCIAL

## 2025-04-08 ENCOUNTER — HOSPITAL ENCOUNTER (OUTPATIENT)
Dept: CT IMAGING | Facility: HOSPITAL | Age: 45
Discharge: HOME | End: 2025-04-08
Admitting: NURSE PRACTITIONER
Payer: COMMERCIAL

## 2025-04-08 ENCOUNTER — OFFICE VISIT (OUTPATIENT)
Dept: ONCOLOGY | Facility: CLINIC | Age: 45
End: 2025-04-08
Payer: COMMERCIAL

## 2025-04-08 VITALS
TEMPERATURE: 97.3 F | HEART RATE: 82 BPM | HEIGHT: 67 IN | RESPIRATION RATE: 20 BRPM | OXYGEN SATURATION: 98 % | BODY MASS INDEX: 26.53 KG/M2 | SYSTOLIC BLOOD PRESSURE: 105 MMHG | DIASTOLIC BLOOD PRESSURE: 66 MMHG | WEIGHT: 169 LBS

## 2025-04-08 DIAGNOSIS — Z17.0 MALIGNANT NEOPLASM OF CENTRAL PORTION OF LEFT BREAST IN FEMALE, ESTROGEN RECEPTOR POSITIVE: Primary | ICD-10-CM

## 2025-04-08 DIAGNOSIS — R07.89 BURNING CHEST PAIN: ICD-10-CM

## 2025-04-08 DIAGNOSIS — R05.3 CHRONIC COUGH: ICD-10-CM

## 2025-04-08 DIAGNOSIS — C50.112 MALIGNANT NEOPLASM OF CENTRAL PORTION OF LEFT BREAST IN FEMALE, ESTROGEN RECEPTOR POSITIVE: ICD-10-CM

## 2025-04-08 DIAGNOSIS — C50.112 MALIGNANT NEOPLASM OF CENTRAL PORTION OF LEFT BREAST IN FEMALE, ESTROGEN RECEPTOR POSITIVE: Primary | ICD-10-CM

## 2025-04-08 DIAGNOSIS — Z17.0 MALIGNANT NEOPLASM OF CENTRAL PORTION OF LEFT BREAST IN FEMALE, ESTROGEN RECEPTOR POSITIVE: ICD-10-CM

## 2025-04-08 PROCEDURE — 25510000001 IOPAMIDOL 61 % SOLUTION: Performed by: NURSE PRACTITIONER

## 2025-04-08 PROCEDURE — 71260 CT THORAX DX C+: CPT

## 2025-04-08 RX ORDER — HYDROCODONE POLISTIREX AND CHLORPHENIRAMINE POLISTIREX 10; 8 MG/5ML; MG/5ML
5 SUSPENSION, EXTENDED RELEASE ORAL EVERY 12 HOURS PRN
Qty: 200 ML | Refills: 0 | Status: SHIPPED | OUTPATIENT
Start: 2025-04-08

## 2025-04-08 RX ORDER — IOPAMIDOL 612 MG/ML
100 INJECTION, SOLUTION INTRAVASCULAR
Status: COMPLETED | OUTPATIENT
Start: 2025-04-08 | End: 2025-04-08

## 2025-04-08 RX ADMIN — IOPAMIDOL 100 ML: 612 INJECTION, SOLUTION INTRAVENOUS at 10:40

## 2025-04-08 NOTE — PROGRESS NOTES
[facility]  Office number [phone]    Fax number [phone]     Follow-up     Date: 25    Patient Name: Kimberley Win  MRN: [id number]  : 1980    Referring Physician: Dr. Daxa Anderson MD    Chief Complaint: follow up    Cancer Staging:  Cancer Staging   Stage IIA (cT2, cN1, cM0, G2, ER+, FL+, HER2-)    History of Present Illness: Kimberley Win is a pleasant 45 y.o. female who presented for evaluation of left breast cancer.     She notes a history of cystic breast disease for many years. She notes a new breast mass that became progressively harder and associated with pain prompting diagnostic workup.     Diagnostic mammogram 23 showed a dense, spiculated mass in the left breast which on ultrasound corresponded to a 3:00 3 cm hypoechoic mass with internal vascularity. In the 7:00 left breast there was a 7 mm mass corresponding on US to a cluster of cysts spanning 1.4 cm. In the left breast there was a 1.7 cm LN with multiple smaller LN.     Left breast biopsy showed grade 2 invasive ductal carcinoma (ER 90%, FL 10%, HER 2 negative 0+ by IHC). Lymph node FNA was postive for malignancy, metastatic ductal carcinoma.  Ki-67 5%    She underwent bilateral mastectomy and sentinel lymph node biopsy on 2023.  Left breast mastectomy showed residual 2.5 cm of invasive ductal carcinoma with associated DCIS.  Margins negative.  [place] lymph node biopsy was positive (1/2) with extracapsular extension and 1 of 4 additional lymph nodes were positive for metastatic carcinoma (total lymph node count 2 of 6).      Breast cancer risk profile:  Age of menarche:10  G0; infertility  Age of menopause: premenopausal   Family history of breast, ovarian, prostate or pancreatic cancer: mom stage IV breast cancer in her 50s. M Great Aunt, breast cancer.   Genetic testing: negative 36 gene CancerNext panel     Treatment history:  Dose dense AC followed by weekly taxol:  C1, 7/5/2023.  Weekly Taxol terminated after 2 doses for pneumonitis which fully resolved with oral steroids,  Radiation completed 2/1/2024 with Dr. Robison  BSO 2/5/2024  Arimidex 2/26/2024 to 9/2024: arthralgias  Exemestane: 9/2024: mild arthralgias, hot flashes, palpitations  Letrozole: arthralgias  Anastrazole: planning 2/2025  Ribociclib: Started March 9, 2025    Interval history:  Overall she has been struggling over the past month.  Continues to have cough but no fever.  Breathing continues to be mildly off.  Persistent fatigue.  Periods of chest pain.  Has been having heart palpitations.  Seems to be worsening.  Joints are doing okay.  Hot flashes stable.  Mood stable.  Completed EKG today.  Does note periods of worsening palpitations and shortness of breath.  She had feelings of passing out at times at least twice she says        Past Medical History:   Past Medical History:   Diagnosis Date    Abnormal ECG 8/23/23    Anemia     Chemo    Arthritis     Arthritis of back     Breast cancer 06/01/2023    Left Breast    Elevated cholesterol     improved, not currently taking medication    History of chemotherapy 09/05/2023    last treatment    History of radiation therapy     left breast, currently in treatment    Hyperlipidemia LDL goal <100 11/26/2024    Hypertension     Malignant neoplasm of central portion of left breast in female, estrogen receptor positive 06/20/2023    Osteoarthritis 2021   No personal history of myocardial infarction, cerebrovascular event, or venous thromboembolism.  Osteoarthritis in her hands. No personal history of autoimmune disease. Fhx of RA in her aunt    Past Surgical History:   Past Surgical History:   Procedure Laterality Date    BREAST BIOPSY  6/1/23    BREAST RECONSTRUCTION, BREAST TISSUE EXPANDER REMOVAL, IMPLANT INSERTION Bilateral 11/06/2023    Procedure: IMMEDIATE BREAST RECONSTRUCTION WITH BREAST TISSUE EXPANDER INSERTION AND ALLODERM - BILATERAL;  Surgeon: Dilip Anderson  MD;  Location: [facility] OR;  Service: Plastics;  Laterality: Bilateral;    COLONOSCOPY N/A 2024    Procedure: COLONOSCOPY;  Surgeon: Pamela Ernst MD;  Location: [facility] ENDOSCOPY;  Service: Gastroenterology;  Laterality: N/A;    COSMETIC SURGERY      Tissue expander insertion    DIAGNOSTIC LAPAROSCOPY, SALPINGO OOPHORECTOMY LAPAROSCOPIC N/A 2024    Procedure: DIAGNOSTIC LAPAROSCOPY, SALPINGO OOPHORECTOMY;  Surgeon: Fabby Miranda MD;  Location: [facility] OR;  Service: Obstetrics/Gynecology;  Laterality: N/A;    LYMPH NODE BIOPSY      Left underarm    MASTECTOMY W/ SENTINEL NODE BIOPSY Bilateral 2023    Procedure: BREAST MASTECTOMY WITH NIPPLE SPARING BILATERAL WITH LEFT SENTINEL NODE BIOPSY, AND AXILLARY DISECTION;  Surgeon: Daxa Anderson MD;  Location: [facility] OR;  Service: General;  Laterality: Bilateral;    US GUIDED LYMPH NODE BIOPSY  2023    VENOUS ACCESS DEVICE (PORT) INSERTION  2023       Family History:   Family History   Problem Relation Age of Onset    Breast cancer Mother 55        Stage 4    COPD Mother     Diabetes Mother     Early death Mother     Heart disease Mother     Stroke Mother     Cancer Mother         Stage 4, unknown origin,  at 57    Rheum arthritis Maternal Aunt     Breast cancer Paternal Aunt     Cancer Paternal Aunt         Breast cancer    COPD Maternal Grandfather     Stroke Maternal Grandfather     Breast cancer Other     Cancer Paternal Grandmother         Ovarian cancer,  at 55    Asthma Maternal Aunt      Social History:   Social History     Socioeconomic History    Marital status:    Tobacco Use    Smoking status: Former     Current packs/day: 0.00     Average packs/day: 0.3 packs/day for 10.0 years (2.5 ttl pk-yrs)     Types: Cigarettes     Start date: 1995     Quit date: 2005     Years since quittin.1     Passive exposure: Past    Smokeless tobacco: Never   Vaping Use    Vaping status: Never Used   Substance and  "Sexual Activity    Alcohol use: Yes     Comment: Beer once in maybe a month, not very often    Drug use: Never    Sexual activity: Yes     Partners: Male     Birth control/protection: Bilateral salpingectomy      Comment: 2/5 had a bilateral salpingo-oopherectomy   , lives in [place].     Medications:     Current Outpatient Medications:     anastrozole (ARIMIDEX) 1 MG tablet, Take 1 tablet by mouth Daily., Disp: 90 tablet, Rfl: 3    fluticasone (FLONASE) 50 MCG/ACT nasal spray, USE 1 SPRAY TO EACH NOSTRIL EVERY DAY, Disp: 48 g, Rfl: 0    hydrocortisone 2.5 % cream, , Disp: , Rfl:     ketoconazole (NIZORAL) 2 % cream, , Disp: , Rfl:     metoprolol succinate XL (TOPROL-XL) 25 MG 24 hr tablet, Take 1 tablet by mouth Daily., Disp: 90 tablet, Rfl: 3    omeprazole (priLOSEC) 20 MG capsule, Take 1 capsule by mouth Daily., Disp: , Rfl:     ondansetron (ZOFRAN) 8 MG tablet, Take 1 tablet by mouth 3 (Three) Times a Day As Needed for Nausea or Vomiting (DO NOT USE WHILE SANCUSO IN PLACE)., Disp: 30 tablet, Rfl: 5    ondansetron (ZOFRAN) 8 MG tablet, Take 1 tablet by mouth 3 (Three) Times a Day As Needed for Nausea or Vomiting., Disp: 30 tablet, Rfl: 5    ribociclib succinate, 400 MG Dose, (KISQALI) 200 MG tablet therapy pack tablet, Take 2 tablets by mouth Daily. on days 1-21 then off 7 days in each 28-day cycle., Disp: 42 tablet, Rfl: 11    Allergies:   Allergies   Allergen Reactions    Chlorhexidine Other (See Comments)     Burning    Nylon Hives    Sunblock [Solbar Pf Spf15] Rash       Objective     Vital Signs:   Vitals:    04/08/25 0912   BP: 105/66  Comment: RUE   Pulse: 82   Resp: 20   Temp: 97.3 °F (36.3 °C)   TempSrc: Infrared   SpO2: 98%  Comment: RA   Weight: 76.7 kg (169 lb)   Height: 170.2 cm (67\")   PainSc: 0-No pain      Body mass index is 26.47 kg/m².   Pain Score    04/08/25 0912   PainSc: 0-No pain     ECOG Performance Status: 0    Physical Exam:   General: No acute distress.   HEENT: " Normocephalic, atraumatic. Sclera anicteric.   Neck: Asymmetric fullness in the left neck without discrete adenopathy  Cardiovascular: regular rate and rhythm. No murmurs.   Respiratory: Normal rate. Clear to auscultation bilaterally.  Abdomen: Soft, nontender, non distended with normoactive bowel sounds.  Lymph: no cervical, supraclavicular or axillary adenopathy.  Neuro: Alert and oriented x 3. No focal deficits.   Ext: Symmetric, no swelling.   Psych: Euthymic.  Breast: no palpable masses bilaterally s/p bilateral mastectomy (not re-examined today)  Skin: scattered circular rash with raised edge    Laboratory/Imaging Reviewed:     Hospital Outpatient Visit on 04/07/2025   Component Date Value Ref Range Status    QT Interval 04/07/2025 350  ms Final    QTC Interval 04/07/2025 430  ms Final   Lab on 04/07/2025   Component Date Value Ref Range Status    Glucose 04/07/2025 85  65 - 99 mg/dL Final    BUN 04/07/2025 12  6 - 20 mg/dL Final    Creatinine 04/07/2025 0.82  0.57 - 1.00 mg/dL Final    Sodium 04/07/2025 143  136 - 145 mmol/L Final    Potassium 04/07/2025 3.9  3.5 - 5.2 mmol/L Final    Chloride 04/07/2025 106  98 - 107 mmol/L Final    CO2 04/07/2025 26.4  22.0 - 29.0 mmol/L Final    Calcium 04/07/2025 9.6  8.6 - 10.5 mg/dL Final    Total Protein 04/07/2025 7.4  6.0 - 8.5 g/dL Final    Albumin 04/07/2025 4.4  3.5 - 5.2 g/dL Final    ALT (SGPT) 04/07/2025 13  1 - 33 U/L Final    AST (SGOT) 04/07/2025 20  1 - 32 U/L Final    Alkaline Phosphatase 04/07/2025 96  39 - 117 U/L Final    Total Bilirubin 04/07/2025 0.2  0.0 - 1.2 mg/dL Final    Globulin 04/07/2025 3.0  gm/dL Final    A/G Ratio 04/07/2025 1.5  g/dL Final    BUN/Creatinine Ratio 04/07/2025 14.6  7.0 - 25.0 Final    Anion Gap 04/07/2025 10.6  5.0 - 15.0 mmol/L Final    eGFR 04/07/2025 89.5  >60.0 mL/min/1.73 Final    WBC 04/07/2025 2.70 (L)  3.40 - 10.80 10*3/mm3 Final    RBC 04/07/2025 4.14  3.77 - 5.28 10*6/mm3 Final    Hemoglobin 04/07/2025 12.6  12.0 -  15.9 g/dL Final    Hematocrit 04/07/2025 37.5  34.0 - 46.6 % Final    MCV 04/07/2025 90.6  79.0 - 97.0 fL Final    MCH 04/07/2025 30.4  26.6 - 33.0 pg Final    MCHC 04/07/2025 33.6  31.5 - 35.7 g/dL Final    RDW 04/07/2025 13.4  12.3 - 15.4 % Final    RDW-SD 04/07/2025 42.9  37.0 - 54.0 fl Final    MPV 04/07/2025 8.5  6.0 - 12.0 fL Final    Platelets 04/07/2025 274  140 - 450 10*3/mm3 Final    Neutrophil % 04/07/2025 51.0  42.7 - 76.0 % Final    Lymphocyte % 04/07/2025 34.1  19.6 - 45.3 % Final    Monocyte % 04/07/2025 11.5  5.0 - 12.0 % Final    Eosinophil % 04/07/2025 1.9  0.3 - 6.2 % Final    Basophil % 04/07/2025 1.5  0.0 - 1.5 % Final    Immature Grans % 04/07/2025 0.0  0.0 - 0.5 % Final    Neutrophils, Absolute 04/07/2025 1.38 (L)  1.70 - 7.00 10*3/mm3 Final    Lymphocytes, Absolute 04/07/2025 0.92  0.70 - 3.10 10*3/mm3 Final    Monocytes, Absolute 04/07/2025 0.31  0.10 - 0.90 10*3/mm3 Final    Eosinophils, Absolute 04/07/2025 0.05  0.00 - 0.40 10*3/mm3 Final    Basophils, Absolute 04/07/2025 0.04  0.00 - 0.20 10*3/mm3 Final    Immature Grans, Absolute 04/07/2025 0.00  0.00 - 0.05 10*3/mm3 Final    nRBC 04/07/2025 0.0  0.0 - 0.2 /100 WBC Final       Assessment / Plan      Assessment/Plan:     1.  Malignant neoplasm of central portion of left breast in female, estrogen receptor positive, lymph node positive  2.  AI use  3.  AI arthralgias  4.  Bilateral frozen shoulder  -She is status post neoadjuvant chemotherapy.  This was terminated early for taxane pneumonitis.  She has fully recovered and underwent bilateral mastectomy showing 2.5 cm of residual disease and 2 of 6 lymph nodes positive  -Completed adjuvant PMRT.  -Discussed options for extended duration endocrine therapy in the context of lymph node positive disease.    -She completed BSO and is tolerating Arimidex.  She is having progressive AI arthralgias which are more noticeable now that her severe shoulder pain from bilateral frozen shoulder has  improved.  I am going to have her trial exemestane to see if her joint symptoms are more tolerable on this regimen.  -BRCA negative, no indication for adjuvant PARP inhibitor  -With respect to adjuvant CDK 4 6 inhibitors, she has 2 lymph nodes positive but tumor size less than 5cm, grade 2, and Ki-67 5% so she does not qualify for adjuvant abemaciclib. We discussed new FDA approval for Ribociclib. She is within 18 mo of initiating endocrine therapy and is interested in a trial. She is going to discuss with her  and call us with a final decision, but we will initiate PA in the meantime and obtain baseline EKG (prior Qtc borderline).  -Change letrozole to anastrozole. If joint stiffness persists, trial duloxetine or acupuncture.   - Overall, continues tolerating Anastrozole and Kisqali. Check CBC/CMP today and every 2 weeks.   -EKG completed on 4/7/2025.  QT stable.  However, there has been some rhythm changes with premature atrial complexes with aberrant conduction.  Ventricular rate has increased by 32 bpm.  I discussed the EKG with Dr. Barclay.  I will refer back to Dr. Costello for further management.  -For now we will continue as clinically indicated with no changes  -We could consider adjuvant bisphosphonate pending further tolerance.      5. Bone health  -Baseline DEXA shows osteopenia.   Plan to repeat DEXA every 2-years while on endocrine therapy.    - Continue to encourage adequate calcium intake, vitamin D supplementation, and weightbearing exercise as tolerated to maintain bone density.   -ordered and discussed  -Consider adjuvant bisphosphonate after tolerating AI. We have previously discussed, consider in Spring 2025, once tolerating AI +ribo    6. Access:  Status post port removal    7.  Palpitations  - Continue to monitor after AI switch.   - QT stable.  However, there has been some rhythm changes with premature atrial complexes with aberrant conduction.  Ventricular rate has increased by 32 bpm.    -I discussed the case with Dr. Barclay.  Referred back to Dr. Costello today.    8. Cough  -Started with URI and improving, repeat scan if fails to resolve. Predated Kisqali   -Cough is not improving.  I will repeat CT scan and call her with results.    9. HLD  -reviewed recent lipid panel. She is planning to resume Anastrozole. Recommend follow up with PCP re: HLD    10. Vaginal dryness  - Continue trial of vaginal moisturizers followed by Hyalogyn      Follow Up:   Continue every 2 weeks Cbc/cmp  3 weeks Dr. Olguin as scheduled    Above is updated reviewed and accurate as of 4/8/2025.    Total time of patient care including time prior to, face to face with patient, and following visit spent in reviewing records, lab results, imaging studies, discussion with patient, and documentation/charting was > 45 minutes      NELLA Horan    Hematology and Oncology

## 2025-04-09 RX ORDER — OMEPRAZOLE 20 MG/1
20 CAPSULE, DELAYED RELEASE ORAL DAILY
Qty: 30 CAPSULE | Refills: 3 | Status: SHIPPED | OUTPATIENT
Start: 2025-04-09

## 2025-04-14 NOTE — PROGRESS NOTES
[facility] Cardiology    Office Consult     Kimberley Win  [id number]  04/15/2025    Referred By: No ref. provider found    Chief Complaint   Patient presents with    Rapid Heart Rate    pre-syncope    Palpitations       Subjective     History of Present Illness:   Kimberley Win is a 45 y.o. female who presents to the Cardiology Clinic for evaluation of palpitations.  The patient has a history of breast cancer, status post bilateral mastectomy, chemotherapy, and radiation therapy. She has a past cardiac history significant for symptomatic ventricular ectopy.  States she started a new chemotherapy pill 2025 round 2.  She had an episode of rapid heart rate in 120s at least that lasted about 15-20 minutes associated with some chest discomfort/ burning, extreme lightheadedness, and hand numbness on her off week between cycles (3 weeks on, 1 week off).  No further episodes since then.  States she still feels skipping beats or extra beats often which she feels has worsened since starting her new chemotherapy medications and radiation.  Her Oncology office has been checking EKGs for QT interval which has been stable.  States she is drinking 8-10 glasses of water maybe 1 cup of coffee daily.  She is reluctant to increase Metoprolol with her bradycardia previously noted.    Past Cardiac Testin. Last Coronary Angio: None  2. Prior Stress Testing: None  3. Last Echo: 2023              1.  Normal left ventricular size and systolic function, LVEF 55-60%.  2.  Normal LV diastolic filling pattern.  3.  Normal right ventricular size and systolic function.  4.  Normal left and right atrial size.  5.  Trace MR, TR, and PI.  6.  Normal LV global longitudinal strain pattern.  4. Prior Holter Monitor: 2024              1.  The predominant rhythm is sinus rhythm with an average heart rate 77 bpm.  2.  No sustained arrhythmias.  3.  Rare supraventricular ectopy.  4.  Occasional  ventricular ectopy, burden 10.5%.  5.  6 symptomatic events corresponded to sinus rhythm and sinus rhythm with PVCs.    Review of Systems   Constitutional:  Negative for activity change and fatigue.   Respiratory:  Negative for chest tightness and shortness of breath.    Cardiovascular:  Positive for palpitations. Negative for chest pain and leg swelling.   Neurological:  Negative for dizziness.   All other systems reviewed and are negative.       Past Medical History:   Diagnosis Date    Abnormal ECG 8/23/23    Anemia     Chemo    Arthritis     Arthritis of back     Breast cancer 06/01/2023    Left Breast    Elevated cholesterol     improved, not currently taking medication    History of chemotherapy 09/05/2023    last treatment    History of radiation therapy     left breast, currently in treatment    Hyperlipidemia LDL goal <100 11/26/2024    Hypertension     Malignant neoplasm of central portion of left breast in female, estrogen receptor positive 06/20/2023    Osteoarthritis 2021       Past Surgical History:   Procedure Laterality Date    BREAST BIOPSY  6/1/23    BREAST RECONSTRUCTION, BREAST TISSUE EXPANDER REMOVAL, IMPLANT INSERTION Bilateral 11/06/2023    Procedure: IMMEDIATE BREAST RECONSTRUCTION WITH BREAST TISSUE EXPANDER INSERTION AND ALLODERM - BILATERAL;  Surgeon: Dilip Anderson MD;  Location: [facility] OR;  Service: Plastics;  Laterality: Bilateral;    COLONOSCOPY N/A 05/23/2024    Procedure: COLONOSCOPY;  Surgeon: Pamela Ernst MD;  Location: [facility] ENDOSCOPY;  Service: Gastroenterology;  Laterality: N/A;    COSMETIC SURGERY  11/6    Tissue expander insertion    DIAGNOSTIC LAPAROSCOPY, SALPINGO OOPHORECTOMY LAPAROSCOPIC N/A 02/05/2024    Procedure: DIAGNOSTIC LAPAROSCOPY, SALPINGO OOPHORECTOMY;  Surgeon: Fabby Miranda MD;  Location: [facility] OR;  Service: Obstetrics/Gynecology;  Laterality: N/A;    LYMPH NODE BIOPSY  6/1    Left underarm    MASTECTOMY W/ SENTINEL NODE BIOPSY Bilateral 11/06/2023     Procedure: BREAST MASTECTOMY WITH NIPPLE SPARING BILATERAL WITH LEFT SENTINEL NODE BIOPSY, AND AXILLARY DISECTION;  Surgeon: Daxa Anderson MD;  Location: [facility];  Service: General;  Laterality: Bilateral;    US GUIDED LYMPH NODE BIOPSY  2023    VENOUS ACCESS DEVICE (PORT) INSERTION  2023       Family History   Problem Relation Age of Onset    Breast cancer Mother 55        Stage 4    COPD Mother     Diabetes Mother     Early death Mother     Heart disease Mother     Stroke Mother     Cancer Mother         Stage 4, unknown origin,  at 57    Rheum arthritis Maternal Aunt     Breast cancer Paternal Aunt     Cancer Paternal Aunt         Breast cancer    COPD Maternal Grandfather     Stroke Maternal Grandfather     Breast cancer Other     Cancer Paternal Grandmother         Ovarian cancer,  at 55    Asthma Maternal Aunt        Social History     Socioeconomic History    Marital status:    Tobacco Use    Smoking status: Former     Current packs/day: 0.00     Average packs/day: 0.3 packs/day for 10.0 years (2.5 ttl pk-yrs)     Types: Cigarettes     Start date: 1995     Quit date: 2005     Years since quittin.1     Passive exposure: Past    Smokeless tobacco: Never   Vaping Use    Vaping status: Never Used   Substance and Sexual Activity    Alcohol use: Yes     Comment: Beer once in maybe a month, not very often    Drug use: Never    Sexual activity: Yes     Partners: Male     Birth control/protection: Bilateral salpingectomy      Comment: 2 had a bilateral salpingo-oopherectomy         Current Outpatient Medications:     anastrozole (ARIMIDEX) 1 MG tablet, Take 1 tablet by mouth Daily., Disp: 90 tablet, Rfl: 3    fluticasone (FLONASE) 50 MCG/ACT nasal spray, USE 1 SPRAY TO EACH NOSTRIL EVERY DAY, Disp: 48 g, Rfl: 0    Hydrocod Cuauhtemoc-Chlorphe Cuauhtemoc ER (TUSSIONEX PENNKINETIC) 10-8 MG/5ML ER suspension, Take 5 mL by mouth Every 12 (Twelve) Hours As Needed for Cough., Disp: 200  "mL, Rfl: 0    hydrocortisone 2.5 % cream, , Disp: , Rfl:     ketoconazole (NIZORAL) 2 % cream, , Disp: , Rfl:     metoprolol succinate XL (TOPROL-XL) 25 MG 24 hr tablet, Take 1 tablet by mouth Daily., Disp: 90 tablet, Rfl: 3    omeprazole (priLOSEC) 20 MG capsule, TAKE 1 CAPSULE BY MOUTH DAILY, Disp: 30 capsule, Rfl: 3    ondansetron (ZOFRAN) 8 MG tablet, Take 1 tablet by mouth 3 (Three) Times a Day As Needed for Nausea or Vomiting (DO NOT USE WHILE SANCUSO IN PLACE)., Disp: 30 tablet, Rfl: 5    ribociclib succinate, 400 MG Dose, (KISQALI) 200 MG tablet therapy pack tablet, Take 2 tablets by mouth Daily. on days 1-21 then off 7 days in each 28-day cycle., Disp: 42 tablet, Rfl: 11    ondansetron (ZOFRAN) 8 MG tablet, Take 1 tablet by mouth 3 (Three) Times a Day As Needed for Nausea or Vomiting. (Patient not taking: Reported on 4/15/2025), Disp: 30 tablet, Rfl: 5      Allergies   Allergen Reactions    Chlorhexidine Other (See Comments)     Burning    Nylon Hives    Sunblock [Solbar Pf Spf15] Rash       Objective     Vitals:    04/15/25 0828   BP: 120/74   BP Location: Right arm   Patient Position: Sitting   Cuff Size: Adult   Pulse: 90   Resp: 18   SpO2: 98%   Weight: 78.1 kg (172 lb 3.2 oz)   Height: 170.2 cm (67\")     Body mass index is 26.97 kg/m².    Physical Exam  Vitals and nursing note reviewed.   Constitutional:       General: She is not in acute distress.     Appearance: Normal appearance. She is not ill-appearing or toxic-appearing.   HENT:      Head: Normocephalic.      Mouth/Throat:      Mouth: Mucous membranes are moist.   Eyes:      Pupils: Pupils are equal, round, and reactive to light.   Cardiovascular:      Rate and Rhythm: Normal rate and regular rhythm.      Pulses: Normal pulses.      Heart sounds: Normal heart sounds. No murmur heard.  Pulmonary:      Effort: Pulmonary effort is normal.      Breath sounds: Normal breath sounds. No wheezing, rhonchi or rales.   Musculoskeletal:      Right lower " leg: No edema.      Left lower leg: No edema.   Skin:     General: Skin is warm and dry.      Capillary Refill: Capillary refill takes less than 2 seconds.   Neurological:      Mental Status: She is alert and oriented to person, place, and time. Mental status is at baseline.   Psychiatric:         Mood and Affect: Mood normal.         Behavior: Behavior normal.         Thought Content: Thought content normal.         Results Review:   I reviewed the patient's new clinical results.      ECG 12 Lead    Date/Time: 4/15/2025 9:15 AM  Performed by: Sintia Rothman APRN    Authorized by: Sintia Rothman APRN  Rhythm: sinus rhythm  Rate: normal    Clinical impression: normal ECG          Assessment & Plan  Symptomatic PVCs  -EKG today NSR  -Previous monitor in 12/2024 noted 10.5% PVC burden  -Reluctant to increase Metoprolol with bradycardia previously  -Episode described concerning for arrhythmia as it lasted for 20 minutes  -With new chemotherapy medication on board, will place another monitor to assess for arrhythmias  -QT interval stable on EKG and being monitored by Oncology (QT interval 392 today on EKG)  -Appears to be staying well hydrated to leyva off side effects of treatments  -Increase in palpitations with recent medications  -Continue Metoprolol for now, consider changing to Diltiazem or Propranolol if PVCs continue to be bothersome  -Discussed changing back to BID dosing for Metoprolol and she prefers to keep current dose for now  -Follow up with Dr. Costello in 6 weeks to discuss monitor results    Orders:    Cardiac Event Monitor (MAGGIE) or Mobile Cardiac Outpatient Telemetry (MCT); Future    ECG 12 Lead      Preventative Cardiology:   Tobacco Cessation: N/A   Advance Care Planning: ACP discussion was held with the patient during this visit. Patient does not have an advance directive, declines further assistance.     Follow Up:   Return in about 5 weeks (around 5/20/2025) for Next scheduled follow up--  Pravin.      Thank you for allowing me to participate in the care of your patient. Please to not hesitate to contact me with additional questions or concerns.     NELLA Woodruff

## 2025-04-15 ENCOUNTER — OFFICE VISIT (OUTPATIENT)
Dept: CARDIOLOGY | Facility: CLINIC | Age: 45
End: 2025-04-15
Payer: COMMERCIAL

## 2025-04-15 VITALS
BODY MASS INDEX: 27.03 KG/M2 | RESPIRATION RATE: 18 BRPM | HEART RATE: 90 BPM | DIASTOLIC BLOOD PRESSURE: 74 MMHG | WEIGHT: 172.2 LBS | OXYGEN SATURATION: 98 % | SYSTOLIC BLOOD PRESSURE: 120 MMHG | HEIGHT: 67 IN

## 2025-04-15 DIAGNOSIS — I49.3 SYMPTOMATIC PVCS: Primary | ICD-10-CM

## 2025-04-15 PROCEDURE — 99213 OFFICE O/P EST LOW 20 MIN: CPT | Performed by: NURSE PRACTITIONER

## 2025-04-15 PROCEDURE — 93000 ELECTROCARDIOGRAM COMPLETE: CPT | Performed by: NURSE PRACTITIONER

## 2025-04-23 DIAGNOSIS — C50.112 MALIGNANT NEOPLASM OF CENTRAL PORTION OF LEFT BREAST IN FEMALE, ESTROGEN RECEPTOR POSITIVE: Primary | ICD-10-CM

## 2025-04-23 DIAGNOSIS — Z17.0 MALIGNANT NEOPLASM OF CENTRAL PORTION OF LEFT BREAST IN FEMALE, ESTROGEN RECEPTOR POSITIVE: Primary | ICD-10-CM

## 2025-04-28 ENCOUNTER — LAB (OUTPATIENT)
Dept: LAB | Facility: HOSPITAL | Age: 45
End: 2025-04-28
Payer: COMMERCIAL

## 2025-04-28 ENCOUNTER — OFFICE VISIT (OUTPATIENT)
Dept: ONCOLOGY | Facility: CLINIC | Age: 45
End: 2025-04-28
Payer: COMMERCIAL

## 2025-04-28 VITALS
HEART RATE: 60 BPM | DIASTOLIC BLOOD PRESSURE: 76 MMHG | RESPIRATION RATE: 12 BRPM | BODY MASS INDEX: 26.42 KG/M2 | WEIGHT: 168.3 LBS | SYSTOLIC BLOOD PRESSURE: 116 MMHG | HEIGHT: 67 IN | OXYGEN SATURATION: 98 % | TEMPERATURE: 97.7 F

## 2025-04-28 DIAGNOSIS — C50.112 MALIGNANT NEOPLASM OF CENTRAL PORTION OF LEFT BREAST IN FEMALE, ESTROGEN RECEPTOR POSITIVE: ICD-10-CM

## 2025-04-28 DIAGNOSIS — C50.112 MALIGNANT NEOPLASM OF CENTRAL PORTION OF LEFT BREAST IN FEMALE, ESTROGEN RECEPTOR POSITIVE: Primary | ICD-10-CM

## 2025-04-28 DIAGNOSIS — Z17.0 MALIGNANT NEOPLASM OF CENTRAL PORTION OF LEFT BREAST IN FEMALE, ESTROGEN RECEPTOR POSITIVE: ICD-10-CM

## 2025-04-28 DIAGNOSIS — Z17.0 MALIGNANT NEOPLASM OF CENTRAL PORTION OF LEFT BREAST IN FEMALE, ESTROGEN RECEPTOR POSITIVE: Primary | ICD-10-CM

## 2025-04-28 LAB
ALBUMIN SERPL-MCNC: 4.4 G/DL (ref 3.5–5.2)
ALBUMIN/GLOB SERPL: 1.2 G/DL
ALP SERPL-CCNC: 97 U/L (ref 39–117)
ALT SERPL W P-5'-P-CCNC: 10 U/L (ref 1–33)
ANION GAP SERPL CALCULATED.3IONS-SCNC: 11.3 MMOL/L (ref 5–15)
AST SERPL-CCNC: 25 U/L (ref 1–32)
BASOPHILS # BLD AUTO: 0.04 10*3/MM3 (ref 0–0.2)
BASOPHILS NFR BLD AUTO: 1.4 % (ref 0–1.5)
BILIRUB SERPL-MCNC: 0.5 MG/DL (ref 0–1.2)
BUN SERPL-MCNC: 12 MG/DL (ref 6–20)
BUN/CREAT SERPL: 13 (ref 7–25)
CALCIUM SPEC-SCNC: 9.7 MG/DL (ref 8.6–10.5)
CHLORIDE SERPL-SCNC: 102 MMOL/L (ref 98–107)
CO2 SERPL-SCNC: 24.7 MMOL/L (ref 22–29)
CREAT SERPL-MCNC: 0.92 MG/DL (ref 0.57–1)
DEPRECATED RDW RBC AUTO: 42.8 FL (ref 37–54)
EGFRCR SERPLBLD CKD-EPI 2021: 77.9 ML/MIN/1.73
EOSINOPHIL # BLD AUTO: 0.08 10*3/MM3 (ref 0–0.4)
EOSINOPHIL NFR BLD AUTO: 2.7 % (ref 0.3–6.2)
ERYTHROCYTE [DISTWIDTH] IN BLOOD BY AUTOMATED COUNT: 13.2 % (ref 12.3–15.4)
GLOBULIN UR ELPH-MCNC: 3.7 GM/DL
GLUCOSE SERPL-MCNC: 99 MG/DL (ref 65–99)
HCT VFR BLD AUTO: 36.1 % (ref 34–46.6)
HGB BLD-MCNC: 12.4 G/DL (ref 12–15.9)
IMM GRANULOCYTES # BLD AUTO: 0.01 10*3/MM3 (ref 0–0.05)
IMM GRANULOCYTES NFR BLD AUTO: 0.3 % (ref 0–0.5)
LYMPHOCYTES # BLD AUTO: 0.99 10*3/MM3 (ref 0.7–3.1)
LYMPHOCYTES NFR BLD AUTO: 33.7 % (ref 19.6–45.3)
MCH RBC QN AUTO: 31.2 PG (ref 26.6–33)
MCHC RBC AUTO-ENTMCNC: 34.3 G/DL (ref 31.5–35.7)
MCV RBC AUTO: 90.9 FL (ref 79–97)
MONOCYTES # BLD AUTO: 0.18 10*3/MM3 (ref 0.1–0.9)
MONOCYTES NFR BLD AUTO: 6.1 % (ref 5–12)
NEUTROPHILS NFR BLD AUTO: 1.64 10*3/MM3 (ref 1.7–7)
NEUTROPHILS NFR BLD AUTO: 55.8 % (ref 42.7–76)
NRBC BLD AUTO-RTO: 0 /100 WBC (ref 0–0.2)
PLATELET # BLD AUTO: 220 10*3/MM3 (ref 140–450)
PMV BLD AUTO: 9.1 FL (ref 6–12)
POTASSIUM SERPL-SCNC: 4.2 MMOL/L (ref 3.5–5.2)
PROT SERPL-MCNC: 8.1 G/DL (ref 6–8.5)
RBC # BLD AUTO: 3.97 10*6/MM3 (ref 3.77–5.28)
SODIUM SERPL-SCNC: 138 MMOL/L (ref 136–145)
WBC NRBC COR # BLD AUTO: 2.94 10*3/MM3 (ref 3.4–10.8)

## 2025-04-28 PROCEDURE — 80053 COMPREHEN METABOLIC PANEL: CPT

## 2025-04-28 PROCEDURE — 99214 OFFICE O/P EST MOD 30 MIN: CPT | Performed by: INTERNAL MEDICINE

## 2025-04-28 PROCEDURE — 36415 COLL VENOUS BLD VENIPUNCTURE: CPT

## 2025-04-28 PROCEDURE — 85025 COMPLETE CBC W/AUTO DIFF WBC: CPT

## 2025-04-28 NOTE — PROGRESS NOTES
Hematology and Oncology [place]  Office number [phone]    Fax number [phone]     Follow-up     Date: 25    Patient Name: Kimberley Win  MRN: [id number]  : 1980    Referring Physician: Dr. Daxa Anderson MD    Chief Complaint: follow up    Cancer Staging:  Cancer Staging   Stage IIA (cT2, cN1, cM0, G2, ER+, IL+, HER2-)    History of Present Illness: Kimberley Win is a pleasant 45 y.o. female who presented for evaluation of left breast cancer.     She notes a history of cystic breast disease for many years. She notes a new breast mass that became progressively harder and associated with pain prompting diagnostic workup.     Diagnostic mammogram 23 showed a dense, spiculated mass in the left breast which on ultrasound corresponded to a 3:00 3 cm hypoechoic mass with internal vascularity. In the 7:00 left breast there was a 7 mm mass corresponding on US to a cluster of cysts spanning 1.4 cm. In the left breast there was a 1.7 cm LN with multiple smaller LN.     Left breast biopsy showed grade 2 invasive ductal carcinoma (ER 90%, IL 10%, HER 2 negative 0+ by IHC). Lymph node FNA was postive for malignancy, metastatic ductal carcinoma.  Ki-67 5%    She underwent bilateral mastectomy and sentinel lymph node biopsy on 2023.  Left breast mastectomy showed residual 2.5 cm of invasive ductal carcinoma with associated DCIS.  Margins negative.  Bryce lymph node biopsy was positive (1/2) with extracapsular extension and 1 of 4 additional lymph nodes were positive for metastatic carcinoma (total lymph node count 2 of 6).      Breast cancer risk profile:  Age of menarche:10  G0; infertility  Age of menopause: premenopausal   Family history of breast, ovarian, prostate or pancreatic cancer: mom stage IV breast cancer in her 50s. M Great Aunt, breast cancer.   Genetic testing: negative 36 gene CancerNext panel     Treatment history:  Dose dense AC followed by weekly taxol:  C1, 7/5/2023.  Weekly Taxol terminated after 2 doses for pneumonitis which fully resolved with oral steroids,  Radiation completed 2/1/2024 with Dr. Robison  BSO 2/5/2024  Arimidex 2/26/2024 to 9/2024: arthralgias  Exemestane: 9/2024: mild arthralgias, hot flashes, palpitations  Letrozole: arthralgias  Anastrazole: 3/2025  Ribociclib: 3/10/2025 to present    Interval history:  She presents today for a follow-up.  She is overall doing well.  She had a cough for which she used Tussidex, but it caused lightheadedness and headaches so she has discontinued that.  The cough is improving.    Not currently on flonase  Vaginal drynes better.   More scattered spots on legs, arms, trunk, neck.  Some of these looked like ringworm and responded to topical antifungals, but now she has multiple new lesions which are of a different character and mildly pruritic. Had seen dermatology at onset but not since this new character rash developed.  She had an increase in palpitations and has been seen by cardiology.  She is currently wearing a 30-day monitor.    No nausea or diarrhea.    Past Medical History:   Past Medical History:   Diagnosis Date    Abnormal ECG 8/23/23    Anemia     Chemo    Arthritis     Arthritis of back     Breast cancer 06/01/2023    Left Breast    Elevated cholesterol     improved, not currently taking medication    History of chemotherapy 09/05/2023    last treatment    History of radiation therapy     left breast, currently in treatment    Hyperlipidemia LDL goal <100 11/26/2024    Hypertension     Malignant neoplasm of central portion of left breast in female, estrogen receptor positive 06/20/2023    Osteoarthritis 2021   No personal history of myocardial infarction, cerebrovascular event, or venous thromboembolism.  Osteoarthritis in her hands. No personal history of autoimmune disease. Fhx of RA in her aunt    Past Surgical History:   Past Surgical History:   Procedure Laterality Date    BREAST BIOPSY  6/1/23     BREAST RECONSTRUCTION, BREAST TISSUE EXPANDER REMOVAL, IMPLANT INSERTION Bilateral 2023    Procedure: IMMEDIATE BREAST RECONSTRUCTION WITH BREAST TISSUE EXPANDER INSERTION AND ALLODERM - BILATERAL;  Surgeon: Dilip Anderson MD;  Location:  [facility] OR;  Service: Plastics;  Laterality: Bilateral;    COLONOSCOPY N/A 2024    Procedure: COLONOSCOPY;  Surgeon: Pamela Ernst MD;  Location: [facility] ENDOSCOPY;  Service: Gastroenterology;  Laterality: N/A;    COSMETIC SURGERY      Tissue expander insertion    DIAGNOSTIC LAPAROSCOPY, SALPINGO OOPHORECTOMY LAPAROSCOPIC N/A 2024    Procedure: DIAGNOSTIC LAPAROSCOPY, SALPINGO OOPHORECTOMY;  Surgeon: Fabby Miranda MD;  Location: [facility] OR;  Service: Obstetrics/Gynecology;  Laterality: N/A;    LYMPH NODE BIOPSY      Left underarm    MASTECTOMY W/ SENTINEL NODE BIOPSY Bilateral 2023    Procedure: BREAST MASTECTOMY WITH NIPPLE SPARING BILATERAL WITH LEFT SENTINEL NODE BIOPSY, AND AXILLARY DISECTION;  Surgeon: Daxa Anderson MD;  Location:  [facility] OR;  Service: General;  Laterality: Bilateral;    US GUIDED LYMPH NODE BIOPSY  2023    VENOUS ACCESS DEVICE (PORT) INSERTION  2023       Family History:   Family History   Problem Relation Age of Onset    Breast cancer Mother 55        Stage 4    COPD Mother     Diabetes Mother     Early death Mother     Heart disease Mother     Stroke Mother     Cancer Mother         Stage 4, unknown origin,  at 57    Rheum arthritis Maternal Aunt     Breast cancer Paternal Aunt     Cancer Paternal Aunt         Breast cancer    COPD Maternal Grandfather     Stroke Maternal Grandfather     Breast cancer Other     Cancer Paternal Grandmother         Ovarian cancer,  at 55    Asthma Maternal Aunt      Social History:   Social History     Socioeconomic History    Marital status:    Tobacco Use    Smoking status: Former     Current packs/day: 0.00     Average packs/day: 0.3 packs/day for 10.0 years  (2.5 ttl pk-yrs)     Types: Cigarettes     Start date: 1995     Quit date: 2005     Years since quittin.2     Passive exposure: Past    Smokeless tobacco: Never   Vaping Use    Vaping status: Never Used   Substance and Sexual Activity    Alcohol use: Yes     Comment: Beer once in maybe a month, not very often    Drug use: Never    Sexual activity: Yes     Partners: Male     Birth control/protection: Bilateral salpingectomy      Comment:  had a bilateral salpingo-oopherectomy   , lives in [place].     Medications:     Current Outpatient Medications:     anastrozole (ARIMIDEX) 1 MG tablet, Take 1 tablet by mouth Daily., Disp: 90 tablet, Rfl: 3    fluticasone (FLONASE) 50 MCG/ACT nasal spray, USE 1 SPRAY TO EACH NOSTRIL EVERY DAY, Disp: 48 g, Rfl: 0    hydrocortisone 2.5 % cream, , Disp: , Rfl:     ketoconazole (NIZORAL) 2 % cream, , Disp: , Rfl:     metoprolol succinate XL (TOPROL-XL) 25 MG 24 hr tablet, Take 1 tablet by mouth Daily., Disp: 90 tablet, Rfl: 3    ondansetron (ZOFRAN) 8 MG tablet, Take 1 tablet by mouth 3 (Three) Times a Day As Needed for Nausea or Vomiting (DO NOT USE WHILE SANCUSO IN PLACE)., Disp: 30 tablet, Rfl: 5    ribociclib succinate, 400 MG Dose, (KISQALI) 200 MG tablet therapy pack tablet, Take 2 tablets by mouth Daily. on days 1-21 then off 7 days in each 28-day cycle., Disp: 42 tablet, Rfl: 11    Hydrocod Cuauhtemoc-Chlorphe Cuauhtemoc ER (TUSSIONEX PENNKINETIC) 10-8 MG/5ML ER suspension, Take 5 mL by mouth Every 12 (Twelve) Hours As Needed for Cough. (Patient not taking: Reported on 2025), Disp: 200 mL, Rfl: 0    omeprazole (priLOSEC) 20 MG capsule, TAKE 1 CAPSULE BY MOUTH DAILY (Patient not taking: Reported on 2025), Disp: 30 capsule, Rfl: 3    ondansetron (ZOFRAN) 8 MG tablet, Take 1 tablet by mouth 3 (Three) Times a Day As Needed for Nausea or Vomiting. (Patient not taking: Reported on 4/15/2025), Disp: 30 tablet, Rfl: 5    Allergies:   Allergies  "  Allergen Reactions    Chlorhexidine Other (See Comments)     Burning    Nylon Hives    Sunblock [Solbar Pf Spf15] Rash       Objective     Vital Signs:   Vitals:    04/28/25 1155   BP: 116/76   Pulse: 60   Resp: 12   Temp: 97.7 °F (36.5 °C)   TempSrc: Infrared   SpO2: 98%   Weight: 76.3 kg (168 lb 4.8 oz)   Height: 170.2 cm (67\")  Comment: per pt   PainSc: 0-No pain      Body mass index is 26.36 kg/m².   Pain Score    04/28/25 1155   PainSc: 0-No pain     ECOG Performance Status: 0    Physical Exam:   General: No acute distress.   HEENT: Normocephalic, atraumatic. Sclera anicteric.   Neck: Asymmetric fullness in the left neck without discrete adenopathy  Cardiovascular: regular rate and rhythm. No murmurs.   Respiratory: Normal rate. Clear to auscultation bilaterally.  Abdomen: Soft, nontender, non distended with normoactive bowel sounds.  Lymph: no cervical, supraclavicular or axillary adenopathy.  Neuro: Alert and oriented x 3. No focal deficits.   Ext: Symmetric, no swelling.   Psych: Euthymic.  Breast: no palpable masses bilaterally s/p bilateral mastectomy  Skin: scattered papules on arms/legs    Laboratory/Imaging Reviewed:     No visits with results within 2 Week(s) from this visit.   Latest known visit with results is:   Hospital Outpatient Visit on 04/07/2025   Component Date Value Ref Range Status    QT Interval 04/07/2025 350  ms Final    QTC Interval 04/07/2025 430  ms Final     On my review of CT, small ground glass change left upper lobe, new from prior < 1 cm      Assessment / Plan      Assessment/Plan:     1.  Malignant neoplasm of central portion of left breast in female, estrogen receptor positive, lymph node positive  2.  AI use  -She is status post neoadjuvant chemotherapy.  This was terminated early for taxane pneumonitis.  She has fully recovered and underwent bilateral mastectomy showing 2.5 cm of residual disease and 2 of 6 lymph nodes positive  -Completed adjuvant PMRT.  -Discussed options " for extended duration endocrine therapy in the context of lymph node positive disease.    -She completed BSO and is tolerating Arimidex.  She is having progressive AI arthralgias which are more noticeable now that her severe shoulder pain from bilateral frozen shoulder has improved.  I am going to have her trial exemestane to see if her joint symptoms are more tolerable on this regimen.  -BRCA negative, no indication for adjuvant PARP inhibitor  -With respect to adjuvant CDK 4 6 inhibitors, she has 2 lymph nodes positive but tumor size less than 5cm, grade 2, and Ki-67 5% so she does not qualify for adjuvant abemaciclib. We discussed new FDA approval for Ribociclib. She is within 18 mo of initiating endocrine therapy and is interested in a trial. She is going to discuss with her  and call us with a final decision, but we will initiate PA in the meantime and obtain baseline EKG (prior Qtc borderline).  -Change letrozole to anastrozole. If joint stiffness persists, trial duloxetine or acupuncture.   -Tolerating Anastrozole and Kisqali.   -CBC and CMP are adequate.  QTC has been in acceptable range.    3. Bone health  -Baseline DEXA shows osteopenia.   Plan to repeat DEXA every 2-years while on endocrine therapy.    -Encourage adequate calcium intake, vitamin D supplementation, and weightbearing exercise as tolerated to maintain bone density.   -ordered and discussed  -Consider adjuvant bisphosphonate after tolerating AI. We have previously discussed, consider in Spring 2025, once tolerating AI +ribo.  I am going to go ahead and order a plan for authorization.  - We are going to wait until she finishes the workup for her increasing palpitations and rash, but may consider initiation at her next visit.    4.  Palpitations  - Require workup with cardiology    5. Cough  - Predated Kisqali and improving.  Chest CT with no concerning infiltrates.  Small lung nodule that can be monitored.    6.  Skin rash  - Unclear if  this could be a drug eruption or other.  It is pruritic.  I asked her to follow-up with her dermatologist who she saw at onset.  If there is clinical concern for a drug reaction we may need to discontinue Kisqali    7. Vaginal dryness  -Better with vaginal moisturizers followed by Hyalogyn      Follow Up:   3 weeks with labs    Martha Olguin MD  Hematology and Oncology

## 2025-05-05 ENCOUNTER — SPECIALTY PHARMACY (OUTPATIENT)
Dept: ONCOLOGY | Facility: HOSPITAL | Age: 45
End: 2025-05-05
Payer: COMMERCIAL

## 2025-05-19 ENCOUNTER — SPECIALTY PHARMACY (OUTPATIENT)
Dept: ONCOLOGY | Facility: HOSPITAL | Age: 45
End: 2025-05-19
Payer: COMMERCIAL

## 2025-05-19 ENCOUNTER — LAB (OUTPATIENT)
Dept: LAB | Facility: HOSPITAL | Age: 45
End: 2025-05-19
Payer: COMMERCIAL

## 2025-05-19 ENCOUNTER — OFFICE VISIT (OUTPATIENT)
Dept: ONCOLOGY | Facility: CLINIC | Age: 45
End: 2025-05-19
Payer: COMMERCIAL

## 2025-05-19 VITALS
OXYGEN SATURATION: 99 % | SYSTOLIC BLOOD PRESSURE: 107 MMHG | DIASTOLIC BLOOD PRESSURE: 66 MMHG | WEIGHT: 167.3 LBS | HEART RATE: 60 BPM | BODY MASS INDEX: 26.26 KG/M2 | HEIGHT: 67 IN | TEMPERATURE: 97.3 F

## 2025-05-19 DIAGNOSIS — C50.112 MALIGNANT NEOPLASM OF CENTRAL PORTION OF LEFT BREAST IN FEMALE, ESTROGEN RECEPTOR POSITIVE: Primary | ICD-10-CM

## 2025-05-19 DIAGNOSIS — Z17.0 MALIGNANT NEOPLASM OF CENTRAL PORTION OF LEFT BREAST IN FEMALE, ESTROGEN RECEPTOR POSITIVE: Primary | ICD-10-CM

## 2025-05-19 DIAGNOSIS — Z17.0 MALIGNANT NEOPLASM OF CENTRAL PORTION OF LEFT BREAST IN FEMALE, ESTROGEN RECEPTOR POSITIVE: ICD-10-CM

## 2025-05-19 DIAGNOSIS — C50.112 MALIGNANT NEOPLASM OF CENTRAL PORTION OF LEFT BREAST IN FEMALE, ESTROGEN RECEPTOR POSITIVE: ICD-10-CM

## 2025-05-19 LAB
ALBUMIN SERPL-MCNC: 4.2 G/DL (ref 3.5–5.2)
ALBUMIN/GLOB SERPL: 1.4 G/DL
ALP SERPL-CCNC: 94 U/L (ref 39–117)
ALT SERPL W P-5'-P-CCNC: 12 U/L (ref 1–33)
ANION GAP SERPL CALCULATED.3IONS-SCNC: 7.9 MMOL/L (ref 5–15)
AST SERPL-CCNC: 23 U/L (ref 1–32)
BASOPHILS # BLD AUTO: 0.07 10*3/MM3 (ref 0–0.2)
BASOPHILS NFR BLD AUTO: 2.2 % (ref 0–1.5)
BILIRUB SERPL-MCNC: 0.4 MG/DL (ref 0–1.2)
BUN SERPL-MCNC: 13 MG/DL (ref 6–20)
BUN/CREAT SERPL: 16 (ref 7–25)
CALCIUM SPEC-SCNC: 9.5 MG/DL (ref 8.6–10.5)
CHLORIDE SERPL-SCNC: 102 MMOL/L (ref 98–107)
CO2 SERPL-SCNC: 27.1 MMOL/L (ref 22–29)
CREAT SERPL-MCNC: 0.81 MG/DL (ref 0.57–1)
DEPRECATED RDW RBC AUTO: 46.8 FL (ref 37–54)
EGFRCR SERPLBLD CKD-EPI 2021: 90.8 ML/MIN/1.73
EOSINOPHIL # BLD AUTO: 0.11 10*3/MM3 (ref 0–0.4)
EOSINOPHIL NFR BLD AUTO: 3.5 % (ref 0.3–6.2)
ERYTHROCYTE [DISTWIDTH] IN BLOOD BY AUTOMATED COUNT: 14 % (ref 12.3–15.4)
GLOBULIN UR ELPH-MCNC: 3.1 GM/DL
GLUCOSE SERPL-MCNC: 89 MG/DL (ref 65–99)
HCT VFR BLD AUTO: 34.3 % (ref 34–46.6)
HGB BLD-MCNC: 11.7 G/DL (ref 12–15.9)
HYPOCHROMIA BLD QL: NORMAL
IMM GRANULOCYTES # BLD AUTO: 0.02 10*3/MM3 (ref 0–0.05)
IMM GRANULOCYTES NFR BLD AUTO: 0.6 % (ref 0–0.5)
LYMPHOCYTES # BLD AUTO: 1.04 10*3/MM3 (ref 0.7–3.1)
LYMPHOCYTES NFR BLD AUTO: 33.2 % (ref 19.6–45.3)
MACROCYTES BLD QL SMEAR: NORMAL
MCH RBC QN AUTO: 31.5 PG (ref 26.6–33)
MCHC RBC AUTO-ENTMCNC: 34.1 G/DL (ref 31.5–35.7)
MCV RBC AUTO: 92.2 FL (ref 79–97)
MONOCYTES # BLD AUTO: 0.2 10*3/MM3 (ref 0.1–0.9)
MONOCYTES NFR BLD AUTO: 6.4 % (ref 5–12)
NEUTROPHILS NFR BLD AUTO: 1.69 10*3/MM3 (ref 1.7–7)
NEUTROPHILS NFR BLD AUTO: 54.1 % (ref 42.7–76)
NRBC BLD AUTO-RTO: 0 /100 WBC (ref 0–0.2)
PLAT MORPH BLD: NORMAL
PLATELET # BLD AUTO: 334 10*3/MM3 (ref 140–450)
PMV BLD AUTO: 9.2 FL (ref 6–12)
POTASSIUM SERPL-SCNC: 4.1 MMOL/L (ref 3.5–5.2)
PROT SERPL-MCNC: 7.3 G/DL (ref 6–8.5)
RBC # BLD AUTO: 3.72 10*6/MM3 (ref 3.77–5.28)
SODIUM SERPL-SCNC: 137 MMOL/L (ref 136–145)
WBC MORPH BLD: NORMAL
WBC NRBC COR # BLD AUTO: 3.13 10*3/MM3 (ref 3.4–10.8)

## 2025-05-19 PROCEDURE — 36415 COLL VENOUS BLD VENIPUNCTURE: CPT

## 2025-05-19 PROCEDURE — 85025 COMPLETE CBC W/AUTO DIFF WBC: CPT

## 2025-05-19 PROCEDURE — 85007 BL SMEAR W/DIFF WBC COUNT: CPT

## 2025-05-19 PROCEDURE — 80053 COMPREHEN METABOLIC PANEL: CPT

## 2025-05-19 PROCEDURE — 99214 OFFICE O/P EST MOD 30 MIN: CPT | Performed by: INTERNAL MEDICINE

## 2025-05-19 RX ORDER — FLUTICASONE PROPIONATE 50 MCG
1 SPRAY, SUSPENSION (ML) NASAL DAILY
Qty: 48 G | Refills: 6 | Status: SHIPPED | OUTPATIENT
Start: 2025-05-19

## 2025-05-19 NOTE — PROGRESS NOTES
Hematology and Oncology [place]  Office number [phone]    Fax number [phone]     Follow-up     Date: 25    Patient Name: Kimberley Win  MRN: [id number]  : 1980    Referring Physician: Dr. Daxa Anderson MD    Chief Complaint: follow up    Cancer Staging:  Cancer Staging   Stage IIA (cT2, cN1, cM0, G2, ER+, CO+, HER2-)    History of Present Illness: Kimberley Win is a pleasant 45 y.o. female who presented for evaluation of left breast cancer.     She notes a history of cystic breast disease for many years. She notes a new breast mass that became progressively harder and associated with pain prompting diagnostic workup.     Diagnostic mammogram 23 showed a dense, spiculated mass in the left breast which on ultrasound corresponded to a 3:00 3 cm hypoechoic mass with internal vascularity. In the 7:00 left breast there was a 7 mm mass corresponding on US to a cluster of cysts spanning 1.4 cm. In the left breast there was a 1.7 cm LN with multiple smaller LN.     Left breast biopsy showed grade 2 invasive ductal carcinoma (ER 90%, CO 10%, HER 2 negative 0+ by IHC). Lymph node FNA was postive for malignancy, metastatic ductal carcinoma.  Ki-67 5%    She underwent bilateral mastectomy and sentinel lymph node biopsy on 2023.  Left breast mastectomy showed residual 2.5 cm of invasive ductal carcinoma with associated DCIS.  Margins negative.  [place] lymph node biopsy was positive (1/2) with extracapsular extension and 1 of 4 additional lymph nodes were positive for metastatic carcinoma (total lymph node count 2 of 6).      Breast cancer risk profile:  Age of menarche:10  G0; infertility  Age of menopause: premenopausal   Family history of breast, ovarian, prostate or pancreatic cancer: mom stage IV breast cancer in her 50s. M Great Aunt, breast cancer.   Genetic testing: negative 36 gene CancerNext panel     Treatment history:  Dose dense AC followed by weekly taxol:  C1, 7/5/2023.  Weekly Taxol terminated after 2 doses for pneumonitis which fully resolved with oral steroids,  Radiation completed 2/1/2024 with Dr. Robison  BSO 2/5/2024  Arimidex 2/26/2024 to 9/2024: arthralgias  Exemestane: 9/2024: mild arthralgias, hot flashes, palpitations  Letrozole: arthralgias  Anastrazole: 3/2025  Ribociclib: 3/10/2025 to present    Interval history:  She presents today for a follow-up. [facility] [place] [facility] Drug eruption favored, managing with topical steroids. Today is second week on and managing well +zyrtec. Still with palpitations. Holter had similar burden PACs 10% prior, now 12%. Daily cough started with URI, was present at time of chest CT in April, better with flonase.  Cough is better on flonase. NO SOA, feels more energy.  Walked 85623 steps  yesterday, was able to run playing tag with kids on Saturday.. No nausea or diarrhea.    Past Medical History:   Past Medical History:   Diagnosis Date    Abnormal ECG 8/23/23    Anemia     Chemo    Arthritis     Arthritis of back     Breast cancer 06/01/2023    Left Breast    Elevated cholesterol     improved, not currently taking medication    History of chemotherapy 09/05/2023    last treatment    History of radiation therapy     left breast, currently in treatment    Hyperlipidemia LDL goal <100 11/26/2024    Hypertension     Malignant neoplasm of central portion of left breast in female, estrogen receptor positive 06/20/2023    Osteoarthritis 2021   No personal history of myocardial infarction, cerebrovascular event, or venous thromboembolism.  Osteoarthritis in her hands. No personal history of autoimmune disease. Fhx of RA in her aunt    Past Surgical History:   Past Surgical History:   Procedure Laterality Date    BREAST BIOPSY  6/1/23    BREAST RECONSTRUCTION, BREAST TISSUE EXPANDER REMOVAL, IMPLANT INSERTION Bilateral 11/06/2023    Procedure: IMMEDIATE BREAST RECONSTRUCTION WITH BREAST  TISSUE EXPANDER INSERTION AND ALLODERM - BILATERAL;  Surgeon: Dilip Anderson MD;  Location:  [facility] OR;  Service: Plastics;  Laterality: Bilateral;    COLONOSCOPY N/A 2024    Procedure: COLONOSCOPY;  Surgeon: Pamela Ernst MD;  Location: [facility] ENDOSCOPY;  Service: Gastroenterology;  Laterality: N/A;    COSMETIC SURGERY      Tissue expander insertion    DIAGNOSTIC LAPAROSCOPY, SALPINGO OOPHORECTOMY LAPAROSCOPIC N/A 2024    Procedure: DIAGNOSTIC LAPAROSCOPY, SALPINGO OOPHORECTOMY;  Surgeon: Fabby Miranda MD;  Location: [facility] OR;  Service: Obstetrics/Gynecology;  Laterality: N/A;    LYMPH NODE BIOPSY      Left underarm    MASTECTOMY W/ SENTINEL NODE BIOPSY Bilateral 2023    Procedure: BREAST MASTECTOMY WITH NIPPLE SPARING BILATERAL WITH LEFT SENTINEL NODE BIOPSY, AND AXILLARY DISECTION;  Surgeon: Daxa Anderson MD;  Location: [facility] OR;  Service: General;  Laterality: Bilateral;    US GUIDED LYMPH NODE BIOPSY  2023    VENOUS ACCESS DEVICE (PORT) INSERTION  2023       Family History:   Family History   Problem Relation Age of Onset    Breast cancer Mother 55        Stage 4    COPD Mother     Diabetes Mother     Early death Mother     Heart disease Mother     Stroke Mother     Cancer Mother         Stage 4, unknown origin,  at 57    Rheum arthritis Maternal Aunt     Breast cancer Paternal Aunt     Cancer Paternal Aunt         Breast cancer    COPD Maternal Grandfather     Stroke Maternal Grandfather     Breast cancer Other     Cancer Paternal Grandmother         Ovarian cancer,  at 55    Asthma Maternal Aunt      Social History:   Social History     Socioeconomic History    Marital status:    Tobacco Use    Smoking status: Former     Current packs/day: 0.00     Average packs/day: 0.3 packs/day for 10.0 years (2.5 ttl pk-yrs)     Types: Cigarettes     Start date: 1995     Quit date: 2005     Years since quittin.2     Passive exposure: Past     "Smokeless tobacco: Never   Vaping Use    Vaping status: Never Used   Substance and Sexual Activity    Alcohol use: Not Currently     Comment: Beer once in maybe a month, not very often    Drug use: Never    Sexual activity: Yes     Partners: Male     Birth control/protection: Bilateral salpingectomy      Comment: 2/5 had a bilateral salpingo-oopherectomy   , lives in [place].     Medications:     Current Outpatient Medications:     anastrozole (ARIMIDEX) 1 MG tablet, Take 1 tablet by mouth Daily., Disp: 90 tablet, Rfl: 3    fluticasone (FLONASE) 50 MCG/ACT nasal spray, USE 1 SPRAY TO EACH NOSTRIL EVERY DAY, Disp: 48 g, Rfl: 0    hydrocortisone 2.5 % cream, , Disp: , Rfl:     ketoconazole (NIZORAL) 2 % cream, , Disp: , Rfl:     metoprolol succinate XL (TOPROL-XL) 25 MG 24 hr tablet, Take 1 tablet by mouth Daily., Disp: 90 tablet, Rfl: 3    omeprazole (priLOSEC) 20 MG capsule, TAKE 1 CAPSULE BY MOUTH DAILY, Disp: 30 capsule, Rfl: 3    ondansetron (ZOFRAN) 8 MG tablet, Take 1 tablet by mouth 3 (Three) Times a Day As Needed for Nausea or Vomiting (DO NOT USE WHILE SANCUSO IN PLACE)., Disp: 30 tablet, Rfl: 5    ondansetron (ZOFRAN) 8 MG tablet, Take 1 tablet by mouth 3 (Three) Times a Day As Needed for Nausea or Vomiting., Disp: 30 tablet, Rfl: 5    ribociclib succinate, 400 MG Dose, (KISQALI) 200 MG tablet therapy pack tablet, Take 2 tablets by mouth Daily. on days 1-21 then off 7 days in each 28-day cycle., Disp: 42 tablet, Rfl: 11    Allergies:   Allergies   Allergen Reactions    Chlorhexidine Other (See Comments)     Burning    Nylon Hives    Sunblock [Solbar Pf Spf15] Rash       Objective     Vital Signs:   Vitals:    05/19/25 1024   BP: 107/66   Pulse: 60   Temp: 97.3 °F (36.3 °C)   TempSrc: Temporal   SpO2: 99%   Weight: 75.9 kg (167 lb 4.8 oz)   Height: 170.2 cm (67.01\")   PainSc: 0-No pain      Body mass index is 26.2 kg/m².   Pain Score    05/19/25 1024   PainSc: 0-No pain     ECOG Performance " Status: 0    Physical Exam:   General: No acute distress.   HEENT: Normocephalic, atraumatic. Sclera anicteric.   Neck: Asymmetric fullness in the left neck without discrete adenopathy  Cardiovascular: regular rate and rhythm. No murmurs.   Respiratory: Normal rate. Clear to auscultation bilaterally.  Abdomen: Soft, nontender, non distended with normoactive bowel sounds.  Lymph: no cervical, supraclavicular or axillary adenopathy.  Neuro: Alert and oriented x 3. No focal deficits.   Ext: Symmetric, no swelling.   Breast: no palpable masses bilaterally s/p bilateral mastectomy (not re-examined today)  Skin: scattered papules on leg    Laboratory/Imaging Reviewed:     No visits with results within 2 Week(s) from this visit.   Latest known visit with results is:   Lab on 04/28/2025   Component Date Value Ref Range Status    Glucose 04/28/2025 99  65 - 99 mg/dL Final    BUN 04/28/2025 12  6 - 20 mg/dL Final    Creatinine 04/28/2025 0.92  0.57 - 1.00 mg/dL Final    Sodium 04/28/2025 138  136 - 145 mmol/L Final    Potassium 04/28/2025 4.2  3.5 - 5.2 mmol/L Final    Chloride 04/28/2025 102  98 - 107 mmol/L Final    CO2 04/28/2025 24.7  22.0 - 29.0 mmol/L Final    Calcium 04/28/2025 9.7  8.6 - 10.5 mg/dL Final    Total Protein 04/28/2025 8.1  6.0 - 8.5 g/dL Final    Albumin 04/28/2025 4.4  3.5 - 5.2 g/dL Final    ALT (SGPT) 04/28/2025 10  1 - 33 U/L Final    AST (SGOT) 04/28/2025 25  1 - 32 U/L Final    Alkaline Phosphatase 04/28/2025 97  39 - 117 U/L Final    Total Bilirubin 04/28/2025 0.5  0.0 - 1.2 mg/dL Final    Globulin 04/28/2025 3.7  gm/dL Final    A/G Ratio 04/28/2025 1.2  g/dL Final    BUN/Creatinine Ratio 04/28/2025 13.0  7.0 - 25.0 Final    Anion Gap 04/28/2025 11.3  5.0 - 15.0 mmol/L Final    eGFR 04/28/2025 77.9  >60.0 mL/min/1.73 Final    WBC 04/28/2025 2.94 (L)  3.40 - 10.80 10*3/mm3 Final    RBC 04/28/2025 3.97  3.77 - 5.28 10*6/mm3 Final    Hemoglobin 04/28/2025 12.4  12.0 - 15.9 g/dL Final    Hematocrit  04/28/2025 36.1  34.0 - 46.6 % Final    MCV 04/28/2025 90.9  79.0 - 97.0 fL Final    MCH 04/28/2025 31.2  26.6 - 33.0 pg Final    MCHC 04/28/2025 34.3  31.5 - 35.7 g/dL Final    RDW 04/28/2025 13.2  12.3 - 15.4 % Final    RDW-SD 04/28/2025 42.8  37.0 - 54.0 fl Final    MPV 04/28/2025 9.1  6.0 - 12.0 fL Final    Platelets 04/28/2025 220  140 - 450 10*3/mm3 Final    Neutrophil % 04/28/2025 55.8  42.7 - 76.0 % Final    Lymphocyte % 04/28/2025 33.7  19.6 - 45.3 % Final    Monocyte % 04/28/2025 6.1  5.0 - 12.0 % Final    Eosinophil % 04/28/2025 2.7  0.3 - 6.2 % Final    Basophil % 04/28/2025 1.4  0.0 - 1.5 % Final    Immature Grans % 04/28/2025 0.3  0.0 - 0.5 % Final    Neutrophils, Absolute 04/28/2025 1.64 (L)  1.70 - 7.00 10*3/mm3 Final    Lymphocytes, Absolute 04/28/2025 0.99  0.70 - 3.10 10*3/mm3 Final    Monocytes, Absolute 04/28/2025 0.18  0.10 - 0.90 10*3/mm3 Final    Eosinophils, Absolute 04/28/2025 0.08  0.00 - 0.40 10*3/mm3 Final    Basophils, Absolute 04/28/2025 0.04  0.00 - 0.20 10*3/mm3 Final    Immature Grans, Absolute 04/28/2025 0.01  0.00 - 0.05 10*3/mm3 Final    nRBC 04/28/2025 0.0  0.0 - 0.2 /100 WBC Final     On my review of CT, small ground glass change left upper lobe, new from prior < 1 cm      Assessment / Plan      Assessment/Plan:     1.  Malignant neoplasm of central portion of left breast in female, estrogen receptor positive, lymph node positive  2.  High risk medication use  3.  Drug induced eruption  4.  Cough  -She is status post neoadjuvant chemotherapy.  This was terminated early for taxane pneumonitis.  She has fully recovered and underwent bilateral mastectomy showing 2.5 cm of residual disease and 2 of 6 lymph nodes positive  -Completed adjuvant PMRT.  -Discussed options for extended duration endocrine therapy in the context of lymph node positive disease.    -She completed BSO and is tolerating Arimidex.  She is having progressive AI arthralgias which are more noticeable now that her  severe shoulder pain from bilateral frozen shoulder has improved.  I am going to have her trial exemestane to see if her joint symptoms are more tolerable on this regimen.  -BRCA negative, no indication for adjuvant PARP inhibitor  -With respect to adjuvant CDK 4 6 inhibitors, she has 2 lymph nodes positive but tumor size less than 5cm, grade 2, and Ki-67 5% so she does not qualify for adjuvant abemaciclib. We discussed new FDA approval for Ribociclib. She is within 18 mo of initiating endocrine therapy and is interested in a trial. She is going to discuss with her  and call us with a final decision, but we will initiate PA in the meantime and obtain baseline EKG (prior Qtc borderline).  -Change letrozole to anastrozole. If joint stiffness persists, trial duloxetine or acupuncture.   -Tolerating Anastrozole and Kisqali.   -CBC and CMP are pending cycle 2 day 15.  QTC has been in acceptable range.  -Transition to monthly follow up with CBC/CMP day 1 starting in 2 weeks.   -DA if rash progressive currently managing. Cough predated Kisqali and improving.  Chest CT with no concerning infiltrates.  Small lung nodule that can be monitored.Low threshold to order CT chest high resolution but no current pneumonitos symptoms and recent CT isauro.  -Request dermatology records    5. Bone health  -Baseline DEXA shows osteopenia.   Plan to repeat DEXA every 2-years while on endocrine therapy.    -Encourage adequate calcium intake, vitamin D supplementation, and weightbearing exercise as tolerated to maintain bone density.   -ordered and discussed  -Consider adjuvant bisphosphonate after tolerating AI. We have previously discussed, consider in Spring 2025, once tolerating AI +ribo. Discussed again start this summer, she will consider, schedule at next visit if she elects to proceed    6.  Palpitations  - Require workup with cardiology    7. Vaginal dryness  -Better with vaginal moisturizers followed by Hyalogyn      Follow  Up:   2 weeks with labs    Martha Olguin MD  Hematology and Oncology

## 2025-06-02 ENCOUNTER — LAB (OUTPATIENT)
Dept: LAB | Facility: HOSPITAL | Age: 45
End: 2025-06-02
Payer: COMMERCIAL

## 2025-06-02 ENCOUNTER — OFFICE VISIT (OUTPATIENT)
Dept: ONCOLOGY | Facility: CLINIC | Age: 45
End: 2025-06-02
Payer: COMMERCIAL

## 2025-06-02 VITALS
WEIGHT: 167 LBS | OXYGEN SATURATION: 99 % | HEIGHT: 67 IN | DIASTOLIC BLOOD PRESSURE: 68 MMHG | SYSTOLIC BLOOD PRESSURE: 126 MMHG | HEART RATE: 74 BPM | RESPIRATION RATE: 18 BRPM | TEMPERATURE: 98 F | BODY MASS INDEX: 26.21 KG/M2

## 2025-06-02 DIAGNOSIS — C50.112 MALIGNANT NEOPLASM OF CENTRAL PORTION OF LEFT BREAST IN FEMALE, ESTROGEN RECEPTOR POSITIVE: Primary | ICD-10-CM

## 2025-06-02 DIAGNOSIS — Z17.0 MALIGNANT NEOPLASM OF CENTRAL PORTION OF LEFT BREAST IN FEMALE, ESTROGEN RECEPTOR POSITIVE: ICD-10-CM

## 2025-06-02 DIAGNOSIS — C50.112 MALIGNANT NEOPLASM OF CENTRAL PORTION OF LEFT BREAST IN FEMALE, ESTROGEN RECEPTOR POSITIVE: ICD-10-CM

## 2025-06-02 DIAGNOSIS — Z17.0 MALIGNANT NEOPLASM OF CENTRAL PORTION OF LEFT BREAST IN FEMALE, ESTROGEN RECEPTOR POSITIVE: Primary | ICD-10-CM

## 2025-06-02 LAB
ALBUMIN SERPL-MCNC: 4 G/DL (ref 3.5–5.2)
ALBUMIN/GLOB SERPL: 1.3 G/DL
ALP SERPL-CCNC: 94 U/L (ref 39–117)
ALT SERPL W P-5'-P-CCNC: 9 U/L (ref 1–33)
ANION GAP SERPL CALCULATED.3IONS-SCNC: 8.3 MMOL/L (ref 5–15)
AST SERPL-CCNC: 20 U/L (ref 1–32)
BASOPHILS # BLD AUTO: 0.04 10*3/MM3 (ref 0–0.2)
BASOPHILS NFR BLD AUTO: 1.8 % (ref 0–1.5)
BILIRUB SERPL-MCNC: 0.2 MG/DL (ref 0–1.2)
BUN SERPL-MCNC: 18 MG/DL (ref 6–20)
BUN/CREAT SERPL: 22.8 (ref 7–25)
CALCIUM SPEC-SCNC: 9.2 MG/DL (ref 8.6–10.5)
CHLORIDE SERPL-SCNC: 107 MMOL/L (ref 98–107)
CO2 SERPL-SCNC: 25.7 MMOL/L (ref 22–29)
CREAT SERPL-MCNC: 0.79 MG/DL (ref 0.57–1)
DEPRECATED RDW RBC AUTO: 47.5 FL (ref 37–54)
EGFRCR SERPLBLD CKD-EPI 2021: 93.6 ML/MIN/1.73
EOSINOPHIL # BLD AUTO: 0.05 10*3/MM3 (ref 0–0.4)
EOSINOPHIL NFR BLD AUTO: 2.3 % (ref 0.3–6.2)
ERYTHROCYTE [DISTWIDTH] IN BLOOD BY AUTOMATED COUNT: 14.2 % (ref 12.3–15.4)
GLOBULIN UR ELPH-MCNC: 3.1 GM/DL
GLUCOSE SERPL-MCNC: 80 MG/DL (ref 65–99)
HCT VFR BLD AUTO: 33.3 % (ref 34–46.6)
HGB BLD-MCNC: 11.2 G/DL (ref 12–15.9)
IMM GRANULOCYTES # BLD AUTO: 0.01 10*3/MM3 (ref 0–0.05)
IMM GRANULOCYTES NFR BLD AUTO: 0.5 % (ref 0–0.5)
LYMPHOCYTES # BLD AUTO: 1 10*3/MM3 (ref 0.7–3.1)
LYMPHOCYTES NFR BLD AUTO: 45.2 % (ref 19.6–45.3)
MAGNESIUM SERPL-MCNC: 1.9 MG/DL (ref 1.6–2.6)
MCH RBC QN AUTO: 31.5 PG (ref 26.6–33)
MCHC RBC AUTO-ENTMCNC: 33.6 G/DL (ref 31.5–35.7)
MCV RBC AUTO: 93.5 FL (ref 79–97)
MONOCYTES # BLD AUTO: 0.26 10*3/MM3 (ref 0.1–0.9)
MONOCYTES NFR BLD AUTO: 11.8 % (ref 5–12)
NEUTROPHILS NFR BLD AUTO: 0.85 10*3/MM3 (ref 1.7–7)
NEUTROPHILS NFR BLD AUTO: 38.4 % (ref 42.7–76)
NRBC BLD AUTO-RTO: 0 /100 WBC (ref 0–0.2)
PHOSPHATE SERPL-MCNC: 3 MG/DL (ref 2.5–4.5)
PLATELET # BLD AUTO: 171 10*3/MM3 (ref 140–450)
PMV BLD AUTO: 9 FL (ref 6–12)
POTASSIUM SERPL-SCNC: 4.3 MMOL/L (ref 3.5–5.2)
PROT SERPL-MCNC: 7.1 G/DL (ref 6–8.5)
RBC # BLD AUTO: 3.56 10*6/MM3 (ref 3.77–5.28)
SODIUM SERPL-SCNC: 141 MMOL/L (ref 136–145)
WBC NRBC COR # BLD AUTO: 2.21 10*3/MM3 (ref 3.4–10.8)

## 2025-06-02 PROCEDURE — 83735 ASSAY OF MAGNESIUM: CPT

## 2025-06-02 PROCEDURE — 85025 COMPLETE CBC W/AUTO DIFF WBC: CPT

## 2025-06-02 PROCEDURE — 80053 COMPREHEN METABOLIC PANEL: CPT

## 2025-06-02 PROCEDURE — 99214 OFFICE O/P EST MOD 30 MIN: CPT | Performed by: NURSE PRACTITIONER

## 2025-06-02 PROCEDURE — 84100 ASSAY OF PHOSPHORUS: CPT

## 2025-06-02 PROCEDURE — 36415 COLL VENOUS BLD VENIPUNCTURE: CPT

## 2025-06-02 NOTE — PROGRESS NOTES
Hematology and Oncology [place]  Office number [phone]    Fax number [phone]     Follow-up     Date: 25    Patient Name: Kimberley Win  MRN: [id number]  : 1980    Referring Physician: Dr. Daxa Anderson MD    Chief Complaint: follow up    Cancer Staging:  Cancer Staging   Stage IIA (cT2, cN1, cM0, G2, ER+, VT+, HER2-)    History of Present Illness: Kimberley Win is a pleasant 45 y.o. female who presented for evaluation of left breast cancer.     She notes a history of cystic breast disease for many years. She notes a new breast mass that became progressively harder and associated with pain prompting diagnostic workup.     Diagnostic mammogram 23 showed a dense, spiculated mass in the left breast which on ultrasound corresponded to a 3:00 3 cm hypoechoic mass with internal vascularity. In the 7:00 left breast there was a 7 mm mass corresponding on US to a cluster of cysts spanning 1.4 cm. In the left breast there was a 1.7 cm LN with multiple smaller LN.     Left breast biopsy showed grade 2 invasive ductal carcinoma (ER 90%, VT 10%, HER 2 negative 0+ by IHC). Lymph node FNA was postive for malignancy, metastatic ductal carcinoma.  Ki-67 5%    She underwent bilateral mastectomy and sentinel lymph node biopsy on 2023.  Left breast mastectomy showed residual 2.5 cm of invasive ductal carcinoma with associated DCIS.  Margins negative.  [place] lymph node biopsy was positive (1/2) with extracapsular extension and 1 of 4 additional lymph nodes were positive for metastatic carcinoma (total lymph node count 2 of 6).      Breast cancer risk profile:  Age of menarche:10  G0; infertility  Age of menopause: premenopausal   Family history of breast, ovarian, prostate or pancreatic cancer: mom stage IV breast cancer in her 50s. M Great Aunt, breast cancer.   Genetic testing: negative 36 gene CancerNext panel     Treatment history:  Dose dense AC followed by weekly taxol:  C1, 7/5/2023.  Weekly Taxol terminated after 2 doses for pneumonitis which fully resolved with oral steroids,  Radiation completed 2/1/2024 with Dr. Robison  BSO 2/5/2024  Arimidex 2/26/2024 to 9/2024: arthralgias  Exemestane: 9/2024: mild arthralgias, hot flashes, palpitations  Letrozole: arthralgias  Anastrazole: 3/2025  Ribociclib: 3/10/2025 to present    Interval history:  She presents today for a follow-up. Saw Heidy Parks [facility].  Continues to have some drug eruption favored, managing with topical steroids and Claritin in the morning with Zyrtec at night.  Continues to have palpitations.  Holter similar burden PAC.  Will see Dr. Costello for follow-up.  Daily cough improved.  No shortness of air.  Continues to have more energy.  Continues to walk between 5 and 12,000 steps a day.  Denies nausea or vomiting.  No diarrhea continues to have some joint pain.  Only takes hydrocodone on her off week.  When she takes it with Kisqali it makes her too tired.  She only does this occasionally to help with joint pain.        Past Medical History:   Past Medical History:   Diagnosis Date    Abnormal ECG 8/23/23    Anemia     Chemo    Arthritis     Arthritis of back     Breast cancer 06/01/2023    Left Breast    Elevated cholesterol     improved, not currently taking medication    History of chemotherapy 09/05/2023    last treatment    History of radiation therapy     left breast, currently in treatment    Hyperlipidemia LDL goal <100 11/26/2024    Hypertension     Malignant neoplasm of central portion of left breast in female, estrogen receptor positive 06/20/2023    Osteoarthritis 2021   No personal history of myocardial infarction, cerebrovascular event, or venous thromboembolism.  Osteoarthritis in her hands. No personal history of autoimmune disease. Fhx of RA in her aunt    Past Surgical History:   Past Surgical History:   Procedure Laterality Date    BREAST BIOPSY  6/1/23     BREAST RECONSTRUCTION, BREAST TISSUE EXPANDER REMOVAL, IMPLANT INSERTION Bilateral 2023    Procedure: IMMEDIATE BREAST RECONSTRUCTION WITH BREAST TISSUE EXPANDER INSERTION AND ALLODERM - BILATERAL;  Surgeon: Dilip Anderson MD;  Location:  [facility] OR;  Service: Plastics;  Laterality: Bilateral;    COLONOSCOPY N/A 2024    Procedure: COLONOSCOPY;  Surgeon: Pamela Ernst MD;  Location: [facility] ENDOSCOPY;  Service: Gastroenterology;  Laterality: N/A;    COSMETIC SURGERY      Tissue expander insertion    DIAGNOSTIC LAPAROSCOPY, SALPINGO OOPHORECTOMY LAPAROSCOPIC N/A 2024    Procedure: DIAGNOSTIC LAPAROSCOPY, SALPINGO OOPHORECTOMY;  Surgeon: Fabby Miranda MD;  Location: [facility] OR;  Service: Obstetrics/Gynecology;  Laterality: N/A;    LYMPH NODE BIOPSY      Left underarm    MASTECTOMY W/ SENTINEL NODE BIOPSY Bilateral 2023    Procedure: BREAST MASTECTOMY WITH NIPPLE SPARING BILATERAL WITH LEFT SENTINEL NODE BIOPSY, AND AXILLARY DISECTION;  Surgeon: Daxa Anderson MD;  Location:  [facility] OR;  Service: General;  Laterality: Bilateral;    US GUIDED LYMPH NODE BIOPSY  2023    VENOUS ACCESS DEVICE (PORT) INSERTION  2023       Family History:   Family History   Problem Relation Age of Onset    Breast cancer Mother 55        Stage 4    COPD Mother     Diabetes Mother     Early death Mother     Heart disease Mother     Stroke Mother     Cancer Mother         Stage 4, unknown origin,  at 57    Rheum arthritis Maternal Aunt     Breast cancer Paternal Aunt     Cancer Paternal Aunt         Breast cancer    COPD Maternal Grandfather     Stroke Maternal Grandfather     Breast cancer Other     Cancer Paternal Grandmother         Ovarian cancer,  at 55    Asthma Maternal Aunt      Social History:   Social History     Socioeconomic History    Marital status:    Tobacco Use    Smoking status: Former     Current packs/day: 0.00     Average packs/day: 0.3 packs/day for 10.0 years (2.5  ttl pk-yrs)     Types: Cigarettes     Start date: 1995     Quit date: 2005     Years since quittin.3     Passive exposure: Past    Smokeless tobacco: Never   Vaping Use    Vaping status: Never Used   Substance and Sexual Activity    Alcohol use: Not Currently     Comment: Beer once in maybe a month, not very often    Drug use: Never    Sexual activity: Yes     Partners: Male     Birth control/protection: Bilateral salpingectomy      Comment:  had a bilateral salpingo-oopherectomy   , lives in [place].     Medications:     Current Outpatient Medications:     anastrozole (ARIMIDEX) 1 MG tablet, Take 1 tablet by mouth Daily., Disp: 90 tablet, Rfl: 3    fluticasone (FLONASE) 50 MCG/ACT nasal spray, Administer 1 spray into the nostril(s) as directed by provider Daily., Disp: 48 g, Rfl: 6    hydrocortisone 2.5 % cream, , Disp: , Rfl:     ketoconazole (NIZORAL) 2 % cream, , Disp: , Rfl:     metoprolol succinate XL (TOPROL-XL) 25 MG 24 hr tablet, Take 1 tablet by mouth Daily., Disp: 90 tablet, Rfl: 3    omeprazole (priLOSEC) 20 MG capsule, TAKE 1 CAPSULE BY MOUTH DAILY, Disp: 30 capsule, Rfl: 3    ondansetron (ZOFRAN) 8 MG tablet, Take 1 tablet by mouth 3 (Three) Times a Day As Needed for Nausea or Vomiting (DO NOT USE WHILE SANCUSO IN PLACE)., Disp: 30 tablet, Rfl: 5    ondansetron (ZOFRAN) 8 MG tablet, Take 1 tablet by mouth 3 (Three) Times a Day As Needed for Nausea or Vomiting., Disp: 30 tablet, Rfl: 5    ribociclib succinate, 400 MG Dose, (KISQALI) 200 MG tablet therapy pack tablet, Take 2 tablets by mouth Daily. on days 1-21 then off 7 days in each 28-day cycle., Disp: 42 tablet, Rfl: 11    Allergies:   Allergies   Allergen Reactions    Chlorhexidine Other (See Comments)     Burning    Nylon Hives    Sunblock [Solbar Pf Spf15] Rash       Objective     Vital Signs:   Vitals:    25 1015   BP: 126/68   Pulse: 74   Resp: 18   Temp: 98 °F (36.7 °C)   TempSrc: Infrared   SpO2: 99%  "  Weight: 75.8 kg (167 lb)   Height: 170.2 cm (67.01\")   PainSc: 0-No pain      Body mass index is 26.15 kg/m².   Pain Score    06/02/25 1015   PainSc: 0-No pain     ECOG Performance Status: 0    Physical Exam:   General: No acute distress.   HEENT: Normocephalic, atraumatic. Sclera anicteric.   Neck: Asymmetric fullness in the left neck without discrete adenopathy  Cardiovascular: regular rate and rhythm. No murmurs.   Respiratory: Normal rate. Clear to auscultation bilaterally.  Abdomen: Soft, nontender, non distended with normoactive bowel sounds.  Lymph: no cervical, supraclavicular or axillary adenopathy.  Neuro: Alert and oriented x 3. No focal deficits.   Ext: Symmetric, no swelling.   Breast: no palpable masses bilaterally s/p bilateral mastectomy (not re-examined today)  Skin: scattered papules on leg    Laboratory/Imaging Reviewed:     No visits with results within 2 Week(s) from this visit.   Latest known visit with results is:   Lab on 05/19/2025   Component Date Value Ref Range Status    Glucose 05/19/2025 89  65 - 99 mg/dL Final    BUN 05/19/2025 13  6 - 20 mg/dL Final    Creatinine 05/19/2025 0.81  0.57 - 1.00 mg/dL Final    Sodium 05/19/2025 137  136 - 145 mmol/L Final    Potassium 05/19/2025 4.1  3.5 - 5.2 mmol/L Final    Chloride 05/19/2025 102  98 - 107 mmol/L Final    CO2 05/19/2025 27.1  22.0 - 29.0 mmol/L Final    Calcium 05/19/2025 9.5  8.6 - 10.5 mg/dL Final    Total Protein 05/19/2025 7.3  6.0 - 8.5 g/dL Final    Albumin 05/19/2025 4.2  3.5 - 5.2 g/dL Final    ALT (SGPT) 05/19/2025 12  1 - 33 U/L Final    AST (SGOT) 05/19/2025 23  1 - 32 U/L Final    Alkaline Phosphatase 05/19/2025 94  39 - 117 U/L Final    Total Bilirubin 05/19/2025 0.4  0.0 - 1.2 mg/dL Final    Globulin 05/19/2025 3.1  gm/dL Final    A/G Ratio 05/19/2025 1.4  g/dL Final    BUN/Creatinine Ratio 05/19/2025 16.0  7.0 - 25.0 Final    Anion Gap 05/19/2025 7.9  5.0 - 15.0 mmol/L Final    eGFR 05/19/2025 90.8  >60.0 mL/min/1.73 " Final    WBC 05/19/2025 3.13 (L)  3.40 - 10.80 10*3/mm3 Final    RBC 05/19/2025 3.72 (L)  3.77 - 5.28 10*6/mm3 Final    Hemoglobin 05/19/2025 11.7 (L)  12.0 - 15.9 g/dL Final    Hematocrit 05/19/2025 34.3  34.0 - 46.6 % Final    MCV 05/19/2025 92.2  79.0 - 97.0 fL Final    MCH 05/19/2025 31.5  26.6 - 33.0 pg Final    MCHC 05/19/2025 34.1  31.5 - 35.7 g/dL Final    RDW 05/19/2025 14.0  12.3 - 15.4 % Final    RDW-SD 05/19/2025 46.8  37.0 - 54.0 fl Final    MPV 05/19/2025 9.2  6.0 - 12.0 fL Final    Platelets 05/19/2025 334  140 - 450 10*3/mm3 Final    Neutrophil % 05/19/2025 54.1  42.7 - 76.0 % Final    Lymphocyte % 05/19/2025 33.2  19.6 - 45.3 % Final    Monocyte % 05/19/2025 6.4  5.0 - 12.0 % Final    Eosinophil % 05/19/2025 3.5  0.3 - 6.2 % Final    Basophil % 05/19/2025 2.2 (H)  0.0 - 1.5 % Final    Immature Grans % 05/19/2025 0.6 (H)  0.0 - 0.5 % Final    Neutrophils, Absolute 05/19/2025 1.69 (L)  1.70 - 7.00 10*3/mm3 Final    Lymphocytes, Absolute 05/19/2025 1.04  0.70 - 3.10 10*3/mm3 Final    Monocytes, Absolute 05/19/2025 0.20  0.10 - 0.90 10*3/mm3 Final    Eosinophils, Absolute 05/19/2025 0.11  0.00 - 0.40 10*3/mm3 Final    Basophils, Absolute 05/19/2025 0.07  0.00 - 0.20 10*3/mm3 Final    Immature Grans, Absolute 05/19/2025 0.02  0.00 - 0.05 10*3/mm3 Final    nRBC 05/19/2025 0.0  0.0 - 0.2 /100 WBC Final    Hypochromia 05/19/2025 Mod/2+  None Seen Final    Macrocytes 05/19/2025 Mod/2+  None Seen Final    WBC Morphology 05/19/2025 Normal  Normal Final    Platelet Morphology 05/19/2025 Normal  Normal Final     On my review of CT, small ground glass change left upper lobe, new from prior < 1 cm      Assessment / Plan      Assessment/Plan:     1.  Malignant neoplasm of central portion of left breast in female, estrogen receptor positive, lymph node positive  2.  High risk medication use  3.  Drug induced eruption  4.  Cough  -She is status post neoadjuvant chemotherapy.  This was terminated early for taxane  pneumonitis.  She has fully recovered and underwent bilateral mastectomy showing 2.5 cm of residual disease and 2 of 6 lymph nodes positive  -Completed adjuvant PMRT.  -Discussed options for extended duration endocrine therapy in the context of lymph node positive disease.    -She completed BSO and is tolerating Arimidex.  She is having progressive AI arthralgias which are more noticeable now that her severe shoulder pain from bilateral frozen shoulder has improved.  I am going to have her trial exemestane to see if her joint symptoms are more tolerable on this regimen.  -BRCA negative, no indication for adjuvant PARP inhibitor  -With respect to adjuvant CDK 4 6 inhibitors, she has 2 lymph nodes positive but tumor size less than 5cm, grade 2, and Ki-67 5% so she does not qualify for adjuvant abemaciclib. We discussed new FDA approval for Ribociclib. She is within 18 mo of initiating endocrine therapy and is interested in a trial. She is going to discuss with her  and call us with a final decision, but we will initiate PA in the meantime and obtain baseline EKG (prior Qtc borderline).  -Changed letrozole to anastrozole. If joint stiffness persists, trial duloxetine or acupuncture.   - Continues tolerating Anastrozole and Kisqali.   -CBC and CMP are pending cycle 4.  QTC has been in acceptable range.  - We will transition to monthly follow up with CBC/CMP day 1 today.  -DA if rash progressive currently managing. Cough predated Kisqali and improving.  Chest CT with no concerning infiltrates.  Small lung nodule that can be monitored.Low threshold to order CT chest high resolution but no current pneumonitos symptoms and recent CT isauro.  -Requested dermatology records    5. Bone health  -Baseline DEXA shows osteopenia.   Plan to repeat DEXA every 2-years while on endocrine therapy.    -Encourage adequate calcium intake, vitamin D supplementation, and weightbearing exercise as tolerated to maintain bone density.    -ordered and discussed  -Consider adjuvant bisphosphonate after tolerating AI. We have previously discussed, consider in Spring 2025, once tolerating AI +ribo. Discussed again start this summer, she will consider, schedule at next visit if she elects to proceed    6.  Palpitations  - Require workup with cardiology    7. Vaginal dryness  -Better with vaginal moisturizers followed by Hyalogyn      Follow Up:   1 month with labs    Addendum.  Discussed the case with Dr. Olguin.  ANC is 0.85 today.  We will dose reduce her to 200 mg twice daily.  Message sent to pharmacy.    Above reviewed and updated as of 6/2/2025    NELLA Horan    Hematology and Oncology

## 2025-06-03 ENCOUNTER — SPECIALTY PHARMACY (OUTPATIENT)
Dept: ONCOLOGY | Facility: HOSPITAL | Age: 45
End: 2025-06-03
Payer: COMMERCIAL

## 2025-06-03 NOTE — PROGRESS NOTES
Re: Refills of Oral Specialty Medication - Kisqali (ribociclib)    Drug-Drug Interactions: The current medication list was reviewed and there are some relevant drug-drug interactions with the oral specialty medication, including:  Category C (monitor therapy): ribociclib (moderate CY inhibitor) may increase the serum concentration of fluticasone nasal spray (monitor for headaches or nosebleeds with fluticasone)   Category C (monitor therapy): Increased risk of QT prolongation with ribociclib and ondansetron (monitor for dizziness, feeling lightheaded, or heart palpitations)   Medication Allergies: The patient has no relevant allergies as it relates to their oral specialty medication  Review of Labs/Dose Adjustments: DOSE CHANGE - I reviewed the most recent note and labs. Due to neutropenia, the dose is being decreased from ribociclib 400 mg to ribociclib 200 mg once neutropenia resolves. I sent refills as described below.  The patient is currently holding treatment with ribociclib due to neutropenia.  Dr. Olguin recommends resuming treatment at a lower dosage of ribociclib 200 mg once neutropenia resolves.    Component  Ref Range & Units (hover) 1 d ago  (25) 2 wk ago  (25)   Neutrophils, Absolute 0.85 Low  1.69 Low        Drug: Kisqali (ribociclib)  Strength: 200 mg  Directions: Take 1 tablet by mouth daily ON days 1-21, OFF for 7 days of each 28- day cycle  Quantity: 21 tablets  Refills: 11  Pharmacy prescription sent to: [facility]    Dorothy Tavera, PharmNAOMI  Clinic Oncology Pharmacy Specialist  [phone]    6/3/2025  07:31 EDT

## 2025-06-03 NOTE — PROGRESS NOTES
Oral Chemotherapy - Double Check    Drug: ribociclib  Strength: 200mg tablet therapy pack  Directions: Take 1 tablet by mouth Daily. On days 1-21, then off for 7 days in a 28-day cycle   QTY: 21  RF:11    Released to pharmacy: [facility]    Completed independent double check on medication order/RX.    Maxine Torres PharmD, [facility]  Clinical Oncology Pharmacy Specialist  Phone: [phone]    6/3/2025  08:54 EDT

## 2025-06-04 ENCOUNTER — TELEPHONE (OUTPATIENT)
Dept: ONCOLOGY | Facility: CLINIC | Age: 45
End: 2025-06-04

## 2025-06-04 NOTE — TELEPHONE ENCOUNTER
Caller: Kimberley Win    Relationship: Self    Best call back number: [phone]    What was the call regarding: PATIENT WAS CALLING TO SPEAK TO [facility]. WAS TOLD BY SKIP LAW THAT PATIENT WOULD  NEED TO COME IN FOR APPT, NOT SURE IT ITS FOR LABS OR LABS AND A F/U.    CALL TO SCU

## 2025-06-09 ENCOUNTER — LAB (OUTPATIENT)
Dept: LAB | Facility: HOSPITAL | Age: 45
End: 2025-06-09
Payer: COMMERCIAL

## 2025-06-09 ENCOUNTER — TELEPHONE (OUTPATIENT)
Age: 45
End: 2025-06-09
Payer: COMMERCIAL

## 2025-06-09 DIAGNOSIS — C50.112 MALIGNANT NEOPLASM OF CENTRAL PORTION OF LEFT BREAST IN FEMALE, ESTROGEN RECEPTOR POSITIVE: ICD-10-CM

## 2025-06-09 DIAGNOSIS — Z17.0 MALIGNANT NEOPLASM OF CENTRAL PORTION OF LEFT BREAST IN FEMALE, ESTROGEN RECEPTOR POSITIVE: ICD-10-CM

## 2025-06-09 LAB
ALBUMIN SERPL-MCNC: 4.2 G/DL (ref 3.5–5.2)
ALBUMIN/GLOB SERPL: 1.2 G/DL
ALP SERPL-CCNC: 97 U/L (ref 39–117)
ALT SERPL W P-5'-P-CCNC: 13 U/L (ref 1–33)
ANION GAP SERPL CALCULATED.3IONS-SCNC: 9.5 MMOL/L (ref 5–15)
AST SERPL-CCNC: 24 U/L (ref 1–32)
BASOPHILS # BLD AUTO: 0.07 10*3/MM3 (ref 0–0.2)
BASOPHILS NFR BLD AUTO: 2.1 % (ref 0–1.5)
BILIRUB SERPL-MCNC: 0.3 MG/DL (ref 0–1.2)
BUN SERPL-MCNC: 16 MG/DL (ref 6–20)
BUN/CREAT SERPL: 21.6 (ref 7–25)
CALCIUM SPEC-SCNC: 10 MG/DL (ref 8.6–10.5)
CHLORIDE SERPL-SCNC: 102 MMOL/L (ref 98–107)
CO2 SERPL-SCNC: 27.5 MMOL/L (ref 22–29)
CREAT SERPL-MCNC: 0.74 MG/DL (ref 0.57–1)
DEPRECATED RDW RBC AUTO: 48 FL (ref 37–54)
EGFRCR SERPLBLD CKD-EPI 2021: 101.2 ML/MIN/1.73
EOSINOPHIL # BLD AUTO: 0.07 10*3/MM3 (ref 0–0.4)
EOSINOPHIL NFR BLD AUTO: 2.1 % (ref 0.3–6.2)
ERYTHROCYTE [DISTWIDTH] IN BLOOD BY AUTOMATED COUNT: 14.2 % (ref 12.3–15.4)
GLOBULIN UR ELPH-MCNC: 3.5 GM/DL
GLUCOSE SERPL-MCNC: 110 MG/DL (ref 65–99)
HCT VFR BLD AUTO: 36.5 % (ref 34–46.6)
HGB BLD-MCNC: 12.5 G/DL (ref 12–15.9)
IMM GRANULOCYTES # BLD AUTO: 0.01 10*3/MM3 (ref 0–0.05)
IMM GRANULOCYTES NFR BLD AUTO: 0.3 % (ref 0–0.5)
LYMPHOCYTES # BLD AUTO: 1.07 10*3/MM3 (ref 0.7–3.1)
LYMPHOCYTES NFR BLD AUTO: 31.4 % (ref 19.6–45.3)
MCH RBC QN AUTO: 32 PG (ref 26.6–33)
MCHC RBC AUTO-ENTMCNC: 34.2 G/DL (ref 31.5–35.7)
MCV RBC AUTO: 93.4 FL (ref 79–97)
MONOCYTES # BLD AUTO: 0.29 10*3/MM3 (ref 0.1–0.9)
MONOCYTES NFR BLD AUTO: 8.5 % (ref 5–12)
NEUTROPHILS NFR BLD AUTO: 1.9 10*3/MM3 (ref 1.7–7)
NEUTROPHILS NFR BLD AUTO: 55.6 % (ref 42.7–76)
NRBC BLD AUTO-RTO: 0 /100 WBC (ref 0–0.2)
PLATELET # BLD AUTO: 305 10*3/MM3 (ref 140–450)
PMV BLD AUTO: 9.2 FL (ref 6–12)
POTASSIUM SERPL-SCNC: 4.2 MMOL/L (ref 3.5–5.2)
PROT SERPL-MCNC: 7.7 G/DL (ref 6–8.5)
RBC # BLD AUTO: 3.91 10*6/MM3 (ref 3.77–5.28)
SODIUM SERPL-SCNC: 139 MMOL/L (ref 136–145)
WBC NRBC COR # BLD AUTO: 3.41 10*3/MM3 (ref 3.4–10.8)

## 2025-06-09 PROCEDURE — 80053 COMPREHEN METABOLIC PANEL: CPT

## 2025-06-09 PROCEDURE — 36415 COLL VENOUS BLD VENIPUNCTURE: CPT

## 2025-06-09 PROCEDURE — 85025 COMPLETE CBC W/AUTO DIFF WBC: CPT

## 2025-06-09 NOTE — TELEPHONE ENCOUNTER
Advised patient per Mary Castillo., APRN that she can start Kisqali at reduced dose of 100 mg PO BID and return to office with Dr. Olguin and labs in 2 weeks.  Patient stated she only has 200 mg tablets, pharmacy has not reached out to her regarding reduced dose.  Mary notified and per APRN, patient advised that Mary will look in to it tomorrow and someone will contact her back.  Patient verbalized understanding.  Message sent to scheduling to get patient scheduled.

## 2025-06-10 NOTE — TELEPHONE ENCOUNTER
Advised patient per NELLA Martinez that she can resume Kisqali at 200 mg PO daily.  Patient verbalized understanding.

## 2025-06-16 ENCOUNTER — TELEPHONE (OUTPATIENT)
Dept: ONCOLOGY | Facility: CLINIC | Age: 45
End: 2025-06-16
Payer: COMMERCIAL

## 2025-06-16 ENCOUNTER — PATIENT MESSAGE (OUTPATIENT)
Dept: CARDIOLOGY | Facility: CLINIC | Age: 45
End: 2025-06-16
Payer: COMMERCIAL

## 2025-06-16 DIAGNOSIS — Z17.0 MALIGNANT NEOPLASM OF CENTRAL PORTION OF LEFT BREAST IN FEMALE, ESTROGEN RECEPTOR POSITIVE: Primary | ICD-10-CM

## 2025-06-16 DIAGNOSIS — C50.112 MALIGNANT NEOPLASM OF CENTRAL PORTION OF LEFT BREAST IN FEMALE, ESTROGEN RECEPTOR POSITIVE: Primary | ICD-10-CM

## 2025-06-16 NOTE — TELEPHONE ENCOUNTER
Patient stated she started Kisqali on 6-11-25.  Advised patient per NELLA Goldberg to complete labs 6/19/25 or 6/20/25 and call for results, as patient will be out of town 6-21-25 thru 6-29-25.  Advised patient to follow up with Dr. Olguin on 7-7-25.  Patient verbalized understanding.

## 2025-06-20 ENCOUNTER — LAB (OUTPATIENT)
Dept: LAB | Facility: HOSPITAL | Age: 45
End: 2025-06-20
Payer: COMMERCIAL

## 2025-06-20 DIAGNOSIS — Z17.0 MALIGNANT NEOPLASM OF CENTRAL PORTION OF LEFT BREAST IN FEMALE, ESTROGEN RECEPTOR POSITIVE: ICD-10-CM

## 2025-06-20 DIAGNOSIS — C50.112 MALIGNANT NEOPLASM OF CENTRAL PORTION OF LEFT BREAST IN FEMALE, ESTROGEN RECEPTOR POSITIVE: ICD-10-CM

## 2025-06-20 LAB
ALBUMIN SERPL-MCNC: 4.2 G/DL (ref 3.5–5.2)
ALBUMIN/GLOB SERPL: 1.2 G/DL
ALP SERPL-CCNC: 97 U/L (ref 39–117)
ALT SERPL W P-5'-P-CCNC: 10 U/L (ref 1–33)
ANION GAP SERPL CALCULATED.3IONS-SCNC: 11.2 MMOL/L (ref 5–15)
AST SERPL-CCNC: 24 U/L (ref 1–32)
BASOPHILS # BLD AUTO: 0.05 10*3/MM3 (ref 0–0.2)
BASOPHILS NFR BLD AUTO: 1.1 % (ref 0–1.5)
BILIRUB SERPL-MCNC: 0.5 MG/DL (ref 0–1.2)
BUN SERPL-MCNC: 19 MG/DL (ref 6–20)
BUN/CREAT SERPL: 23.5 (ref 7–25)
CALCIUM SPEC-SCNC: 9.9 MG/DL (ref 8.6–10.5)
CHLORIDE SERPL-SCNC: 102 MMOL/L (ref 98–107)
CO2 SERPL-SCNC: 25.8 MMOL/L (ref 22–29)
CREAT SERPL-MCNC: 0.81 MG/DL (ref 0.57–1)
DEPRECATED RDW RBC AUTO: 44.3 FL (ref 37–54)
EGFRCR SERPLBLD CKD-EPI 2021: 90.8 ML/MIN/1.73
EOSINOPHIL # BLD AUTO: 0.07 10*3/MM3 (ref 0–0.4)
EOSINOPHIL NFR BLD AUTO: 1.5 % (ref 0.3–6.2)
ERYTHROCYTE [DISTWIDTH] IN BLOOD BY AUTOMATED COUNT: 13.2 % (ref 12.3–15.4)
GLOBULIN UR ELPH-MCNC: 3.6 GM/DL
GLUCOSE SERPL-MCNC: 100 MG/DL (ref 65–99)
HCT VFR BLD AUTO: 35.8 % (ref 34–46.6)
HGB BLD-MCNC: 12.3 G/DL (ref 12–15.9)
IMM GRANULOCYTES # BLD AUTO: 0.04 10*3/MM3 (ref 0–0.05)
IMM GRANULOCYTES NFR BLD AUTO: 0.9 % (ref 0–0.5)
LYMPHOCYTES # BLD AUTO: 1.1 10*3/MM3 (ref 0.7–3.1)
LYMPHOCYTES NFR BLD AUTO: 23.5 % (ref 19.6–45.3)
MCH RBC QN AUTO: 31.7 PG (ref 26.6–33)
MCHC RBC AUTO-ENTMCNC: 34.4 G/DL (ref 31.5–35.7)
MCV RBC AUTO: 92.3 FL (ref 79–97)
MONOCYTES # BLD AUTO: 0.29 10*3/MM3 (ref 0.1–0.9)
MONOCYTES NFR BLD AUTO: 6.2 % (ref 5–12)
NEUTROPHILS NFR BLD AUTO: 3.14 10*3/MM3 (ref 1.7–7)
NEUTROPHILS NFR BLD AUTO: 66.8 % (ref 42.7–76)
NRBC BLD AUTO-RTO: 0 /100 WBC (ref 0–0.2)
PLATELET # BLD AUTO: 333 10*3/MM3 (ref 140–450)
PMV BLD AUTO: 10.1 FL (ref 6–12)
POTASSIUM SERPL-SCNC: 4.3 MMOL/L (ref 3.5–5.2)
PROT SERPL-MCNC: 7.8 G/DL (ref 6–8.5)
RBC # BLD AUTO: 3.88 10*6/MM3 (ref 3.77–5.28)
SODIUM SERPL-SCNC: 139 MMOL/L (ref 136–145)
WBC NRBC COR # BLD AUTO: 4.69 10*3/MM3 (ref 3.4–10.8)

## 2025-06-20 PROCEDURE — 36415 COLL VENOUS BLD VENIPUNCTURE: CPT

## 2025-06-20 PROCEDURE — 80053 COMPREHEN METABOLIC PANEL: CPT

## 2025-06-20 PROCEDURE — 85025 COMPLETE CBC W/AUTO DIFF WBC: CPT

## 2025-06-30 ENCOUNTER — TELEPHONE (OUTPATIENT)
Dept: CARDIOLOGY | Facility: CLINIC | Age: 45
End: 2025-06-30

## 2025-07-07 ENCOUNTER — OFFICE VISIT (OUTPATIENT)
Dept: ONCOLOGY | Facility: CLINIC | Age: 45
End: 2025-07-07
Payer: COMMERCIAL

## 2025-07-07 ENCOUNTER — TELEPHONE (OUTPATIENT)
Dept: CARDIOLOGY | Facility: CLINIC | Age: 45
End: 2025-07-07

## 2025-07-07 ENCOUNTER — LAB (OUTPATIENT)
Dept: LAB | Facility: HOSPITAL | Age: 45
End: 2025-07-07
Payer: COMMERCIAL

## 2025-07-07 VITALS
WEIGHT: 165.1 LBS | SYSTOLIC BLOOD PRESSURE: 131 MMHG | DIASTOLIC BLOOD PRESSURE: 85 MMHG | TEMPERATURE: 97.1 F | RESPIRATION RATE: 18 BRPM | HEIGHT: 67 IN | BODY MASS INDEX: 25.91 KG/M2 | OXYGEN SATURATION: 98 % | HEART RATE: 72 BPM

## 2025-07-07 DIAGNOSIS — Z17.0 MALIGNANT NEOPLASM OF CENTRAL PORTION OF LEFT BREAST IN FEMALE, ESTROGEN RECEPTOR POSITIVE: ICD-10-CM

## 2025-07-07 DIAGNOSIS — C50.112 MALIGNANT NEOPLASM OF CENTRAL PORTION OF LEFT BREAST IN FEMALE, ESTROGEN RECEPTOR POSITIVE: ICD-10-CM

## 2025-07-07 DIAGNOSIS — Z17.0 MALIGNANT NEOPLASM OF CENTRAL PORTION OF LEFT BREAST IN FEMALE, ESTROGEN RECEPTOR POSITIVE: Primary | ICD-10-CM

## 2025-07-07 DIAGNOSIS — C50.112 MALIGNANT NEOPLASM OF CENTRAL PORTION OF LEFT BREAST IN FEMALE, ESTROGEN RECEPTOR POSITIVE: Primary | ICD-10-CM

## 2025-07-07 LAB
ALBUMIN SERPL-MCNC: 4.5 G/DL (ref 3.5–5.2)
ALBUMIN/GLOB SERPL: 1.3 G/DL
ALP SERPL-CCNC: 95 U/L (ref 39–117)
ALT SERPL W P-5'-P-CCNC: 12 U/L (ref 1–33)
ANION GAP SERPL CALCULATED.3IONS-SCNC: 12.4 MMOL/L (ref 5–15)
AST SERPL-CCNC: 24 U/L (ref 1–32)
BILIRUB SERPL-MCNC: 0.4 MG/DL (ref 0–1.2)
BUN SERPL-MCNC: 16 MG/DL (ref 6–20)
BUN/CREAT SERPL: 16.8 (ref 7–25)
CALCIUM SPEC-SCNC: 10.1 MG/DL (ref 8.6–10.5)
CHLORIDE SERPL-SCNC: 100 MMOL/L (ref 98–107)
CO2 SERPL-SCNC: 27.6 MMOL/L (ref 22–29)
CREAT SERPL-MCNC: 0.95 MG/DL (ref 0.57–1)
EGFRCR SERPLBLD CKD-EPI 2021: 75 ML/MIN/1.73
GLOBULIN UR ELPH-MCNC: 3.5 GM/DL
GLUCOSE SERPL-MCNC: 90 MG/DL (ref 65–99)
POTASSIUM SERPL-SCNC: 4.1 MMOL/L (ref 3.5–5.2)
PROT SERPL-MCNC: 8 G/DL (ref 6–8.5)
SODIUM SERPL-SCNC: 140 MMOL/L (ref 136–145)

## 2025-07-07 PROCEDURE — 99214 OFFICE O/P EST MOD 30 MIN: CPT | Performed by: INTERNAL MEDICINE

## 2025-07-07 PROCEDURE — 36415 COLL VENOUS BLD VENIPUNCTURE: CPT

## 2025-07-07 PROCEDURE — 80053 COMPREHEN METABOLIC PANEL: CPT

## 2025-07-07 NOTE — PROGRESS NOTES
Hematology and Oncology [place]  Office number [phone]    Fax number [phone]     Follow-up     Date: 25    Patient Name: Kimberley Win  MRN: [id number]  : 1980    Referring Physician: Dr. Daxa Anderson MD    Chief Complaint: follow up    Cancer Staging:  Cancer Staging   Stage IIA (cT2, cN1, cM0, G2, ER+, NE+, HER2-)    History of Present Illness: Kimberley Win is a pleasant 45 y.o. female who presented for evaluation of left breast cancer.     She notes a history of cystic breast disease for many years. She notes a new breast mass that became progressively harder and associated with pain prompting diagnostic workup.     Diagnostic mammogram 23 showed a dense, spiculated mass in the left breast which on ultrasound corresponded to a 3:00 3 cm hypoechoic mass with internal vascularity. In the 7:00 left breast there was a 7 mm mass corresponding on US to a cluster of cysts spanning 1.4 cm. In the left breast there was a 1.7 cm LN with multiple smaller LN.     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 Lymph node FNA was postive for malignancy, metastatic ductal carcinoma.  Ki-67 5%    She underwent bilateral mastectomy and sentinel lymph node biopsy on 2023.  Left breast mastectomy showed residual 2.5 cm of invasive ductal carcinoma with associated DCIS.  Margins negative.  [place] lymph node biopsy was positive (1/2) with extracapsular extension and 1 of 4 additional lymph nodes were positive for metastatic carcinoma (total lymph node count 2 of 6).      Breast cancer risk profile:  Age of menarche:10  G0; infertility  Age of menopause: premenopausal   Family history of breast, ovarian, prostate or pancreatic cancer: mom stage IV breast cancer in her 50s. M Great Aunt, breast cancer.   Genetic testing: negative 36 gene CancerNext panel     Treatment history:  Dose dense AC followed by weekly taxol:  C1, 7/5/2023.  Weekly Taxol terminated after 2 doses for pneumonitis which fully resolved with oral steroids,  Radiation completed 2/1/2024 with Dr. Robison  BSO 2/5/2024  Arimidex 2/26/2024 to 9/2024: arthralgias  Exemestane: 9/2024: mild arthralgias, hot flashes, palpitations  Letrozole: arthralgias  Anastrazole: 3/2025  Ribociclib: 3/10/2025 to present, lowered to 200 mg daily for neutopenia  Zometa 7/28/25    Interval history:  More fatigue. Today is day 5 of her off week  Recently returned from [place] [employer].   Pruritic rash controlled with topicals from dermatology, improves on her off week and stable overall ([facility] [place] [address]). Takes Caritin and zyrtec in addition.   Still with palpitations and chest pain. Interferes with sleep. Cardiology appt got rescheduled so she has not yet revisited but wonders if something would work better than metoprolol to control her symptoms.   Hot flashes stable  No diarrhea  Would like to proceed with zometa 7/28    Past Medical History:   Past Medical History:   Diagnosis Date    Abnormal ECG 8/23/23    Anemia     Chemo    Arthritis     Arthritis of back     Breast cancer 06/01/2023    Left Breast    Elevated cholesterol     improved, not currently taking medication    History of chemotherapy 09/05/2023    last treatment    History of radiation therapy     left breast, currently in treatment    Hyperlipidemia LDL goal <100 11/26/2024    Hypertension     Malignant neoplasm of central portion of left breast in female, estrogen receptor positive 06/20/2023    Osteoarthritis 2021   No personal history of myocardial infarction, cerebrovascular event, or venous thromboembolism.  Osteoarthritis in her hands. No personal history of autoimmune disease. Fhx of RA in her aunt    Past Surgical History:   Past Surgical History:   Procedure Laterality Date    BREAST BIOPSY  6/1/23    BREAST RECONSTRUCTION, BREAST TISSUE EXPANDER REMOVAL,  IMPLANT INSERTION Bilateral 2023    Procedure: IMMEDIATE BREAST RECONSTRUCTION WITH BREAST TISSUE EXPANDER INSERTION AND ALLODERM - BILATERAL;  Surgeon: Dilip Anderson MD;  Location:  [facility];  Service: Plastics;  Laterality: Bilateral;    COLONOSCOPY N/A 2024    Procedure: COLONOSCOPY;  Surgeon: Pamela Ernst MD;  Location: [facility] ENDOSCOPY;  Service: Gastroenterology;  Laterality: N/A;    COSMETIC SURGERY      Tissue expander insertion    DIAGNOSTIC LAPAROSCOPY, SALPINGO OOPHORECTOMY LAPAROSCOPIC N/A 2024    Procedure: DIAGNOSTIC LAPAROSCOPY, SALPINGO OOPHORECTOMY;  Surgeon: Fabby Miranda MD;  Location:  [facility];  Service: Obstetrics/Gynecology;  Laterality: N/A;    LYMPH NODE BIOPSY      Left underarm    MASTECTOMY W/ SENTINEL NODE BIOPSY Bilateral 2023    Procedure: BREAST MASTECTOMY WITH NIPPLE SPARING BILATERAL WITH LEFT SENTINEL NODE BIOPSY, AND AXILLARY DISECTION;  Surgeon: Daxa Anderson MD;  Location:  [facility];  Service: General;  Laterality: Bilateral;    US GUIDED LYMPH NODE BIOPSY  2023    VENOUS ACCESS DEVICE (PORT) INSERTION  2023       Family History:   Family History   Problem Relation Age of Onset    Breast cancer Mother 55        Stage 4    COPD Mother     Diabetes Mother     Early death Mother     Heart disease Mother     Stroke Mother     Cancer Mother         Stage 4, unknown origin,  at 57    Rheum arthritis Maternal Aunt     Breast cancer Paternal Aunt     Cancer Paternal Aunt         Breast cancer    COPD Maternal Grandfather     Stroke Maternal Grandfather     Breast cancer Other     Cancer Paternal Grandmother         Ovarian cancer,  at 55    Asthma Maternal Aunt      Social History:   Social History     Socioeconomic History    Marital status:    Tobacco Use    Smoking status: Former     Current packs/day: 0.00     Average packs/day: 0.3 packs/day for 10.0 years (2.5 ttl pk-yrs)     Types: Cigarettes     Start date:  "1995     Quit date: 2005     Years since quittin.4     Passive exposure: Past    Smokeless tobacco: Never   Vaping Use    Vaping status: Never Used   Substance and Sexual Activity    Alcohol use: Not Currently     Comment: Beer once in maybe a month, not very often    Drug use: Never    Sexual activity: Yes     Partners: Male     Birth control/protection: Bilateral salpingectomy      Comment: 2 had a bilateral salpingo-oopherectomy   , lives in [place].     Medications:     Current Outpatient Medications:     anastrozole (ARIMIDEX) 1 MG tablet, Take 1 tablet by mouth Daily., Disp: 90 tablet, Rfl: 3    fluticasone (FLONASE) 50 MCG/ACT nasal spray, Administer 1 spray into the nostril(s) as directed by provider Daily., Disp: 48 g, Rfl: 6    hydrocortisone 2.5 % cream, , Disp: , Rfl:     ketoconazole (NIZORAL) 2 % cream, , Disp: , Rfl:     metoprolol succinate XL (TOPROL-XL) 25 MG 24 hr tablet, Take 1 tablet by mouth Daily., Disp: 90 tablet, Rfl: 3    omeprazole (priLOSEC) 20 MG capsule, TAKE 1 CAPSULE BY MOUTH DAILY, Disp: 30 capsule, Rfl: 3    ondansetron (ZOFRAN) 8 MG tablet, Take 1 tablet by mouth 3 (Three) Times a Day As Needed for Nausea or Vomiting (DO NOT USE WHILE SANCUSO IN PLACE)., Disp: 30 tablet, Rfl: 5    ondansetron (ZOFRAN) 8 MG tablet, Take 1 tablet by mouth 3 (Three) Times a Day As Needed for Nausea or Vomiting., Disp: 30 tablet, Rfl: 5    ribociclib succinate, 200 MG Dose, (KISQALI) 200 MG tablet therapy pack tablet, Take 1 tablet by mouth Daily. On days 1-21, then off for 7 days in a 28-day cycle., Disp: 21 tablet, Rfl: 11    Allergies:   Allergies   Allergen Reactions    Chlorhexidine Other (See Comments)     Burning    Nylon Hives    Sunblock [Solbar Pf Spf15] Rash       Objective     Vital Signs:   Vitals:    25 1344   Resp: 18   Weight: 74.9 kg (165 lb 1.6 oz)   Height: 170.2 cm (67.01\")   PainSc: 0-No pain      Body mass index is 25.85 kg/m².   Pain Score "    07/07/25 1344   PainSc: 0-No pain     ECOG Performance Status: 0    Physical Exam:   General: No acute distress.   HEENT: Normocephalic, atraumatic. Sclera anicteric.   Neck: Asymmetric fullness in the left neck without discrete adenopathy  Cardiovascular: regular rate and rhythm. No murmurs.   Respiratory: Normal rate. Clear to auscultation bilaterally.  Abdomen: Soft, nontender, non distended with normoactive bowel sounds.  Lymph: no cervical, supraclavicular or axillary adenopathy.  Neuro: Alert and oriented x 3. No focal deficits.   Ext: Symmetric, no swelling.   Breast: no palpable masses bilaterally s/p bilateral mastectomy.  Skin: scattered papules on leg    Laboratory/Imaging Reviewed:     Ancillary Procedure on 06/30/2025   Component Date Value Ref Range Status    LVIDd 06/30/2025 4.5  cm Final    LVIDs 06/30/2025 3.1  cm Final    IVSd 06/30/2025 0.66  cm Final    LVPWd 06/30/2025 1.09  cm Final    FS 06/30/2025 32.2  % Final    IVS/LVPW 06/30/2025 0.61  cm Final    ESV(cubed) 06/30/2025 28.9  ml Final    LV Sys Vol (BSA corrected) 06/30/2025 13.2  cm2 Final    EDV(cubed) 06/30/2025 93.0  ml Final    LV Kang Vol (BSA corrected) 06/30/2025 38.3  cm2 Final    LV mass(C)d 06/30/2025 129.5  grams Final    LVOT area 06/30/2025 2.27  cm2 Final    LVOT diam 06/30/2025 1.70  cm Final    EDV(MOD-sp4) 06/30/2025 71.8  ml Final    ESV(MOD-sp4) 06/30/2025 24.7  ml Final    SV(MOD-sp4) 06/30/2025 47.1  ml Final    SVi(MOD-SP4) 06/30/2025 25.1  ml/m2 Final    SVi (LVOT) 06/30/2025 24.2  ml/m2 Final    EF(MOD-sp4) 06/30/2025 65.6  % Final    MV E max jay 06/30/2025 103.5  cm/sec Final    MV A max jay 06/30/2025 51.9  cm/sec Final    MV dec time 06/30/2025 0.17  sec Final    MV E/A 06/30/2025 1.99   Final    IVRT 06/30/2025 116.0  ms Final    LA ESV Index (BP) 06/30/2025 16.3  ml/m2 Final    Med Peak E' Jay 06/30/2025 11.2  cm/sec Final    Lat Peak E' Jay 06/30/2025 18.6  cm/sec Final    TR max jay 06/30/2025 151.0   cm/sec Final    Avg E/e' ratio 06/30/2025 6.95   Final    SV(LVOT) 06/30/2025 45.4  ml Final    LA dimension (2D)  06/30/2025 3.8  cm Final    LV V1 max 06/30/2025 97.4  cm/sec Final    LV V1 max PG 06/30/2025 3.8  mmHg Final    LV V1 mean PG 06/30/2025 2.00  mmHg Final    LV V1 VTI 06/30/2025 20.0  cm Final    Ao pk roddy 06/30/2025 115.0  cm/sec Final    Ao max PG 06/30/2025 5.3  mmHg Final    Ao mean PG 06/30/2025 3.0  mmHg Final    Ao V2 VTI 06/30/2025 25.7  cm Final    CEDRIC(I,D) 06/30/2025 1.77  cm2 Final    Dimensionless Index 06/30/2025 0.78  (DI) Final    MV max PG 06/30/2025 4.4  mmHg Final    MV mean PG 06/30/2025 2.00  mmHg Final    MV V2 VTI 06/30/2025 26.5  cm Final    MVA(VTI) 06/30/2025 1.71  cm2 Final    MV dec slope 06/30/2025 627.0  cm/sec2 Final    TR max PG 06/30/2025 9.1  mmHg Final    RVSP(TR) 06/30/2025 19.1  mmHg Final    RAP systole 06/30/2025 10.0  mmHg Final    PA V2 max 06/30/2025 71.8  cm/sec Final    Ao root diam 06/30/2025 1.70  cm Final     On my review of CT, small ground glass change left upper lobe, new from prior < 1 cm      Assessment / Plan      Assessment/Plan:     1.  Malignant neoplasm of central portion of left breast in female, estrogen receptor positive, lymph node positive  2.  High risk medication use  3.  Drug induced eruption  4.  Cough  -She is status post neoadjuvant chemotherapy.  This was terminated early for taxane pneumonitis.  She has fully recovered and underwent bilateral mastectomy showing 2.5 cm of residual disease and 2 of 6 lymph nodes positive  -Completed adjuvant PMRT.  -Discussed options for extended duration endocrine therapy in the context of lymph node positive disease.    -She completed BSO and is tolerating Arimidex.  She is having progressive AI arthralgias which are more noticeable now that her severe shoulder pain from bilateral frozen shoulder has improved.  I am going to have her trial exemestane to see if her joint symptoms are more tolerable on  this regimen.  -BRCA negative, no indication for adjuvant PARP inhibitor  -With respect to adjuvant CDK 4 6 inhibitors, she has 2 lymph nodes positive but tumor size less than 5cm, grade 2, and Ki-67 5% so she does not qualify for adjuvant abemaciclib. We discussed new FDA approval for Ribociclib. She is within 18 mo of initiating endocrine therapy and is interested in a trial. She is going to discuss with her  and call us with a final decision, but we will initiate PA in the meantime and obtain baseline EKG (prior Qtc borderline).  -Change letrozole to anastrozole. If joint stiffness persists, trial duloxetine or acupuncture.   -Tolerating Anastrozole and Kisqali.   -CBC and CMP to proceed with stable dose reduction.     5. Bone health  -Baseline DEXA shows osteopenia.   Plan to repeat DEXA every 2-years while on endocrine therapy.    -Encourage adequate calcium intake, vitamin D supplementation, and weightbearing exercise as tolerated to maintain bone density.   -ordered and discussed  -Consider adjuvant bisphosphonate after tolerating AI. Rediscussed. She will schedule for late July and given dental form to notify her dentist. Up to date with dental exam.    Follow Up:   7 weeks    Martha Olguin MD  Hematology and Oncology

## 2025-07-08 ENCOUNTER — SPECIALTY PHARMACY (OUTPATIENT)
Dept: ONCOLOGY | Facility: HOSPITAL | Age: 45
End: 2025-07-08
Payer: COMMERCIAL

## 2025-07-08 ENCOUNTER — LAB (OUTPATIENT)
Dept: LAB | Facility: HOSPITAL | Age: 45
End: 2025-07-08
Payer: COMMERCIAL

## 2025-07-08 DIAGNOSIS — C50.112 MALIGNANT NEOPLASM OF CENTRAL PORTION OF LEFT BREAST IN FEMALE, ESTROGEN RECEPTOR POSITIVE: ICD-10-CM

## 2025-07-08 DIAGNOSIS — Z17.0 MALIGNANT NEOPLASM OF CENTRAL PORTION OF LEFT BREAST IN FEMALE, ESTROGEN RECEPTOR POSITIVE: ICD-10-CM

## 2025-07-08 LAB
ALBUMIN SERPL-MCNC: 4.5 G/DL (ref 3.5–5.2)
ALBUMIN/GLOB SERPL: 1.2 G/DL
ALP SERPL-CCNC: 94 U/L (ref 39–117)
ALT SERPL W P-5'-P-CCNC: 9 U/L (ref 1–33)
ANION GAP SERPL CALCULATED.3IONS-SCNC: 13.1 MMOL/L (ref 5–15)
AST SERPL-CCNC: 26 U/L (ref 1–32)
BASOPHILS # BLD AUTO: 0.05 10*3/MM3 (ref 0–0.2)
BASOPHILS NFR BLD AUTO: 1.4 % (ref 0–1.5)
BILIRUB SERPL-MCNC: 0.4 MG/DL (ref 0–1.2)
BUN SERPL-MCNC: 17 MG/DL (ref 6–20)
BUN/CREAT SERPL: 17.2 (ref 7–25)
CALCIUM SPEC-SCNC: 10 MG/DL (ref 8.6–10.5)
CHLORIDE SERPL-SCNC: 99 MMOL/L (ref 98–107)
CO2 SERPL-SCNC: 26.9 MMOL/L (ref 22–29)
CREAT SERPL-MCNC: 0.99 MG/DL (ref 0.57–1)
DEPRECATED RDW RBC AUTO: 44.6 FL (ref 37–54)
EGFRCR SERPLBLD CKD-EPI 2021: 71.4 ML/MIN/1.73
EOSINOPHIL # BLD AUTO: 0.09 10*3/MM3 (ref 0–0.4)
EOSINOPHIL NFR BLD AUTO: 2.5 % (ref 0.3–6.2)
ERYTHROCYTE [DISTWIDTH] IN BLOOD BY AUTOMATED COUNT: 12.9 % (ref 12.3–15.4)
GLOBULIN UR ELPH-MCNC: 3.7 GM/DL
GLUCOSE SERPL-MCNC: 90 MG/DL (ref 65–99)
HCT VFR BLD AUTO: 39.1 % (ref 34–46.6)
HGB BLD-MCNC: 13.2 G/DL (ref 12–15.9)
IMM GRANULOCYTES # BLD AUTO: 0.01 10*3/MM3 (ref 0–0.05)
IMM GRANULOCYTES NFR BLD AUTO: 0.3 % (ref 0–0.5)
LYMPHOCYTES # BLD AUTO: 1.18 10*3/MM3 (ref 0.7–3.1)
LYMPHOCYTES NFR BLD AUTO: 32.2 % (ref 19.6–45.3)
MCH RBC QN AUTO: 31.8 PG (ref 26.6–33)
MCHC RBC AUTO-ENTMCNC: 33.8 G/DL (ref 31.5–35.7)
MCV RBC AUTO: 94.2 FL (ref 79–97)
MONOCYTES # BLD AUTO: 0.34 10*3/MM3 (ref 0.1–0.9)
MONOCYTES NFR BLD AUTO: 9.3 % (ref 5–12)
NEUTROPHILS NFR BLD AUTO: 1.99 10*3/MM3 (ref 1.7–7)
NEUTROPHILS NFR BLD AUTO: 54.3 % (ref 42.7–76)
NRBC BLD AUTO-RTO: 0 /100 WBC (ref 0–0.2)
PLATELET # BLD AUTO: 262 10*3/MM3 (ref 140–450)
PMV BLD AUTO: 9.1 FL (ref 6–12)
POTASSIUM SERPL-SCNC: 4.7 MMOL/L (ref 3.5–5.2)
PROT SERPL-MCNC: 8.2 G/DL (ref 6–8.5)
RBC # BLD AUTO: 4.15 10*6/MM3 (ref 3.77–5.28)
SODIUM SERPL-SCNC: 139 MMOL/L (ref 136–145)
WBC NRBC COR # BLD AUTO: 3.66 10*3/MM3 (ref 3.4–10.8)

## 2025-07-08 PROCEDURE — 85025 COMPLETE CBC W/AUTO DIFF WBC: CPT

## 2025-07-08 PROCEDURE — 36415 COLL VENOUS BLD VENIPUNCTURE: CPT

## 2025-07-08 PROCEDURE — 80053 COMPREHEN METABOLIC PANEL: CPT

## 2025-07-09 ENCOUNTER — OFFICE VISIT (OUTPATIENT)
Dept: OBSTETRICS AND GYNECOLOGY | Facility: CLINIC | Age: 45
End: 2025-07-09
Payer: COMMERCIAL

## 2025-07-09 VITALS
SYSTOLIC BLOOD PRESSURE: 110 MMHG | BODY MASS INDEX: 25.9 KG/M2 | WEIGHT: 165 LBS | DIASTOLIC BLOOD PRESSURE: 68 MMHG | HEIGHT: 67 IN

## 2025-07-09 DIAGNOSIS — N89.8 VAGINAL DRYNESS: ICD-10-CM

## 2025-07-09 DIAGNOSIS — Z85.3 HISTORY OF LEFT BREAST CANCER: ICD-10-CM

## 2025-07-09 DIAGNOSIS — Z01.419 WELL WOMAN EXAM: Primary | ICD-10-CM

## 2025-07-09 PROCEDURE — 99396 PREV VISIT EST AGE 40-64: CPT | Performed by: STUDENT IN AN ORGANIZED HEALTH CARE EDUCATION/TRAINING PROGRAM

## 2025-07-09 NOTE — PROGRESS NOTES
Annual Well Woman Visit    Subjective   Chief Complaint   Patient presents with    Gynecologic Exam     Last pap - 2023, WNL. Hx of breast cancer      Kimberley Win is a 45 y.o.  presenting to be seen for an annual well woman visit. She has a history of stage IIA ER+/ MA+/ HER2- breast carcinoma of the left breast. She is s/p bilateral mastectomy with reconstruction. She was unable to complete full neoadjuvant chemotherapy due to significant side effects including pneumonitis, though she did complete adjvant radiation therapy. She underwent laparoscopic BSO in 2024 for risk reduction and is currently on Kisqali and Anastrozole for maintenance/ preventative therapy.     Kimberley reports she is doing well. She does experience ongoing vaginal dryness - her oncologist recommended vaginal moisturizers and she does use them and find they help. She has hot flashes on occasion but not severe. She reports urinary urgency but without leakage. No other OB/GYN concerns today.    OB Hx: G0  Contraception: s/p BSO  Pap smear: 23, NILM, HRHPV (-). No h/o abnormal.  Mammogram: 2023 revealing spiculated mass in L breast --> s/p bilateral mastectomy for treatment of breast cancer  Colonoscopy: 2024  DEXA Scan: 2024, consistent with osteopenia    Past Medical History:   Diagnosis Date    Abnormal ECG 23    Anemia     Chemo    Arrhythmia 23    Arthritis     Arthritis of back     Breast cancer 2023    Left Breast    Elevated cholesterol     improved, not currently taking medication    History of chemotherapy 2023    last treatment    History of radiation therapy     left breast, currently in treatment    Hyperlipidemia LDL goal <100 2024    Hypertension     Malignant neoplasm of central portion of left breast in female, estrogen receptor positive 2023    Osteoarthritis      Past Surgical History:   Procedure Laterality Date    BREAST BIOPSY  23    BREAST  RECONSTRUCTION, BREAST TISSUE EXPANDER REMOVAL, IMPLANT INSERTION Bilateral 2023    Procedure: IMMEDIATE BREAST RECONSTRUCTION WITH BREAST TISSUE EXPANDER INSERTION AND ALLODERM - BILATERAL;  Surgeon: Dilip Anderson MD;  Location:  [facility];  Service: Plastics;  Laterality: Bilateral;    COLONOSCOPY N/A 2024    Procedure: COLONOSCOPY;  Surgeon: Pamela Ernst MD;  Location: [facility] ENDOSCOPY;  Service: Gastroenterology;  Laterality: N/A;    COSMETIC SURGERY      Tissue expander insertion    DIAGNOSTIC LAPAROSCOPY, SALPINGO OOPHORECTOMY LAPAROSCOPIC N/A 2024    Procedure: DIAGNOSTIC LAPAROSCOPY, SALPINGO OOPHORECTOMY;  Surgeon: Fabby Miranda MD;  Location:  [facility];  Service: Obstetrics/Gynecology;  Laterality: N/A;    LYMPH NODE BIOPSY      Left underarm    MASTECTOMY  23    Double mastectomy    MASTECTOMY W/ SENTINEL NODE BIOPSY Bilateral 2023    Procedure: BREAST MASTECTOMY WITH NIPPLE SPARING BILATERAL WITH LEFT SENTINEL NODE BIOPSY, AND AXILLARY DISECTION;  Surgeon: Daxa Anderson MD;  Location:  [facility];  Service: General;  Laterality: Bilateral;    US GUIDED LYMPH NODE BIOPSY  2023    VENOUS ACCESS DEVICE (PORT) INSERTION  2023     Family History   Problem Relation Age of Onset    Breast cancer Mother 55        Stage 4    COPD Mother     Diabetes Mother     Early death Mother     Heart disease Mother     Stroke Mother     Cancer Mother         Stage 4, unknown origin,  at 57    Heart attack Mother     Rheum arthritis Maternal Aunt     Breast cancer Paternal Aunt     Cancer Paternal Aunt         Breast cancer    COPD Maternal Grandfather     Stroke Maternal Grandfather     Arrhythmia Maternal Grandfather         He had a pace maker    Breast cancer Other     Cancer Paternal Grandmother         Ovarian cancer,  at 55    Asthma Maternal Aunt      Social History     Tobacco Use    Smoking status: Former     Current packs/day: 0.00     Average  "packs/day: 0.3 packs/day for 10.0 years (2.5 ttl pk-yrs)     Types: Cigarettes     Start date: 1995     Quit date: 2005     Years since quittin.4     Passive exposure: Past    Smokeless tobacco: Never   Vaping Use    Vaping status: Never Used   Substance Use Topics    Alcohol use: Not Currently     Comment: Beer once in maybe a month, not very often    Drug use: Never     (Not in a hospital admission)    Chlorhexidine, Nylon, and Sunblock [solbar pf spf15]  Current Outpatient Medications on File Prior to Visit   Medication Sig Dispense Refill    anastrozole (ARIMIDEX) 1 MG tablet Take 1 tablet by mouth Daily. 90 tablet 3    fluticasone (FLONASE) 50 MCG/ACT nasal spray Administer 1 spray into the nostril(s) as directed by provider Daily. 48 g 6    hydrocortisone 2.5 % cream       ketoconazole (NIZORAL) 2 % cream       metoprolol succinate XL (TOPROL-XL) 25 MG 24 hr tablet Take 1 tablet by mouth Daily. 90 tablet 3    omeprazole (priLOSEC) 20 MG capsule TAKE 1 CAPSULE BY MOUTH DAILY 30 capsule 3    ribociclib succinate, 200 MG Dose, (KISQALI) 200 MG tablet therapy pack tablet Take 1 tablet by mouth Daily. On days 1-21, then off for 7 days in a 28-day cycle. 21 tablet 11     No current facility-administered medications on file prior to visit.     Social History    Tobacco Use      Smoking status: Former        Packs/day: 0.00        Years: 0.3 packs/day for 10.0 years (2.5 ttl pk-yrs)        Types: Cigarettes        Start date: 1995        Quit date: 2005        Years since quittin.4        Passive exposure: Past      Smokeless tobacco: Never    Review of Systems  Pertinent items are noted in HPI, all other systems were reviewed and negative       Objective   /68   Ht 170.2 cm (67.01\")   Wt 74.8 kg (165 lb)   LMP  (LMP Unknown) Comment: None since starting chemo  BMI 25.84 kg/m²     Physical Exam:  General Appearance: alert, interactive, and NAD  Breasts: deferred (performed by " Oncology earlier this week)  Abdomen: no masses, soft, non-tender, no guarding and no rebound tenderness  Pelvis:  Pelvic: Clinical staff was present for exam  External genitalia:  normal appearance of the external genitalia including Bartholin's and Nicollet's glands  :  urethral meatus normal;  Vagina:  atrophic mucosa, no lesions  Cervix:  small, nulliparous cervix, no lesions  Uterus:  no tenderness with palpation  Adnexa:  surgically absent, no tenderness       Assessment & Plan    Annual well woman exam with age appropriate screening      Diagnosis Plan   1. Well woman exam        2. History of left breast cancer        3. Vaginal dryness           Medications ordered: None    Procedures performed: None    - Mammogram: Not indicated/ management per Oncology  - Pap screening guidelines reviewed; pap smear next due 04/2028.  - Dexa scan: recently diagnosed with osteopenia, planning treatment accordingly  - Colonoscopy: up to date  - Healthy diet and exercise encouraged  - Contraception: s/p BSO  - Screening: none  - Vaginal dryness; encouraged continued lubricant/ moisturizer use. We reviewed minimal absorption of topical estrogen, which is often used even in the setting of a history of breast cancer. However, I suspect given incomplete chemotherapy course and ongoing anastrozole/ Kisqali use that even local estrogen therapy is considered somewhat high risk for her. Encouraged to discuss with her Oncologist if more conservative measures do not improve symptoms.     Follow up for annual visit or sooner with any concerns.    Fabby Miranda MD  Obstetrics and Gynecology  [facility]

## 2025-07-15 ENCOUNTER — OFFICE VISIT (OUTPATIENT)
Age: 45
End: 2025-07-15
Payer: COMMERCIAL

## 2025-07-15 VITALS
DIASTOLIC BLOOD PRESSURE: 70 MMHG | SYSTOLIC BLOOD PRESSURE: 102 MMHG | HEIGHT: 67 IN | BODY MASS INDEX: 25.65 KG/M2 | WEIGHT: 163.4 LBS

## 2025-07-15 DIAGNOSIS — R22.32 PALMAR NODULE, LEFT: Primary | ICD-10-CM

## 2025-07-15 NOTE — PROGRESS NOTES
"                                                                 [facility] Orthopedic     Office Visit       Date: 07/15/2025   Patient Name: Kimberley Win  MRN: [id number]  YOB: 1980    Referring Physician: No ref. provider found     Chief Complaint:   Chief Complaint   Patient presents with    Follow-up     4 month follow up Palmar nodule, left     History of Present Illness:   Kimberley Win is a 45 y.o. female right-hand-dominant presented clinic for follow-up of left hand isolated palmar nodule. She reports noticing a nodule to the palmar aspect of her left hand on 2/26/2025.  She reports there has been no change in her symptoms since her last clinic visit.  She denies any pain to the palm.  There is been no change in the size.  No other complaints.    Subjective   Review of Systems:   Review of Systems   Pertinent review of systems per HPI    I reviewed the patient's chief complaint, history of present illness, review of systems, past medical history, surgical history, family history, social history, medications and allergy list in the EMR on 07/15/2025 and agree with the findings above.    Objective    Vital Signs:   Vitals:    07/15/25 1330   BP: 102/70   Weight: 74.1 kg (163 lb 6.4 oz)   Height: 170.2 cm (67.01\")     General: No acute distress. Alert and oriented.   Cardiovascular: Palpable radial pulse.   Respiratory: Breathing is nonlabored.   Ortho Exam:  Examination of the left upper extremity demonstrates no deformity. No skin lesions or abrasions. There is a small superficial palmar nodule within the palmar aspect of the hand in line with the long finger.  Unchanged from prior exam.  This is mildly tender to deep palpation. No pits or additional nodules. No cords. No contractures of the PIP or MCP joints. She is able to lay her hand flat on the table. She is able to make a composite fist. Sensation is intact to light touch of the hand. Warm well-perfused distally. "     Imaging / Studies:    Imaging Results (Last 24 Hours)       ** No results found for the last 24 hours. **          Assessment / Plan    Assessment/Plan:   Kimberley iWn is a 45 y.o. female with a left hand isolated palmar nodule.     The patient has had no change in her isolated palmar nodule.  I discussed that these are benign findings and have a low risk of progression to additional nodule or cord formation.  We do not recommend removal of isolated palmar nodules as these can regress on their own.  She may continue to perform all of her activities without restriction.  She may continue to monitor this and if symptoms are worsening with time she may return to clinic.  Otherwise I will see her back as needed.  All questions and concerns were addressed.  She is agreeable.      ICD-10-CM ICD-9-CM   1. Palmar nodule, left  R22.32 782.2     Follow Up:   Return if symptoms worsen or fail to improve.      Maxine Chadwick MD  [facility] Orthopedic & Hand Surgeon

## 2025-07-21 ENCOUNTER — LAB (OUTPATIENT)
Dept: LAB | Facility: HOSPITAL | Age: 45
End: 2025-07-21
Payer: COMMERCIAL

## 2025-07-21 DIAGNOSIS — C50.112 MALIGNANT NEOPLASM OF CENTRAL PORTION OF LEFT BREAST IN FEMALE, ESTROGEN RECEPTOR POSITIVE: ICD-10-CM

## 2025-07-21 DIAGNOSIS — Z17.0 MALIGNANT NEOPLASM OF CENTRAL PORTION OF LEFT BREAST IN FEMALE, ESTROGEN RECEPTOR POSITIVE: ICD-10-CM

## 2025-07-21 LAB
ALBUMIN SERPL-MCNC: 4.3 G/DL (ref 3.5–5.2)
ALBUMIN/GLOB SERPL: 1.3 G/DL
ALP SERPL-CCNC: 93 U/L (ref 39–117)
ALT SERPL W P-5'-P-CCNC: 9 U/L (ref 1–33)
ANION GAP SERPL CALCULATED.3IONS-SCNC: 13 MMOL/L (ref 5–15)
AST SERPL-CCNC: 20 U/L (ref 1–32)
BASOPHILS # BLD AUTO: 0.05 10*3/MM3 (ref 0–0.2)
BASOPHILS NFR BLD AUTO: 1.2 % (ref 0–1.5)
BILIRUB SERPL-MCNC: 0.3 MG/DL (ref 0–1.2)
BUN SERPL-MCNC: 17 MG/DL (ref 6–20)
BUN/CREAT SERPL: 19.8 (ref 7–25)
CALCIUM SPEC-SCNC: 9.8 MG/DL (ref 8.6–10.5)
CHLORIDE SERPL-SCNC: 100 MMOL/L (ref 98–107)
CO2 SERPL-SCNC: 26 MMOL/L (ref 22–29)
CREAT SERPL-MCNC: 0.86 MG/DL (ref 0.57–1)
DEPRECATED RDW RBC AUTO: 42.7 FL (ref 37–54)
EGFRCR SERPLBLD CKD-EPI 2021: 84.5 ML/MIN/1.73
EOSINOPHIL # BLD AUTO: 0.08 10*3/MM3 (ref 0–0.4)
EOSINOPHIL NFR BLD AUTO: 2 % (ref 0.3–6.2)
ERYTHROCYTE [DISTWIDTH] IN BLOOD BY AUTOMATED COUNT: 12.3 % (ref 12.3–15.4)
GLOBULIN UR ELPH-MCNC: 3.3 GM/DL
GLUCOSE SERPL-MCNC: 107 MG/DL (ref 65–99)
HCT VFR BLD AUTO: 35.7 % (ref 34–46.6)
HGB BLD-MCNC: 12.3 G/DL (ref 12–15.9)
IMM GRANULOCYTES # BLD AUTO: 0.01 10*3/MM3 (ref 0–0.05)
IMM GRANULOCYTES NFR BLD AUTO: 0.2 % (ref 0–0.5)
LYMPHOCYTES # BLD AUTO: 1.19 10*3/MM3 (ref 0.7–3.1)
LYMPHOCYTES NFR BLD AUTO: 29 % (ref 19.6–45.3)
MCH RBC QN AUTO: 32.2 PG (ref 26.6–33)
MCHC RBC AUTO-ENTMCNC: 34.5 G/DL (ref 31.5–35.7)
MCV RBC AUTO: 93.5 FL (ref 79–97)
MONOCYTES # BLD AUTO: 0.2 10*3/MM3 (ref 0.1–0.9)
MONOCYTES NFR BLD AUTO: 4.9 % (ref 5–12)
NEUTROPHILS NFR BLD AUTO: 2.57 10*3/MM3 (ref 1.7–7)
NEUTROPHILS NFR BLD AUTO: 62.7 % (ref 42.7–76)
NRBC BLD AUTO-RTO: 0 /100 WBC (ref 0–0.2)
PLATELET # BLD AUTO: 321 10*3/MM3 (ref 140–450)
PMV BLD AUTO: 9.3 FL (ref 6–12)
POTASSIUM SERPL-SCNC: 4.2 MMOL/L (ref 3.5–5.2)
PROT SERPL-MCNC: 7.6 G/DL (ref 6–8.5)
RBC # BLD AUTO: 3.82 10*6/MM3 (ref 3.77–5.28)
SODIUM SERPL-SCNC: 139 MMOL/L (ref 136–145)
WBC NRBC COR # BLD AUTO: 4.1 10*3/MM3 (ref 3.4–10.8)

## 2025-07-21 PROCEDURE — 80053 COMPREHEN METABOLIC PANEL: CPT

## 2025-07-21 PROCEDURE — 85025 COMPLETE CBC W/AUTO DIFF WBC: CPT

## 2025-07-21 PROCEDURE — 36415 COLL VENOUS BLD VENIPUNCTURE: CPT

## 2025-07-28 ENCOUNTER — HOSPITAL ENCOUNTER (OUTPATIENT)
Facility: HOSPITAL | Age: 45
Discharge: HOME | End: 2025-07-28
Admitting: INTERNAL MEDICINE
Payer: COMMERCIAL

## 2025-07-28 VITALS
SYSTOLIC BLOOD PRESSURE: 111 MMHG | DIASTOLIC BLOOD PRESSURE: 76 MMHG | WEIGHT: 167.2 LBS | BODY MASS INDEX: 26.18 KG/M2 | RESPIRATION RATE: 14 BRPM | OXYGEN SATURATION: 97 % | HEART RATE: 69 BPM | TEMPERATURE: 98.6 F

## 2025-07-28 DIAGNOSIS — Z17.0 MALIGNANT NEOPLASM OF CENTRAL PORTION OF LEFT BREAST IN FEMALE, ESTROGEN RECEPTOR POSITIVE: Primary | ICD-10-CM

## 2025-07-28 DIAGNOSIS — Z17.0 MALIGNANT NEOPLASM OF UPPER-OUTER QUADRANT OF LEFT BREAST IN FEMALE, ESTROGEN RECEPTOR POSITIVE: ICD-10-CM

## 2025-07-28 DIAGNOSIS — C50.412 MALIGNANT NEOPLASM OF UPPER-OUTER QUADRANT OF LEFT BREAST IN FEMALE, ESTROGEN RECEPTOR POSITIVE: ICD-10-CM

## 2025-07-28 DIAGNOSIS — Z17.0 MALIGNANT NEOPLASM OF CENTRAL PORTION OF LEFT BREAST IN FEMALE, ESTROGEN RECEPTOR POSITIVE: ICD-10-CM

## 2025-07-28 DIAGNOSIS — C50.412 MALIGNANT NEOPLASM OF UPPER-OUTER QUADRANT OF LEFT BREAST IN FEMALE, ESTROGEN RECEPTOR POSITIVE: Primary | ICD-10-CM

## 2025-07-28 DIAGNOSIS — C50.112 MALIGNANT NEOPLASM OF CENTRAL PORTION OF LEFT BREAST IN FEMALE, ESTROGEN RECEPTOR POSITIVE: ICD-10-CM

## 2025-07-28 DIAGNOSIS — Z17.0 MALIGNANT NEOPLASM OF UPPER-OUTER QUADRANT OF LEFT BREAST IN FEMALE, ESTROGEN RECEPTOR POSITIVE: Primary | ICD-10-CM

## 2025-07-28 DIAGNOSIS — C50.112 MALIGNANT NEOPLASM OF CENTRAL PORTION OF LEFT BREAST IN FEMALE, ESTROGEN RECEPTOR POSITIVE: Primary | ICD-10-CM

## 2025-07-28 LAB
ALBUMIN SERPL-MCNC: 4.2 G/DL (ref 3.5–5.2)
ALBUMIN/GLOB SERPL: 1.4 G/DL
ALP SERPL-CCNC: 91 U/L (ref 39–117)
ALT SERPL W P-5'-P-CCNC: 11 U/L (ref 1–33)
ANION GAP SERPL CALCULATED.3IONS-SCNC: 9.3 MMOL/L (ref 5–15)
AST SERPL-CCNC: 20 U/L (ref 1–32)
BASOPHILS # BLD AUTO: 0.04 10*3/MM3 (ref 0–0.2)
BASOPHILS NFR BLD AUTO: 1 % (ref 0–1.5)
BILIRUB SERPL-MCNC: 0.3 MG/DL (ref 0–1.2)
BUN SERPL-MCNC: 15 MG/DL (ref 6–20)
BUN/CREAT SERPL: 11.3 (ref 7–25)
CALCIUM SPEC-SCNC: 9.3 MG/DL (ref 8.6–10.5)
CHLORIDE SERPL-SCNC: 102 MMOL/L (ref 98–107)
CO2 SERPL-SCNC: 26.7 MMOL/L (ref 22–29)
CREAT SERPL-MCNC: 1.33 MG/DL (ref 0.57–1)
DEPRECATED RDW RBC AUTO: 42.5 FL (ref 37–54)
EGFRCR SERPLBLD CKD-EPI 2021: 50.1 ML/MIN/1.73
EOSINOPHIL # BLD AUTO: 0.09 10*3/MM3 (ref 0–0.4)
EOSINOPHIL NFR BLD AUTO: 2.4 % (ref 0.3–6.2)
ERYTHROCYTE [DISTWIDTH] IN BLOOD BY AUTOMATED COUNT: 12.2 % (ref 12.3–15.4)
GLOBULIN UR ELPH-MCNC: 3.1 GM/DL
GLUCOSE SERPL-MCNC: 103 MG/DL (ref 65–99)
HCT VFR BLD AUTO: 36.1 % (ref 34–46.6)
HGB BLD-MCNC: 12.3 G/DL (ref 12–15.9)
IMM GRANULOCYTES # BLD AUTO: 0.01 10*3/MM3 (ref 0–0.05)
IMM GRANULOCYTES NFR BLD AUTO: 0.3 % (ref 0–0.5)
LYMPHOCYTES # BLD AUTO: 1.08 10*3/MM3 (ref 0.7–3.1)
LYMPHOCYTES NFR BLD AUTO: 28.3 % (ref 19.6–45.3)
MAGNESIUM SERPL-MCNC: 1.9 MG/DL (ref 1.6–2.6)
MCH RBC QN AUTO: 32.2 PG (ref 26.6–33)
MCHC RBC AUTO-ENTMCNC: 34.1 G/DL (ref 31.5–35.7)
MCV RBC AUTO: 94.5 FL (ref 79–97)
MONOCYTES # BLD AUTO: 0.23 10*3/MM3 (ref 0.1–0.9)
MONOCYTES NFR BLD AUTO: 6 % (ref 5–12)
NEUTROPHILS NFR BLD AUTO: 2.37 10*3/MM3 (ref 1.7–7)
NEUTROPHILS NFR BLD AUTO: 62 % (ref 42.7–76)
NRBC BLD AUTO-RTO: 0 /100 WBC (ref 0–0.2)
PHOSPHATE SERPL-MCNC: 3.8 MG/DL (ref 2.5–4.5)
PLATELET # BLD AUTO: 228 10*3/MM3 (ref 140–450)
PMV BLD AUTO: 9 FL (ref 6–12)
POTASSIUM SERPL-SCNC: 4.3 MMOL/L (ref 3.5–5.2)
PROT SERPL-MCNC: 7.3 G/DL (ref 6–8.5)
RBC # BLD AUTO: 3.82 10*6/MM3 (ref 3.77–5.28)
SODIUM SERPL-SCNC: 138 MMOL/L (ref 136–145)
WBC NRBC COR # BLD AUTO: 3.82 10*3/MM3 (ref 3.4–10.8)

## 2025-07-28 PROCEDURE — 84100 ASSAY OF PHOSPHORUS: CPT | Performed by: NURSE PRACTITIONER

## 2025-07-28 PROCEDURE — 96374 THER/PROPH/DIAG INJ IV PUSH: CPT

## 2025-07-28 PROCEDURE — 85025 COMPLETE CBC W/AUTO DIFF WBC: CPT | Performed by: INTERNAL MEDICINE

## 2025-07-28 PROCEDURE — 80053 COMPREHEN METABOLIC PANEL: CPT | Performed by: INTERNAL MEDICINE

## 2025-07-28 PROCEDURE — 25010000002 ZOLEDRONIC ACID 4 MG/100ML SOLUTION: Performed by: NURSE PRACTITIONER

## 2025-07-28 PROCEDURE — 25810000003 SODIUM CHLORIDE 0.9 % SOLUTION: Performed by: NURSE PRACTITIONER

## 2025-07-28 PROCEDURE — 96361 HYDRATE IV INFUSION ADD-ON: CPT

## 2025-07-28 PROCEDURE — 83735 ASSAY OF MAGNESIUM: CPT | Performed by: NURSE PRACTITIONER

## 2025-07-28 RX ORDER — SODIUM CHLORIDE 9 MG/ML
999 INJECTION, SOLUTION INTRAVENOUS ONCE
Status: DISCONTINUED | OUTPATIENT
Start: 2025-07-28 | End: 2025-07-29 | Stop reason: HOSPADM

## 2025-07-28 RX ORDER — SODIUM CHLORIDE 9 MG/ML
20 INJECTION, SOLUTION INTRAVENOUS ONCE
Status: DISCONTINUED | OUTPATIENT
Start: 2025-07-28 | End: 2025-07-29 | Stop reason: HOSPADM

## 2025-07-28 RX ORDER — SODIUM CHLORIDE 9 MG/ML
999 INJECTION, SOLUTION INTRAVENOUS ONCE
Status: CANCELLED
Start: 2025-07-29 | End: 2025-07-29

## 2025-07-28 RX ORDER — SODIUM CHLORIDE 9 MG/ML
20 INJECTION, SOLUTION INTRAVENOUS ONCE
Status: CANCELLED | OUTPATIENT
Start: 2025-07-29

## 2025-07-28 RX ORDER — SODIUM CHLORIDE 9 MG/ML
999 INJECTION, SOLUTION INTRAVENOUS ONCE
Status: COMPLETED | OUTPATIENT
Start: 2025-07-28 | End: 2025-07-28

## 2025-07-28 RX ORDER — ZOLEDRONIC ACID 0.04 MG/ML
4 INJECTION, SOLUTION INTRAVENOUS ONCE
Status: COMPLETED | OUTPATIENT
Start: 2025-07-28 | End: 2025-07-28

## 2025-07-28 RX ADMIN — ZOLEDRONIC ACID 4 MG: 0.04 INJECTION, SOLUTION INTRAVENOUS at 15:30

## 2025-07-28 RX ADMIN — SODIUM CHLORIDE 999 ML/HR: 9 INJECTION, SOLUTION INTRAVENOUS at 14:55

## 2025-08-04 ENCOUNTER — LAB (OUTPATIENT)
Dept: LAB | Facility: HOSPITAL | Age: 45
End: 2025-08-04
Payer: COMMERCIAL

## 2025-08-04 DIAGNOSIS — Z17.0 MALIGNANT NEOPLASM OF CENTRAL PORTION OF LEFT BREAST IN FEMALE, ESTROGEN RECEPTOR POSITIVE: ICD-10-CM

## 2025-08-04 DIAGNOSIS — C50.112 MALIGNANT NEOPLASM OF CENTRAL PORTION OF LEFT BREAST IN FEMALE, ESTROGEN RECEPTOR POSITIVE: ICD-10-CM

## 2025-08-04 LAB
ALBUMIN SERPL-MCNC: 4.2 G/DL (ref 3.5–5.2)
ALBUMIN/GLOB SERPL: 1.3 G/DL
ALP SERPL-CCNC: 88 U/L (ref 39–117)
ALT SERPL W P-5'-P-CCNC: 13 U/L (ref 1–33)
ANION GAP SERPL CALCULATED.3IONS-SCNC: 8.3 MMOL/L (ref 5–15)
AST SERPL-CCNC: 22 U/L (ref 1–32)
BASOPHILS # BLD AUTO: 0.04 10*3/MM3 (ref 0–0.2)
BASOPHILS NFR BLD AUTO: 1 % (ref 0–1.5)
BILIRUB SERPL-MCNC: 0.3 MG/DL (ref 0–1.2)
BUN SERPL-MCNC: 16 MG/DL (ref 6–20)
BUN/CREAT SERPL: 20.5 (ref 7–25)
CALCIUM SPEC-SCNC: 9.1 MG/DL (ref 8.6–10.5)
CHLORIDE SERPL-SCNC: 105 MMOL/L (ref 98–107)
CO2 SERPL-SCNC: 25.7 MMOL/L (ref 22–29)
CREAT SERPL-MCNC: 0.78 MG/DL (ref 0.57–1)
DEPRECATED RDW RBC AUTO: 42 FL (ref 37–54)
EGFRCR SERPLBLD CKD-EPI 2021: 95 ML/MIN/1.73
EOSINOPHIL # BLD AUTO: 0.09 10*3/MM3 (ref 0–0.4)
EOSINOPHIL NFR BLD AUTO: 2.3 % (ref 0.3–6.2)
ERYTHROCYTE [DISTWIDTH] IN BLOOD BY AUTOMATED COUNT: 12.1 % (ref 12.3–15.4)
GLOBULIN UR ELPH-MCNC: 3.3 GM/DL
GLUCOSE SERPL-MCNC: 90 MG/DL (ref 65–99)
HCT VFR BLD AUTO: 34.6 % (ref 34–46.6)
HGB BLD-MCNC: 11.9 G/DL (ref 12–15.9)
IMM GRANULOCYTES # BLD AUTO: 0.01 10*3/MM3 (ref 0–0.05)
IMM GRANULOCYTES NFR BLD AUTO: 0.3 % (ref 0–0.5)
LYMPHOCYTES # BLD AUTO: 1.37 10*3/MM3 (ref 0.7–3.1)
LYMPHOCYTES NFR BLD AUTO: 34.9 % (ref 19.6–45.3)
MCH RBC QN AUTO: 32.2 PG (ref 26.6–33)
MCHC RBC AUTO-ENTMCNC: 34.4 G/DL (ref 31.5–35.7)
MCV RBC AUTO: 93.8 FL (ref 79–97)
MONOCYTES # BLD AUTO: 0.23 10*3/MM3 (ref 0.1–0.9)
MONOCYTES NFR BLD AUTO: 5.9 % (ref 5–12)
NEUTROPHILS NFR BLD AUTO: 2.18 10*3/MM3 (ref 1.7–7)
NEUTROPHILS NFR BLD AUTO: 55.6 % (ref 42.7–76)
NRBC BLD AUTO-RTO: 0 /100 WBC (ref 0–0.2)
PLATELET # BLD AUTO: 227 10*3/MM3 (ref 140–450)
PMV BLD AUTO: 8.9 FL (ref 6–12)
POTASSIUM SERPL-SCNC: 4.7 MMOL/L (ref 3.5–5.2)
PROT SERPL-MCNC: 7.5 G/DL (ref 6–8.5)
RBC # BLD AUTO: 3.69 10*6/MM3 (ref 3.77–5.28)
SODIUM SERPL-SCNC: 139 MMOL/L (ref 136–145)
WBC NRBC COR # BLD AUTO: 3.92 10*3/MM3 (ref 3.4–10.8)

## 2025-08-04 PROCEDURE — 36415 COLL VENOUS BLD VENIPUNCTURE: CPT

## 2025-08-04 PROCEDURE — 85025 COMPLETE CBC W/AUTO DIFF WBC: CPT

## 2025-08-04 PROCEDURE — 80053 COMPREHEN METABOLIC PANEL: CPT

## 2025-08-04 PROCEDURE — 82610 CYSTATIN C: CPT

## 2025-08-06 LAB
CYSTATIN C SERPL-MCNC: 0.54 MG/L (ref 0.6–1)
GFR/BSA.PRED SERPLBLD CYS-BASED-ARV: 125 ML/MIN/1.73

## 2025-08-18 ENCOUNTER — LAB (OUTPATIENT)
Dept: LAB | Facility: HOSPITAL | Age: 45
End: 2025-08-18
Payer: COMMERCIAL

## 2025-08-18 DIAGNOSIS — C50.112 MALIGNANT NEOPLASM OF CENTRAL PORTION OF LEFT BREAST IN FEMALE, ESTROGEN RECEPTOR POSITIVE: ICD-10-CM

## 2025-08-18 DIAGNOSIS — Z17.0 MALIGNANT NEOPLASM OF CENTRAL PORTION OF LEFT BREAST IN FEMALE, ESTROGEN RECEPTOR POSITIVE: ICD-10-CM

## 2025-08-18 LAB
ALBUMIN SERPL-MCNC: 4.3 G/DL (ref 3.5–5.2)
ALBUMIN/GLOB SERPL: 1.3 G/DL
ALP SERPL-CCNC: 83 U/L (ref 39–117)
ALT SERPL W P-5'-P-CCNC: 16 U/L (ref 1–33)
ANION GAP SERPL CALCULATED.3IONS-SCNC: 10.5 MMOL/L (ref 5–15)
AST SERPL-CCNC: 27 U/L (ref 1–32)
BASOPHILS # BLD AUTO: 0.05 10*3/MM3 (ref 0–0.2)
BASOPHILS NFR BLD AUTO: 1.6 % (ref 0–1.5)
BILIRUB SERPL-MCNC: 0.4 MG/DL (ref 0–1.2)
BUN SERPL-MCNC: 16 MG/DL (ref 6–20)
BUN/CREAT SERPL: 16.7 (ref 7–25)
CALCIUM SPEC-SCNC: 9.4 MG/DL (ref 8.6–10.5)
CHLORIDE SERPL-SCNC: 102 MMOL/L (ref 98–107)
CO2 SERPL-SCNC: 25.5 MMOL/L (ref 22–29)
CREAT SERPL-MCNC: 0.96 MG/DL (ref 0.57–1)
DEPRECATED RDW RBC AUTO: 41.4 FL (ref 37–54)
EGFRCR SERPLBLD CKD-EPI 2021: 74 ML/MIN/1.73
EOSINOPHIL # BLD AUTO: 0.08 10*3/MM3 (ref 0–0.4)
EOSINOPHIL NFR BLD AUTO: 2.5 % (ref 0.3–6.2)
ERYTHROCYTE [DISTWIDTH] IN BLOOD BY AUTOMATED COUNT: 12 % (ref 12.3–15.4)
GLOBULIN UR ELPH-MCNC: 3.3 GM/DL
GLUCOSE SERPL-MCNC: 103 MG/DL (ref 65–99)
HCT VFR BLD AUTO: 35.8 % (ref 34–46.6)
HGB BLD-MCNC: 12.3 G/DL (ref 12–15.9)
IMM GRANULOCYTES # BLD AUTO: 0.01 10*3/MM3 (ref 0–0.05)
IMM GRANULOCYTES NFR BLD AUTO: 0.3 % (ref 0–0.5)
LYMPHOCYTES # BLD AUTO: 0.94 10*3/MM3 (ref 0.7–3.1)
LYMPHOCYTES NFR BLD AUTO: 29.8 % (ref 19.6–45.3)
MCH RBC QN AUTO: 32.3 PG (ref 26.6–33)
MCHC RBC AUTO-ENTMCNC: 34.4 G/DL (ref 31.5–35.7)
MCV RBC AUTO: 94 FL (ref 79–97)
MONOCYTES # BLD AUTO: 0.19 10*3/MM3 (ref 0.1–0.9)
MONOCYTES NFR BLD AUTO: 6 % (ref 5–12)
NEUTROPHILS NFR BLD AUTO: 1.88 10*3/MM3 (ref 1.7–7)
NEUTROPHILS NFR BLD AUTO: 59.8 % (ref 42.7–76)
NRBC BLD AUTO-RTO: 0 /100 WBC (ref 0–0.2)
PLATELET # BLD AUTO: 315 10*3/MM3 (ref 140–450)
PMV BLD AUTO: 9.2 FL (ref 6–12)
POTASSIUM SERPL-SCNC: 4.5 MMOL/L (ref 3.5–5.2)
PROT SERPL-MCNC: 7.6 G/DL (ref 6–8.5)
RBC # BLD AUTO: 3.81 10*6/MM3 (ref 3.77–5.28)
SODIUM SERPL-SCNC: 138 MMOL/L (ref 136–145)
WBC NRBC COR # BLD AUTO: 3.15 10*3/MM3 (ref 3.4–10.8)

## 2025-08-18 PROCEDURE — 82610 CYSTATIN C: CPT

## 2025-08-18 PROCEDURE — 85025 COMPLETE CBC W/AUTO DIFF WBC: CPT

## 2025-08-18 PROCEDURE — 36415 COLL VENOUS BLD VENIPUNCTURE: CPT

## 2025-08-18 PROCEDURE — 80053 COMPREHEN METABOLIC PANEL: CPT

## 2025-08-20 LAB
CYSTATIN C SERPL-MCNC: 0.7 MG/L (ref 0.6–1)
GFR/BSA.PRED SERPLBLD CYS-BASED-ARV: 110 ML/MIN/1.73

## 2025-08-25 ENCOUNTER — OFFICE VISIT (OUTPATIENT)
Dept: ONCOLOGY | Facility: CLINIC | Age: 45
End: 2025-08-25
Payer: COMMERCIAL

## 2025-08-25 VITALS
HEIGHT: 67 IN | SYSTOLIC BLOOD PRESSURE: 106 MMHG | TEMPERATURE: 97.8 F | WEIGHT: 161 LBS | HEART RATE: 64 BPM | RESPIRATION RATE: 16 BRPM | OXYGEN SATURATION: 98 % | BODY MASS INDEX: 25.27 KG/M2 | DIASTOLIC BLOOD PRESSURE: 74 MMHG

## 2025-08-25 DIAGNOSIS — Z17.0 MALIGNANT NEOPLASM OF CENTRAL PORTION OF LEFT BREAST IN FEMALE, ESTROGEN RECEPTOR POSITIVE: Primary | ICD-10-CM

## 2025-08-25 DIAGNOSIS — C50.112 MALIGNANT NEOPLASM OF CENTRAL PORTION OF LEFT BREAST IN FEMALE, ESTROGEN RECEPTOR POSITIVE: Primary | ICD-10-CM

## 2025-08-25 PROCEDURE — 99214 OFFICE O/P EST MOD 30 MIN: CPT | Performed by: INTERNAL MEDICINE

## 2025-08-26 ENCOUNTER — SPECIALTY PHARMACY (OUTPATIENT)
Dept: ONCOLOGY | Facility: HOSPITAL | Age: 45
End: 2025-08-26
Payer: COMMERCIAL

## (undated) DEVICE — Device

## (undated) DEVICE — DRSNG SURESITE WNDW 4X4.5

## (undated) DEVICE — ST TBG PNEUMOCLEAR EVAC SMOKE HIFLO

## (undated) DEVICE — ELECTRD BLD EZ CLN STD 2.5IN

## (undated) DEVICE — ANTIBACTERIAL UNDYED BRAIDED (POLYGLACTIN 910), SYNTHETIC ABSORBABLE SUTURE: Brand: COATED VICRYL

## (undated) DEVICE — STERILE PVP: Brand: MEDLINE INDUSTRIES, INC.

## (undated) DEVICE — INTENDED FOR TISSUE SEPARATION, AND OTHER PROCEDURES THAT REQUIRE A SHARP SURGICAL BLADE TO PUNCTURE OR CUT.: Brand: BARD-PARKER ® SAFETYLOCK CARBON RIB-BACK BLADES

## (undated) DEVICE — SPNG GZ WOVN 4X4IN 12PLY 10/BX STRL

## (undated) DEVICE — STRIP,CLOSURE,WOUND,MEDI-STRIP,1/2X4: Brand: MEDLINE

## (undated) DEVICE — DECANT BG O JET

## (undated) DEVICE — PATIENT RETURN ELECTRODE, SINGLE-USE, CONTACT QUALITY MONITORING, ADULT, WITH 9FT CORD, FOR PATIENTS WEIGING OVER 33LBS. (15KG): Brand: MEGADYNE

## (undated) DEVICE — PAD STEEP TRENDELENBURG W/RAIL STRAP INTEGR ARM PROTECT WING

## (undated) DEVICE — CVR HNDL LIGHT RIGID

## (undated) DEVICE — LUBE JELLY PK/2.75GM STRL BX/144

## (undated) DEVICE — SOL IRR NACL 0.9PCT BT 1000ML

## (undated) DEVICE — 3M™ IOBAN™ 2 ANTIMICROBIAL INCISE DRAPE 6650EZ: Brand: IOBAN™ 2

## (undated) DEVICE — PK CHST BRST 10

## (undated) DEVICE — HYBRID CO2 TUBING/CAP SET FOR OLYMPUS® SCOPES & CO2 SOURCE: Brand: ERBE

## (undated) DEVICE — SHEATH GUIDE SCOUT SURG TPR 8.3TO3.2CM 264CM STRL

## (undated) DEVICE — SUT ETHLN 2/0 PS 18IN 585H

## (undated) DEVICE — SUT VIC 0 UR6 27IN VCP603H

## (undated) DEVICE — GLV SURG SENSICARE POLYISPRN W/ALOE PF LF 6.5 GRN STRL

## (undated) DEVICE — IRRIGATOR BULB ASEPTO 60CC STRL

## (undated) DEVICE — SUT MNCRYL PLS ANTIB UD 3/0 PS2 27IN

## (undated) DEVICE — SUT SILK 2/0 TIES 18IN A185H

## (undated) DEVICE — SUT SILK 3/0 TIES 18IN A184H

## (undated) DEVICE — SXN/IRR STRYKEFLOW2 10FT

## (undated) DEVICE — GLV SURG SENSICARE PI MIC PF SZ7.5 LF STRL

## (undated) DEVICE — SLV SCD CALF HEMOFORCE DVT THERP REPROC MD

## (undated) DEVICE — SKN PREP SPNG STKS PVP PNT STR: Brand: MEDLINE INDUSTRIES, INC.

## (undated) DEVICE — BLAKE SILICONE DRAIN, 15 FR ROUND, HUBLESS WITH 3/16" TROCAR: Brand: BLAKE

## (undated) DEVICE — SUT SILK 2/0 PS 18IN 1588H

## (undated) DEVICE — BIOPATCH™ ANTIMICROBIAL DRESSING WITH CHLORHEXIDINE GLUCONATE IS A HYDROPHILLIC POLYURETHANE ABSORPTIVE FOAM WITH CHLORHEXIDINE GLUCONATE (CHG) WHICH INHIBITS BACTERIAL GROWTH UNDER THE DRESSING. THE DRESSING IS INTENDED TO BE USED TO ABSORB EXUDATE, COVER A WOUND CAUSED BY VASCULAR AND NONVASCULAR PERCUTANEOUS MEDICAL DEVICES DURING SURGERY, AS WELL AS REDUCE LOCAL INFECTION AND COLONIZATION OF MICROORGANISMS.: Brand: BIOPATCH

## (undated) DEVICE — DISH PETRI 3.5IN MD STRL LF

## (undated) DEVICE — TRAP FLD MINIVAC MEGADYNE 100ML

## (undated) DEVICE — UNDERGLV SURG BIOGEL INDICAT PI SZ8 BLU

## (undated) DEVICE — TROC BLADLES XCEL/OPTI SLV THRD 12X100

## (undated) DEVICE — INTENDED USE FOR SURGICAL MARKING ON INTACT SKIN, ALSO PROVIDES A PERMANENT METHOD OF IDENTIFYING OBJECTS IN THE OPERATING ROOM: Brand: WRITESITE® REGULAR TIP SKIN MARKER

## (undated) DEVICE — TROC BLADLES XCEL/OPTI SLV THRD 11X100

## (undated) DEVICE — PROXIMATE RH ROTATING HEAD SKIN STAPLERS (35 WIDE) CONTAINS 35 STAINLESS STEEL STAPLES: Brand: PROXIMATE

## (undated) DEVICE — SYR LL TP 10ML STRL

## (undated) DEVICE — ADHS SKIN PREMIERPRO EXOFIN TOPICAL HI/VISC .5ML

## (undated) DEVICE — PCH INST SURG INVISISHIELD 2PCKT

## (undated) DEVICE — SUT VIC 3/0 SH 27IN UD VCP416H

## (undated) DEVICE — SUT MNCRYL PLS ANTIB UD 4/0 PS2 18IN

## (undated) DEVICE — PCH DRN BRST RECONSTRUCT BRA LXF

## (undated) DEVICE — ELECTRD BLD EZ CLN STD 4IN

## (undated) DEVICE — DEV DEL BRST/IMP GEL KELLER2 FUNNEL EA/5

## (undated) DEVICE — SLV TROC ENDOPATHXCEL THRD 5X100MM CB5LT

## (undated) DEVICE — HDRST POSTN SLOTTED A/

## (undated) DEVICE — SUT ETHLN PLSTC P1 6/0 18IN 697H

## (undated) DEVICE — TBG PENCL TELESCP MEGADYNE SMOKE EVAC 10FT

## (undated) DEVICE — ENDOSCOPY PORT CONNECTOR FOR OLYMPUS® SCOPES: Brand: ERBE

## (undated) DEVICE — 3M™ STERI-STRIP™ REINFORCED ADHESIVE SKIN CLOSURES, R1547, 1/2 IN X 4 IN (12 MM X 100 MM), 6 STRIPS/ENVELOPE: Brand: 3M™ STERI-STRIP™

## (undated) DEVICE — VLV SXN AIR/H2O ORCAPOD3 1P/U STRL

## (undated) DEVICE — PK LAVH 20

## (undated) DEVICE — ELECTRD BLD MEGADYNE 2.5IN SS

## (undated) DEVICE — TROC BLADLES XCEL/OPTI SLV THRD 5X100

## (undated) DEVICE — BANDAGE,GAUZE,BULKEE II,4.5"X4.1YD,STRL: Brand: MEDLINE

## (undated) DEVICE — GLV SURG ULTRATOUCH BIOGEL/COAT PF LF SZ6 STRL

## (undated) DEVICE — NDL HYPO ECLPS SFTY 25G 1 1/2IN

## (undated) DEVICE — LEX GENERAL BREAST: Brand: MEDLINE INDUSTRIES, INC.

## (undated) DEVICE — SUT SILK 2/0 SH CR8 18IN CR8 C012D

## (undated) DEVICE — GLV SURG SENSICARE PI MIC PF SZ6.5 LF STRL